# Patient Record
Sex: MALE | Race: WHITE | NOT HISPANIC OR LATINO | Employment: OTHER | URBAN - METROPOLITAN AREA
[De-identification: names, ages, dates, MRNs, and addresses within clinical notes are randomized per-mention and may not be internally consistent; named-entity substitution may affect disease eponyms.]

---

## 2017-01-07 ENCOUNTER — HOSPITAL ENCOUNTER (INPATIENT)
Facility: HOSPITAL | Age: 82
LOS: 5 days | Discharge: HOME HEALTH | End: 2017-01-13
Attending: EMERGENCY MEDICINE | Admitting: HOSPITALIST

## 2017-01-07 ENCOUNTER — APPOINTMENT (OUTPATIENT)
Dept: GENERAL RADIOLOGY | Facility: HOSPITAL | Age: 82
End: 2017-01-07

## 2017-01-07 DIAGNOSIS — E11.628 TYPE 2 DIABETES MELLITUS WITH OTHER SKIN COMPLICATION, WITHOUT LONG-TERM CURRENT USE OF INSULIN (HCC): ICD-10-CM

## 2017-01-07 DIAGNOSIS — L02.611 ABSCESS OF RIGHT FOOT: ICD-10-CM

## 2017-01-07 DIAGNOSIS — L03.115 CELLULITIS OF RIGHT FOOT: Primary | ICD-10-CM

## 2017-01-07 LAB
ALBUMIN SERPL-MCNC: 3.7 G/DL (ref 3.5–5.2)
ALBUMIN/GLOB SERPL: 1.5 G/DL
ALP SERPL-CCNC: 71 U/L (ref 40–129)
ALT SERPL W P-5'-P-CCNC: 11 U/L (ref 5–41)
ANION GAP SERPL CALCULATED.3IONS-SCNC: 14.3 MMOL/L
AST SERPL-CCNC: 12 U/L (ref 5–40)
BASOPHILS # BLD AUTO: 0.06 10*3/MM3 (ref 0–0.2)
BASOPHILS NFR BLD AUTO: 0.8 % (ref 0–2)
BILIRUB SERPL-MCNC: 0.4 MG/DL (ref 0.2–1.2)
BUN BLD-MCNC: 25 MG/DL (ref 8–23)
BUN/CREAT SERPL: 20.2 (ref 7–25)
CALCIUM SPEC-SCNC: 8.8 MG/DL (ref 8.8–10.5)
CHLORIDE SERPL-SCNC: 104 MMOL/L (ref 98–107)
CO2 SERPL-SCNC: 22.7 MMOL/L (ref 22–29)
CREAT BLD-MCNC: 1.24 MG/DL (ref 0.76–1.27)
DEPRECATED RDW RBC AUTO: 45.5 FL (ref 37–54)
EOSINOPHIL # BLD AUTO: 0.34 10*3/MM3 (ref 0.1–0.3)
EOSINOPHIL NFR BLD AUTO: 4.8 % (ref 0–4)
ERYTHROCYTE [DISTWIDTH] IN BLOOD BY AUTOMATED COUNT: 14 % (ref 11.5–14.5)
GFR SERPL CREATININE-BSD FRML MDRD: 56 ML/MIN/1.73
GLOBULIN UR ELPH-MCNC: 2.5 GM/DL
GLUCOSE BLD-MCNC: 163 MG/DL (ref 65–99)
HCT VFR BLD AUTO: 39 % (ref 42–52)
HGB BLD-MCNC: 12.6 G/DL (ref 14–18)
IMM GRANULOCYTES # BLD: 0.05 10*3/MM3 (ref 0–0.03)
IMM GRANULOCYTES NFR BLD: 0.7 % (ref 0–0.5)
LYMPHOCYTES # BLD AUTO: 0.99 10*3/MM3 (ref 0.6–4.8)
LYMPHOCYTES NFR BLD AUTO: 13.9 % (ref 20–45)
MCH RBC QN AUTO: 28.8 PG (ref 27–31)
MCHC RBC AUTO-ENTMCNC: 32.3 G/DL (ref 31–37)
MCV RBC AUTO: 89.2 FL (ref 80–94)
MONOCYTES # BLD AUTO: 0.84 10*3/MM3 (ref 0–1)
MONOCYTES NFR BLD AUTO: 11.8 % (ref 3–8)
NEUTROPHILS # BLD AUTO: 4.86 10*3/MM3 (ref 1.5–8.3)
NEUTROPHILS NFR BLD AUTO: 68 % (ref 45–70)
NRBC BLD MANUAL-RTO: 0 /100 WBC (ref 0–0)
PLATELET # BLD AUTO: 197 10*3/MM3 (ref 140–500)
PMV BLD AUTO: 10.8 FL (ref 7.4–10.4)
POTASSIUM BLD-SCNC: 4.3 MMOL/L (ref 3.5–5.2)
PROT SERPL-MCNC: 6.2 G/DL (ref 6–8.5)
RBC # BLD AUTO: 4.37 10*6/MM3 (ref 4.7–6.1)
SODIUM BLD-SCNC: 141 MMOL/L (ref 136–145)
WBC NRBC COR # BLD: 7.14 10*3/MM3 (ref 4.8–10.8)

## 2017-01-07 PROCEDURE — 85025 COMPLETE CBC W/AUTO DIFF WBC: CPT | Performed by: EMERGENCY MEDICINE

## 2017-01-07 PROCEDURE — 25010000002 VANCOMYCIN PER 500 MG: Performed by: EMERGENCY MEDICINE

## 2017-01-07 PROCEDURE — G0378 HOSPITAL OBSERVATION PER HR: HCPCS

## 2017-01-07 PROCEDURE — 80053 COMPREHEN METABOLIC PANEL: CPT | Performed by: EMERGENCY MEDICINE

## 2017-01-07 PROCEDURE — 99284 EMERGENCY DEPT VISIT MOD MDM: CPT

## 2017-01-07 PROCEDURE — 73630 X-RAY EXAM OF FOOT: CPT

## 2017-01-07 PROCEDURE — 99284 EMERGENCY DEPT VISIT MOD MDM: CPT | Performed by: EMERGENCY MEDICINE

## 2017-01-07 RX ORDER — CEPHALEXIN 500 MG/1
500 CAPSULE ORAL 4 TIMES DAILY
COMMUNITY
End: 2017-01-13 | Stop reason: HOSPADM

## 2017-01-07 RX ORDER — SODIUM CHLORIDE 9 MG/ML
INJECTION, SOLUTION INTRAVENOUS
Status: DISPENSED
Start: 2017-01-07 | End: 2017-01-08

## 2017-01-07 RX ORDER — SODIUM CHLORIDE 0.9 % (FLUSH) 0.9 %
10 SYRINGE (ML) INJECTION AS NEEDED
Status: DISCONTINUED | OUTPATIENT
Start: 2017-01-07 | End: 2017-01-13 | Stop reason: HOSPADM

## 2017-01-07 RX ORDER — ANTIOX #8/OM3/DHA/EPA/LUT/ZEAX 250-2.5 MG
1 CAPSULE ORAL 2 TIMES DAILY
COMMUNITY
End: 2017-11-21

## 2017-01-07 RX ADMIN — VANCOMYCIN HYDROCHLORIDE 2000 MG: 1 INJECTION, POWDER, LYOPHILIZED, FOR SOLUTION INTRAVENOUS at 22:27

## 2017-01-07 NOTE — Clinical Note
Level of Care: Med/Surg [1]   Admitting Physician: JULES SAENZ [[id number]]   Attending Physician: JULES SAENZ [[id number]]   Patient Class: Observation [104]

## 2017-01-07 NOTE — IP AVS SNAPSHOT
AFTER VISIT SUMMARY             Joey Schmitt           About your hospitalization     You were admitted on:  January 7, 2017 You last received care in the:  [facility] SURG       Procedures & Surgeries      Procedure(s) (LRB):  INCISION AND DRAINAGE LOWER EXTREMITY (Right)     1/7/2017 - 1/11/2017     Surgeon(s):  Mikael Dwyer DPM  -------------------      Medications    If you or your caregiver advised us that you are currently taking a medication and that medication is marked below as “Resume”, this simply indicates that we have reviewed those medications to make sure our new therapy recommendations do not interfere.  If you have concerns about medications other than those new ones which we are prescribing today, please consult the physician who prescribed them (or your primary physician).  Our review of your home medications is not meant to indicate that we are directing their use.             Your Medications      START taking these medications     amoxicillin-clavulanate 875-125 MG per tablet   Take 1 tablet by mouth 2 (Two) Times a Day.   Commonly known as:  AUGMENTIN   Next Dose Due:  01/13/2017 7:00 PM           HYDROcodone-acetaminophen 5-325 MG per tablet   Take 1 tablet by mouth Every 6 (Six) Hours As Needed for moderate pain (4-6).   Last time this was given:  1/12/2017  2:24 PM   Commonly known as:  NORCO           lactobacillus acidophilus capsule capsule   Take 1 capsule by mouth Daily.   Next Dose Due:  01/14/2017 8:00 AM           lisinopril 20 MG tablet   Take 1 tablet by mouth Daily.   Last time this was given:  1/13/2017  9:31 AM   Commonly known as:  PRINIVIL,ZESTRIL             CONTINUE taking these medications     glipiZIDE 10 MG tablet   Take 10 mg by mouth 2 (two) times a day before meals   Last time this was given:  1/13/2017  9:31 AM   Commonly known as:  GLUCOTROL   Next Dose Due:  01/13/2017 6:00 PM           levothyroxine 100 MCG tablet   Take 100 mcg by  mouth daily   Last time this was given:  1/13/2017  4:57 AM   Commonly known as:  SYNTHROID, LEVOTHROID   Next Dose Due:  01/14/2017 8:00 AM           meloxicam 7.5 MG tablet   Take 1 tablet by mouth Daily.   Last time this was given:  1/13/2017  9:31 AM   Commonly known as:  MOBIC   Next Dose Due:  01/14/2017 8:00 AM           metFORMIN 1000 MG tablet   Take 1,000 mg by mouth 2 (two) times a day with meals   Last time this was given:  1/13/2017  9:31 AM   Commonly known as:  GLUCOPHAGE   Next Dose Due:  01/13/2017 6:00 PM           montelukast 10 MG tablet   TAKE ONE TABLET DAILY IN THE EVENING   Last time this was given:  1/12/2017 10:23 PM   Commonly known as:  SINGULAIR           ONE TOUCH ULTRA 2 W/DEVICE kit   USE AS DIRECTED           PRESERVISION AREDS 2 capsule   Take 1 tablet by mouth 2 (Two) Times a Day.           VICTOZA 18 MG/3ML solution pen-injector   Inject under the skin   Generic drug:  Liraglutide             STOP taking these medications     ACTOS 30 MG tablet   Generic drug:  pioglitazone           cephalexin 500 MG capsule   Commonly known as:  KEFLEX           lisinopril-hydrochlorothiazide 20-25 MG per tablet   Commonly known as:  PRINZIDE,ZESTORETIC                Where to Get Your Medications      These medications were sent to [facility]/pharmacy #[id number] - [place], [place] - [address] - [phone]  - [phone] [address] [place] [address]     Phone:  [phone]     amoxicillin-clavulanate 875-125 MG per tablet    lactobacillus acidophilus capsule capsule    lisinopril 20 MG tablet         You can get these medications from any pharmacy     Bring a paper prescription for each of these medications     HYDROcodone-acetaminophen 5-325 MG per tablet          Pharmacy Instructions:      Patient fills scripts at [facility] pharmacy in [place].                         Your Medications      Your Medication List           Morning Noon Evening Bedtime As Needed     amoxicillin-clavulanate 875-125 MG per tablet   Take 1 tablet by mouth 2 (Two) Times a Day.   Commonly known as:  AUGMENTIN                       01/13/2017 7:00 PM               glipiZIDE 10 MG tablet   Take 10 mg by mouth 2 (two) times a day before meals   Commonly known as:  GLUCOTROL                       01/13/2017 6:00 PM               HYDROcodone-acetaminophen 5-325 MG per tablet   Take 1 tablet by mouth Every 6 (Six) Hours As Needed for moderate pain (4-6).   Commonly known as:  NORCO                                   lactobacillus acidophilus capsule capsule   Take 1 capsule by mouth Daily.            01/14/2017 8:00 AM                       levothyroxine 100 MCG tablet   Take 100 mcg by mouth daily   Commonly known as:  SYNTHROID, LEVOTHROID            01/14/2017 8:00 AM                       lisinopril 20 MG tablet   Take 1 tablet by mouth Daily.   Commonly known as:  PRINIVIL,ZESTRIL            01/14/2017 8:00 AM                       meloxicam 7.5 MG tablet   Take 1 tablet by mouth Daily.   Commonly known as:  MOBIC            01/14/2017 8:00 AM                       metFORMIN 1000 MG tablet   Take 1,000 mg by mouth 2 (two) times a day with meals   Commonly known as:  GLUCOPHAGE                       01/13/2017 6:00 PM               montelukast 10 MG tablet   TAKE ONE TABLET DAILY IN THE EVENING   Commonly known as:  SINGULAIR                    01/13/2017 6:00 PM               ONE TOUCH ULTRA 2 W/DEVICE kit   USE AS DIRECTED                                PRESERVISION AREDS 2 capsule   Take 1 tablet by mouth 2 (Two) Times a Day.                       01/13/2017 6:00 PM               VICTOZA 18 MG/3ML solution pen-injector   Inject under the skin   Generic drug:  Liraglutide                                         Pharmacy Instructions:      Patient fills scripts at [facility] pharmacy in [place].                 Instructions for After Discharge        Activity Instructions     Activity as Tolerated            Measure Blood Pressure                 Other Instructions     Check Blood Glucose - Fingerstick           Discharge Dressing / Wound Instructions       Dakin's wet to dry daily       Discharge Instructions       Review prior to discharge             Discharge References/Attachments     ABSCESS (ENGLISH)    CELLULITIS, EASY-TO-READ (ENGLISH)    DIABETES AND FOOT CARE (ENGLISH)    DIABETES AND SICK DAY MANAGEMENT (ENGLISH)    DIABETES AND EXERCISE-SPORTSMED (ENGLISH)    TYPE 2 DIABETES MELLITUS, ADULT, EASY-TO-READ (ENGLISH)       Follow-ups for After Discharge        Follow-up Information     Follow up with Romeo Chamberlain MD .    Specialty:  Family Medicine    Contact information:    [address]  [facility] [id number] [phone]          Follow up with [facility] .    Specialty:  Home Health Services    Contact information:    [address] [facility] [address]  [place] [id number] [phone]        Follow up with Mikael Dwyer DPM .    Specialty:  Podiatry    Contact information:    [address]  [address]  [place] [id number] [phone]        Referrals and Follow-ups to Schedule     Additional Follow-Up    As directed    Romeo Chamberlain MD   Follow Up Details:  1 week       Follow-Up    As directed    Dr. Dwyer   Follow Up Details:  within 1 week   Dr. Dwyer             Scheduled Appointments     Follow up with Dr. Romeo Chamberlain on  at 2:10    Follow up with Dr. Dwyer at the [place] office on  at 3:30 phone #[phone]           Pact Signup     [employer] allows you to send messages to your doctor, view your test results, renew your prescriptions, schedule appointments, and more. To sign up, go to [employer] and click on the Sign Up Now link in the New User? box. Enter your Pact Activation Code exactly as it appears below along with the last four digits of your Social Security Number and your Date of Birth () to complete  the sign-up process. If you do not sign up before the expiration date, you must request a new code.    MyChart Activation Code: [email or web address]  Expires: 1/27/2017 10:30 AM    If you have questions, you can email [email or web address] or call [phone] to talk to our MyChart staff. Remember, MyChart is NOT to be used for urgent needs. For medical emergencies, dial 911.           Summary of Your Hospitalization        Reason for Hospitalization     Your primary diagnosis was:  Not on File    Your diagnoses also included:  Cellulitis Of Foot, Right, Cellulitis Of Right Foot      Care Providers     Provider Service Role Specialty    Charlee Corona DO Internal Medicine Attending Provider Internal Medicine    Mikael Dwyer DPM -- Consulting Physician  Podiatry    Mikael Dwyer DPM -- Surgeon  Podiatry      Your Allergies  Date Reviewed: 1/11/2017    No active allergies      Pending Labs     Order Current Status    Anaerobic Culture In process    Wound Culture Preliminary result      Patient Belongings Returned     Document Return of Belongings Flowsheet     Were the patient bedside belongings sent home?   Yes   Belongings Retrieved from Security & Sent Home   N/A    Belongings Sent to Safe   --   Medications Retrieved from Pharmacy & Sent Home   Yes              More Information      Abscess  An abscess is an infected area that contains a collection of pus and debris. It can occur in almost any part of the body. An abscess is also known as a furuncle or boil.  CAUSES   An abscess occurs when tissue gets infected. This can occur from blockage of oil or sweat glands, infection of hair follicles, or a minor injury to the skin. As the body tries to fight the infection, pus collects in the area and creates pressure under the skin. This pressure causes pain. People with weakened immune systems have difficulty fighting infections and get certain abscesses more often.   SYMPTOMS  Usually an abscess develops  on the skin and becomes a painful mass that is red, warm, and tender. If the abscess forms under the skin, you may feel a moveable soft area under the skin. Some abscesses break open (rupture) on their own, but most will continue to get worse without care. The infection can spread deeper into the body and eventually into the bloodstream, causing you to feel ill.   DIAGNOSIS   Your caregiver will take your medical history and perform a physical exam. A sample of fluid may also be taken from the abscess to determine what is causing your infection.  TREATMENT   Your caregiver may prescribe antibiotic medicines to fight the infection. However, taking antibiotics alone usually does not cure an abscess. Your caregiver may need to make a small cut (incision) in the abscess to drain the pus. In some cases, gauze is packed into the abscess to reduce pain and to continue draining the area.  HOME CARE INSTRUCTIONS   · Only take over-the-counter or prescription medicines for pain, discomfort, or fever as directed by your caregiver.  · If you were prescribed antibiotics, take them as directed. Finish them even if you start to feel better.  · If gauze is used, follow your caregiver's directions for changing the gauze.  · To avoid spreading the infection:    Keep your draining abscess covered with a bandage.    Wash your hands well.    Do not share personal care items, towels, or whirlpools with others.    Avoid skin contact with others.  · Keep your skin and clothes clean around the abscess.  · Keep all follow-up appointments as directed by your caregiver.  SEEK MEDICAL CARE IF:   · You have increased pain, swelling, redness, fluid drainage, or bleeding.  · You have muscle aches, chills, or a general ill feeling.  · You have a fever.  MAKE SURE YOU:   · Understand these instructions.  · Will watch your condition.  · Will get help right away if you are not doing well or get worse.     This information is not intended to replace  advice given to you by your health care provider. Make sure you discuss any questions you have with your health care provider.     Document Released: 09/27/2006 Document Revised: 06/18/2013 Document Reviewed: 03/01/2013  [employer] Patient Education ©2016 [employer]          Cellulitis  Cellulitis is an infection of the skin and the tissue under the skin. The infected area is usually red and tender. This happens most often in the arms and lower legs.  HOME CARE   · Take your antibiotic medicine as told. Finish the medicine even if you start to feel better.  · Keep the infected arm or leg raised (elevated).  · Put a warm cloth on the area up to 4 times per day.  · Only take medicines as told by your doctor.  · Keep all doctor visits as told.  GET HELP IF:  · You see red streaks on the skin coming from the infected area.  · Your red area gets bigger or turns a dark color.  · Your bone or joint under the infected area is painful after the skin heals.  · Your infection comes back in the same area or different area.  · You have a puffy (swollen) bump in the infected area.  · You have new symptoms.  · You have a fever.  GET HELP RIGHT AWAY IF:   · You feel very sleepy.  · You throw up (vomit) or have watery poop (diarrhea).  · You feel sick and have muscle aches and pains.     This information is not intended to replace advice given to you by your health care provider. Make sure you discuss any questions you have with your health care provider.     Document Released: 06/05/2009 Document Revised: 09/07/2016 Document Reviewed: 03/04/2013  [employer] Patient Education ©2016 [employer] Inc.          Diabetes and Foot Care  Diabetes may cause you to have problems because of poor blood supply (circulation) to your feet and legs. This may cause the skin on your feet to become thinner, break easier, and heal more slowly. Your skin may become dry, and the skin may peel and crack. You may also have nerve damage  in your legs and feet causing decreased feeling in them. You may not notice minor injuries to your feet that could lead to infections or more serious problems. Taking care of your feet is one of the most important things you can do for yourself.   HOME CARE INSTRUCTIONS  · Wear shoes at all times, even in the house. Do not go barefoot. Bare feet are easily injured.  · Check your feet daily for blisters, cuts, and redness. If you cannot see the bottom of your feet, use a mirror or ask someone for help.  · Wash your feet with warm water (do not use hot water) and mild soap. Then pat your feet and the areas between your toes until they are completely dry. Do not soak your feet as this can dry your skin.  · Apply a moisturizing lotion or petroleum jelly (that does not contain alcohol and is unscented) to the skin on your feet and to dry, brittle toenails. Do not apply lotion between your toes.  · Trim your toenails straight across. Do not dig under them or around the cuticle. File the edges of your nails with an emery board or nail file.  · Do not cut corns or calluses or try to remove them with medicine.  · Wear clean socks or stockings every day. Make sure they are not too tight. Do not wear knee-high stockings since they may decrease blood flow to your legs.  · Wear shoes that fit properly and have enough cushioning. To break in new shoes, wear them for just a few hours a day. This prevents you from injuring your feet. Always look in your shoes before you put them on to be sure there are no objects inside.  · Do not cross your legs. This may decrease the blood flow to your feet.  · If you find a minor scrape, cut, or break in the skin on your feet, keep it and the skin around it clean and dry. These areas may be cleansed with mild soap and water. Do not cleanse the area with peroxide, alcohol, or iodine.  · When you remove an adhesive bandage, be sure not to damage the skin around it.  · If you have a wound, look at  it several times a day to make sure it is healing.  · Do not use heating pads or hot water bottles. They may burn your skin. If you have lost feeling in your feet or legs, you may not know it is happening until it is too late.  · Make sure your health care provider performs a complete foot exam at least annually or more often if you have foot problems. Report any cuts, sores, or bruises to your health care provider immediately.  SEEK MEDICAL CARE IF:   · You have an injury that is not healing.  · You have cuts or breaks in the skin.  · You have an ingrown nail.  · You notice redness on your legs or feet.  · You feel burning or tingling in your legs or feet.  · You have pain or cramps in your legs and feet.  · Your legs or feet are numb.  · Your feet always feel cold.  SEEK IMMEDIATE MEDICAL CARE IF:   · There is increasing redness, swelling, or pain in or around a wound.  · There is a red line that goes up your leg.  · Pus is coming from a wound.  · You develop a fever or as directed by your health care provider.  · You notice a bad smell coming from an ulcer or wound.     This information is not intended to replace advice given to you by your health care provider. Make sure you discuss any questions you have with your health care provider.     Document Released: 12/15/2001 Document Revised: 08/20/2014 Document Reviewed: 05/27/2014  [employer] Patient Education ©2016 [employer]          Diabetes and Sick Day Management  Blood sugar (glucose) can be more difficult to control when you are sick. Colds, fever, flu, nausea, vomiting, and diarrhea are all examples of common illnesses that can cause problems for people with diabetes. Loss of body fluids (dehydration) from fever, vomiting, diarrhea, infection, and the stress of a sickness can all cause blood glucose levels to increase. Because of this, it is very important to take your diabetes medicines and to eat some form of carbohydrate food when you are  sick. Liquid or soft foods are often tolerated, and they help to replace fluids.  HOME CARE INSTRUCTIONS  These main guidelines are intended for managing a short-term (24 hours or less) sickness:  · Take your usual dose of insulin or oral diabetes medicine. An exception would be if you take any form of metformin. If you cannot eat or drink, you can become dehydrated and should not take this medicine.  · Continue to take your insulin even if you are unable to eat solid foods or are vomiting. Your insulin dose may stay the same, or it may need to be increased when you are sick.  · You will need to test your blood glucose more often, generally every 2-4 hours. If you have type 1 diabetes, test your urine for ketones every 4 hours. If you have type 2 diabetes, test your urine for ketones as directed by your health care provider.  · Eat some form of food that contains carbohydrates. The carbohydrates can be in solid or liquid form. You should eat 45-50 g of carbohydrates every 3-4 hours.  · Replace fluids if you have a fever, vomit, or have diarrhea. Ask your health care provider for specific rehydration instructions.  · Watch carefully for the signs of ketoacidosis if you have type 1 diabetes. Call your health care provider if any of the following symptoms are present, especially in children:    Moderate to large ketones in the urine along with a high blood glucose level.    Severe nausea.    Vomiting.    Diarrhea.    Abdominal pain.    Rapid breathing.  · Drink extra liquids that do not contain sugar such as water.  · Be careful with over-the-counter medicines. Read the labels. They may contain sugar or types of sugars that can increase your blood glucose level.  Food Choices for Illness  All of the food choices below contain about 15 g of carbohydrates. Plan ahead and keep some of these foods around.   · ½ to ¾ cup carbonated beverage containing sugar. Carbonated beverages will usually be better tolerated if they are  opened and left at room temperature for a few minutes.  · ½ of a twin frozen ice pop.  · ½ cup regular gelatin.  · ½ cup juice.  · ½ cup ice cream or frozen yogurt.  · ½ cup cooked cereal.  · ¼ cup sherbet.  · 1 cup clear broth or soup.  · 1 cup cream soup.  · ½ cup regular custard.  · ½ cup regular pudding.  · 1 cup sports drink.  · 1 cup plain yogurt.  · 1 slice toast.  · 6 squares saltine crackers.  · 5 vanilla wafers.  SEEK MEDICAL CARE IF:   · You are unable to drink fluids, even small amounts.  · You have nausea and vomiting for more than 6 hours.  · You have diarrhea for more than 6 hours.  · Your blood glucose level is more than 240 mg/dL, even with additional insulin.  · There is a change in mental status.  · You develop an additional serious sickness.  · You have been sick for 2 days and are not getting better.  · You have a fever.  SEEK IMMEDIATE MEDICAL CARE IF:  · You have difficulty breathing.  · You have moderate to large ketone levels.  MAKE SURE YOU:  · Understand these instructions.  · Will watch your condition.  · Will get help right away if you are not doing well or get worse.     This information is not intended to replace advice given to you by your health care provider. Make sure you discuss any questions you have with your health care provider.     Document Released: 12/20/2004 Document Revised: 01/08/2016 Document Reviewed: 05/27/2014  [employer] Interactive Patient Education ©2016 [employer]          Diabetes and Exercise  Diabetes mellitus is a common, chronic disease in which a person's blood sugar (glucose) levels are often too high. Getting exercise on a regular basis is an important part of diabetes treatment.  WHY SHOULD I EXERCISE REGULARLY IF I HAVE DIABETES?  Exercising regularly provides many benefits for people who have diabetes. Some of these benefits include:  · Lowering your blood glucose level.  · Maintaining a healthy body weight and structure.  · Reducing your risk of  heart disease by:    Lowering your LDL cholesterol levels. LDL is sometimes called bad cholesterol.    Lowering your triglyceride levels.    Raising your HDL cholesterol levels. HDL is sometimes called good cholesterol.    Decreasing your blood pressure.  · Lowering your hemoglobin A1C levels. A1C is a blood test that determines your average blood glucose level over a 3-month period. Doing resistance exercises on a regular basis can help to lower these levels.  · Improving your body's ability to use insulin and glucose.  WHAT TYPES OF EXERCISE ARE RECOMMENDED FOR PEOPLE WHO HAVE DIABETES?  A combination of resistance exercises and aerobic exercise is recommended for people who have diabetes. Resistance exercises are those that you do with free weights or weight machines. Aerobic exercise includes any exercise that raises your heart rate and breathing rate for a sustained amount of time. This can include activities such as:  · Brisk walking.  · Water or dry-land aerobics.  · Bicycling or riding a stationary bike.  · Jogging.  · Dancing.  · Hiking.  · Moderate to vigorous gardening.  Ask your health care provider what types of exercise are safe for you.  HOW LONG AND HOW OFTEN SHOULD I EXERCISE?  The American Diabetes Association and the U.S. Department of Health recommend the following:  · Children who have diabetes or are at risk of developing diabetes (have prediabetes) should get 1 hour or more of exercise every day.  · Adults who have diabetes should exercise with moderate intensity for 150 minutes or more per week. Moderate-intensity exercise is any exercise that raises your heart rate to 50-70% of your maximum heart rate. Ask your health care provider how to calculate this.  · Adults who have diabetes should spread exercise over at least 3 days per week, but they should not go more than 2 days in a row without exercising.  · Adults who have diabetes should do resistance exercise at least 2 days per  week.  · Adults who have diabetes should avoid sitting for more than 90 minutes at a time.  WHAT SHOULD I DO BEFORE I START AN EXERCISE PROGRAM?  · Talk with your health care provider before you start a new exercise program. If you have, or are at risk for, certain medical conditions, you may need to have a physical exam and testing. Testing may include:    A chest X-ray.    An electrocardiogram (ECG). This test checks the electrical functioning of your heart.    Blood tests.  · If you have type 1 diabetes, talk with your health care provider about managing your blood glucose levels with insulin before, during, and after exercise.  · Understand that exercise affects blood glucose levels differently depending on how long you exercise and depending on other factors such as whether you are sick. Be ready to treat high blood glucose (hyperglycemia) and low blood glucose (hypoglycemia) right away if either occurs during or after exercise.  · Talk with your health care provider about diabetes-related problems that can occur during exercise. Your health care provider can help you to decide on an exercise plan that allows you to avoid these problems. This plan might include instructions about choosing the proper footwear for exercise and staying well hydrated during and after exercise.  HOW CAN I MANAGE MY BLOOD GLUCOSE LEVEL WHILE I EXERCISE?  · Eat 1-3 hours before you exercise.  · Check your blood glucose level immediately before and after exercise.  · Do not start exercising if:    Your blood glucose is more than 250 mg/dL and you do not feel well.    You have ketones in your urine.    Your blood glucose is less than 100 mg/dL.  · If you do not feel well while exercising, take a break and check your blood glucose.  · Stop exercising if you find that your blood glucose has dropped below 100 mg/dL. If this occurs, do the following:    Eat a 15-gram to 20-gram carbohydrate-rich snack.    Recheck your blood glucose level.     If your blood glucose level does not rise above 100 mg/dL, have another 15-gram to 20-gram carbohydrate snack.  · Stop exercising if you find that your blood glucose has risen above 250 mg/dL while exercising.  · Do not go on with your workout until:    Your blood glucose level rises above 100 mg/dL if it was below this level.    Your blood glucose level drops below 250 mg/dL if it was above this level.  · If you take insulin, you may need to alter your insulin schedule on the days that you exercise. This depends on how your blood glucose responds to exercise. Ask your health care provider how to do this.  · Drink fluids during and after exercise to avoid dehydration. For exercise that lasts a long time, use a sports drink to maintain your glucose level.  SEEK MEDICAL CARE IF:  · You have stopped exercising because of hypoglycemia and your blood glucose does not rise above 100 mg/dL after you have two 15-gram to 20-gram carbohydrate-rich snacks.  · You notice a loss of sensation in your feet during or after exercise.  · You have increased numbness, tingling, or pins-and-needles sensations after exercise.  · You have a fast, irregular heartbeat (palpitations) during or after exercise.  · Your exercise tolerance gets worse.  · You have dizzy spells or feel faint for brief periods during or after exercise.  SEEK IMMEDIATE MEDICAL CARE IF:  · You have chest pain during or after exercise.  · You pass out (lose consciousness) during or after exercise.  · You have the following in addition to hyperglycemia:    Fatigue.    Dry or flushed skin.    Difficulty breathing.    Confusion.    Nausea, vomiting, or pain in the abdomen.    Fruity-smelling breath.     This information is not intended to replace advice given to you by your health care provider. Make sure you discuss any questions you have with your health care provider.     Document Released: 12/18/2006 Document Revised: 09/07/2016 Document Reviewed:  02/16/2016  [employer] Interactive Patient Education ©2016 [employer]          Type 2 Diabetes Mellitus, Adult  Type 2 diabetes mellitus is a long-term (chronic) disease. In type 2 diabetes:  · The pancreas does not make enough of a hormone called insulin.  · The cells in the body do not respond as well to the insulin that is made.  · Both of the above can happen.  Normally, insulin moves sugars from food into tissue cells. This gives you energy. If you have type 2 diabetes, sugars cannot be moved into tissue cells. This causes high blood sugar (hyperglycemia).   Your doctors will set personal treatment goals for you based on your age, your medicines, how long you have had diabetes, and any other medical conditions you have. Generally, the goal of treatment is to maintain the following blood glucose levels:  · Before meals (preprandial):  mg/dL.  · After meals (postprandial): below 180 mg/dL.  · A1c: less than 6.5-7%.  HOME CARE  · Have your hemoglobin A1c level checked twice a year. The level shows if your diabetes is under control or out of control.  · Test your blood sugar level every day as told by your doctor.  · Check your ketone levels by testing your pee (urine) when you are sick and as told.  · Take your diabetes or insulin medicine as told by your doctor.    Never run out of insulin.    Adjust how much insulin you give yourself based on how many carbs (carbohydrates) you eat. Carbs are in many foods, such as fruits, vegetables, whole grains, and dairy products.  · Have a healthy snack between every healthy meal. Have 3 meals and 3 snacks a day.  · Lose weight if you are overweight.  · Carry a medical alert card or wear your medical alert jewelry.  · Carry a 15-gram carb snack with you at all times. Examples include:    Glucose pills, 3 or 4.    Glucose gel, 15-gram tube.    Raisins, 2 tablespoons (24 grams).    Jelly beans, 6.    Animal crackers, 8.    Regular (not diet) pop, 4 ounces (120  milliliters).    Gummy treats, 9.  · Notice low blood sugar (hypoglycemia) symptoms, such as:    Shaking (tremors).    Trouble thinking clearly.    Sweating.    Faster heart rate.    Headache.    Dry mouth.    Hunger.    Crabbiness (irritability).    Being worried or tense (anxious).    Restless sleep.    A change in speech or coordination.    Confusion.  · Treat low blood sugar right away. If you are alert and can swallow, follow the 15:15 rule:    Take 15-20 grams of a rapid-acting glucose or carb. This includes glucose gel, glucose pills, or 4 ounces (120 milliliters) of fruit juice, regular pop, or low-fat milk.    Check your blood sugar level 15 minutes after taking the glucose.    Take 15-20 grams more of glucose if the repeat blood sugar level is still 70 mg/dL (milligrams/deciliter) or below.    Eat a meal or snack within 1 hour of the blood sugar levels going back to normal.  · Notice early symptoms of high blood sugar, such as:    Being really thirsty or drinking a lot (polydipsia).    Peeing a lot (polyuria).  · Do at least 150 minutes of physical activity a week or as told.    Split the 150 minutes of activity up during the week. Do not do 150 minutes of activity in one day.    Perform exercises, such as weight lifting, at least 2 times a week or as told.    Spend no more than 90 minutes at one time inactive.  · Adjust your insulin or food intake as needed if you start a new exercise or sport.  · Follow your sick-day plan when you are not able to eat or drink as usual.  · Do not smoke, chew tobacco, or use electronic cigarettes.  · Women who are not pregnant should drink no more than 1 drink a day. Men should drink no more than 2 drinks a day.    Only drink alcohol with food.    Ask your doctor if alcohol is safe for you.    Tell your doctor if you drink alcohol several times during the week.  · See your doctor regularly.  · Schedule an eye exam soon after you are told you have diabetes. Schedule exams  once every year.  · Check your skin and feet every day. Check for cuts, bruises, redness, nail problems, bleeding, blisters, or sores. A doctor should do a foot exam once a year.  · Brush your teeth and gums twice a day. Floss once a day. Visit your dentist regularly.  · Share your diabetes plan with your workplace or school.  · Keep your shots that fight diseases (vaccines) up to date.    Get a flu (influenza) shot every year.    Get a pneumonia shot. If you are 65 years of age or older and you have never gotten a pneumonia shot, you might need to get two shots.    Ask your doctor which other shots you should get.  · Learn how to deal with stress.  · Get diabetes education and support as needed.  · Ask your doctor for special help if:    You need help to maintain or improve how you do things on your own.    You need help to maintain or improve the quality of your life.    You have foot or hand problems.    You have trouble cleaning yourself, dressing, eating, or doing physical activity.  GET HELP IF:  · You are unable to eat or drink for more than 6 hours.  · You feel sick to your stomach (nauseous) or throw up (vomit) for more than 6 hours.  · Your blood sugar level is over 240 mg/dL.  · There is a change in mental status.  · You get another serious illness.  · You have watery poop (diarrhea) for more than 6 hours.  · You have been sick or have had a fever for 2 or more days and are not getting better.  · You have pain when you are active.  GET HELP RIGHT AWAY IF:  · You have trouble breathing.  · Your ketone levels are higher than your doctor says they should be.  MAKE SURE YOU:  · Understand these instructions.  · Will watch your condition.  · Will get help right away if you are not doing well or get worse.     This information is not intended to replace advice given to you by your health care provider. Make sure you discuss any questions you have with your health care provider.     Document Released: 09/26/2009  Document Revised: 05/03/2016 Document Reviewed: 07/19/2013  [employer] Interactive Patient Education ©2016 [employer]         PREVENTING SURGICAL SITE INFECTIONS     Surgical Site Infections FAQs  What is a Surgical Site Infection (SSI)?  A surgical site infection is an infection that occurs after surgery in the part of the body where the surgery took place. Most patients who have surgery do not develop an infection. However, infections develop in about 1 to 3 out of every 100 patients who have surgery.  Some of the common symptoms of a surgical site infection are:  · Redness and pain around the area where you had surgery  · Drainage of cloudy fluid from your surgical wound  · Fever  Can SSIs be treated?  Yes. Most surgical site infections can be treated with antibiotics. The antibiotic given to you depends on the bacteria (germs) causing the infection. Sometimes patients with SSIs also need another surgery to treat the infection.  What are some of the things that hospitals are doing to prevent SSIs?  To prevent SSIs, doctors, nurses, and other healthcare providers:  · Clean their hands and arms up to their elbows with an antiseptic agent just before the surgery.  · Clean their hands with soap and water or an alcohol-based hand rub before and after caring for each patient.  · May remove some of your hair immediately before your surgery using electric clippers if the hair is in the same area where the procedure will occur. They should not shave you with a razor.  · Wear special hair covers, masks, gowns, and gloves during surgery to keep the surgery area clean.  · Give you antibiotics before your surgery starts. In most cases, you should get antibiotics within 60 minutes before the surgery starts and the antibiotics should be stopped within 24 hours after surgery.  · Clean the skin at the site of your surgery with a special soap that kills germs.  What can I do to help prevent SSIs?  Before your surgery:  · Tell  your doctor about other medical problems you may have. Health problems such as allergies, diabetes, and obesity could affect your surgery and your treatment.  · Quit smoking. Patients who smoke get more infections. Talk to your doctor about how you can quit before your surgery.  · Do not shave near where you will have surgery. Shaving with a razor can irritate your skin and make it easier to develop an infection.  At the time of your surgery:  · Speak up if someone tries to shave you with a razor before surgery. Ask why you need to be shaved and talk with your surgeon if you have any concerns.  · Ask if you will get antibiotics before surgery.  After your surgery:  · Make sure that your healthcare providers clean their hands before examining you, either with soap and water or an alcohol-based hand rub.    If you do not see your providers clean their hands, please ask them to do so.  · Family and friends who visit you should not touch the surgical wound or dressings.  · Family and friends should clean their hands with soap and water or an alcohol-based hand rub before and after visiting you. If you do not see them clean their hands, ask them to clean their hands.  What do I need to do when I go home from the hospital?  · Before you go home, your doctor or nurse should explain everything you need to know about taking care of your wound. Make sure you understand how to care for your wound before you leave the hospital.  · Always clean your hands before and after caring for your wound.  · Before you go home, make sure you know who to contact if you have questions or problems after you get home.  · If you have any symptoms of an infection, such as redness and pain at the surgery site, drainage, or fever, call your doctor immediately.  If you have additional questions, please ask your doctor or nurse.  Developed and co-sponsored by The Society for Healthcare Epidemiology of [place] (SHEA); Infectious Diseases Society of  [place] (IDSA); American Hospital Association; Association for Professionals in Infection Control and Epidemiology (APIC); Centers for Disease Control and Prevention (CDC); and The Joint Commission.     This information is not intended to replace advice given to you by your health care provider. Make sure you discuss any questions you have with your health care provider.     Document Released: 12/23/2014 Document Revised: 01/08/2016 Document Reviewed: 03/02/2016  [employer] Interactive Patient Education ©2016 [employer]             SYMPTOMS OF A STROKE    Call 911 or have someone take you to the Emergency Department if you have any of the following:    · Sudden numbness or weakness of your face, arm or leg especially on one side of the body  · Sudden confusion, diffiiculty speaking or trouble understanding   · Changes in your vision or loss of sight in one eye  · Sudden severe headache with no known cause  · sudden dizziness, trouble walking, loss of balance or coordination    It is important to seek emergency care right away if you have further stroke symptoms. If you get emergency help quickly, the powerful clot-dissolving medicines can reduce the disabilities caused by a stroke.     For more information:    American Stroke Association  0-786-2-STROKE  www.strokeassociation.org           IF YOU SMOKE OR USE TOBACCO PLEASE READ THE FOLLOWING:    Why is smoking bad for me?  Smoking increases the risk of heart disease, lung disease, vascular disease, stroke, and cancer.     If you smoke, STOP!    If you would like more information on quitting smoking, please visit the [employer] website: [email or web address]   This link will provide additional resources including the QUIT line and the [employer] support groups.     For more information:    American Cancer Society  ([phone] 973-7041    American Heart Association  1-975.852.4960               YOU ARE THE MOST IMPORTANT  FACTOR IN YOUR RECOVERY.     Follow all instructions carefully.     I have reviewed my discharge instructions with my nurse, including the following information, if applicable:     Information about my illness and diagnosis   Follow up appointments (including lab draws)   Wound Care   Equipment Needs   Medications (new and continuing) along with side effects   Preventative information such as vaccines and smoking cessations   Diet   Pain   I know when to contact my Doctor's office or seek emergency care      I want my nurse to describe the side effects of my medications: YES NO   If the answer is no, I understand the side effects of my medications: YES NO   My nurse described the side effects of my medications in a way that I could understand: YES NO   I have taken my personal belongings and my own medications with me at discharge: YES NO            I have received this information and my questions have been answered. I have discussed any concerns I see with this plan with the nurse or physician. I understand these instructions.    Signature of Patient or Responsible Person: _____________________________________    Date: _________________  Time: __________________    Signature of Healthcare Provider: _______________________________________  Date: _________________  Time: __________________

## 2017-01-08 PROBLEM — L03.115 CELLULITIS OF RIGHT FOOT: Status: ACTIVE | Noted: 2017-01-08

## 2017-01-08 LAB
ANION GAP SERPL CALCULATED.3IONS-SCNC: 14.3 MMOL/L
BUN BLD-MCNC: 22 MG/DL (ref 8–23)
BUN/CREAT SERPL: 18.8 (ref 7–25)
CALCIUM SPEC-SCNC: 8.8 MG/DL (ref 8.8–10.5)
CHLORIDE SERPL-SCNC: 104 MMOL/L (ref 98–107)
CO2 SERPL-SCNC: 24.7 MMOL/L (ref 22–29)
CREAT BLD-MCNC: 1.17 MG/DL (ref 0.76–1.27)
GFR SERPL CREATININE-BSD FRML MDRD: 60 ML/MIN/1.73
GLUCOSE BLD-MCNC: 117 MG/DL (ref 65–99)
GLUCOSE BLDC GLUCOMTR-MCNC: 124 MG/DL (ref 70–130)
GLUCOSE BLDC GLUCOMTR-MCNC: 66 MG/DL (ref 70–130)
GLUCOSE BLDC GLUCOMTR-MCNC: 71 MG/DL (ref 70–130)
GLUCOSE BLDC GLUCOMTR-MCNC: 87 MG/DL (ref 70–130)
GLUCOSE BLDC GLUCOMTR-MCNC: 88 MG/DL (ref 70–130)
HBA1C MFR BLD: 6.1 % (ref 4.8–5.6)
POTASSIUM BLD-SCNC: 4.1 MMOL/L (ref 3.5–5.2)
SODIUM BLD-SCNC: 143 MMOL/L (ref 136–145)

## 2017-01-08 PROCEDURE — 25010000002 VANCOMYCIN 1500 MG/250ML SOLUTION: Performed by: HOSPITALIST

## 2017-01-08 PROCEDURE — 80048 BASIC METABOLIC PNL TOTAL CA: CPT | Performed by: HOSPITALIST

## 2017-01-08 PROCEDURE — 25010000002 PIPERACILLIN-TAZOBACTAM: Performed by: HOSPITALIST

## 2017-01-08 PROCEDURE — 25010000002 PIPERACILLIN SOD-TAZOBACTAM PER 1 G: Performed by: HOSPITALIST

## 2017-01-08 PROCEDURE — 25010000002 ENOXAPARIN PER 10 MG: Performed by: HOSPITALIST

## 2017-01-08 PROCEDURE — 82962 GLUCOSE BLOOD TEST: CPT

## 2017-01-08 PROCEDURE — 99222 1ST HOSP IP/OBS MODERATE 55: CPT | Performed by: HOSPITALIST

## 2017-01-08 PROCEDURE — 83036 HEMOGLOBIN GLYCOSYLATED A1C: CPT | Performed by: HOSPITALIST

## 2017-01-08 RX ORDER — MELOXICAM 7.5 MG/1
7.5 TABLET ORAL DAILY
Status: DISCONTINUED | OUTPATIENT
Start: 2017-01-08 | End: 2017-01-13 | Stop reason: HOSPADM

## 2017-01-08 RX ORDER — MULTIPLE VITAMINS W/ MINERALS TAB 2148-113
1 TAB ORAL DAILY
Status: DISCONTINUED | OUTPATIENT
Start: 2017-01-08 | End: 2017-01-08 | Stop reason: CLARIF

## 2017-01-08 RX ORDER — MONTELUKAST SODIUM 10 MG/1
10 TABLET ORAL NIGHTLY
Status: DISCONTINUED | OUTPATIENT
Start: 2017-01-08 | End: 2017-01-13 | Stop reason: HOSPADM

## 2017-01-08 RX ORDER — LEVOTHYROXINE SODIUM 0.05 MG/1
TABLET ORAL
Status: COMPLETED
Start: 2017-01-08 | End: 2017-01-08

## 2017-01-08 RX ORDER — SODIUM CHLORIDE 0.9 % (FLUSH) 0.9 %
1-10 SYRINGE (ML) INJECTION AS NEEDED
Status: DISCONTINUED | OUTPATIENT
Start: 2017-01-08 | End: 2017-01-13 | Stop reason: HOSPADM

## 2017-01-08 RX ORDER — GLIPIZIDE 10 MG/1
10 TABLET ORAL
Status: DISCONTINUED | OUTPATIENT
Start: 2017-01-08 | End: 2017-01-13 | Stop reason: HOSPADM

## 2017-01-08 RX ORDER — VANCOMYCIN HCL-SODIUM CHLORIDE IV SOLN 1.5 GM/250ML-0.9% 1.5-0.9/25 GM/ML-%
1500 SOLUTION INTRAVENOUS EVERY 12 HOURS
Status: DISCONTINUED | OUTPATIENT
Start: 2017-01-08 | End: 2017-01-09

## 2017-01-08 RX ORDER — ACETAMINOPHEN 325 MG/1
650 TABLET ORAL EVERY 4 HOURS PRN
Status: DISCONTINUED | OUTPATIENT
Start: 2017-01-08 | End: 2017-01-13 | Stop reason: HOSPADM

## 2017-01-08 RX ORDER — LISINOPRIL 20 MG/1
20 TABLET ORAL
Status: DISCONTINUED | OUTPATIENT
Start: 2017-01-08 | End: 2017-01-13 | Stop reason: HOSPADM

## 2017-01-08 RX ORDER — NICOTINE POLACRILEX 4 MG
15 LOZENGE BUCCAL AS NEEDED
Status: DISCONTINUED | OUTPATIENT
Start: 2017-01-08 | End: 2017-01-13 | Stop reason: HOSPADM

## 2017-01-08 RX ORDER — MULTIPLE VITAMINS W/ MINERALS TAB 9MG-400MCG
1 TAB ORAL DAILY
Status: DISCONTINUED | OUTPATIENT
Start: 2017-01-08 | End: 2017-01-13 | Stop reason: HOSPADM

## 2017-01-08 RX ORDER — DEXTROSE MONOHYDRATE 25 G/50ML
25 INJECTION, SOLUTION INTRAVENOUS AS NEEDED
Status: DISCONTINUED | OUTPATIENT
Start: 2017-01-08 | End: 2017-01-13 | Stop reason: HOSPADM

## 2017-01-08 RX ORDER — LEVOTHYROXINE SODIUM 0.1 MG/1
100 TABLET ORAL
Status: DISCONTINUED | OUTPATIENT
Start: 2017-01-08 | End: 2017-01-13 | Stop reason: HOSPADM

## 2017-01-08 RX ADMIN — PIPERACILLIN AND TAZOBACTAM 3.38 G: 3; .375 INJECTION, POWDER, FOR SOLUTION INTRAVENOUS at 09:45

## 2017-01-08 RX ADMIN — MONTELUKAST SODIUM 10 MG: 10 TABLET, FILM COATED ORAL at 22:30

## 2017-01-08 RX ADMIN — PIPERACILLIN AND TAZOBACTAM 3.38 G: 3; .375 INJECTION, POWDER, FOR SOLUTION INTRAVENOUS at 22:31

## 2017-01-08 RX ADMIN — ENOXAPARIN SODIUM 40 MG: 40 INJECTION SUBCUTANEOUS at 06:57

## 2017-01-08 RX ADMIN — LEVOTHYROXINE SODIUM 100 MCG: 50 TABLET ORAL at 06:56

## 2017-01-08 RX ADMIN — MELOXICAM 7.5 MG: 7.5 TABLET ORAL at 08:18

## 2017-01-08 RX ADMIN — LISINOPRIL 20 MG: 20 TABLET ORAL at 08:18

## 2017-01-08 RX ADMIN — VANCOMYCIN HCL-SODIUM CHLORIDE IV SOLN 1.5 GM/250ML-0.9% 1500 MG: 1.5-0.9/25 SOLUTION at 12:08

## 2017-01-08 RX ADMIN — PIPERACILLIN AND TAZOBACTAM 3.38 G: 3; .375 INJECTION, POWDER, FOR SOLUTION INTRAVENOUS at 18:12

## 2017-01-08 RX ADMIN — LEVOTHYROXINE SODIUM 100 MCG: 100 TABLET ORAL at 06:56

## 2017-01-08 RX ADMIN — VANCOMYCIN HCL-SODIUM CHLORIDE IV SOLN 1.5 GM/250ML-0.9% 1500 MG: 1.5-0.9/25 SOLUTION at 23:33

## 2017-01-08 RX ADMIN — MULTIPLE VITAMINS W/ MINERALS TAB 1 TABLET: TAB at 08:18

## 2017-01-08 NOTE — PLAN OF CARE
Problem: Patient Care Overview (Adult)  Goal: Plan of Care Review  Outcome: Ongoing (interventions implemented as appropriate)    01/08/17 0239   Coping/Psychosocial Response Interventions   Plan Of Care Reviewed With patient   Patient Care Overview   Progress progress toward functional goals is gradual   Outcome Evaluation   Outcome Summary/Follow up Plan New admit, right foot cellulitis, IV Vancomycin, pharmacy to dose, Pediatry consult in the AM        Goal: Adult Individualization and Mutuality  Outcome: Ongoing (interventions implemented as appropriate)  Goal: Discharge Needs Assessment  Outcome: Ongoing (interventions implemented as appropriate)    Problem: Skin Integrity Impairment, Risk/Actual (Adult)  Goal: Identify Related Risk Factors and Signs and Symptoms  Outcome: Ongoing (interventions implemented as appropriate)  Goal: Skin Integrity/Wound Healing  Outcome: Ongoing (interventions implemented as appropriate)    Problem: Pain, Acute (Adult)  Goal: Identify Related Risk Factors and Signs and Symptoms  Outcome: Ongoing (interventions implemented as appropriate)  Goal: Acceptable Pain Control/Comfort Level  Outcome: Ongoing (interventions implemented as appropriate)

## 2017-01-08 NOTE — H&P
[facility] MEDICAL [facility] HOSPITALIST     Romeo Chamberlain MD    CHIEF COMPLAINT: Right foot pain, swelling and redness    HISTORY OF PRESENT ILLNESS:    80 y/o male with DM-2, hypothyroidism, hypertension, dyslipidemia and prostate cancer presented to the ER secondary to right foot swelling, redness and pain.  The patient notes on Tuesday of this past week he got up out of his recliner and stepped on a toothpick that was on the floor. He was only wearing socks at the time.  He pulled the toothpick out of his foot and thought her got it all. The next day that area started to become sore.  He then began to get some swelling and redness in that area.  He saw his PCP Friday and was started on Keflex.  After almost two days on the Keflex the area of redness was starting to spread back toward the patient's ankle and he was having increasing difficulty bearing weight secondary to increasing pain so the patient decided to come to the ER for evaluation.  Patient denies any F/C, N/V/D.  Denies CP, SOA or heart palpitations.  Denies recent illness.       Past Medical History   Diagnosis Date   • Diabetes mellitus    • Disease of thyroid gland    • Hyperlipidemia    • Hypertension    • Prostate cancer      Past Surgical History   Procedure Laterality Date   • Prostate surgery     • Shoulder surgery Left      Family History   Problem Relation Age of Onset   • Heart disease Father      Social History   Substance Use Topics   • Smoking status: Never Smoker   • Smokeless tobacco: Never Used   • Alcohol use No     Prescriptions Prior to Admission   Medication Sig Dispense Refill Last Dose   • cephalexin (KEFLEX) 500 MG capsule Take 500 mg by mouth 4 (Four) Times a Day.   1/7/2017 at 18:00   • glipiZIDE (GLUCOTROL) 10 MG tablet Take 10 mg by mouth 2 (two) times a day before meals   1/7/2017 at 18:00   • levothyroxine (SYNTHROID, LEVOTHROID) 100 MCG tablet Take 100 mcg by mouth daily   1/7/2017 at 09:00   • Liraglutide (VICTOZA)  "18 MG/3ML solution pen-injector Inject under the skin   1/7/2017 at 09:00   • lisinopril-hydrochlorothiazide (PRINZIDE,ZESTORETIC) 20-25 MG per tablet Take 1 tablet by mouth daily   1/7/2017 at 09:00   • meloxicam (MOBIC) 7.5 MG tablet Take 1 tablet by mouth Daily. 30 tablet 5 1/7/2017 at 09:00   • metFORMIN (GLUCOPHAGE) 1000 MG tablet Take 1,000 mg by mouth 2 (two) times a day with meals   1/7/2017 at 18:00   • montelukast (SINGULAIR) 10 MG tablet TAKE ONE TABLET DAILY IN THE EVENING  11 1/7/2017 at 18:00   • Multiple Vitamins-Minerals (PRESERVISION AREDS 2) capsule Take 1 tablet by mouth 2 (Two) Times a Day.   1/7/2017 at 18:00   • pioglitazone (ACTOS) 30 MG tablet Take 30 mg by mouth daily   1/7/2017 at 09:00   • Blood Glucose Monitoring Suppl (ONE TOUCH ULTRA 2) W/DEVICE kit USE AS DIRECTED  0 Taking     Allergies:  Review of patient's allergies indicates no known allergies.    REVIEW OF SYSTEMS:  Please see the above history of present illness for pertinent positives and negatives.  The remainder of the patient's systems have been reviewed and are negative.     Vital Signs  Temp:  [97.2 °F (36.2 °C)-97.6 °F (36.4 °C)] 97.6 °F (36.4 °C)  Heart Rate:  [76-86] 76  Resp:  [18-20] 18  BP: (131-165)/(72-84) 144/79   O2 Saturations: 97% on RA    Flowsheet Rows         First Filed Value    Admission Height  74\" (188 cm) Documented at 01/07/2017 2117    Admission Weight  240 lb (109 kg) Documented at 01/07/2017 2117           Physical Exam:  Physical Exam   Constitutional: Patient appears well-developed and well-nourished and in no acute distress   HEENT:   Head: Normocephalic and atraumatic.   Eyes:  Pupils are equal, round, and reactive to light. EOM are intact. Sclera are anicteric and non-injected.  Mouth and Throat: Patient has moist mucous membranes. Oropharynx is clear of any erythema or exudate.     Neck: Neck supple. No JVD present. No thyromegaly present. No lymphadenopathy present.  Cardiovascular: Regular " rate, regular rhythm, S1 normal and S2 normal.  Exam reveals no gallop and no friction rub.  No murmur heard.  Pulmonary/Chest: Lungs are clear to auscultation bilaterally. No respiratory distress. No wheezes. No rhonchi. No rales.   Abdominal: Soft. Bowel sounds are normal. No distension and no mass. There is no hepatosplenomegaly. There is no tenderness.   Musculoskeletal: Normal Muscle tone  Extremities: Pulses are palpable in all 4 extremities. Erythema of right foot with darker area along the medial aspect. Redness extends back to the heel and associated edema.  Neurological: Patient is alert and oriented to person, place, and time. Cranial nerves II-XII are grossly intact with no focal deficits.  Skin: Skin is warm. Nails show no clubbing.      Results Review:    I reviewed the patient's new clinical results.  Lab Results (most recent)     Procedure Component Value Units Date/Time    CBC & Differential [[id number]] Collected:  01/07/17 2209    Specimen:  Blood Updated:  01/07/17 2220    Narrative:       The following orders were created for panel order CBC & Differential.  Procedure                               Abnormality         Status                     ---------                               -----------         ------                     CBC Auto Differential[[id number]]         Abnormal            Final result                 Please view results for these tests on the individual orders.    CBC Auto Differential [[id number]]  (Abnormal) Collected:  01/07/17 2209    Specimen:  Blood Updated:  01/07/17 2220     WBC 7.14 10*3/mm3      RBC 4.37 (L) 10*6/mm3      Hemoglobin 12.6 (L) g/dL      Hematocrit 39.0 (L) %      MCV 89.2 fL      MCH 28.8 pg      MCHC 32.3 g/dL      RDW 14.0 %      RDW-SD 45.5 fl      MPV 10.8 (H) fL      Platelets 197 10*3/mm3      Neutrophil % 68.0 %      Lymphocyte % 13.9 (L) %      Monocyte % 11.8 (H) %      Eosinophil % 4.8 (H) %      Basophil % 0.8 %      Immature Grans % 0.7 (H) %       Neutrophils, Absolute 4.86 10*3/mm3      Lymphocytes, Absolute 0.99 10*3/mm3      Monocytes, Absolute 0.84 10*3/mm3      Eosinophils, Absolute 0.34 (H) 10*3/mm3      Basophils, Absolute 0.06 10*3/mm3      Immature Grans, Absolute 0.05 (H) 10*3/mm3      nRBC 0.0 /100 WBC     Comprehensive Metabolic Panel [[id number]]  (Abnormal) Collected:  01/07/17 2209    Specimen:  Blood Updated:  01/07/17 2239     Glucose 163 (H) mg/dL      BUN 25 (H) mg/dL      Creatinine 1.24 mg/dL      Sodium 141 mmol/L      Potassium 4.3 mmol/L      Chloride 104 mmol/L      CO2 22.7 mmol/L      Calcium 8.8 mg/dL      Total Protein 6.2 g/dL      Albumin 3.70 g/dL      ALT (SGPT) 11 U/L      AST (SGOT) 12 U/L      Alkaline Phosphatase 71 U/L      Total Bilirubin 0.4 mg/dL      eGFR Non African Amer 56 (L) mL/min/1.73      Globulin 2.5 gm/dL      A/G Ratio 1.5 g/dL      BUN/Creatinine Ratio 20.2      Anion Gap 14.3 mmol/L     Narrative:       The MDRD GFR formula is only valid for adults with stable renal function between ages 18 and 70.    Hemoglobin A1c [[id number]]  (Abnormal) Collected:  01/08/17 0134    Specimen:  Blood Updated:  01/08/17 0151     Hemoglobin A1C 6.10 (H) %     Narrative:       Hemoglobin A1C Ranges:    Increased Risk for Diabetes  5.7% to 6.4%  Diabetes                     >= 6.5%  Diabetic Goal                < 7.0%    POC Glucose Fingerstick [[id number]]  (Abnormal) Collected:  01/08/17 0756    Specimen:  Blood Updated:  01/08/17 0805     Glucose 66 (L) mg/dL     Narrative:       Meter: [id number] : [id number] Alfonso MADRIGAL    POC Glucose Fingerstick [[id number]]  (Normal) Collected:  01/08/17 0822    Specimen:  Blood Updated:  01/08/17 0831     Glucose 88 mg/dL     Narrative:       Meter: [id number] : [id number] Lester Shipley    Basic Metabolic Panel [[id number]] Collected:  01/08/17 0841    Specimen:  Blood Updated:  01/08/17 0844          Imaging Results (most recent)     Procedure Component Value Units Date/Time     XR Foot 3+ View Right [[id number]] Collected:  01/08/17 0811     Updated:  01/08/17 0814    Narrative:       INDICATION: Stepped on a toothpick last Wednesday with pain and swelling  since.     TECHNIQUE: 3 views the foot were obtained     FINDINGS: 3 views of the foot show mild degenerative changes along the  tarsometatarsal joint line and at the first MTP joint. Heel spurs are  noted. No radiodense foreign bodies are seen. No active cortical bone  destruction is noted.       Impression:       Degenerative changes as described.     This report was finalized on 1/8/2017 8:12 AM by Dr. Compa George MD.               ECG/EMG Results (most recent)     None            Assessment/Plan     1. Cellulitis right foot: Patient initially started on vancomycin, will add zosyn today as need coverage for gram negatives given this wound initiated from a foreign body on the floor.  Podiatry consulted.  WBC WNL and patient afebrile.  Monitor.    2. DM-2: Well controlled with HbA1c of 6.1.  Patient continued on home glipizide, but home actos and victoza held for now.  On SSI with Accu checks, monitor. CC diet.    3. 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  Monitor.    4. HTN: BP mostly WNL on home meds, monitor.    5. Hypothyroidism: Continue home replacement.    6. Dyslipidemia:  On no medications currently, defer to PCP at F/U.    7. Prostate Cancer: s/p prostatectomy many years ago.    I discussed the patients findings and my recommendations with patient.     Gwen Townsend MD  01/08/17  8:59 AM

## 2017-01-08 NOTE — CONSULTS
"Adult Nutrition  Assessment/PES    Patient Name:  Joey Schmitt  YOB: 1935  MRN: [id number]  Admit Date:  1/7/2017    Assessment Date:  1/8/2017        Reason for Assessment       01/08/17 1407    Reason for Assessment    Reason For Assessment/Visit education;physician consult    Diagnosis --   Cellulitis of foot               Nutrition/Diet History       01/08/17 1407    Nutrition/Diet History    Typical Food/Fluid Intake Pt with  at visit, will cont to follow.            Anthropometrics       01/08/17 1407    Anthropometrics    RD Documented Current Weight  109 kg (240 lb 4.8 oz)    Anthropometrics (Special Considerations)    RD Calculated BMI (kg/m2) 30.9    Body Mass Index (BMI)    BMI Grade 30 - 34.9- obesity - grade I            Labs/Tests/Procedures/Meds       01/08/17 1408    Labs/Tests/Procedures/Meds    Labs/Tests Review Reviewed;Glucose;Hgb A1C    Medication Review Reviewed, pertinent;Diabetic Oral Agent;Insulin;Multivitamin              Estimated/Assessed Needs       01/08/17 1408    Calculation Measurements    Weight Used For Calculations 109 kg (240 lb 4.8 oz)    Height Used for Calculations 1.88 m (6' 2.02\")    Estimated/Assessed Energy Needs    Energy Need Method [place]-St Jeor    Age 81    RMR ([place]-St. Jeor Equation) 1870    Activity Factors ([place] St Jeor)  Confined to bed  1.2    Total estimated needs ([place] St. Jeor) 2244 kcal    Estimated Kcal Range  1745 kcal ( mifflin 1.2 - 500 kcal for obesity) 195 gm cho/day (45% of needs)    Estimated/Assessed Protein Needs    Weight Used for Protein Calculation 109 kg (240 lb 4.8 oz)    Protein (gm/kg) 0.8    0.8 Gm Protein (gm) 87.2    Estimated/Assessed Fluid Needs    Fluid Need Method RDA method    RDA Method (mL)  1745            Nutrition Prescription Ordered       01/08/17 1409    Nutrition Prescription PO    Common Modifiers Cardiac;Consistent Carbohydrate            Evaluation of Received Nutrient/Fluid Intake "       01/08/17 1409    Evaluation of Received Nutrient/Fluid Intake    Number of Days Evaluated 1 day    Nutrition Delivered Fluid Evaluation    Fluid Intake Evaluation    IV Fluid (mL) 350    Total Fluid Intake (mL) 350    % Fluid Needs 20%    PO Evaluation    Number of Days PO Intake Evaluated Insufficient Data    % PO Intake no data available               Problem/Interventions:        Problem 1       01/08/17 1410    Nutrition Diagnoses Problem 1    Problem 1 Knowledge Deficit    Etiology (related to) Medical Diagnosis    Signs/Symptoms (evidenced by) Potential Information Deficit                    Intervention Goal       01/08/17 1410    Intervention Goal    General Provide information regarding MNT for treatment/condition    PO PO intake (%)    PO Intake % 50 %   greater 50% of meals            Nutrition Intervention       01/08/17 1411    Nutrition Intervention    RD/Tech Action Follow Tx progress            Nutrition Prescription       01/08/17 1411    Other Orders    Other continue current diet             Education/Evaluation       01/08/17 1411    Education    Education Other (comment)   Pt with other care provider will cont to follow    Monitor/Evaluation    Monitor Per protocol;I&O;PO intake;Pertinent labs;Weight;Skin status        Comments:    Agree with current diet. Will cont to follow and provide edu.    Electronically signed by:  Bianca Tang RD  01/08/17 2:11 PM

## 2017-01-08 NOTE — NURSING NOTE
Spoke with patient and with his permission wife and two daughters at bedside.  patient lives with wife in a multi level home.  patient is able to stay on one level.  He has been independent and still driving some prior to admission.  patient has no home oxygen, medical equipment or home health services in place at this time.  He fills scripts at [facility] pharmacy in [place] and has no issues getting his medicines.  patient says the plan and goal is home when medically ready.  Explained Medicare Important Message and gave signed copy to wife.   will follow and assist as needed.

## 2017-01-08 NOTE — ED PROVIDER NOTES
Subjective   History of Present Illness  History of Present Illness    Chief complaint: Foot pain, redness, swelling    Location: Right foot    Quality/Severity:  Moderate pain    Timing/Duration: Worsening over the past 3 days    Modifying Factors: Worse with weightbearing or ambulation    Narrative: Patient says that on Tuesday of this past week he got up out of his recliner at home and actually stepped on a toothpick on the floor.  He was not wearing shoes but may have been wearing socks at the time.  He remembers pulling the tooth back out of his foot and thought he had at all removed at that time.  He went about his day as normal from there.  The following day he began having some new tenderness and pain in this area which was subsequently followed by some area of redness and swelling.  He went to see his primary care doctor on Thursday and was prescribed Keflex for potential infection concerns.  He has taken about 7 pills of the Keflex so far but it does not seem to be helping his symptoms.  Now he had his family are concerned because the redness and swelling are increasing and spreading back towards his ankle.  He denies any fevers.  He says the pain is increasing and making it harder for him to bear any weight on that foot.  He does have a history of diabetes and has never had any irritations or foot wounds in the past.  He is not established with any podiatrist for routine foot care.    Associated Symptoms: As above    Review of Systems   Constitutional: Negative for activity change, appetite change, chills and fever.   HENT: Negative.    Eyes: Negative for pain and visual disturbance.   Respiratory: Negative for cough and shortness of breath.    Cardiovascular: Negative for chest pain.   Gastrointestinal: Negative for abdominal pain, nausea and vomiting.   Genitourinary: Negative for dysuria.   Musculoskeletal: Positive for arthralgias and myalgias.   Skin: Positive for rash and wound. Negative for color  change.   Neurological: Negative for syncope and headaches.   Psychiatric/Behavioral: Negative for confusion.   All other systems reviewed and are negative.      Past Medical History   Diagnosis Date   • Diabetes mellitus    • Disease of thyroid gland    • Hyperlipidemia    • Hypertension    • Prostate cancer        No Known Allergies    Past Surgical History   Procedure Laterality Date   • Prostate surgery     • Shoulder surgery Left        History reviewed. No pertinent family history.    Social History     Social History   • Marital status:      Spouse name: N/A   • Number of children: N/A   • Years of education: N/A     Social History Main Topics   • Smoking status: Never Smoker   • Smokeless tobacco: Never Used   • Alcohol use No   • Drug use: No   • Sexual activity: Defer     Other Topics Concern   • None     Social History Narrative   • None         ED Triage Vitals   Temp Heart Rate Resp BP SpO2   01/07/17 2117 01/07/17 2117 01/07/17 2117 01/07/17 2117 01/07/17 2117   97.4 °F (36.3 °C) 86 20 165/84 97 %      Temp src Heart Rate Source Patient Position BP Location FiO2 (%)   01/07/17 2117 01/07/17 2117 01/07/17 2117 01/07/17 2117 --   Oral Monitor Sitting Right arm        Objective   Physical Exam   Constitutional: He is oriented to person, place, and time. He appears well-developed and well-nourished. No distress.   HENT:   Head: Normocephalic and atraumatic.   Eyes: EOM are normal. Pupils are equal, round, and reactive to light. Right eye exhibits no discharge. Left eye exhibits no discharge.   Neck: Normal range of motion. Neck supple.   Cardiovascular: Normal rate, regular rhythm and intact distal pulses.    Pulmonary/Chest: Effort normal. No respiratory distress.   Musculoskeletal: Normal range of motion. He exhibits tenderness. He exhibits no edema or deformity.   The right foot is markedly tender at the distal first metatarsal and first MTP joint.  The plantar aspect in this region shows a small  puncture wound but it is difficult to discern whether there may be a retained foreign body within this wound.  There is significant surrounding erythema and induration present and spreading outward from the first MTP region.  The induration spreads superiorly to the dorsal aspect of the midfoot.  The proximal foot and ankle appears to be spared at this time.   Neurological: He is alert and oriented to person, place, and time. He exhibits normal muscle tone.   Skin: Skin is warm and dry. No rash noted. He is not diaphoretic. No erythema.   Psychiatric: He has a normal mood and affect. His behavior is normal. Judgment and thought content normal.   Nursing note and vitals reviewed.    Results for orders placed or performed during the hospital encounter of 01/07/17   Comprehensive Metabolic Panel   Result Value Ref Range    Glucose 163 (H) 65 - 99 mg/dL    BUN 25 (H) 8 - 23 mg/dL    Creatinine 1.24 0.76 - 1.27 mg/dL    Sodium 141 136 - 145 mmol/L    Potassium 4.3 3.5 - 5.2 mmol/L    Chloride 104 98 - 107 mmol/L    CO2 22.7 22.0 - 29.0 mmol/L    Calcium 8.8 8.8 - 10.5 mg/dL    Total Protein 6.2 6.0 - 8.5 g/dL    Albumin 3.70 3.50 - 5.20 g/dL    ALT (SGPT) 11 5 - 41 U/L    AST (SGOT) 12 5 - 40 U/L    Alkaline Phosphatase 71 40 - 129 U/L    Total Bilirubin 0.4 0.2 - 1.2 mg/dL    eGFR Non African Amer 56 (L) >60 mL/min/1.73    Globulin 2.5 gm/dL    A/G Ratio 1.5 g/dL    BUN/Creatinine Ratio 20.2 7.0 - 25.0    Anion Gap 14.3 mmol/L   CBC Auto Differential   Result Value Ref Range    WBC 7.14 4.80 - 10.80 10*3/mm3    RBC 4.37 (L) 4.70 - 6.10 10*6/mm3    Hemoglobin 12.6 (L) 14.0 - 18.0 g/dL    Hematocrit 39.0 (L) 42.0 - 52.0 %    MCV 89.2 80.0 - 94.0 fL    MCH 28.8 27.0 - 31.0 pg    MCHC 32.3 31.0 - 37.0 g/dL    RDW 14.0 11.5 - 14.5 %    RDW-SD 45.5 37.0 - 54.0 fl    MPV 10.8 (H) 7.4 - 10.4 fL    Platelets 197 140 - 500 10*3/mm3    Neutrophil % 68.0 45.0 - 70.0 %    Lymphocyte % 13.9 (L) 20.0 - 45.0 %    Monocyte % 11.8 (H)  3.0 - 8.0 %    Eosinophil % 4.8 (H) 0.0 - 4.0 %    Basophil % 0.8 0.0 - 2.0 %    Immature Grans % 0.7 (H) 0.0 - 0.5 %    Neutrophils, Absolute 4.86 1.50 - 8.30 10*3/mm3    Lymphocytes, Absolute 0.99 0.60 - 4.80 10*3/mm3    Monocytes, Absolute 0.84 0.00 - 1.00 10*3/mm3    Eosinophils, Absolute 0.34 (H) 0.10 - 0.30 10*3/mm3    Basophils, Absolute 0.06 0.00 - 0.20 10*3/mm3    Immature Grans, Absolute 0.05 (H) 0.00 - 0.03 10*3/mm3    nRBC 0.0 0.0 - 0.0 /100 WBC       RADIOLOGY        Study: Right foot x-ray    Findings: No fracture, dislocation or radiopaque foreign body    Interpreted Contemporaneously by myself, independently viewed by me      CONSULT        Provider:  Dr. porter    Discussion: Reviewed clinical presentation, labs, and x-ray.  Agrees that vancomycin is appropriate initial therapy.  No request for further orders at this time.  Agrees to accept for continued management tonight     Agreeable c treatment and planned disposition.          Procedures         ED Course  ED Course   Comment By Time   Patient appears to be failing outpatient management of this right foot cellulitis as this erythema is advancing despite being on Keflex therapy for her was 48 hours.  Given his history of diabetes, I started a more aggressive therapy with IV vancomycin tonight.  We'll plan to admit him for further management and possible podiatry consultation as needed Placido Chambers MD 01/07 6151                  MDM  Number of Diagnoses or Management Options     Amount and/or Complexity of Data Reviewed  Clinical lab tests: ordered and reviewed  Tests in the radiology section of CPT®: ordered and reviewed  Independent visualization of images, tracings, or specimens: yes    Risk of Complications, Morbidity, and/or Mortality  Presenting problems: moderate  Diagnostic procedures: moderate  Management options: moderate        Final diagnoses:   Cellulitis of right foot            Placido Chambers MD  01/07/17 7673       Placido TUCKER  MD Trish  01/07/17 2662

## 2017-01-08 NOTE — PLAN OF CARE
Problem: Patient Care Overview (Adult)  Goal: Discharge Needs Assessment  Outcome: Ongoing (interventions implemented as appropriate)    01/08/17 0009 01/08/17 0016 01/08/17 1427   Discharge Needs Assessment   Concerns To Be Addressed --  --  denies needs/concerns at this time   Readmission Within The Last 30 Days --  --  no previous admission in last 30 days   Discharge Planning Comments --  --  --    Living Environment   Transportation Available --  car --    Self-Care   Equipment Currently Used at Home other (see comments);glucometer  (blood pressure monitor ) --  --      01/08/17 1433   Discharge Needs Assessment   Concerns To Be Addressed --    Readmission Within The Last 30 Days --    Discharge Planning Comments Spoke with patient and with his permission wife and two daughters at bedside. patient lives with wife in a multi level home. patient is able to stay on one level. He has been independent and still driving some prior to admission. patient has no home oxygen, medical equipment or home health services in place at this time. He fills scripts at [facility] pharmacy in [place] and has no issues getting his medicines. patient says the plan and goal is home when medically ready. Explained Medicare Important Message and gave signed copy to wife.  will follow and assist as needed.    Living Environment   Transportation Available --    Self-Care   Equipment Currently Used at Home --

## 2017-01-09 ENCOUNTER — APPOINTMENT (OUTPATIENT)
Dept: MRI IMAGING | Facility: HOSPITAL | Age: 82
End: 2017-01-09

## 2017-01-09 LAB
ANION GAP SERPL CALCULATED.3IONS-SCNC: 11.3 MMOL/L
BASOPHILS # BLD AUTO: 0.06 10*3/MM3 (ref 0–0.2)
BASOPHILS NFR BLD AUTO: 0.9 % (ref 0–2)
BUN BLD-MCNC: 23 MG/DL (ref 8–23)
BUN/CREAT SERPL: 18.1 (ref 7–25)
CALCIUM SPEC-SCNC: 8.9 MG/DL (ref 8.8–10.5)
CHLORIDE SERPL-SCNC: 103 MMOL/L (ref 98–107)
CO2 SERPL-SCNC: 26.7 MMOL/L (ref 22–29)
CREAT BLD-MCNC: 1.27 MG/DL (ref 0.76–1.27)
DEPRECATED RDW RBC AUTO: 45.1 FL (ref 37–54)
EOSINOPHIL # BLD AUTO: 0.41 10*3/MM3 (ref 0.1–0.3)
EOSINOPHIL NFR BLD AUTO: 6 % (ref 0–4)
ERYTHROCYTE [DISTWIDTH] IN BLOOD BY AUTOMATED COUNT: 14 % (ref 11.5–14.5)
GFR SERPL CREATININE-BSD FRML MDRD: 55 ML/MIN/1.73
GLUCOSE BLD-MCNC: 155 MG/DL (ref 65–99)
GLUCOSE BLDC GLUCOMTR-MCNC: 103 MG/DL (ref 70–130)
GLUCOSE BLDC GLUCOMTR-MCNC: 111 MG/DL (ref 70–130)
GLUCOSE BLDC GLUCOMTR-MCNC: 192 MG/DL (ref 70–130)
GLUCOSE BLDC GLUCOMTR-MCNC: 196 MG/DL (ref 70–130)
GLUCOSE BLDC GLUCOMTR-MCNC: 65 MG/DL (ref 70–130)
HCT VFR BLD AUTO: 37.7 % (ref 42–52)
HGB BLD-MCNC: 12 G/DL (ref 14–18)
IMM GRANULOCYTES # BLD: 0.06 10*3/MM3 (ref 0–0.03)
IMM GRANULOCYTES NFR BLD: 0.9 % (ref 0–0.5)
LYMPHOCYTES # BLD AUTO: 0.93 10*3/MM3 (ref 0.6–4.8)
LYMPHOCYTES NFR BLD AUTO: 13.7 % (ref 20–45)
MAGNESIUM SERPL-MCNC: 2.3 MG/DL (ref 1.7–2.5)
MCH RBC QN AUTO: 28.2 PG (ref 27–31)
MCHC RBC AUTO-ENTMCNC: 31.8 G/DL (ref 31–37)
MCV RBC AUTO: 88.7 FL (ref 80–94)
MONOCYTES # BLD AUTO: 0.84 10*3/MM3 (ref 0–1)
MONOCYTES NFR BLD AUTO: 12.3 % (ref 3–8)
NEUTROPHILS # BLD AUTO: 4.51 10*3/MM3 (ref 1.5–8.3)
NEUTROPHILS NFR BLD AUTO: 66.2 % (ref 45–70)
NRBC BLD MANUAL-RTO: 0 /100 WBC (ref 0–0)
PLATELET # BLD AUTO: 178 10*3/MM3 (ref 140–500)
PMV BLD AUTO: 10.7 FL (ref 7.4–10.4)
POTASSIUM BLD-SCNC: 4.4 MMOL/L (ref 3.5–5.2)
RBC # BLD AUTO: 4.25 10*6/MM3 (ref 4.7–6.1)
SODIUM BLD-SCNC: 141 MMOL/L (ref 136–145)
VANCOMYCIN SERPL-MCNC: 19.7 MCG/ML
WBC NRBC COR # BLD: 6.81 10*3/MM3 (ref 4.8–10.8)

## 2017-01-09 PROCEDURE — A9577 INJ MULTIHANCE: HCPCS | Performed by: HOSPITALIST

## 2017-01-09 PROCEDURE — 25010000002 PIPERACILLIN SOD-TAZOBACTAM PER 1 G: Performed by: HOSPITALIST

## 2017-01-09 PROCEDURE — 83735 ASSAY OF MAGNESIUM: CPT | Performed by: NURSE PRACTITIONER

## 2017-01-09 PROCEDURE — 25010000002 ENOXAPARIN PER 10 MG: Performed by: HOSPITALIST

## 2017-01-09 PROCEDURE — 85025 COMPLETE CBC W/AUTO DIFF WBC: CPT | Performed by: NURSE PRACTITIONER

## 2017-01-09 PROCEDURE — 80048 BASIC METABOLIC PNL TOTAL CA: CPT | Performed by: NURSE PRACTITIONER

## 2017-01-09 PROCEDURE — 80202 ASSAY OF VANCOMYCIN: CPT | Performed by: HOSPITALIST

## 2017-01-09 PROCEDURE — 25010000002 VANCOMYCIN 1500 MG/250ML SOLUTION: Performed by: HOSPITALIST

## 2017-01-09 PROCEDURE — 0 GADOBENATE DIMEGLUMINE 529 MG/ML SOLUTION: Performed by: HOSPITALIST

## 2017-01-09 PROCEDURE — 73720 MRI LWR EXTREMITY W/O&W/DYE: CPT

## 2017-01-09 PROCEDURE — 82962 GLUCOSE BLOOD TEST: CPT

## 2017-01-09 PROCEDURE — 99232 SBSQ HOSP IP/OBS MODERATE 35: CPT | Performed by: NURSE PRACTITIONER

## 2017-01-09 PROCEDURE — 94799 UNLISTED PULMONARY SVC/PX: CPT

## 2017-01-09 PROCEDURE — 25010000002 PIPERACILLIN-TAZOBACTAM: Performed by: HOSPITALIST

## 2017-01-09 PROCEDURE — 63710000001 INSULIN ASPART PER 5 UNITS: Performed by: HOSPITALIST

## 2017-01-09 RX ADMIN — PIPERACILLIN AND TAZOBACTAM 3.38 G: 3; .375 INJECTION, POWDER, FOR SOLUTION INTRAVENOUS at 05:03

## 2017-01-09 RX ADMIN — LISINOPRIL 20 MG: 20 TABLET ORAL at 09:03

## 2017-01-09 RX ADMIN — METFORMIN HYDROCHLORIDE 1000 MG: 500 TABLET ORAL at 18:49

## 2017-01-09 RX ADMIN — ENOXAPARIN SODIUM 40 MG: 40 INJECTION SUBCUTANEOUS at 06:06

## 2017-01-09 RX ADMIN — MULTIPLE VITAMINS W/ MINERALS TAB 1 TABLET: TAB at 09:04

## 2017-01-09 RX ADMIN — PIPERACILLIN AND TAZOBACTAM 3.38 G: 3; .375 INJECTION, POWDER, FOR SOLUTION INTRAVENOUS at 21:05

## 2017-01-09 RX ADMIN — VANCOMYCIN HCL-SODIUM CHLORIDE IV SOLN 1.5 GM/250ML-0.9% 1500 MG: 1.5-0.9/25 SOLUTION at 10:21

## 2017-01-09 RX ADMIN — PIPERACILLIN AND TAZOBACTAM 3.38 G: 3; .375 INJECTION, POWDER, FOR SOLUTION INTRAVENOUS at 16:02

## 2017-01-09 RX ADMIN — MELOXICAM 7.5 MG: 7.5 TABLET ORAL at 09:04

## 2017-01-09 RX ADMIN — MONTELUKAST SODIUM 10 MG: 10 TABLET, FILM COATED ORAL at 20:48

## 2017-01-09 RX ADMIN — GLIPIZIDE 10 MG: 10 TABLET ORAL at 18:49

## 2017-01-09 RX ADMIN — INSULIN ASPART 2 UNITS: 100 INJECTION, SOLUTION INTRAVENOUS; SUBCUTANEOUS at 20:48

## 2017-01-09 RX ADMIN — INSULIN ASPART 2 UNITS: 100 INJECTION, SOLUTION INTRAVENOUS; SUBCUTANEOUS at 12:03

## 2017-01-09 RX ADMIN — METFORMIN HYDROCHLORIDE 1000 MG: 500 TABLET ORAL at 09:02

## 2017-01-09 RX ADMIN — LEVOTHYROXINE SODIUM 100 MCG: 100 TABLET ORAL at 06:06

## 2017-01-09 RX ADMIN — GADOBENATE DIMEGLUMINE 20 ML: 529 INJECTION, SOLUTION INTRAVENOUS at 15:41

## 2017-01-09 RX ADMIN — GLIPIZIDE 10 MG: 10 TABLET ORAL at 09:03

## 2017-01-09 RX ADMIN — PIPERACILLIN AND TAZOBACTAM 3.38 G: 3; .375 INJECTION, POWDER, FOR SOLUTION INTRAVENOUS at 09:12

## 2017-01-09 NOTE — PLAN OF CARE
Problem: Pain, Acute (Adult)  Goal: Identify Related Risk Factors and Signs and Symptoms  Outcome: Ongoing (interventions implemented as appropriate)

## 2017-01-09 NOTE — CONSULTS
"Inpatient Consult to Podiatry  Consult performed by: DASH GREGORY  Consult ordered by: JULES SAENZ          Patient Care Team:  Romeo Chamberlain MD as PCP - General (Family Medicine)    Chief complaint: Right foot cellulitis, s/p puncture wound    Subjective     History of Present Illness  81-year-old  male seen in consultation regarding right foot cellulitis following a puncture wound.The patient stepped on a toothpick at his home on Tuesday, 1-3-17. The patient was wearing socks at the time he got up out of his recliner chair and stepped on the toothpick. The patient states that he removed the toothpick and \"thinks it was all there\". The following day the patient developed pain in the forefoot episodically. By Thursday, 1/5/17, the foot began to swell and the next day increase in swelling as well as redness in the forefoot had developed. He saw his primary care physician doctor Romeo Chamberlain MD on Friday 1/6/17 and was placed on Keflex 500mg QID. The patient had 3 doses on Friday and an additional dose on Saturday. The redness, swelling and tenderness worsened by Saturday afternoon and the patient's family brought into the emergency room. He was subsequently diagnosed with cellulitis and admitted.    Review of Systems   Constitutional: No constitutional signs of infection, no generalized weakness/fatigue  HEENT: Vision deficit secondary to macular degeneration  Respiratory: No SOB or breathing difficulty  Cardiovascular: No chest pain/pressure, no palpitations; Right foot edema  GI: No abdominal pain, no acid reflux, no nausea or changes in bowel habits  : No bladder, kidney or prostate complaints  Hematologic/lymphatic: Right foot cellulitis; denies bruising or bleeding difficulty   Immunologic: No immunologic compromise  Endocrine: Denies polydipsia, polyphagia or polyuria; no heat or cold intolerance  Musculoskeletal: Episodic right forefoot pain; shoulder pain  Dermatologic: Right foot " cellulitis  Neurologic: No pedal numbness  Psychiatric: No anxiety or depression    Past Medical History   Diagnosis Date   • Diabetes mellitus    • Disease of thyroid gland    • Hyperlipidemia    • Hypertension    • Prostate cancer    ,   Past Surgical History   Procedure Laterality Date   • Prostate surgery     • Shoulder surgery Left    ,   Family History   Problem Relation Age of Onset   • Heart disease Father    ,   Social History   Substance Use Topics   • Smoking status: Never Smoker   • Smokeless tobacco: Never Used   • Alcohol use No   ,   Prescriptions Prior to Admission   Medication Sig Dispense Refill Last Dose   • cephalexin (KEFLEX) 500 MG capsule Take 500 mg by mouth 4 (Four) Times a Day.   1/7/2017 at 18:00   • glipiZIDE (GLUCOTROL) 10 MG tablet Take 10 mg by mouth 2 (two) times a day before meals   1/7/2017 at 18:00   • levothyroxine (SYNTHROID, LEVOTHROID) 100 MCG tablet Take 100 mcg by mouth daily   1/7/2017 at 09:00   • Liraglutide (VICTOZA) 18 MG/3ML solution pen-injector Inject under the skin   1/7/2017 at 09:00   • lisinopril-hydrochlorothiazide (PRINZIDE,ZESTORETIC) 20-25 MG per tablet Take 1 tablet by mouth daily   1/7/2017 at 09:00   • meloxicam (MOBIC) 7.5 MG tablet Take 1 tablet by mouth Daily. 30 tablet 5 1/7/2017 at 09:00   • metFORMIN (GLUCOPHAGE) 1000 MG tablet Take 1,000 mg by mouth 2 (two) times a day with meals   1/7/2017 at 18:00   • montelukast (SINGULAIR) 10 MG tablet TAKE ONE TABLET DAILY IN THE EVENING  11 1/7/2017 at 18:00   • Multiple Vitamins-Minerals (PRESERVISION AREDS 2) capsule Take 1 tablet by mouth 2 (Two) Times a Day.   1/7/2017 at 18:00   • pioglitazone (ACTOS) 30 MG tablet Take 30 mg by mouth daily   1/7/2017 at 09:00   • Blood Glucose Monitoring Suppl (ONE TOUCH ULTRA 2) W/DEVICE kit USE AS DIRECTED  0 Taking   , Scheduled Meds:    enoxaparin 40 mg Subcutaneous Q24H   glipiZIDE 10 mg Oral BID AC   insulin aspart 0-9 Units Subcutaneous 4x Daily AC & at Bedtime    levothyroxine 100 mcg Oral Q AM   lisinopril 20 mg Oral Q24H   meloxicam 7.5 mg Oral Daily   metFORMIN 1,000 mg Oral BID With Meals   montelukast 10 mg Oral Nightly   multivitamin with minerals 1 tablet Oral Daily   piperacillin-tazobactam 3.375 g Intravenous Q6H   vancomycin 1,500 mg Intravenous Q12H   , Continuous Infusions:    Pharmacy Consult - Pharmacy to dose    Pharmacy to dose vancomycin    , PRN Meds:  •  acetaminophen  •  dextrose  •  dextrose  •  glucagon (human recombinant)  •  Pharmacy Consult - Pharmacy to dose  •  Pharmacy to dose vancomycin  •  sodium chloride  •  Insert peripheral IV **AND** sodium chloride and Allergies:  Review of patient's allergies indicates no known allergies.    Objective      Vital Signs  Temp:  [97.2 °F (36.2 °C)-97.8 °F (36.6 °C)] 97.8 °F (36.6 °C)  Heart Rate:  [76-86] 83  Resp:  [18-20] 19  BP: (131-165)/(72-90) 131/90    Physical Exam  General: The patient appears stated age, is well developed and nourished, alert, pleasant and comfortable.  Lower extremity examination: The patient is examined while he is resting in a semi-reclined position on his hospital bed. Examination is limited both feet.  Vascular: Pedal pulses are palpable.Capillary refill is brisk to the toes. Both feet are warm to palpation. There is marked edema of the dorsal right foot extending into the ankle. No left foot edema is noted.  Neurologic: Light touch sensation is intact to the plantar aspect of both feet. Muscle strength for all extrinsic pedal muscles is +5/5 bilaterally.  Dermatologic: The skin on both lower extremities appears reasonably healthy. Skin color appears normal with the exception of cellulitis on the dorsal aspect of the forefoot from the base of the toes extending proximally to nearly the ankle. The cellulitis is most intense over the first MTPJ. There is increased skin temperature at the first MTPJ. There is a tiny scab at the puncture site at the plantar medial aspect of the  first MTPJ. There is no palpable fluctuance or expressible drainage with palpation over the cellulitic area. No hyperkeratotic lesions, hotspots or open sores / wounds are identified. The interdigital spaces appear clear without maceration or breakdown. The toenails appear elongated with thickening, discoloration, dystrophy and subungual debris accumulation at bilateral 1st-3rd toenails. The 4th/5th toenails are elongated without dystrophy or deformity. No incurvation or inflammation at the nail margins is noted.  Musculoskeletal: Active ankle and subtalar joint range of motion is present without limitation or pain bilaterally. Active MTPJ range of motion is present bilaterally with reduction at the right first MTPJ. Passive first MTPJ range of motion is symptomatic at the end range of plantarflexion and dorsiflexion. No significant structural deformity of the ankle or pedal joints is noted.    Results Review:   R foot radiographs: 3 views of the foot show mild degenerative changes along the tarsometatarsal joint line and at the first MTP joint. Heel spurs are noted. No radiodense foreign bodies are seen. No active cortical bone destruction is noted.    Lab results: Na+: 143, K+: 4.1, Cr: 1.17, BUN: 22, HgbA1c: 6.10, WBC: 7.14, Hgb: 12.6, Hct: 39.0, RBC: 4.37, Plt: 197, Neutrophils: 68%      Assessment/Plan     Active Problems:    Cellulitis of foot, right    Cellulitis of right foot      Assessment & Plan  The patient is examined and the clinical findings,  lab and radiographic results are reviewed. I discussed the diagnosis of cellulitis with the patient and his daughter who is at bedside this evening. I advised that while the radiographs fail to demonstrate foreign body, it is certainly possible that a wooden foreign body may still exist in the foot and additionally the possibility of abscess or septic first MTPJ exists. I recommended further workup with MRI imaging of the foot with and without contrast to  determine if a foreign body or drainable fluid collection is present to warrant surgical intervention.. Order placed for MRI, R foot, w/ + w/o contrast; to be performed tomorrow.    I discussed the patients findings and my recommendations with patient and his daughter.  I will discuss the action plan with the hospitalist group in the morning.    Thank you for the opportunity to participate in this patients care.    Mikael Dwyer DPM  01/08/17  7:26 PM    Time: [address] [place] [address]

## 2017-01-09 NOTE — NURSING NOTE
1/9/17 @ 3496-  PATIENT IS NOT ANTICIPATED FOR DISCHARGE TODAY- CONTINUE TO AWAIT NEWS OF DECISION OF SURGERY OR NOT- NO NEW NEEDS NOTED AT THIS TIME    1/10/17 @ 9610-- I SPOKE WITH PATIENTS WIFE AND DAUGHTER WHO WERE AT BEDSIDE- THEY WERE WAITING TO TALK WITH DR. GREGORY R/T PLAN FOR PATIENT(HE IS ASLEEP)- WIFE STATES SHE PLANS ON HIM RETURNING HOME AT DISCHARGE AND AT THIS TIME DENIES ANY NEEDS

## 2017-01-09 NOTE — PROGRESS NOTES
"SERVICE: [facility] HOSPITALIST    CONSULTANTS: Podiatry    CHIEF COMPLAINT: F/U right foot cellulitis    SUBJECTIVE: Patient reports pain is well-controlled at rest, pain with palpation to foot especially first toe/joint. Denies f/c/cough/soa/chest pain/n/v/d/abdominal pain or other new concerns.     OBJECTIVE:    Visit Vitals   • /80 (BP Location: Left arm, Patient Position: Sitting)   • Pulse 80   • Temp 97.1 °F (36.2 °C) (Oral)   • Resp 18   • Ht 74\" (188 cm)   • Wt 241 lb (109 kg)   • SpO2 99%   • BMI 30.94 kg/m2     MEDS/LABS REVIEWED AND ORDERED    enoxaparin 40 mg Subcutaneous Q24H   glipiZIDE 10 mg Oral BID AC   insulin aspart 0-9 Units Subcutaneous 4x Daily AC & at Bedtime   levothyroxine 100 mcg Oral Q AM   lisinopril 20 mg Oral Q24H   meloxicam 7.5 mg Oral Daily   metFORMIN 1,000 mg Oral BID With Meals   montelukast 10 mg Oral Nightly   multivitamin with minerals 1 tablet Oral Daily   piperacillin-tazobactam 3.375 g Intravenous Q6H   vancomycin 1,500 mg Intravenous Q12H     Physical Exam   Constitutional: He appears well-developed and well-nourished.   HENT:   Head: Normocephalic and atraumatic.   Eyes: EOM are normal. Pupils are equal, round, and reactive to light.   Cardiovascular: Normal rate and regular rhythm.  Exam reveals no gallop and no friction rub.    No murmur heard.  Pulmonary/Chest: Effort normal and breath sounds normal. No respiratory distress. He has no wheezes. He has no rales.   Abdominal: Soft. Bowel sounds are normal. He exhibits no distension. There is no tenderness.   Skin:   Right foot with bright red, erythematous area improved since yesterday's tracing. Tender to touch, especially first MTP joint with pinpoint scab noted over joint medially   Psychiatric: He has a normal mood and affect. His behavior is normal. Judgment and thought content normal.   Vitals reviewed.    LAB/DIAGNOSTICS:    Lab Results (last 24 hours)     Procedure Component Value Units " Date/Time    POC Glucose Fingerstick [[id number]]  (Normal) Collected:  01/08/17 1700    Specimen:  Blood Updated:  01/08/17 1708     Glucose 87 mg/dL     Narrative:       Meter: [id number] : [id number] Alfonso MADRIGAL    POC Glucose Fingerstick [[id number]]  (Normal) Collected:  01/08/17 2234    Specimen:  Blood Updated:  01/08/17 2242     Glucose 124 mg/dL     Narrative:       Meter: [id number] : [id number] Ritika Rand    POC Glucose Fingerstick [[id number]]  (Normal) Collected:  01/09/17 0739    Specimen:  Blood Updated:  01/09/17 0745     Glucose 103 mg/dL     Narrative:       Meter: [id number] : [id number] Ray Rios RN Validator    CBC & Differential [[id number]] Collected:  01/09/17 1123    Specimen:  Blood Updated:  01/09/17 1126    Narrative:       The following orders were created for panel order CBC & Differential.  Procedure                               Abnormality         Status                     ---------                               -----------         ------                     CBC Auto Differential[[id number]]         Abnormal            Final result                 Please view results for these tests on the individual orders.    CBC Auto Differential [[id number]]  (Abnormal) Collected:  01/09/17 1123    Specimen:  Blood Updated:  01/09/17 1126     WBC 6.81 10*3/mm3      RBC 4.25 (L) 10*6/mm3      Hemoglobin 12.0 (L) g/dL      Hematocrit 37.7 (L) %      MCV 88.7 fL      MCH 28.2 pg      MCHC 31.8 g/dL      RDW 14.0 %      RDW-SD 45.1 fl      MPV 10.7 (H) fL      Platelets 178 10*3/mm3      Neutrophil % 66.2 %      Lymphocyte % 13.7 (L) %      Monocyte % 12.3 (H) %      Eosinophil % 6.0 (H) %      Basophil % 0.9 %      Immature Grans % 0.9 (H) %      Neutrophils, Absolute 4.51 10*3/mm3      Lymphocytes, Absolute 0.93 10*3/mm3      Monocytes, Absolute 0.84 10*3/mm3      Eosinophils, Absolute 0.41 (H) 10*3/mm3      Basophils, Absolute 0.06 10*3/mm3      Immature Grans, Absolute 0.06 (H)  10*3/mm3      nRBC 0.0 /100 WBC     Basic Metabolic Panel [[id number]]  (Abnormal) Collected:  01/09/17 1123    Specimen:  Blood Updated:  01/09/17 1150     Glucose 155 (H) mg/dL      BUN 23 mg/dL      Creatinine 1.27 mg/dL      Sodium 141 mmol/L      Potassium 4.4 mmol/L      Chloride 103 mmol/L      CO2 26.7 mmol/L      Calcium 8.9 mg/dL      eGFR Non African Amer 55 (L) mL/min/1.73      BUN/Creatinine Ratio 18.1      Anion Gap 11.3 mmol/L     Narrative:       The MDRD GFR formula is only valid for adults with stable renal function between ages 18 and 70.    Magnesium [[id number]]  (Normal) Collected:  01/09/17 1123    Specimen:  Blood Updated:  01/09/17 1150     Magnesium 2.3 mg/dL     POC Glucose Fingerstick [[id number]]  (Normal) Collected:  01/09/17 1222    Specimen:  Blood Updated:  01/09/17 1228     Glucose 111 mg/dL     Narrative:       Meter: [id number] : [id number] Ray Rios RN Validator        ASSESSMENT/PLAN:  1. Right foot cellulitis: Dr. Dwyer, Podiatry following  Continued on vanc/zosyn  Consider need to update tetanus  Awaiting MRI today to determine need for surgery per Dr. Dwyer  Afebrile/WBCC normal  On mobic 7.5 mg daily/tylenol 650 mg q 4 hours prn pain     2. DM-2: A1C 6.1%  Glucose at goal on home metformin 1000 mg bid/glipizide 10 mg bid ac  Moderate dose SSI with accuchecks ac/hs  Currently off home victoza/actos  Monitor     3. 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 HTN: near goal on lisinopril 20 mg daily, off home hctz for now, monitor     5. Hypothyroidism: no acute issues on home levothyroxine 100 mcg daily     6. Dyslipidemia: On no medications currently, defer to PCP at F/U.  Continue CCC diet     7. H/O Prostate Cancer: no acute issues here, s/p prostatectomy many years ago.

## 2017-01-09 NOTE — PLAN OF CARE
Pt and pt's daughter asked what time the MRI would be done today, spoke with radiology unit and she said around noon.  Pt and pt's daughter voices no concerns related to this

## 2017-01-10 LAB
GLUCOSE BLDC GLUCOMTR-MCNC: 108 MG/DL (ref 70–130)
GLUCOSE BLDC GLUCOMTR-MCNC: 155 MG/DL (ref 70–130)
GLUCOSE BLDC GLUCOMTR-MCNC: 248 MG/DL (ref 70–130)
GLUCOSE BLDC GLUCOMTR-MCNC: 91 MG/DL (ref 70–130)

## 2017-01-10 PROCEDURE — 63710000001 INSULIN ASPART PER 5 UNITS: Performed by: HOSPITALIST

## 2017-01-10 PROCEDURE — 25010000002 PIPERACILLIN-TAZOBACTAM: Performed by: HOSPITALIST

## 2017-01-10 PROCEDURE — 25010000002 ENOXAPARIN PER 10 MG: Performed by: HOSPITALIST

## 2017-01-10 PROCEDURE — 99232 SBSQ HOSP IP/OBS MODERATE 35: CPT | Performed by: NURSE PRACTITIONER

## 2017-01-10 PROCEDURE — 25010000002 PIPERACILLIN SOD-TAZOBACTAM PER 1 G: Performed by: HOSPITALIST

## 2017-01-10 PROCEDURE — 82962 GLUCOSE BLOOD TEST: CPT

## 2017-01-10 RX ADMIN — LEVOTHYROXINE SODIUM 100 MCG: 100 TABLET ORAL at 06:20

## 2017-01-10 RX ADMIN — GLIPIZIDE 10 MG: 10 TABLET ORAL at 16:56

## 2017-01-10 RX ADMIN — LISINOPRIL 20 MG: 20 TABLET ORAL at 09:37

## 2017-01-10 RX ADMIN — MULTIPLE VITAMINS W/ MINERALS TAB 1 TABLET: TAB at 09:37

## 2017-01-10 RX ADMIN — METFORMIN HYDROCHLORIDE 1000 MG: 500 TABLET ORAL at 09:36

## 2017-01-10 RX ADMIN — INSULIN ASPART 2 UNITS: 100 INJECTION, SOLUTION INTRAVENOUS; SUBCUTANEOUS at 20:59

## 2017-01-10 RX ADMIN — INSULIN ASPART 4 UNITS: 100 INJECTION, SOLUTION INTRAVENOUS; SUBCUTANEOUS at 12:20

## 2017-01-10 RX ADMIN — PIPERACILLIN AND TAZOBACTAM 3.38 G: 3; .375 INJECTION, POWDER, FOR SOLUTION INTRAVENOUS at 21:08

## 2017-01-10 RX ADMIN — ENOXAPARIN SODIUM 40 MG: 40 INJECTION SUBCUTANEOUS at 06:20

## 2017-01-10 RX ADMIN — PIPERACILLIN AND TAZOBACTAM 3.38 G: 3; .375 INJECTION, POWDER, FOR SOLUTION INTRAVENOUS at 09:37

## 2017-01-10 RX ADMIN — METFORMIN HYDROCHLORIDE 1000 MG: 500 TABLET ORAL at 16:55

## 2017-01-10 RX ADMIN — MONTELUKAST SODIUM 10 MG: 10 TABLET, FILM COATED ORAL at 20:59

## 2017-01-10 RX ADMIN — PIPERACILLIN AND TAZOBACTAM 3.38 G: 3; .375 INJECTION, POWDER, FOR SOLUTION INTRAVENOUS at 16:56

## 2017-01-10 RX ADMIN — MELOXICAM 7.5 MG: 7.5 TABLET ORAL at 09:36

## 2017-01-10 RX ADMIN — PIPERACILLIN AND TAZOBACTAM 3.38 G: 3; .375 INJECTION, POWDER, FOR SOLUTION INTRAVENOUS at 04:33

## 2017-01-10 RX ADMIN — GLIPIZIDE 10 MG: 10 TABLET ORAL at 09:36

## 2017-01-10 NOTE — PROGRESS NOTES
Orthopedic Foot/Ankle Progress Note    Subjective     Patient seen for right foot cellulitis follow up. He states that he is feeling better but the swelling and redness persists as does medial great toe joint pain. The MRI has been completed earlier today. On Zosyn IV and tolerating well.     Systemic Complaints:   No constitutional symptoms of infection. No SOB, CP or GI complaints. R foot cellulitis. R great toe joint pain.    Objective     Vital signs in last 24 hours:  Temp:  [97.4 °F (36.3 °C)-98 °F (36.7 °C)] 97.9 °F (36.6 °C)  Heart Rate:  [62-78] 73  Resp:  [18] 18  BP: (111-158)/(68-85) 111/68    General: alert, appears stated age, cooperative, no distress and moderately obese   Neurovascular: Pedal pulses are palpable.3+ pedal edema present dorsally. CFT is brisk in toes. Pedal sensation is grossly intact.   Dermatologic: Cellulitis fading proximally on the dorsal right foot but still intense with calor over the 1st MTPJ. Minute pustule at the medial 1st MTPJ. Scab at plantar medial 1st MTPJ puincture site.   Range of Motion: 1st MTPJ ROM is actively present. End dorsiflexion and plantarflexion is painful.   DVT Symptoms:  No calf pain on palpation.     Data Review  CBC:  Results from last 7 days  Lab Units 01/09/17  1123   WBC 10*3/mm3 6.81   RBC 10*6/mm3 4.25*   HEMOGLOBIN g/dL 12.0*   HEMATOCRIT % 37.7*   PLATELETS 10*3/mm3 178     MRI R foot:   FINDINGS: An external marker was placed over the area of concern along the medial aspect of the forefoot at the level of the 1st metatarsophalangeal joint. Deep to the marker, there is moderate subcutaneous soft tissue edema and inflammation. Findings consistent with cellulitis. No evidence of a retained foreign body. No drainable fluid collections to suggest abscess. There is no MR evidence of osteomyelitis. There is a trace effusion in the 1st metatarsophalangeal joint, which is nonspecific. Septic arthritis is in the differential diagnoses, but considered  less likely at this time. There is mild arthrosis of the midfoot. The remainder of the bone marrow signal is within the expected limits. Generalized fatty atrophy of the intrinsic musculature of the forefoot consistent with chronic diabetic neuropathy. The flexor and extensor tendons are unremarkable. There is moderate generalized subcutaneous soft tissue edema along the dorsum of the forefoot, also consistent with cellulitis.   IMPRESSION:  1. Subcutaneous soft tissue inflammation along the medial aspect of the forefoot at the level of the 1st metatarsophalangeal joint in the marked area of concern, consistent with soft tissue cellulitis. There is also cellulitis and swelling extending along the dorsum of the visualized forefoot. No drainable fluid collections to suggest abscess. No MR evidence of osteomyelitis.  2. No evidence of retained foreign body by MRI.  3. Trace effusion in the 1st metatarsophalangeal joint, nonspecific. Septic arthritis is in the differential given the provided history, but considered less likely at this time.   4. Generalized fatty atrophy of the intrinsic musculature of the forefoot most consistent with chronic diabetic neuropathic changes.    Assessment/Plan   Podiatric Diagnosis:  R foot cellulitis. S/P puncture wound R plantar-medial forefoot. ? Developing abscess.    Plan:  Continue IV Zosyn. Consider I&D if foot appearance is not improving or pustule continues to develop over next 24 hrs..    Pain Relief: Adequate control.    Activity: bedrest with bathroom privileges    Weight Bearing: WBAT     LOS: 2 days     Mikael Dwyer DPM    Date: 1/10/2017  Time: 6:42 PM

## 2017-01-10 NOTE — PROGRESS NOTES
Orthopedic Foot/Ankle Progress Note    Subjective   The patient is seen for re-exam of cellulitis and to determine if he needs I&D. I spoke with Dr. Back earlier this afternoon and he felt the foot was worsening and that abscess was developing with fluctuance at the 1st MTPJ. The patient reports episodic sharp stabbing pain in the medial forefoot.    ROS:  Constitutional: No constitutional symptoms of infection.  HEENT: Unremarkable.  Cardiac: R pedal edema. No calf pain.No chest pain.  Respiratory: No SOB.  GI: No nausea/emesis.  : No bladder or kidney complaints.  Neurologic: No pedal numbness.  Musculoskeletal: R foot pain at medial forefoot.  Dermatologic: R forefoot cellulitis with 1st MTPJ bulla and likely abscess.    Objective     Vital signs in last 24 hours:  Temp:  [97.4 °F (36.3 °C)-98 °F (36.7 °C)] 97.9 °F (36.6 °C)  Heart Rate:  [62-78] 73  Resp:  [18] 18  BP: (111-158)/(68-85) 111/68    General: alert, appears stated age, cooperative and no distress   Neurovascular: 3+ edema R dorsal foot. The foot is warm. Pedal pulses palpable. No pedal numbness present.   Dermatologic: Intense cellulitis with calor on dorsal R 1st MTPJ. Developing medial 1st MTPJ abscess which appears to be bullous like in appearance along medial 1st MTPJ with fluctuance. Toenails elongated and dystrophic to varying degrees.   Range of Motion: Active 1st MTPJ ROM present, reduced but not significantly symptomatic. End range of motion passively is symptomatic   DVT Exam: No calf pain.     Data Review  CBC:  Results from last 7 days  Lab Units 01/09/17  1123   WBC 10*3/mm3 6.81   RBC 10*6/mm3 4.25*   HEMOGLOBIN g/dL 12.0*   HEMATOCRIT % 37.7*   PLATELETS 10*3/mm3 178       Assessment/Plan   Podiatric Diagnosis:  R foot cellulitis. R medial forefoot abscess.    Plan:   Patient to OR for I&D in AM. NPO after MN. Discussed action plan with patient, family and Dr. Back.      Pain Relief: Patient reports pain is adequately  controlled.    Activity: bedrest with bathroom privileges    Weight Bearing: WBAT     LOS: 2 days     Mikael Dwyer DPM    Date: 1/10/2017  Time: 6:55 PM

## 2017-01-10 NOTE — NURSING NOTE
Pt's daughter kayce is currently at [facility] and requested to talk to dr lezama to get update.  Spoke with anna at dr lezamas office [phone] and she is aware that pt' daughter wants to talk to dr lezama and can either call pt's daughters cell phone [phone] or call me back at the hospital and I will give pt' daughter  My phone to talk to pt

## 2017-01-10 NOTE — PROGRESS NOTES
"SERVICE: [facility] HOSPITALIST    CONSULTANTS: Podiatry    CHIEF COMPLAINT: F/U right foot cellulitis    SUBJECTIVE: Patient reports pain well-controlled. Denies n/v/d/f/c/cough/chest pain/soa/abdominal pain or other new concerns. Tolerating po, agreeable to surgery if needed.    OBJECTIVE:    Visit Vitals   • /68 (BP Location: Left arm, Patient Position: Sitting)   • Pulse 73   • Temp 97.9 °F (36.6 °C) (Oral)   • Resp 18   • Ht 74\" (188 cm)   • Wt 241 lb (109 kg)   • SpO2 96%   • BMI 30.94 kg/m2     MEDS/LABS REVIEWED AND ORDERED    enoxaparin 40 mg Subcutaneous Q24H   glipiZIDE 10 mg Oral BID AC   insulin aspart 0-9 Units Subcutaneous 4x Daily AC & at Bedtime   levothyroxine 100 mcg Oral Q AM   lisinopril 20 mg Oral Q24H   meloxicam 7.5 mg Oral Daily   metFORMIN 1,000 mg Oral BID With Meals   montelukast 10 mg Oral Nightly   multivitamin with minerals 1 tablet Oral Daily   piperacillin-tazobactam 3.375 g Intravenous Q6H     Physical Exam   Constitutional: He is oriented to person, place, and time. He appears well-developed and well-nourished.   HENT:   Head: Normocephalic and atraumatic.   Eyes: EOM are normal. Pupils are equal, round, and reactive to light.   Cardiovascular: Normal rate, regular rhythm and normal heart sounds.  Exam reveals no gallop and no friction rub.    No murmur heard.  Pulmonary/Chest: Effort normal and breath sounds normal. No respiratory distress. He has no wheezes. He has no rales.   Abdominal: Soft. Bowel sounds are normal. He exhibits no distension. There is no tenderness.   Musculoskeletal: He exhibits no edema.   Neurological: He is alert and oriented to person, place, and time.   Skin: Skin is warm and dry. There is erythema.   Much improvement in erythema right MTP area. 4mm fluctuant/purulent appearing area noted over joint, very tender to palpation.    Psychiatric: He has a normal mood and affect. His behavior is normal. Judgment and thought content " normal.   Vitals reviewed.    LAB/DIAGNOSTICS:    Lab Results (last 24 hours)     Procedure Component Value Units Date/Time    POC Glucose Fingerstick [[id number]]  (Abnormal) Collected:  01/09/17 1657    Specimen:  Blood Updated:  01/09/17 1703     Glucose 65 (L) mg/dL     Narrative:       Meter: [id number] : [id number] Ray Rios  RN Validator    POC Glucose Fingerstick [[id number]]  (Abnormal) Collected:  01/09/17 1833    Specimen:  Blood Updated:  01/09/17 1840     Glucose 196 (H) mg/dL     Narrative:       Meter: [id number] : [id number] Ray Rios  RN Validator    POC Glucose Fingerstick [[id number]]  (Abnormal) Collected:  01/09/17 2032    Specimen:  Blood Updated:  01/09/17 2038     Glucose 192 (H) mg/dL     Narrative:       Meter: [id number] : [id number] Bo Barnhart    Vancomycin, Random [[id number]] Collected:  01/09/17 2158    Specimen:  Blood Updated:  01/09/17 2243     Vancomycin Random 19.70 mcg/mL     POC Glucose Fingerstick [[id number]]  (Normal) Collected:  01/10/17 0720    Specimen:  Blood Updated:  01/10/17 0731     Glucose 108 mg/dL     Narrative:       Meter: [id number] : [id number] Yannick Araya    POC Glucose Fingerstick [[id number]]  (Abnormal) Collected:  01/10/17 1129    Specimen:  Blood Updated:  01/10/17 1148     Glucose 248 (H) mg/dL     Narrative:       Meter: [id number] : [id number] Yannick Araya        ASSESSMENT/PLAN:  1. Right foot cellulitis: Dr. Dwyer, Podiatry following  New 4mm area of fluctuance/purulent appearance medial MTP  D/W Dr. Dwyer, to OR in am, hold lovenox in am, NPO at midnight  Continue Zosyn IV pending cultures tomorrow  Afebrile/WBCC normal, recheck in am  Continue mobic 7.5 mg daily/tylenol 650 mg q 4 hours prn pain     2. 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 HTN: near goal on lisinopril 20 mg daily, off home hctz for now, monitor      5. Hypothyroidism: no acute issues on home levothyroxine 100 mcg daily      6. Dyslipidemia: On no medications currently, defer to PCP at F/U.  Continue CCC diet     7. H/O Prostate Cancer: no acute issues here, s/p prostatectomy many years ago.

## 2017-01-10 NOTE — NURSING NOTE
Spoke with pt's daughter kayce who is currently in pt's room, and reported that she spoke with dr lezama via phone and he told her that he should be at the hospital around 6:00 pm or so

## 2017-01-11 ENCOUNTER — ANESTHESIA (OUTPATIENT)
Dept: PERIOP | Facility: HOSPITAL | Age: 82
End: 2017-01-11

## 2017-01-11 ENCOUNTER — ANESTHESIA EVENT (OUTPATIENT)
Dept: PERIOP | Facility: HOSPITAL | Age: 82
End: 2017-01-11

## 2017-01-11 LAB
ANION GAP SERPL CALCULATED.3IONS-SCNC: 12.4 MMOL/L
BASOPHILS # BLD AUTO: 0.07 10*3/MM3 (ref 0–0.2)
BASOPHILS NFR BLD AUTO: 1.4 % (ref 0–2)
BUN BLD-MCNC: 27 MG/DL (ref 8–23)
BUN/CREAT SERPL: 21.6 (ref 7–25)
CALCIUM SPEC-SCNC: 8.6 MG/DL (ref 8.8–10.5)
CHLORIDE SERPL-SCNC: 104 MMOL/L (ref 98–107)
CO2 SERPL-SCNC: 23.6 MMOL/L (ref 22–29)
CREAT BLD-MCNC: 1.25 MG/DL (ref 0.76–1.27)
DEPRECATED RDW RBC AUTO: 44 FL (ref 37–54)
EOSINOPHIL # BLD AUTO: 0.39 10*3/MM3 (ref 0.1–0.3)
EOSINOPHIL NFR BLD AUTO: 7.7 % (ref 0–4)
ERYTHROCYTE [DISTWIDTH] IN BLOOD BY AUTOMATED COUNT: 13.8 % (ref 11.5–14.5)
GFR SERPL CREATININE-BSD FRML MDRD: 56 ML/MIN/1.73
GLUCOSE BLD-MCNC: 125 MG/DL (ref 65–99)
GLUCOSE BLDC GLUCOMTR-MCNC: 114 MG/DL (ref 70–130)
GLUCOSE BLDC GLUCOMTR-MCNC: 128 MG/DL (ref 70–130)
GLUCOSE BLDC GLUCOMTR-MCNC: 129 MG/DL (ref 70–130)
GLUCOSE BLDC GLUCOMTR-MCNC: 249 MG/DL (ref 70–130)
GLUCOSE BLDC GLUCOMTR-MCNC: 263 MG/DL (ref 70–130)
HCT VFR BLD AUTO: 35.3 % (ref 42–52)
HGB BLD-MCNC: 11.6 G/DL (ref 14–18)
IMM GRANULOCYTES # BLD: 0.05 10*3/MM3 (ref 0–0.03)
IMM GRANULOCYTES NFR BLD: 1 % (ref 0–0.5)
LYMPHOCYTES # BLD AUTO: 0.99 10*3/MM3 (ref 0.6–4.8)
LYMPHOCYTES NFR BLD AUTO: 19.6 % (ref 20–45)
MCH RBC QN AUTO: 28.6 PG (ref 27–31)
MCHC RBC AUTO-ENTMCNC: 32.9 G/DL (ref 31–37)
MCV RBC AUTO: 87.2 FL (ref 80–94)
MONOCYTES # BLD AUTO: 0.53 10*3/MM3 (ref 0–1)
MONOCYTES NFR BLD AUTO: 10.5 % (ref 3–8)
NEUTROPHILS # BLD AUTO: 3.03 10*3/MM3 (ref 1.5–8.3)
NEUTROPHILS NFR BLD AUTO: 59.8 % (ref 45–70)
NRBC BLD MANUAL-RTO: 0 /100 WBC (ref 0–0)
PLATELET # BLD AUTO: 176 10*3/MM3 (ref 140–500)
PMV BLD AUTO: 10.5 FL (ref 7.4–10.4)
POTASSIUM BLD-SCNC: 4 MMOL/L (ref 3.5–5.2)
RBC # BLD AUTO: 4.05 10*6/MM3 (ref 4.7–6.1)
SODIUM BLD-SCNC: 140 MMOL/L (ref 136–145)
WBC NRBC COR # BLD: 5.06 10*3/MM3 (ref 4.8–10.8)

## 2017-01-11 PROCEDURE — 25010000002 MIDAZOLAM PER 1 MG: Performed by: NURSE ANESTHETIST, CERTIFIED REGISTERED

## 2017-01-11 PROCEDURE — 87205 SMEAR GRAM STAIN: CPT | Performed by: PODIATRIST

## 2017-01-11 PROCEDURE — 25010000002 PIPERACILLIN SOD-TAZOBACTAM PER 1 G: Performed by: HOSPITALIST

## 2017-01-11 PROCEDURE — 82962 GLUCOSE BLOOD TEST: CPT

## 2017-01-11 PROCEDURE — 25010000002 FENTANYL CITRATE (PF) 100 MCG/2ML SOLUTION: Performed by: NURSE ANESTHETIST, CERTIFIED REGISTERED

## 2017-01-11 PROCEDURE — 87075 CULTR BACTERIA EXCEPT BLOOD: CPT | Performed by: PODIATRIST

## 2017-01-11 PROCEDURE — 25010000002 ONDANSETRON PER 1 MG: Performed by: NURSE ANESTHETIST, CERTIFIED REGISTERED

## 2017-01-11 PROCEDURE — 0JBQ0ZZ EXCISION OF RIGHT FOOT SUBCUTANEOUS TISSUE AND FASCIA, OPEN APPROACH: ICD-10-PCS | Performed by: PODIATRIST

## 2017-01-11 PROCEDURE — 85025 COMPLETE CBC W/AUTO DIFF WBC: CPT | Performed by: NURSE PRACTITIONER

## 2017-01-11 PROCEDURE — 0J9Q0ZZ DRAINAGE OF RIGHT FOOT SUBCUTANEOUS TISSUE AND FASCIA, OPEN APPROACH: ICD-10-PCS | Performed by: PODIATRIST

## 2017-01-11 PROCEDURE — 99232 SBSQ HOSP IP/OBS MODERATE 35: CPT | Performed by: NURSE PRACTITIONER

## 2017-01-11 PROCEDURE — 25010000002 PROPOFOL 10 MG/ML EMULSION: Performed by: NURSE ANESTHETIST, CERTIFIED REGISTERED

## 2017-01-11 PROCEDURE — 87070 CULTURE OTHR SPECIMN AEROBIC: CPT | Performed by: PODIATRIST

## 2017-01-11 PROCEDURE — 0S9M3ZZ DRAINAGE OF RIGHT METATARSAL-PHALANGEAL JOINT, PERCUTANEOUS APPROACH: ICD-10-PCS | Performed by: PODIATRIST

## 2017-01-11 PROCEDURE — 93005 ELECTROCARDIOGRAM TRACING: CPT | Performed by: NURSE ANESTHETIST, CERTIFIED REGISTERED

## 2017-01-11 PROCEDURE — 63710000001 INSULIN ASPART PER 5 UNITS: Performed by: HOSPITALIST

## 2017-01-11 PROCEDURE — 25010000002 HYDROMORPHONE PER 4 MG: Performed by: PODIATRIST

## 2017-01-11 PROCEDURE — 80048 BASIC METABOLIC PNL TOTAL CA: CPT | Performed by: NURSE PRACTITIONER

## 2017-01-11 RX ORDER — MIDAZOLAM HYDROCHLORIDE 1 MG/ML
2 INJECTION INTRAMUSCULAR; INTRAVENOUS
Status: DISCONTINUED | OUTPATIENT
Start: 2017-01-11 | End: 2017-01-11 | Stop reason: HOSPADM

## 2017-01-11 RX ORDER — HYDROCODONE BITARTRATE AND ACETAMINOPHEN 5; 325 MG/1; MG/1
1 TABLET ORAL EVERY 4 HOURS PRN
Status: DISCONTINUED | OUTPATIENT
Start: 2017-01-11 | End: 2017-01-13 | Stop reason: HOSPADM

## 2017-01-11 RX ORDER — FENTANYL CITRATE 50 UG/ML
INJECTION, SOLUTION INTRAMUSCULAR; INTRAVENOUS AS NEEDED
Status: DISCONTINUED | OUTPATIENT
Start: 2017-01-11 | End: 2017-01-11 | Stop reason: SURG

## 2017-01-11 RX ORDER — SODIUM HYPOCHLORITE 1.25 MG/ML
SOLUTION TOPICAL 2 TIMES DAILY
Status: DISCONTINUED | OUTPATIENT
Start: 2017-01-11 | End: 2017-01-13 | Stop reason: HOSPADM

## 2017-01-11 RX ORDER — FAMOTIDINE 10 MG/ML
20 INJECTION, SOLUTION INTRAVENOUS ONCE
Status: COMPLETED | OUTPATIENT
Start: 2017-01-11 | End: 2017-01-11

## 2017-01-11 RX ORDER — SODIUM CHLORIDE 0.9 % (FLUSH) 0.9 %
1-10 SYRINGE (ML) INJECTION AS NEEDED
Status: DISCONTINUED | OUTPATIENT
Start: 2017-01-11 | End: 2017-01-11 | Stop reason: HOSPADM

## 2017-01-11 RX ORDER — SODIUM CHLORIDE, SODIUM LACTATE, POTASSIUM CHLORIDE, CALCIUM CHLORIDE 600; 310; 30; 20 MG/100ML; MG/100ML; MG/100ML; MG/100ML
9 INJECTION, SOLUTION INTRAVENOUS CONTINUOUS PRN
Status: DISCONTINUED | OUTPATIENT
Start: 2017-01-11 | End: 2017-01-11 | Stop reason: HOSPADM

## 2017-01-11 RX ORDER — ONDANSETRON 2 MG/ML
4 INJECTION INTRAMUSCULAR; INTRAVENOUS ONCE AS NEEDED
Status: DISCONTINUED | OUTPATIENT
Start: 2017-01-11 | End: 2017-01-13 | Stop reason: HOSPADM

## 2017-01-11 RX ORDER — PROPOFOL 10 MG/ML
VIAL (ML) INTRAVENOUS AS NEEDED
Status: DISCONTINUED | OUTPATIENT
Start: 2017-01-11 | End: 2017-01-11 | Stop reason: SURG

## 2017-01-11 RX ORDER — BUPIVACAINE HYDROCHLORIDE 2.5 MG/ML
INJECTION, SOLUTION INFILTRATION; PERINEURAL AS NEEDED
Status: DISCONTINUED | OUTPATIENT
Start: 2017-01-11 | End: 2017-01-11 | Stop reason: HOSPADM

## 2017-01-11 RX ORDER — MAGNESIUM HYDROXIDE 1200 MG/15ML
LIQUID ORAL AS NEEDED
Status: DISCONTINUED | OUTPATIENT
Start: 2017-01-11 | End: 2017-01-11 | Stop reason: HOSPADM

## 2017-01-11 RX ORDER — MIDAZOLAM HYDROCHLORIDE 1 MG/ML
1 INJECTION INTRAMUSCULAR; INTRAVENOUS
Status: DISCONTINUED | OUTPATIENT
Start: 2017-01-11 | End: 2017-01-11 | Stop reason: HOSPADM

## 2017-01-11 RX ORDER — LIDOCAINE HYDROCHLORIDE 20 MG/ML
INJECTION, SOLUTION INFILTRATION; PERINEURAL AS NEEDED
Status: DISCONTINUED | OUTPATIENT
Start: 2017-01-11 | End: 2017-01-11 | Stop reason: SURG

## 2017-01-11 RX ORDER — MEPERIDINE HYDROCHLORIDE 25 MG/ML
12.5 INJECTION INTRAMUSCULAR; INTRAVENOUS; SUBCUTANEOUS
Status: ACTIVE | OUTPATIENT
Start: 2017-01-11 | End: 2017-01-12

## 2017-01-11 RX ORDER — ONDANSETRON 2 MG/ML
4 INJECTION INTRAMUSCULAR; INTRAVENOUS ONCE AS NEEDED
Status: COMPLETED | OUTPATIENT
Start: 2017-01-11 | End: 2017-01-11

## 2017-01-11 RX ADMIN — PIPERACILLIN AND TAZOBACTAM 3.38 G: 3; .375 INJECTION, POWDER, FOR SOLUTION INTRAVENOUS at 04:30

## 2017-01-11 RX ADMIN — GLIPIZIDE 10 MG: 10 TABLET ORAL at 17:55

## 2017-01-11 RX ADMIN — LISINOPRIL 20 MG: 20 TABLET ORAL at 08:42

## 2017-01-11 RX ADMIN — HYDROCODONE BITARTATE AND ACETAMINOPHEN 1 TABLET: 5; 325 TABLET ORAL at 18:52

## 2017-01-11 RX ADMIN — PROPOFOL 40 MG: 10 INJECTION, EMULSION INTRAVENOUS at 12:42

## 2017-01-11 RX ADMIN — HYDROMORPHONE HYDROCHLORIDE 0.5 MG: 1 INJECTION, SOLUTION INTRAMUSCULAR; INTRAVENOUS; SUBCUTANEOUS at 21:35

## 2017-01-11 RX ADMIN — PIPERACILLIN AND TAZOBACTAM 3.38 G: 3; .375 INJECTION, POWDER, FOR SOLUTION INTRAVENOUS at 15:50

## 2017-01-11 RX ADMIN — FENTANYL CITRATE 25 MCG: 50 INJECTION, SOLUTION INTRAMUSCULAR; INTRAVENOUS at 13:08

## 2017-01-11 RX ADMIN — INSULIN ASPART 4 UNITS: 100 INJECTION, SOLUTION INTRAVENOUS; SUBCUTANEOUS at 17:53

## 2017-01-11 RX ADMIN — PIPERACILLIN AND TAZOBACTAM 3.38 G: 3; .375 INJECTION, POWDER, FOR SOLUTION INTRAVENOUS at 21:35

## 2017-01-11 RX ADMIN — FENTANYL CITRATE 25 MCG: 50 INJECTION, SOLUTION INTRAMUSCULAR; INTRAVENOUS at 12:43

## 2017-01-11 RX ADMIN — LIDOCAINE HYDROCHLORIDE 100 MG: 20 INJECTION, SOLUTION INFILTRATION; PERINEURAL at 12:41

## 2017-01-11 RX ADMIN — MIDAZOLAM HYDROCHLORIDE 0.5 MG: 1 INJECTION, SOLUTION INTRAMUSCULAR; INTRAVENOUS at 12:33

## 2017-01-11 RX ADMIN — MELOXICAM 7.5 MG: 7.5 TABLET ORAL at 15:46

## 2017-01-11 RX ADMIN — METFORMIN HYDROCHLORIDE 1000 MG: 500 TABLET ORAL at 17:54

## 2017-01-11 RX ADMIN — FAMOTIDINE 20 MG: 10 INJECTION, SOLUTION INTRAVENOUS at 11:28

## 2017-01-11 RX ADMIN — ONDANSETRON 4 MG: 2 INJECTION, SOLUTION INTRAMUSCULAR; INTRAVENOUS at 11:28

## 2017-01-11 RX ADMIN — PIPERACILLIN AND TAZOBACTAM 3.38 G: 3; .375 INJECTION, POWDER, FOR SOLUTION INTRAVENOUS at 11:04

## 2017-01-11 RX ADMIN — SODIUM CHLORIDE, POTASSIUM CHLORIDE, SODIUM LACTATE AND CALCIUM CHLORIDE 9 ML/HR: 600; 310; 30; 20 INJECTION, SOLUTION INTRAVENOUS at 11:00

## 2017-01-11 RX ADMIN — LEVOTHYROXINE SODIUM 100 MCG: 100 TABLET ORAL at 06:52

## 2017-01-11 RX ADMIN — MONTELUKAST SODIUM 10 MG: 10 TABLET, FILM COATED ORAL at 21:35

## 2017-01-11 RX ADMIN — PROPOFOL 20 MG: 10 INJECTION, EMULSION INTRAVENOUS at 13:08

## 2017-01-11 NOTE — PLAN OF CARE
Problem: Patient Care Overview (Adult)  Goal: Plan of Care Review  Outcome: Ongoing (interventions implemented as appropriate)    01/11/17 1108   Coping/Psychosocial Response Interventions   Plan Of Care Reviewed With patient   Patient Care Overview   Progress no change   Outcome Evaluation   Outcome Summary/Follow up Plan vss       Goal: Adult Individualization and Mutuality  Outcome: Ongoing (interventions implemented as appropriate)    Problem: Perioperative Period (Adult)  Goal: Signs and Symptoms of Listed Potential Problems Will be Absent or Manageable (Perioperative Period)  Outcome: Ongoing (interventions implemented as appropriate)

## 2017-01-11 NOTE — PLAN OF CARE
Problem: Patient Care Overview (Adult)  Goal: Plan of Care Review  Outcome: Ongoing (interventions implemented as appropriate)    01/11/17 1400   Coping/Psychosocial Response Interventions   Plan Of Care Reviewed With patient   Patient Care Overview   Progress improving   Outcome Evaluation   Outcome Summary/Follow up Plan VSS, NO C/O PAIN, DRESSING CLEAN DRY AND INTACT, TAKING PO, READY FOR TRANSFER BACK TO FLOOR ROOM       Goal: Adult Individualization and Mutuality  Outcome: Ongoing (interventions implemented as appropriate)    Problem: Perioperative Period (Adult)  Goal: Signs and Symptoms of Listed Potential Problems Will be Absent or Manageable (Perioperative Period)  Outcome: Ongoing (interventions implemented as appropriate)

## 2017-01-11 NOTE — BRIEF OP NOTE
INCISION AND DRAINAGE LOWER EXTREMITY  Procedure Note    Joey Schmitt  1/7/2017 - 1/11/2017    Pre-op Diagnosis:   Abscess of right foot [L02.611]    Post-op Diagnosis:     Post-Op Diagnosis Codes:     * Abscess of right foot [L02.611]    Procedure/CPT® Codes:  AL EXPLOR METATARSO-PHALANG JT [03293]    Procedure(s):  INCISION AND DRAINAGE RIGHT FOREFOOT WITH 1ST MTPJ ARTHROTOMY    Surgeon(s):  Mikael Dwyer DPM    Anesthesia: MAC + ankle block using 18cc 1:1 mix of 0.25% Marcaine and 1% Lidocaine plain. An additional 12cc of 0.25% Marcaine was necessary intra-op.    Staff:   Circulator: Mani Hall RN  Scrub Person: Chelsea Blakely  Orientee: Jocelyn Duran RN    Estimated Blood Loss: 15 mL    Specimens:                  ID Type Source Tests Collected by Time Destination   1 :  Wound Foot, Right ANAEROBIC CULTURE, WOUND CULTURE Mikael Dwyer DPM 1/11/2017 1306          Drains:    None       Findings: 3.2x1.7cm bullous lesion at medial R 1st MTPJ with underlying fluctuance and superficial abscess with a small quantity of pus which was collected for C&S. 1st MTPJ aspiration is negative. Arthrotomy of the 1st MTPJ is negative for purulence or foreign material.    Complications: None    Mikael Dwyer DPM     Date: 1/11/2017  Time: 2:16 PM

## 2017-01-11 NOTE — ANESTHESIA PREPROCEDURE EVALUATION
Anesthesia Evaluation     Patient summary reviewed and Nursing notes reviewed    No history of anesthetic complications   Airway   Mallampati: II  TM distance: >3 FB  Neck ROM: limited  possible difficult intubation  Dental - normal exam     Pulmonary - normal exam    breath sounds clear to auscultation  (+) sleep apnea (NO CPAP),   Recent URI: clear sinus drainage.  Cardiovascular - normal exam  (+) hypertension well controlled,     Rhythm: regular  Rate: normal    Neuro/Psych  (+) numbness (diabetic neuropathy bilat. feet), dementia, poor historian.,    GI/Hepatic/Renal/Endo    (+) obesity,  diabetes mellitus type 2 well controlled,     Musculoskeletal     Abdominal   (+) obese,    Substance History - negative use     OB/GYN negative ob/gyn ROS         Other   (+) arthritis (shoulders)                        Anesthesia Plan    ASA 2     MAC     intravenous induction   Anesthetic plan and risks discussed with patient.  Use of blood products discussed with patient  Consented to blood products.

## 2017-01-11 NOTE — PROGRESS NOTES
"SERVICE: [facility] HOSPITALIST    CONSULTANTS: Podiatry    CHIEF COMPLAINT: F/U right foot cellulitis/abscess    SUBJECTIVE: Patient denies concerns, reports foot is still numb. Tolerating po, no f/c/n/v/d/cough/chest pain/abdominal pain or other concerns.     OBJECTIVE:    Visit Vitals   • /75 (BP Location: Left arm, Patient Position: Sitting)   • Pulse 75   • Temp 98 °F (36.7 °C) (Oral)   • Resp 20   • Ht 74\" (188 cm)   • Wt 241 lb (109 kg)   • SpO2 94%   • BMI 30.94 kg/m2     MEDS/LABS REVIEWED AND ORDERED    glipiZIDE 10 mg Oral BID AC   insulin aspart 0-9 Units Subcutaneous 4x Daily AC & at Bedtime   levothyroxine 100 mcg Oral Q AM   lisinopril 20 mg Oral Q24H   meloxicam 7.5 mg Oral Daily   metFORMIN 1,000 mg Oral BID With Meals   montelukast 10 mg Oral Nightly   multivitamin with minerals 1 tablet Oral Daily   piperacillin-tazobactam 3.375 g Intravenous Q6H   sodium hypochlorite  Topical BID     Physical Exam   Constitutional: He is oriented to person, place, and time. He appears well-developed and well-nourished.   HENT:   Head: Normocephalic and atraumatic.   Eyes: EOM are normal. Pupils are equal, round, and reactive to light.   Cardiovascular: Normal rate and regular rhythm.  Exam reveals no friction rub.    No murmur heard.  Pulmonary/Chest: Effort normal and breath sounds normal. No respiratory distress. He has no wheezes. He has no rales.   Abdominal: Soft. Bowel sounds are normal. He exhibits no distension. There is no tenderness.   Musculoskeletal: He exhibits no edema.   Neurological: He is alert and oriented to person, place, and time.   Skin: Skin is warm and dry. No erythema.   Right foot with ace. CMS check good to right foot.    Psychiatric: He has a normal mood and affect. His behavior is normal. Judgment and thought content normal.     LAB/DIAGNOSTICS:    Lab Results (last 24 hours)     Procedure Component Value Units Date/Time    POC Glucose Fingerstick [[id number]]  " (Normal) Collected:  01/10/17 0720    Specimen:  Blood Updated:  01/10/17 0731     Glucose 108 mg/dL     Narrative:       Meter: [id number] : [id number] Yannick Quiana    POC Glucose Fingerstick [[id number]]  (Abnormal) Collected:  01/10/17 1129    Specimen:  Blood Updated:  01/10/17 1148     Glucose 248 (H) mg/dL     Narrative:       Meter: [id number] : [id number] Yannick Quiana    POC Glucose Fingerstick [[id number]]  (Normal) Collected:  01/10/17 1624    Specimen:  Blood Updated:  01/10/17 1631     Glucose 91 mg/dL     Narrative:       Meter: [id number] : [id number] Yannick Quiana    POC Glucose Fingerstick [[id number]]  (Abnormal) Collected:  01/10/17 2015    Specimen:  Blood Updated:  01/10/17 2022     Glucose 155 (H) mg/dL     Narrative:       Meter: [id number] : [id number] Tony Catrina    CBC & Differential [[id number]] Collected:  01/11/17 0448    Specimen:  Blood Updated:  01/11/17 0459    Narrative:       The following orders were created for panel order CBC & Differential.  Procedure                               Abnormality         Status                     ---------                               -----------         ------                     CBC Auto Differential[[id number]]         Abnormal            Final result                 Please view results for these tests on the individual orders.    CBC Auto Differential [[id number]]  (Abnormal) Collected:  01/11/17 0448    Specimen:  Blood Updated:  01/11/17 0459     WBC 5.06 10*3/mm3      RBC 4.05 (L) 10*6/mm3      Hemoglobin 11.6 (L) g/dL      Hematocrit 35.3 (L) %      MCV 87.2 fL      MCH 28.6 pg      MCHC 32.9 g/dL      RDW 13.8 %      RDW-SD 44.0 fl      MPV 10.5 (H) fL      Platelets 176 10*3/mm3      Neutrophil % 59.8 %      Lymphocyte % 19.6 (L) %      Monocyte % 10.5 (H) %      Eosinophil % 7.7 (H) %      Basophil % 1.4 %      Immature Grans % 1.0 (H) %      Neutrophils, Absolute 3.03 10*3/mm3      Lymphocytes, Absolute 0.99 10*3/mm3       Monocytes, Absolute 0.53 10*3/mm3      Eosinophils, Absolute 0.39 (H) 10*3/mm3      Basophils, Absolute 0.07 10*3/mm3      Immature Grans, Absolute 0.05 (H) 10*3/mm3      nRBC 0.0 /100 WBC     Basic Metabolic Panel [[id number]]  (Abnormal) Collected:  01/11/17 0448    Specimen:  Blood Updated:  01/11/17 0522     Glucose 125 (H) mg/dL      BUN 27 (H) mg/dL      Creatinine 1.25 mg/dL      Sodium 140 mmol/L      Potassium 4.0 mmol/L      Chloride 104 mmol/L      CO2 23.6 mmol/L      Calcium 8.6 (L) mg/dL      eGFR Non African Amer 56 (L) mL/min/1.73      BUN/Creatinine Ratio 21.6      Anion Gap 12.4 mmol/L     Narrative:       The MDRD GFR formula is only valid for adults with stable renal function between ages 18 and 70.        ASSESSMENT/PLAN:  1. Right foot cellulitis: Dr. Dwyer, Podiatry following  S/P I&D right medial MTP abscess  Continue Zosyn IV, pending results of wound cultures sent by Dr. Dwyer  Continues afebrile/WBCC continues normal  Dilaudid IV per Dr. Dwyer for severe pain, Norco 5/325 mg for moderate pain  Continue mobic 7.5 mg daily/tylenol 650 mg q 4 hours prn mild pain  Monitor and adjust medication based on culture results      2. 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 HTN: overall at goal on lisinopril 20 mg daily, off home hctz for now, monitor      5. Hypothyroidism: no acute issues on home levothyroxine 100 mcg daily      6. Dyslipidemia: no medications noted on med list  Will defer to Romeo Chamberlain MD  Continue CCC diet      7. H/O Prostate Cancer: no acute issues here, s/p prostatectomy many years ago.

## 2017-01-11 NOTE — PROGRESS NOTES
"Adult Nutrition  Assessment/PES    Patient Name:  Joey Schmitt  YOB: 1935  MRN: [id number]  Admit Date:  1/7/2017    Assessment Date:  1/11/2017        Reason for Assessment       01/11/17 1155    Reason for Assessment    Reason For Assessment/Visit follow up protocol    Diagnosis --   Pt in OR for I & D of foot s/p stepping on tooth pick              Nutrition/Diet History       01/11/17 1156    Nutrition/Diet History    Typical Food/Fluid Intake Pt in OR today. Spoke with pt and daughter yesterday report good appetite no needs voiced.             Anthropometrics       01/11/17 1156    Anthropometrics    RD Documented Current Weight  --   no new wt    Anthropometrics (Special Considerations)    RD Calculated BMI (kg/m2) 30.9    Body Mass Index (BMI)    BMI Grade 30 - 34.9- obesity - grade I            Labs/Tests/Procedures/Meds       01/11/17 1157    Labs/Tests/Procedures/Meds    Labs/Tests Review Reviewed;Glucose;BUN    Medication Review Reviewed, pertinent;Diabetic Oral Agent;Insulin;Antibiotic;Multivitamin              Estimated/Assessed Needs       01/11/17 1157    Calculation Measurements    Weight Used For Calculations 109 kg (240 lb 4.8 oz)    Height Used for Calculations 1.88 m (6' 2.02\")    Estimated/Assessed Energy Needs    [facility]    Age 81    RMR ([facility] Equation) 1870    Activity Factors ([facility] [facility])  Confined to bed  1.2    Estimated Kcal Range  1744 kcal (mifflin1.2 - 500 kcal )    Estimated/Assessed Protein Needs    Weight Used for Protein Calculation 109 kg (240 lb 4.8 oz)    Protein (gm/kg) 1.0    1.0 Gm Protein (gm) 109    Estimated/Assessed Fluid Needs    Fluid Need Method RDA method    RDA Method (mL)  1744            Nutrition Prescription Ordered       01/11/17 1159    Nutrition Prescription PO    Current PO Diet NPO            Evaluation of Received Nutrient/Fluid Intake       01/11/17 1159    Evaluation of Received Nutrient/Fluid " Intake    Number of Days Evaluated 3 days    Nutrition Delivered Fluid Evaluation    Fluid Intake Evaluation    Oral Fluid (mL) 713    Total Fluid Intake (mL) 713    % Fluid Needs 41%    PO Evaluation    Number of Meals 7    % PO Intake 93              Problem/Interventions:        Problem 1       01/11/17 1201    Nutrition Diagnoses Problem 1    Problem 1 Overweight/Obesity    Etiology (related to) Medical Diagnosis    Signs/Symptoms (evidenced by) BMI    BMI 30 - 34.9                    Intervention Goal       01/11/17 1202    Intervention Goal    PO Maintain intake    PO Intake % 75 %   % of meals            Nutrition Intervention       01/11/17 1202    Nutrition Intervention    RD/Tech Action Follow Tx progress            Nutrition Prescription       01/11/17 1202    Other Orders    Other Advance diet as indicated post op            Education/Evaluation       01/11/17 1202    Education    Education Other (comment)   Pt in OR, previously declined edu this admit.    Monitor/Evaluation    Monitor Per protocol;I&O;PO intake;Pertinent labs;Weight;Skin status        Comments:    Agree with MVT to aid with healing wound.    Will cont to follow and monitor.     Electronically signed by:  Bianca Tang RD  01/11/17 12:03 PM

## 2017-01-11 NOTE — PLAN OF CARE
Problem: Patient Care Overview (Adult)  Goal: Plan of Care Review  Outcome: Ongoing (interventions implemented as appropriate)    01/11/17 182   Coping/Psychosocial Response Interventions   Plan Of Care Reviewed With patient;spouse;daughter   Patient Care Overview   Progress improving       Goal: Adult Individualization and Mutuality  Outcome: Ongoing (interventions implemented as appropriate)  Goal: Discharge Needs Assessment  Outcome: Ongoing (interventions implemented as appropriate)    Problem: Skin Integrity Impairment, Risk/Actual (Adult)  Goal: Identify Related Risk Factors and Signs and Symptoms  Outcome: Ongoing (interventions implemented as appropriate)  Goal: Skin Integrity/Wound Healing  Outcome: Ongoing (interventions implemented as appropriate)    Problem: Pain, Acute (Adult)  Goal: Identify Related Risk Factors and Signs and Symptoms  Outcome: Ongoing (interventions implemented as appropriate)  Goal: Acceptable Pain Control/Comfort Level  Outcome: Ongoing (interventions implemented as appropriate)

## 2017-01-11 NOTE — ANESTHESIA POSTPROCEDURE EVALUATION
Patient: Joey Schmitt    Procedure Summary     Date Anesthesia Start Anesthesia Stop Room / Location    01/11/17 9738 [id number] [facility] / [facility]       Procedure Diagnosis Surgeon Provider    INCISION AND DRAINAGE LOWER EXTREMITY (Right Foot) No diagnosis on file. YULIANA Hernández CRNA          Anesthesia Type: general  Last vitals  BP      Temp      Pulse     Resp      SpO2        Post Anesthesia Care and Evaluation    Patient location during evaluation: bedside  Patient participation: complete - patient participated  Level of consciousness: awake and alert  Pain management: adequate  Airway patency: patent  Anesthetic complications: No anesthetic complications  Cardiovascular status: acceptable  Respiratory status: acceptable  Hydration status: acceptable

## 2017-01-12 LAB
GLUCOSE BLDC GLUCOMTR-MCNC: 131 MG/DL (ref 70–130)
GLUCOSE BLDC GLUCOMTR-MCNC: 187 MG/DL (ref 70–130)
GLUCOSE BLDC GLUCOMTR-MCNC: 258 MG/DL (ref 70–130)
GLUCOSE BLDC GLUCOMTR-MCNC: 81 MG/DL (ref 70–130)

## 2017-01-12 PROCEDURE — 82962 GLUCOSE BLOOD TEST: CPT

## 2017-01-12 PROCEDURE — 25010000002 PIPERACILLIN-TAZOBACTAM: Performed by: HOSPITALIST

## 2017-01-12 PROCEDURE — 63710000001 INSULIN ASPART PER 5 UNITS: Performed by: HOSPITALIST

## 2017-01-12 PROCEDURE — 99231 SBSQ HOSP IP/OBS SF/LOW 25: CPT | Performed by: NURSE PRACTITIONER

## 2017-01-12 PROCEDURE — 25010000002 PIPERACILLIN SOD-TAZOBACTAM PER 1 G: Performed by: HOSPITALIST

## 2017-01-12 RX ADMIN — MULTIPLE VITAMINS W/ MINERALS TAB 1 TABLET: TAB at 11:09

## 2017-01-12 RX ADMIN — MONTELUKAST SODIUM 10 MG: 10 TABLET, FILM COATED ORAL at 22:23

## 2017-01-12 RX ADMIN — PIPERACILLIN AND TAZOBACTAM 3.38 G: 3; .375 INJECTION, POWDER, FOR SOLUTION INTRAVENOUS at 22:24

## 2017-01-12 RX ADMIN — PIPERACILLIN AND TAZOBACTAM 3.38 G: 3; .375 INJECTION, POWDER, FOR SOLUTION INTRAVENOUS at 16:09

## 2017-01-12 RX ADMIN — GLIPIZIDE 10 MG: 10 TABLET ORAL at 17:45

## 2017-01-12 RX ADMIN — LISINOPRIL 20 MG: 20 TABLET ORAL at 11:09

## 2017-01-12 RX ADMIN — HYDROCODONE BITARTATE AND ACETAMINOPHEN 1 TABLET: 5; 325 TABLET ORAL at 14:24

## 2017-01-12 RX ADMIN — INSULIN ASPART 2 UNITS: 100 INJECTION, SOLUTION INTRAVENOUS; SUBCUTANEOUS at 22:23

## 2017-01-12 RX ADMIN — DAKIN'S SOLUTION 0.125% (QUARTER STRENGTH): 0.12 SOLUTION at 11:10

## 2017-01-12 RX ADMIN — INSULIN ASPART 6 UNITS: 100 INJECTION, SOLUTION INTRAVENOUS; SUBCUTANEOUS at 12:50

## 2017-01-12 RX ADMIN — METFORMIN HYDROCHLORIDE 1000 MG: 500 TABLET ORAL at 17:44

## 2017-01-12 RX ADMIN — MELOXICAM 7.5 MG: 7.5 TABLET ORAL at 11:09

## 2017-01-12 RX ADMIN — GLIPIZIDE 10 MG: 10 TABLET ORAL at 11:09

## 2017-01-12 RX ADMIN — PIPERACILLIN AND TAZOBACTAM 3.38 G: 3; .375 INJECTION, POWDER, FOR SOLUTION INTRAVENOUS at 11:09

## 2017-01-12 RX ADMIN — HYDROCODONE BITARTATE AND ACETAMINOPHEN 1 TABLET: 5; 325 TABLET ORAL at 00:41

## 2017-01-12 RX ADMIN — LEVOTHYROXINE SODIUM 100 MCG: 100 TABLET ORAL at 05:12

## 2017-01-12 RX ADMIN — PIPERACILLIN AND TAZOBACTAM 3.38 G: 3; .375 INJECTION, POWDER, FOR SOLUTION INTRAVENOUS at 04:17

## 2017-01-12 RX ADMIN — METFORMIN HYDROCHLORIDE 1000 MG: 500 TABLET ORAL at 11:09

## 2017-01-12 RX ADMIN — DAKIN'S SOLUTION 0.125% (QUARTER STRENGTH): 0.12 SOLUTION at 17:45

## 2017-01-12 RX ADMIN — HYDROCODONE BITARTATE AND ACETAMINOPHEN 1 TABLET: 5; 325 TABLET ORAL at 05:12

## 2017-01-12 NOTE — PLAN OF CARE
Problem: Patient Care Overview (Adult)  Goal: Plan of Care Review  Outcome: Ongoing (interventions implemented as appropriate)    01/12/17 0524   Coping/Psychosocial Response Interventions   Plan Of Care Reviewed With patient   Patient Care Overview   Progress no change   Outcome Evaluation   Outcome Summary/Follow up Plan vss, c/o pain, drsg clean dry intact         Problem: Skin Integrity Impairment, Risk/Actual (Adult)  Goal: Identify Related Risk Factors and Signs and Symptoms  Outcome: Ongoing (interventions implemented as appropriate)    01/12/17 0524   Skin Integrity Impairment, Risk/Actual   Skin Integrity Impairment, Risk/Actual: Related Risk Factors surgery/procedure   Signs and Symptoms (Skin Integrity Impairment) inflammation       Goal: Skin Integrity/Wound Healing  Outcome: Ongoing (interventions implemented as appropriate)    01/12/17 0524   Skin Integrity Impairment, Risk/Actual (Adult)   Skin Integrity/Wound Healing making progress toward outcome         Problem: Pain, Acute (Adult)  Goal: Identify Related Risk Factors and Signs and Symptoms  Outcome: Ongoing (interventions implemented as appropriate)    01/12/17 0524   Pain, Acute   Related Risk Factors (Acute Pain) surgery;procedure/treatment   Signs and Symptoms (Acute Pain) verbalization of pain descriptors       Goal: Acceptable Pain Control/Comfort Level  Outcome: Ongoing (interventions implemented as appropriate)    01/12/17 0524   Pain, Acute (Adult)   Acceptable Pain Control/Comfort Level making progress toward outcome

## 2017-01-12 NOTE — PROGRESS NOTES
Orthopedic Foot/Ankle Progress Note    Subjective     Post-Operative Day: 1 post-right foot/ankle procedure; I&D of R forefoot abscess w/ 1st MTPJ arthrotomy.    Systemic or Specific Complaints: The patient denies constitutional symptoms of infection. The dressing has blood strike thru at the 1st MTPJ medially. He states he is feeling better and getting around better and in less pain than prior to OR. He is resting in a reclined position in a bedside chair with surgical shoe and intact surgical dressing on foot. The patients wife and daughter are at bedside.    Objective     Vital signs in last 24 hours:  Temp:  [96.6 °F (35.9 °C)-97.3 °F (36.3 °C)] 96.7 °F (35.9 °C)  Heart Rate:  [62-77] 63  Resp:  [16-18] 18  BP: (121-142)/(73-78) 142/78    General: alert, appears stated age, cooperative and no distress   Neurovascular: Neurovascular status is intact. 2-3+ pedal edema is present.   Wound: Open 4cm longitudinal incision is present at the medial R 1st MTPJ. Serosanguinous drainage is present in small quantity. No foul odor is noted. No active bleeding. The dorsal cellulitis continues to improve and no calor is present over the dorsal erythematous zone.   Range of Motion: Reduced active/passive 1st MTPJ ROM due to recent surgery.   DVT Exam: No calf pain to palpation.     Data Review  CBC:  Results from last 7 days  Lab Units 01/11/17  0448   WBC 10*3/mm3 5.06   RBC 10*6/mm3 4.05*   HEMOGLOBIN g/dL 11.6*   HEMATOCRIT % 35.3*   PLATELETS 10*3/mm3 176       Assessment/Plan     Status post-right foot/ankle procedure: Doing well postoperatively.     Pain Relief: some relief w/ Percocet.    Activity: BRP w/ assist, up in chair as desired w/ foot elevated.    Weight Bearing: PWB w/ walker assist    Treatment: Dressing change done. Wound is re-packed w/ Dakins wet-dry gauze after Dakins irrigation.     Plan: Wound culture is pending. Patient will require return to OR for wound closure. This can be done next week as an  out-patient. Once daily wound packing can be performed as out patient until wound closure occurs. Will discuss with primary team.     LOS: 4 days     Mikael Dwyer DPM    Date: 1/12/2017  Time: 6:43 PM

## 2017-01-12 NOTE — PLAN OF CARE
Problem: Patient Care Overview (Adult)  Goal: Plan of Care Review  Outcome: Ongoing (interventions implemented as appropriate)    01/12/17 8712   Coping/Psychosocial Response Interventions   Plan Of Care Reviewed With patient;daughter   Patient Care Overview   Progress improving   Outcome Evaluation   Outcome Summary/Follow up Plan patient remains confused. Family staying at bedside, incontince issues.          Problem: Skin Integrity Impairment, Risk/Actual (Adult)  Goal: Skin Integrity/Wound Healing  Outcome: Ongoing (interventions implemented as appropriate)

## 2017-01-12 NOTE — PROGRESS NOTES
Patient: Joey Schmtit  Procedure(s):  INCISION AND DRAINAGE LOWER EXTREMITY  Anesthesia type: [unfilled]    Patient location: [facility] Surgical Floor  Vitals:    01/11/17 1950 01/11/17 2331 01/12/17 0404 01/12/17 0630   BP: 121/74 133/73 128/77 136/73   BP Location: Right arm Right arm Right arm Right arm   Patient Position: Lying Lying Lying Lying   Pulse: 71 77 72 74   Resp: 18 16 18 16   Temp: 96.9 °F (36.1 °C) 97.3 °F (36.3 °C) 96.6 °F (35.9 °C) 97 °F (36.1 °C)   TempSrc: Oral Oral Oral Oral   SpO2: 95% 96% 95% 94%   Weight:       Height:         Level of consciousness: awake, alert and oriented    Post-anesthesia pain: adequate analgesia  Airway patency: patent  Respiratory: unassisted  Cardiovascular: stable and blood pressure at baseline  Hydration: euvolemic    Anesthetic complications: no

## 2017-01-12 NOTE — PROGRESS NOTES
"SERVICE: [facility] HOSPITALIST    CONSULTANTS: Podiatry    CHIEF COMPLAINT: f/u Right foot cellulitis/abscess, s/p I&D    SUBJECTIVE: The patient reports that his pain is well controlled this morning however his wife reports that he experienced severe pain overnight she does not recall. He reports that he has had a dry cough for months, unchanged after URI in nov/dec. He denies fever, chills, chest pain, shortness of breath, abdominal pain or other new concerns.    OBJECTIVE:    Visit Vitals   • /73 (BP Location: Right arm, Patient Position: Lying)   • Pulse 74   • Temp 97 °F (36.1 °C) (Oral)   • Resp 16   • Ht 74\" (188 cm)   • Wt 241 lb (109 kg)   • SpO2 94%   • BMI 30.94 kg/m2     MEDS/LABS REVIEWED AND ORDERED    glipiZIDE 10 mg Oral BID AC   insulin aspart 0-9 Units Subcutaneous 4x Daily AC & at Bedtime   levothyroxine 100 mcg Oral Q AM   lisinopril 20 mg Oral Q24H   meloxicam 7.5 mg Oral Daily   metFORMIN 1,000 mg Oral BID With Meals   montelukast 10 mg Oral Nightly   multivitamin with minerals 1 tablet Oral Daily   piperacillin-tazobactam 3.375 g Intravenous Q6H   sodium hypochlorite  Topical BID     Physical Exam   Constitutional: He is oriented to person, place, and time. He appears well-developed and well-nourished.   HENT:   Head: Normocephalic and atraumatic.   Eyes: EOM are normal. Pupils are equal, round, and reactive to light.   Cardiovascular: Normal rate and regular rhythm.  Exam reveals no gallop and no friction rub.    No murmur heard.  Pulmonary/Chest: Effort normal and breath sounds normal. No respiratory distress. He has no wheezes. He has no rales.   Abdominal: Soft. Bowel sounds are normal. He exhibits no distension. There is no tenderness.   Musculoskeletal: He exhibits no edema.   Neurological: He is alert and oriented to person, place, and time.   Skin: Skin is warm and dry. No erythema.   Right foot with ace wrap, incision not visualized. CMS check to right foot " are WNL   Psychiatric: He has a normal mood and affect. His behavior is normal. Judgment and thought content normal.       LAB/DIAGNOSTICS:    Lab Results (last 24 hours)     Procedure Component Value Units Date/Time    POC Glucose Fingerstick [[id number]]  (Normal) Collected:  01/11/17 1105    Specimen:  Blood Updated:  01/11/17 1120     Glucose 129 mg/dL     Narrative:       Meter: [id number] : [id number] Roney Mary    Anaerobic Culture [[id number]] Collected:  01/11/17 1306    Specimen:  Wound from Foot, Right Updated:  01/11/17 1349    POC Glucose Fingerstick [[id number]]  (Abnormal) Collected:  01/11/17 1735    Specimen:  Blood Updated:  01/11/17 1742     Glucose 263 (H) mg/dL     Narrative:       Meter: [id number] : [id number] Hilaria Sherly PCA    POC Glucose Fingerstick [[id number]]  (Abnormal) Collected:  01/11/17 1737    Specimen:  Blood Updated:  01/11/17 1743     Glucose 249 (H) mg/dL     Narrative:       Meter: [id number] : [id number] Hilaria Sherly PCA    POC Glucose Fingerstick [[id number]]  (Normal) Collected:  01/11/17 2035    Specimen:  Blood Updated:  01/11/17 2041     Glucose 128 mg/dL     Narrative:       Meter: [id number] : [id number] Tony Fritz    POC Glucose Fingerstick [[id number]]  (Abnormal) Collected:  01/12/17 0832    Specimen:  Blood Updated:  01/12/17 0846     Glucose 131 (H) mg/dL     Narrative:       Meter: [id number] : [id number] Yannick Araya    Wound Culture [[id number]]  (Normal) Collected:  01/11/17 1306    Specimen:  Wound from Foot, Right Updated:  01/12/17 0939     Wound Culture No growth at 24 hours         ASSESSMENT/PLAN:  1. Right foot cellulitis/abscess/S/P I&D POD 1: Dr. Dwyer, Podiatry following  S/P I&D yesterday right medial MTP area abscess  Continue Zosyn IV although cultures prelim negative for bacterial growth, await finals  Dilaudid IV per Dr. Dwyer for severe pain, Norco 5/325 mg for moderate pain  Continue mobic 7.5 mg daily/tylenol 650 mg q  4 hours prn mild pain  Awaiting final culture results to change medication and make plan for discharge soon      2. DM-2: A1C 6.1%  Glucose at goal on home dose of metformin 1000 mg bid/glipizide 10 mg bid ac  Continue moderate dose SSI with accuchecks ac/hs-has required minimal mealtime insulin  Continue off home victoza/actos      3. 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 HTN: overall at goal on lisinopril 20 mg daily, off home hctz for now, monitor      5. Hypothyroidism: no acute issues on home levothyroxine 100 mcg daily      6. Dyslipidemia: no medications noted on med list  Will defer to Romeo Chamberlain MD  Continue CCC diet      7. H/O Prostate Cancer: no acute issues here, s/p prostatectomy many years ago.

## 2017-01-13 VITALS
RESPIRATION RATE: 18 BRPM | DIASTOLIC BLOOD PRESSURE: 79 MMHG | BODY MASS INDEX: 30.93 KG/M2 | HEART RATE: 67 BPM | TEMPERATURE: 97.4 F | WEIGHT: 241 LBS | OXYGEN SATURATION: 98 % | SYSTOLIC BLOOD PRESSURE: 144 MMHG | HEIGHT: 74 IN

## 2017-01-13 LAB
GLUCOSE BLDC GLUCOMTR-MCNC: 153 MG/DL (ref 70–130)
GLUCOSE BLDC GLUCOMTR-MCNC: 181 MG/DL (ref 70–130)

## 2017-01-13 PROCEDURE — 82962 GLUCOSE BLOOD TEST: CPT

## 2017-01-13 PROCEDURE — 63710000001 INSULIN ASPART PER 5 UNITS: Performed by: HOSPITALIST

## 2017-01-13 PROCEDURE — 25010000002 PIPERACILLIN SOD-TAZOBACTAM PER 1 G: Performed by: HOSPITALIST

## 2017-01-13 PROCEDURE — 99238 HOSP IP/OBS DSCHRG MGMT 30/<: CPT | Performed by: NURSE PRACTITIONER

## 2017-01-13 RX ORDER — L.ACID,PARA/B.BIFIDUM/S.THERM 8B CELL
1 CAPSULE ORAL DAILY
Qty: 30 CAPSULE | Refills: 0 | OUTPATIENT
Start: 2017-01-13 | End: 2017-11-21

## 2017-01-13 RX ORDER — HYDROCODONE BITARTRATE AND ACETAMINOPHEN 5; 325 MG/1; MG/1
1 TABLET ORAL EVERY 6 HOURS PRN
Qty: 28 TABLET | Refills: 0 | Status: SHIPPED | OUTPATIENT
Start: 2017-01-13 | End: 2017-11-20

## 2017-01-13 RX ORDER — LISINOPRIL 20 MG/1
20 TABLET ORAL
Qty: 30 TABLET | Refills: 0 | Status: SHIPPED | OUTPATIENT
Start: 2017-01-13 | End: 2017-11-28 | Stop reason: ALTCHOICE

## 2017-01-13 RX ORDER — AMOXICILLIN AND CLAVULANATE POTASSIUM 875; 125 MG/1; MG/1
1 TABLET, FILM COATED ORAL 2 TIMES DAILY
Qty: 14 TABLET | Refills: 0 | Status: SHIPPED | OUTPATIENT
Start: 2017-01-13 | End: 2017-11-08

## 2017-01-13 RX ADMIN — DAKIN'S SOLUTION 0.125% (QUARTER STRENGTH): 0.12 SOLUTION at 11:41

## 2017-01-13 RX ADMIN — LISINOPRIL 20 MG: 20 TABLET ORAL at 09:31

## 2017-01-13 RX ADMIN — PIPERACILLIN AND TAZOBACTAM 3.38 G: 3; .375 INJECTION, POWDER, FOR SOLUTION INTRAVENOUS at 11:41

## 2017-01-13 RX ADMIN — INSULIN ASPART 2 UNITS: 100 INJECTION, SOLUTION INTRAVENOUS; SUBCUTANEOUS at 09:32

## 2017-01-13 RX ADMIN — LEVOTHYROXINE SODIUM 100 MCG: 100 TABLET ORAL at 04:57

## 2017-01-13 RX ADMIN — METFORMIN HYDROCHLORIDE 1000 MG: 500 TABLET ORAL at 09:31

## 2017-01-13 RX ADMIN — MULTIPLE VITAMINS W/ MINERALS TAB 1 TABLET: TAB at 09:32

## 2017-01-13 RX ADMIN — MELOXICAM 7.5 MG: 7.5 TABLET ORAL at 09:31

## 2017-01-13 RX ADMIN — GLIPIZIDE 10 MG: 10 TABLET ORAL at 09:31

## 2017-01-13 RX ADMIN — PIPERACILLIN AND TAZOBACTAM 3.38 G: 3; .375 INJECTION, POWDER, FOR SOLUTION INTRAVENOUS at 04:54

## 2017-01-13 NOTE — PLAN OF CARE
Problem: Patient Care Overview (Adult)  Goal: Plan of Care Review  Outcome: Ongoing (interventions implemented as appropriate)    01/13/17 0247   Coping/Psychosocial Response Interventions   Plan Of Care Reviewed With patient   Patient Care Overview   Progress no change   Outcome Evaluation   Outcome Summary/Follow up Plan pt confused, bed alarm on         Problem: Skin Integrity Impairment, Risk/Actual (Adult)  Goal: Identify Related Risk Factors and Signs and Symptoms  Outcome: Ongoing (interventions implemented as appropriate)    01/13/17 0247   Skin Integrity Impairment, Risk/Actual   Skin Integrity Impairment, Risk/Actual: Related Risk Factors surgery/procedure       Goal: Skin Integrity/Wound Healing  Outcome: Ongoing (interventions implemented as appropriate)    01/13/17 0247   Skin Integrity Impairment, Risk/Actual (Adult)   Skin Integrity/Wound Healing making progress toward outcome         Problem: Pain, Acute (Adult)  Goal: Identify Related Risk Factors and Signs and Symptoms  Outcome: Ongoing (interventions implemented as appropriate)    01/13/17 0247   Pain, Acute   Related Risk Factors (Acute Pain) surgery;procedure/treatment   Signs and Symptoms (Acute Pain) facial mask of pain/grimace       Goal: Acceptable Pain Control/Comfort Level  Outcome: Ongoing (interventions implemented as appropriate)    01/13/17 0247   Pain, Acute (Adult)   Acceptable Pain Control/Comfort Level making progress toward outcome

## 2017-01-13 NOTE — DISCHARGE SUMMARY
Joey Schmitt  1935  [id number]    Hospitalists Discharge Summary    Date of Admission: 1/7/2017  Date of Discharge:  1/13/2017    Primary Discharge Diagnoses:   1. Right foot cellulitis/abscess/S/P I&D POD 2  Secondary Discharge Diagnoses:     2. DM-2    3. 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 HTN    5. Hypothyroidism    6. Dyslipidemia    7. H/O Prostate Cancer  PCP  Patient Care Team:  Romeo Chamberlain MD as PCP - General (Family Medicine)    Consults:   Consults     Date and Time Order Name Status Description    1/8/2017 0111 Inpatient Consult to Podiatry Completed         Operations and Procedures Performed:  Procedure(s):  INCISION AND DRAINAGE LOWER EXTREMITY     Xr Foot 3+ View Right    Result Date: 1/8/2017  Narrative: INDICATION: Stepped on a toothpick last Wednesday with pain and swelling since.  TECHNIQUE: 3 views the foot were obtained  FINDINGS: 3 views of the foot show mild degenerative changes along the tarsometatarsal joint line and at the first MTP joint. Heel spurs are noted. No radiodense foreign bodies are seen. No active cortical bone destruction is noted.      Impression: Degenerative changes as described.  This report was finalized on 1/8/2017 8:12 AM by Dr. Compa George MD.      Mri Foot Right With & Without Contrast    Result Date: 1/10/2017  Narrative: RIGHT FOOT MRI WITHOUT AND WITH CONTRAST 01/09/2017 HISTORY: An 81-year-old male with right foot pain and swelling after stepping on a toothpick 01/03/2017. History of diabetes. Order states cellulitis. Evaluate for foreign body and septic arthritis of the 1st MTP joint.  COMPARISON: Right foot x-rays 01/07/2017. TECHNIQUE: Multiplanar multisequence high-field MR imaging of the right forefoot was performed both pre and post administration of IV gadolinium (20 mL Multihance). FINDINGS: An external marker was placed over the area of concern along the medial aspect of the forefoot at the level of the 1st metatarsophalangeal joint. Deep to  the marker, there is moderate subcutaneous soft tissue edema and inflammation. Findings consistent with cellulitis. No evidence of a retained foreign body. No drainable fluid collections to suggest abscess. There is no MR evidence of osteomyelitis. There is a trace effusion in the 1st metatarsophalangeal joint, which is nonspecific. Septic arthritis is in the differential diagnoses, but considered less likely at this time. There is mild arthrosis of the midfoot. The remainder of the bone marrow signal is within the expected limits. Generalized fatty atrophy of the intrinsic musculature of the forefoot consistent with chronic diabetic neuropathy. The flexor and extensor tendons are unremarkable. There is moderate generalized subcutaneous soft tissue edema along the dorsum of the forefoot, also consistent with cellulitis.     Impression: 1. Subcutaneous soft tissue inflammation along the medial aspect of the forefoot at the level of the 1st metatarsophalangeal joint in the marked area of concern, consistent with soft tissue cellulitis. There is also cellulitis and swelling extending along the dorsum of the visualized forefoot. No drainable fluid collections to suggest abscess. No MR evidence of osteomyelitis. 2. No evidence of retained foreign body by MRI. 3. Trace effusion in the 1st metatarsophalangeal joint, nonspecific. Septic arthritis is in the differential given the provided history, but considered less likely at this time. 4. Generalized fatty atrophy of the intrinsic musculature of the forefoot most consistent with chronic diabetic neuropathic changes.    Allergies:  has No Known Allergies.    Andrew  No medications noted on report 1/8/17    Discharge Medications:   Joey Schmitt   [place] Medication Instructions DARLINE:[id number]    Printed on:01/13/17 9713   Medication Information                      amoxicillin-clavulanate (AUGMENTIN) 875-125 MG per tablet  Take 1 tablet by mouth 2 (Two) Times a Day.       "       Blood Glucose Monitoring Suppl (ONE TOUCH ULTRA 2) W/DEVICE kit  USE AS DIRECTED             glipiZIDE (GLUCOTROL) 10 MG tablet  Take 10 mg by mouth 2 (two) times a day before meals             HYDROcodone-acetaminophen (NORCO) 5-325 MG per tablet  Take 1 tablet by mouth Every 6 (Six) Hours As Needed for moderate pain (4-6).             lactobacillus acidophilus (RISAQUAD) capsule capsule  Take 1 capsule by mouth Daily.             levothyroxine (SYNTHROID, LEVOTHROID) 100 MCG tablet  Take 100 mcg by mouth daily             Liraglutide (VICTOZA) 18 MG/3ML solution pen-injector  Inject under the skin             lisinopril (PRINIVIL,ZESTRIL) 20 MG tablet  Take 1 tablet by mouth Daily.             meloxicam (MOBIC) 7.5 MG tablet  Take 1 tablet by mouth Daily.             metFORMIN (GLUCOPHAGE) 1000 MG tablet  Take 1,000 mg by mouth 2 (two) times a day with meals             montelukast (SINGULAIR) 10 MG tablet  TAKE ONE TABLET DAILY IN THE EVENING             Multiple Vitamins-Minerals (PRESERVISION AREDS 2) capsule  Take 1 tablet by mouth 2 (Two) Times a Day.               History of Present Illness: Taken from original HPI on admit per Dr. Townsend:  \"80 y/o male with DM-2, hypothyroidism, hypertension, dyslipidemia and prostate cancer presented to the ER secondary to right foot swelling, redness and pain. The patient notes on Tuesday of this past week he got up out of his recliner and stepped on a toothpick that was on the floor. He was only wearing socks at the time. He pulled the toothpick out of his foot and thought her got it all. The next day that area started to become sore. He then began to get some swelling and redness in that area. He saw his PCP Friday and was started on Keflex. After almost two days on the Keflex the area of redness was starting to spread back toward the patient's ankle and he was having increasing difficulty bearing weight secondary to increasing pain so the patient decided to " "come to the ER for evaluation. Patient denies any F/C, N/V/D. Denies CP, SOA or heart palpitations. Denies recent illness.\"     Hospital Course  1. Right foot cellulitis/abscess/S/P I&D POD 2: Dr. Dwyer, Podiatry followed  Received Zosyn IV although cultures prelim remain negative for bacterial growth, anaerobic still outstanding  Continue mobic 7.5 mg daily/tylenol 650 mg q 4 hours prn mild pain  Dakins wet-to-dry daily dressing per    Augmentin 875/125mg x 7 days  Probiotic added  F/U Romeo Chamberlain MD 1 week  f/u Dr. Dwyer early next week      2. DM-2: A1C 6.1%  Glucose remained near goal on home dose of metformin 1000 mg bid/glipizide 10 mg bid ac  Required minimal sliding scale insulin  OK to resume home victoza, hold actos for now pending re-eval per Romeo Chamberlain MD   Log glucose and bring to appt with Romeo Chamberlain MD      3. 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 HTN: overall at goal on lisinopril 20 mg daily, off home hctz while here and on IVFs  F/U Romeo Chamberlain MD next week to resume or not,  to monitor      5. Hypothyroidism: no acute issues on home levothyroxine 100 mcg daily      6. Dyslipidemia: no medications noted on med list  Will defer to Romeo Chamberlain MD  Continue CCC diet      7. H/O Prostate Cancer: no acute issues here, s/p prostatectomy many years ago    Last Lab Results:   Lab Results (most recent)     Procedure Component Value Units Date/Time    CBC & Differential [[id number]] Collected:  01/07/17 2209    Specimen:  Blood Updated:  01/07/17 2220    Narrative:       The following orders were created for panel order CBC & Differential.  Procedure                               Abnormality         Status                     ---------                               -----------         ------                     CBC Auto Differential[[id number]]         Abnormal            Final result      "            Please view results for these tests on the individual orders.    CBC Auto Differential [[id number]]  (Abnormal) Collected:  01/07/17 2209    Specimen:  Blood Updated:  01/07/17 2220     WBC 7.14 10*3/mm3      RBC 4.37 (L) 10*6/mm3      Hemoglobin 12.6 (L) g/dL      Hematocrit 39.0 (L) %      MCV 89.2 fL      MCH 28.8 pg      MCHC 32.3 g/dL      RDW 14.0 %      RDW-SD 45.5 fl      MPV 10.8 (H) fL      Platelets 197 10*3/mm3      Neutrophil % 68.0 %      Lymphocyte % 13.9 (L) %      Monocyte % 11.8 (H) %      Eosinophil % 4.8 (H) %      Basophil % 0.8 %      Immature Grans % 0.7 (H) %      Neutrophils, Absolute 4.86 10*3/mm3      Lymphocytes, Absolute 0.99 10*3/mm3      Monocytes, Absolute 0.84 10*3/mm3      Eosinophils, Absolute 0.34 (H) 10*3/mm3      Basophils, Absolute 0.06 10*3/mm3      Immature Grans, Absolute 0.05 (H) 10*3/mm3      nRBC 0.0 /100 WBC     Comprehensive Metabolic Panel [[id number]]  (Abnormal) Collected:  01/07/17 2209    Specimen:  Blood Updated:  01/07/17 2239     Glucose 163 (H) mg/dL      BUN 25 (H) mg/dL      Creatinine 1.24 mg/dL      Sodium 141 mmol/L      Potassium 4.3 mmol/L      Chloride 104 mmol/L      CO2 22.7 mmol/L      Calcium 8.8 mg/dL      Total Protein 6.2 g/dL      Albumin 3.70 g/dL      ALT (SGPT) 11 U/L      AST (SGOT) 12 U/L      Alkaline Phosphatase 71 U/L      Total Bilirubin 0.4 mg/dL      eGFR Non African Amer 56 (L) mL/min/1.73      Globulin 2.5 gm/dL      A/G Ratio 1.5 g/dL      BUN/Creatinine Ratio 20.2      Anion Gap 14.3 mmol/L     Narrative:       The MDRD GFR formula is only valid for adults with stable renal function between ages 18 and 70.    Hemoglobin A1c [[id number]]  (Abnormal) Collected:  01/08/17 0134    Specimen:  Blood Updated:  01/08/17 0151     Hemoglobin A1C 6.10 (H) %     Narrative:       Hemoglobin A1C Ranges:    Increased Risk for Diabetes  5.7% to 6.4%  Diabetes                     >= 6.5%  Diabetic Goal                < 7.0%    POC Glucose  Fingerstick [[id number]]  (Abnormal) Collected:  01/08/17 0756    Specimen:  Blood Updated:  01/08/17 0805     Glucose 66 (L) mg/dL     Narrative:       Meter: [id number] : [id number] Faulker Lorenza PCA    POC Glucose Fingerstick [[id number]]  (Normal) Collected:  01/08/17 0822    Specimen:  Blood Updated:  01/08/17 0831     Glucose 88 mg/dL     Narrative:       Meter: [id number] : [id number] [facility]    Basic Metabolic Panel [[id number]]  (Abnormal) Collected:  01/08/17 0841    Specimen:  Blood Updated:  01/08/17 0912     Glucose 117 (H) mg/dL      BUN 22 mg/dL      Creatinine 1.17 mg/dL      Sodium 143 mmol/L      Potassium 4.1 mmol/L      Chloride 104 mmol/L      CO2 24.7 mmol/L      Calcium 8.8 mg/dL      eGFR Non African Amer 60 (L) mL/min/1.73      BUN/Creatinine Ratio 18.8      Anion Gap 14.3 mmol/L     Narrative:       The MDRD GFR formula is only valid for adults with stable renal function between ages 18 and 70.    POC Glucose Fingerstick [[id number]]  (Normal) Collected:  01/08/17 1158    Specimen:  Blood Updated:  01/08/17 1205     Glucose 71 mg/dL     Narrative:       Meter: [id number] : [id number] Faulker Lorenza PCA    POC Glucose Fingerstick [[id number]]  (Normal) Collected:  01/08/17 1700    Specimen:  Blood Updated:  01/08/17 1708     Glucose 87 mg/dL     Narrative:       Meter: [id number] : [id number] Faulker Lorenza PCA    POC Glucose Fingerstick [[id number]]  (Normal) Collected:  01/08/17 2234    Specimen:  Blood Updated:  01/08/17 2242     Glucose 124 mg/dL     Narrative:       Meter: [id number] : [id number] Ritika Rand    POC Glucose Fingerstick [[id number]]  (Normal) Collected:  01/09/17 0739    Specimen:  Blood Updated:  01/09/17 0745     Glucose 103 mg/dL     Narrative:       Meter: [id number] : [id number] Ray Rios RN Validator    CBC & Differential [[id number]] Collected:  01/09/17 1123    Specimen:  Blood Updated:  01/09/17 1126    Narrative:       The following orders  were created for panel order CBC & Differential.  Procedure                               Abnormality         Status                     ---------                               -----------         ------                     CBC Auto Differential[[id number]]         Abnormal            Final result                 Please view results for these tests on the individual orders.    CBC Auto Differential [[id number]]  (Abnormal) Collected:  01/09/17 1123    Specimen:  Blood Updated:  01/09/17 1126     WBC 6.81 10*3/mm3      RBC 4.25 (L) 10*6/mm3      Hemoglobin 12.0 (L) g/dL      Hematocrit 37.7 (L) %      MCV 88.7 fL      MCH 28.2 pg      MCHC 31.8 g/dL      RDW 14.0 %      RDW-SD 45.1 fl      MPV 10.7 (H) fL      Platelets 178 10*3/mm3      Neutrophil % 66.2 %      Lymphocyte % 13.7 (L) %      Monocyte % 12.3 (H) %      Eosinophil % 6.0 (H) %      Basophil % 0.9 %      Immature Grans % 0.9 (H) %      Neutrophils, Absolute 4.51 10*3/mm3      Lymphocytes, Absolute 0.93 10*3/mm3      Monocytes, Absolute 0.84 10*3/mm3      Eosinophils, Absolute 0.41 (H) 10*3/mm3      Basophils, Absolute 0.06 10*3/mm3      Immature Grans, Absolute 0.06 (H) 10*3/mm3      nRBC 0.0 /100 WBC     Basic Metabolic Panel [[id number]]  (Abnormal) Collected:  01/09/17 1123    Specimen:  Blood Updated:  01/09/17 1150     Glucose 155 (H) mg/dL      BUN 23 mg/dL      Creatinine 1.27 mg/dL      Sodium 141 mmol/L      Potassium 4.4 mmol/L      Chloride 103 mmol/L      CO2 26.7 mmol/L      Calcium 8.9 mg/dL      eGFR Non African Amer 55 (L) mL/min/1.73      BUN/Creatinine Ratio 18.1      Anion Gap 11.3 mmol/L     Narrative:       The MDRD GFR formula is only valid for adults with stable renal function between ages 18 and 70.    Magnesium [[id number]]  (Normal) Collected:  01/09/17 1123    Specimen:  Blood Updated:  01/09/17 1150     Magnesium 2.3 mg/dL     POC Glucose Fingerstick [[id number]]  (Normal) Collected:  01/09/17 1222    Specimen:  Blood Updated:  01/09/17  1228     Glucose 111 mg/dL     Narrative:       Meter: [id number] : [id number] Ray Rios RN Validator    POC Glucose Fingerstick [[id number]]  (Abnormal) Collected:  01/09/17 1657    Specimen:  Blood Updated:  01/09/17 1703     Glucose 65 (L) mg/dL     Narrative:       Meter: [id number] : [id number] Ray Rios RN Validator    POC Glucose Fingerstick [[id number]]  (Abnormal) Collected:  01/09/17 1833    Specimen:  Blood Updated:  01/09/17 1840     Glucose 196 (H) mg/dL     Narrative:       Meter: [id number] : [id number] Ray Rios RN Validator    POC Glucose Fingerstick [[id number]]  (Abnormal) Collected:  01/09/17 2032    Specimen:  Blood Updated:  01/09/17 2038     Glucose 192 (H) mg/dL     Narrative:       Meter: [id number] : [id number] Bo Barnhart    Vancomycin, Random [[id number]] Collected:  01/09/17 2158    Specimen:  Blood Updated:  01/09/17 2243     Vancomycin Random 19.70 mcg/mL     POC Glucose Fingerstick [[id number]]  (Normal) Collected:  01/10/17 0720    Specimen:  Blood Updated:  01/10/17 0731     Glucose 108 mg/dL     Narrative:       Meter: [id number] : [id number] Yannick Quiana    POC Glucose Fingerstick [[id number]]  (Abnormal) Collected:  01/10/17 1129    Specimen:  Blood Updated:  01/10/17 1148     Glucose 248 (H) mg/dL     Narrative:       Meter: [id number] : [id number] Yannick Quiana    POC Glucose Fingerstick [[id number]]  (Normal) Collected:  01/10/17 1624    Specimen:  Blood Updated:  01/10/17 1631     Glucose 91 mg/dL     Narrative:       Meter: [id number] : [id number] Yannick Quiana    POC Glucose Fingerstick [[id number]]  (Abnormal) Collected:  01/10/17 2015    Specimen:  Blood Updated:  01/10/17 2022     Glucose 155 (H) mg/dL     Narrative:       Meter: [id number] : [id number] Tony Fritz    CBC & Differential [[id number]] Collected:  01/11/17 0448    Specimen:  Blood Updated:  01/11/17 0459    Narrative:       The following orders were created for panel order  CBC & Differential.  Procedure                               Abnormality         Status                     ---------                               -----------         ------                     CBC Auto Differential[[id number]]         Abnormal            Final result                 Please view results for these tests on the individual orders.    CBC Auto Differential [[id number]]  (Abnormal) Collected:  01/11/17 0448    Specimen:  Blood Updated:  01/11/17 0459     WBC 5.06 10*3/mm3      RBC 4.05 (L) 10*6/mm3      Hemoglobin 11.6 (L) g/dL      Hematocrit 35.3 (L) %      MCV 87.2 fL      MCH 28.6 pg      MCHC 32.9 g/dL      RDW 13.8 %      RDW-SD 44.0 fl      MPV 10.5 (H) fL      Platelets 176 10*3/mm3      Neutrophil % 59.8 %      Lymphocyte % 19.6 (L) %      Monocyte % 10.5 (H) %      Eosinophil % 7.7 (H) %      Basophil % 1.4 %      Immature Grans % 1.0 (H) %      Neutrophils, Absolute 3.03 10*3/mm3      Lymphocytes, Absolute 0.99 10*3/mm3      Monocytes, Absolute 0.53 10*3/mm3      Eosinophils, Absolute 0.39 (H) 10*3/mm3      Basophils, Absolute 0.07 10*3/mm3      Immature Grans, Absolute 0.05 (H) 10*3/mm3      nRBC 0.0 /100 WBC     Basic Metabolic Panel [[id number]]  (Abnormal) Collected:  01/11/17 0448    Specimen:  Blood Updated:  01/11/17 0522     Glucose 125 (H) mg/dL      BUN 27 (H) mg/dL      Creatinine 1.25 mg/dL      Sodium 140 mmol/L      Potassium 4.0 mmol/L      Chloride 104 mmol/L      CO2 23.6 mmol/L      Calcium 8.6 (L) mg/dL      eGFR Non African Amer 56 (L) mL/min/1.73      BUN/Creatinine Ratio 21.6      Anion Gap 12.4 mmol/L     Narrative:       The MDRD GFR formula is only valid for adults with stable renal function between ages 18 and 70.    POC Glucose Fingerstick [[id number]]  (Normal) Collected:  01/11/17 0708    Specimen:  Blood Updated:  01/11/17 0733     Glucose 114 mg/dL     Narrative:       Meter: [id number] : [id number] Yannick Araya    POC Glucose Fingerstick [[id number]]  (Normal)  Collected:  01/11/17 1105    Specimen:  Blood Updated:  01/11/17 1120     Glucose 129 mg/dL     Narrative:       Meter: [id number] : [id number] Kade Hernandez    Anaerobic Culture [[id number]] Collected:  01/11/17 1306    Specimen:  Wound from Foot, Right Updated:  01/11/17 1349    POC Glucose Fingerstick [[id number]]  (Abnormal) Collected:  01/11/17 1735    Specimen:  Blood Updated:  01/11/17 1742     Glucose 263 (H) mg/dL     Narrative:       Meter: [id number] : [id number] Hilaria Sherly PCA    POC Glucose Fingerstick [[id number]]  (Abnormal) Collected:  01/11/17 1737    Specimen:  Blood Updated:  01/11/17 1743     Glucose 249 (H) mg/dL     Narrative:       Meter: [id number] : [id number] Hilaria Sherly PCA    POC Glucose Fingerstick [[id number]]  (Normal) Collected:  01/11/17 2035    Specimen:  Blood Updated:  01/11/17 2041     Glucose 128 mg/dL     Narrative:       Meter: [id number] : [id number] Tony Catrina    POC Glucose Fingerstick [[id number]]  (Abnormal) Collected:  01/12/17 0832    Specimen:  Blood Updated:  01/12/17 0846     Glucose 131 (H) mg/dL     Narrative:       Meter: [id number] : [id number] Yannick Araya    Wound Culture [[id number]]  (Normal) Collected:  01/11/17 1306    Specimen:  Wound from Foot, Right Updated:  01/12/17 1022     Wound Culture No growth at 24 hours      Gram Stain Result No WBCs or organisms seen     POC Glucose Fingerstick [[id number]]  (Abnormal) Collected:  01/12/17 1125    Specimen:  Blood Updated:  01/12/17 1141     Glucose 258 (H) mg/dL     Narrative:       Meter: [id number] : [id number] Yannick Araya    POC Glucose Fingerstick [[id number]]  (Normal) Collected:  01/12/17 1633    Specimen:  Blood Updated:  01/12/17 1647     Glucose 81 mg/dL     Narrative:       Meter: [id number] : [id number] Manuel Aydee PCA    POC Glucose Fingerstick [[id number]]  (Abnormal) Collected:  01/12/17 2140    Specimen:  Blood Updated:  01/12/17 2148     Glucose 187 (H) mg/dL      Narrative:       Meter: [id number] : [id number] Tony Fritz    POC Glucose Fingerstick [[id number]]  (Abnormal) Collected:  01/13/17 0728    Specimen:  Blood Updated:  01/13/17 0743     Glucose 153 (H) mg/dL     Narrative:       Meter: [id number] : [id number] Manuel Bajwa PCA        Imaging Results (most recent)     Procedure Component Value Units Date/Time    XR Foot 3+ View Right [[id number]] Collected:  01/08/17 0811     Updated:  01/08/17 0814    Narrative:       INDICATION: Stepped on a toothpick last Wednesday with pain and swelling  since.     TECHNIQUE: 3 views the foot were obtained     FINDINGS: 3 views of the foot show mild degenerative changes along the  tarsometatarsal joint line and at the first MTP joint. Heel spurs are  noted. No radiodense foreign bodies are seen. No active cortical bone  destruction is noted.       Impression:       Degenerative changes as described.     This report was finalized on 1/8/2017 8:12 AM by Dr. Compa George MD.       SCANNED - IMAGING [[id number]] Resulted:  01/09/17      Updated:  01/10/17 1416    MRI Foot Right With & Without Contrast [[id number]] Resulted:  01/10/17 1537     Updated:  01/10/17 1537    Narrative:       RIGHT FOOT MRI WITHOUT AND WITH CONTRAST 01/09/2017    HISTORY: An 81-year-old male with right foot pain and swelling after   stepping on a toothpick 01/03/2017. History of diabetes. Order states   cellulitis. Evaluate for foreign body and septic arthritis of the 1st MTP   joint.      COMPARISON: Right foot x-rays 01/07/2017.    TECHNIQUE: Multiplanar multisequence high-field MR imaging of the right   forefoot was performed both pre and post administration of IV gadolinium   (20 mL Multihance).    FINDINGS: An external marker was placed over the area of concern along the   medial aspect of the forefoot at the level of the 1st metatarsophalangeal   joint. Deep to the marker, there is moderate subcutaneous soft tissue   edema and inflammation. Findings  consistent with cellulitis. No evidence   of a retained foreign body. No drainable fluid collections to suggest   abscess. There is no MR evidence of osteomyelitis. There is a trace   effusion in the 1st metatarsophalangeal joint, which is nonspecific.   Septic arthritis is in the differential diagnoses, but considered less   likely at this time. There is mild arthrosis of the midfoot. The remainder   of the bone marrow signal is within the expected limits. Generalized fatty   atrophy of the intrinsic musculature of the forefoot consistent with   chronic diabetic neuropathy. The flexor and extensor tendons are   unremarkable. There is moderate generalized subcutaneous soft tissue edema   along the dorsum of the forefoot, also consistent with cellulitis.       Impression:       1. Subcutaneous soft tissue inflammation along the medial aspect of the   forefoot at the level of the 1st metatarsophalangeal joint in the marked   area of concern, consistent with soft tissue cellulitis. There is also   cellulitis and swelling extending along the dorsum of the visualized   forefoot. No drainable fluid collections to suggest abscess. No MR   evidence of osteomyelitis.  2. No evidence of retained foreign body by MRI.  3. Trace effusion in the 1st metatarsophalangeal joint, nonspecific.   Septic arthritis is in the differential given the provided history, but   considered less likely at this time.   4. Generalized fatty atrophy of the intrinsic musculature of the forefoot   most consistent with chronic diabetic neuropathic changes.        PROCEDURES  Procedure(s):  INCISION AND DRAINAGE LOWER EXTREMITY    Condition on Discharge:  Stable    Physical Exam at Discharge  Vital Signs  Temp:  [96.7 °F (35.9 °C)-97.4 °F (36.3 °C)] 97.4 °F (36.3 °C)  Heart Rate:  [63-69] 67  Resp:  [18] 18  BP: (136-144)/(78-84) 144/79    Physical Exam:  Physical Exam   Constitutional: Patient appears well-developed and well-nourished and in no acute  distress , [place]  HEENT:   Head: Normocephalic and atraumatic.   Eyes:  Pupils are equal, round, and reactive to light. EOM are intact. Sclera are anicteric and non-injected.  Mouth and Throat: Patient has moist mucous membranes. Oropharynx is clear of any erythema or exudate.     Neck: Neck supple. No JVD present. No thyromegaly present. No lymphadenopathy present.  Cardiovascular: Regular rate, regular rhythm, S1 normal and S2 normal.  Exam reveals no gallop and no friction rub.  No murmur heard.  Pulmonary/Chest: Lungs are clear to auscultation bilaterally. No respiratory distress. No wheezes. No rhonchi. No rales.   Abdominal: Soft. Bowel sounds are normal. No distension and no mass. There is no hepatosplenomegaly. There is no tenderness.   Musculoskeletal: Normal Muscle tone  Extremities: No edema. Pulses are palpable in all 4 extremities.  Neurological: Patient is alert and oriented to person, place, and time. Cranial nerves II-XII are grossly intact with no focal deficits.  Skin: Skin is warm. No rash noted. Nails show no clubbing.  No cyanosis or erythema. Right foot dressing clean, dry and intact with CMS checks good to right foot.  Discharge Disposition  Home    Visiting Nurse:    Yes     Home PT/OT:  Yes     Home Safety Evaluation:  Yes     DME  Already has    Discharge Diet:         Dietary Orders            Start     Ordered    01/11/17 1421  Diet Regular; Consistent Carbohydrate  Diet Effective Now     Question Answer Comment   Diet Texture / Consistency Regular    Common Modifiers Consistent Carbohydrate        01/11/17 1420        Activity at Discharge:  As tolerated    Pre-discharge education  Diabetic, Wound Care, Injectables, medications, follow up    Follow-up Appointments  No future appointments.  Referrals and Follow-ups to Schedule     Additional Follow-Up    As directed    Romeo Chamberlain MD   Follow Up Details:  1 week       Follow-Up    As directed    Dr. Dwyer   Follow Up Details:  within  1 week   Dr. Dwyer               Test Results Pending at Discharge   Order Current Status    Anaerobic Culture In process    Wound Culture Preliminary result         Deanna Goldberg, APRN  01/13/17  11:16 AM    Time: Discharge 30 min (if over 30 minutes give explanation as to why it took greater than 30 minutes)

## 2017-01-13 NOTE — NURSING NOTE
spoke with daughter Xiomara in med-surg rejuvenation room regarding discharge planning. list of HH agencies given. daughter to call  later with decision. updated important message from medicare. will continue to follow.      received call from daughter and home health choice is [facility]. spoke with Kaushik Orellana r/t referral. will continue to follow.

## 2017-01-13 NOTE — DISCHARGE INSTR - APPOINTMENTS
Follow up with Dr. Romeo Chamberlain on January 23rd at 2:10    Follow up with Dr. Dwyer at the [place] office on January 6th at 3:30 phone #[phone]

## 2017-01-14 LAB
BACTERIA SPEC AEROBE CULT: NORMAL
GRAM STN SPEC: NORMAL

## 2017-01-16 LAB — BACTERIA SPEC ANAEROBE CULT: NORMAL

## 2017-02-02 ENCOUNTER — OFFICE VISIT (OUTPATIENT)
Dept: ORTHOPEDIC SURGERY | Facility: CLINIC | Age: 82
End: 2017-02-02

## 2017-02-02 DIAGNOSIS — M19.011 PRIMARY OSTEOARTHRITIS OF BOTH SHOULDERS: Primary | ICD-10-CM

## 2017-02-02 DIAGNOSIS — M19.012 PRIMARY OSTEOARTHRITIS OF BOTH SHOULDERS: Primary | ICD-10-CM

## 2017-02-02 PROCEDURE — 20610 DRAIN/INJ JOINT/BURSA W/O US: CPT | Performed by: ORTHOPAEDIC SURGERY

## 2017-02-02 RX ORDER — LIDOCAINE HYDROCHLORIDE 10 MG/ML
4 INJECTION, SOLUTION INFILTRATION; PERINEURAL
Status: COMPLETED | OUTPATIENT
Start: 2017-02-02 | End: 2017-02-02

## 2017-02-02 RX ORDER — PIOGLITAZONEHYDROCHLORIDE 30 MG/1
30 TABLET ORAL EVERY MORNING
COMMUNITY
Start: 2017-02-01 | End: 2017-11-28 | Stop reason: ALTCHOICE

## 2017-02-02 RX ORDER — BETAMETHASONE SODIUM PHOSPHATE AND BETAMETHASONE ACETATE 3; 3 MG/ML; MG/ML
6 INJECTION, SUSPENSION INTRA-ARTICULAR; INTRALESIONAL; INTRAMUSCULAR; SOFT TISSUE
Status: COMPLETED | OUTPATIENT
Start: 2017-02-02 | End: 2017-02-02

## 2017-02-02 RX ADMIN — LIDOCAINE HYDROCHLORIDE 4 ML: 10 INJECTION, SOLUTION INFILTRATION; PERINEURAL at 10:08

## 2017-02-02 RX ADMIN — BETAMETHASONE SODIUM PHOSPHATE AND BETAMETHASONE ACETATE 6 MG: 3; 3 INJECTION, SUSPENSION INTRA-ARTICULAR; INTRALESIONAL; INTRAMUSCULAR; SOFT TISSUE at 10:08

## 2017-02-02 NOTE — PROGRESS NOTES
Subjective: Right shoulder pain     Patient ID: Joey Schmitt is a 81 y.o. male.    Chief Complaint:    History of Present Illness 81-year-old female with osteoarthritis of both shoulder returns with the right shoulder still symptomatic.  Underwent a cortisone injection in his left shoulder last month which has helped although still has some pain but the right was given in the most discomfort.  He inquired about other forms of treatment and I explained to him with the amount of arthritis that he has in the only other form of treatment would be shoulder replacement operation but at the cortisone injection to given him relief and he last for 5-6 months that be my preference.  Reviewed with him the risk of shoulder replacement which include but not limited to particularly diabetic infection and the need for implant removal to eradicate the infection, nerve injury and paralysis, vascular injury, periprosthetic fracture, dislocation and in some persistent pain and discomfort despite a well implanted implant.  After discussion he agrees to proceed with an injection of the right shoulder and see how they respond to these injections     Social History     Occupational History   • Not on file.     Social History Main Topics   • Smoking status: Never Smoker   • Smokeless tobacco: Never Used   • Alcohol use No   • Drug use: No   • Sexual activity: Defer      Review of Systems   Constitutional: Negative for chills, diaphoresis, fever and unexpected weight change.   HENT: Negative for hearing loss, nosebleeds, sore throat and tinnitus.    Eyes: Negative for pain and visual disturbance.   Respiratory: Negative for cough, shortness of breath and wheezing.    Cardiovascular: Negative for chest pain and palpitations.   Gastrointestinal: Negative for abdominal pain, diarrhea, nausea and vomiting.   Endocrine: Negative for cold intolerance, heat intolerance and polydipsia.   Genitourinary: Negative for difficulty urinating, dysuria  and hematuria.   Musculoskeletal: Negative for arthralgias, joint swelling and myalgias.   Skin: Negative for rash and wound.   Allergic/Immunologic: Negative for environmental allergies.   Neurological: Negative for dizziness, syncope and numbness.   Hematological: Does not bruise/bleed easily.   Psychiatric/Behavioral: Negative for dysphoric mood and sleep disturbance. The patient is not nervous/anxious.    All other systems reviewed and are negative.        Past Medical History   Diagnosis Date   • Diabetes mellitus    • Disease of thyroid gland    • Hyperlipidemia    • Hypertension    • Prostate cancer      Past Surgical History   Procedure Laterality Date   • Prostate surgery     • Shoulder surgery Left    • Incision and drainage leg Right 1/11/2017     Procedure: INCISION AND DRAINAGE LOWER EXTREMITY;  Surgeon: Mikael Dwyer DPM;  [facility]: [facility];  Service:      Family History   Problem Relation Age of Onset   • Heart disease Father          Objective:  There were no vitals filed for this visit.  There were no vitals filed for this visit.  There is no height or weight on file to calculate BMI.       Ortho Exam   marked pain and crepitus with range of motion to the right shoulder particularly with anything above 90° which is very limited.  No motor deficit    Assessment:     No diagnosis found.      Plan:       the right shoulder was injected 4 cc lidocaine 1 cc Celestone through the posterior portal after sterile prep without, complications tolerated it well.  Postinjection instructions given to the patient.  Return to see me as needed      Work Status:    MYRA query complete.    Orders:  No orders of the defined types were placed in this encounter.      Medications:  New Medications Ordered This Visit   Medications   • pioglitazone (ACTOS) 30 MG tablet       Followup:  No Follow-up on file.        Large Joint Arthrocentesis  Date/Time: 2/2/2017 10:08 AM  Consent given by: patient  Site marked: site  marked  Timeout: Immediately prior to procedure a time out was called to verify the correct patient, procedure, equipment, support staff and site/side marked as required   Supporting Documentation  Indications: pain   Procedure Details  Location: shoulder - R glenohumeral  Preparation: Patient was prepped and draped in the usual sterile fashion  Needle size: 22 G  Medications administered: 4 mL lidocaine 1 %; 6 mg betamethasone acetate-betamethasone sodium phosphate 6 (3-3) MG/ML  Patient tolerance: patient tolerated the procedure well with no immediate complications        Dragon transcription disclaimer     Much of this encounter note is an electronic transcription/translation of spoken language to printed text. The electronic translation of spoken language may permit erroneous, or at times, nonsensical words or phrases to be inadvertently transcribed. Although I have reviewed the note for such errors, some may still exist.

## 2017-05-30 RX ORDER — MELOXICAM 7.5 MG/1
TABLET ORAL
Qty: 30 TABLET | Refills: 4 | Status: SHIPPED | OUTPATIENT
Start: 2017-05-30 | End: 2017-11-08

## 2017-05-31 RX ORDER — MELOXICAM 7.5 MG/1
TABLET ORAL
Qty: 30 TABLET | Refills: 4 | OUTPATIENT
Start: 2017-05-31 | End: 2017-11-21

## 2017-07-08 LAB — GLUCOSE BLDC GLUCOMTR-MCNC: 300 MG/DL (ref 70–130)

## 2017-07-08 PROCEDURE — 99283 EMERGENCY DEPT VISIT LOW MDM: CPT

## 2017-07-08 PROCEDURE — 82962 GLUCOSE BLOOD TEST: CPT

## 2017-07-09 ENCOUNTER — HOSPITAL ENCOUNTER (EMERGENCY)
Facility: HOSPITAL | Age: 82
Discharge: HOME | End: 2017-07-09
Attending: EMERGENCY MEDICINE | Admitting: EMERGENCY MEDICINE

## 2017-07-09 VITALS
BODY MASS INDEX: 29.77 KG/M2 | TEMPERATURE: 97.8 F | HEIGHT: 74 IN | HEART RATE: 74 BPM | DIASTOLIC BLOOD PRESSURE: 81 MMHG | OXYGEN SATURATION: 98 % | RESPIRATION RATE: 20 BRPM | SYSTOLIC BLOOD PRESSURE: 154 MMHG | WEIGHT: 232 LBS

## 2017-07-09 DIAGNOSIS — E11.65 TYPE 2 DIABETES MELLITUS WITH HYPERGLYCEMIA, WITHOUT LONG-TERM CURRENT USE OF INSULIN (HCC): Primary | ICD-10-CM

## 2017-07-09 LAB
ALBUMIN SERPL-MCNC: 4.1 G/DL (ref 3.5–5.2)
ALBUMIN/GLOB SERPL: 1.6 G/DL
ALP SERPL-CCNC: 73 U/L (ref 40–129)
ALT SERPL W P-5'-P-CCNC: 18 U/L (ref 5–41)
ANION GAP SERPL CALCULATED.3IONS-SCNC: 18.5 MMOL/L
AST SERPL-CCNC: 27 U/L (ref 5–40)
BACTERIA UR QL AUTO: ABNORMAL /HPF
BASOPHILS # BLD AUTO: 0.01 10*3/MM3 (ref 0–0.2)
BASOPHILS NFR BLD AUTO: 0.1 % (ref 0–2)
BILIRUB SERPL-MCNC: 0.3 MG/DL (ref 0.2–1.2)
BILIRUB UR QL STRIP: NEGATIVE
BUN BLD-MCNC: 52 MG/DL (ref 8–23)
BUN/CREAT SERPL: 32.3 (ref 7–25)
CALCIUM SPEC-SCNC: 9.5 MG/DL (ref 8.8–10.5)
CHLORIDE SERPL-SCNC: 97 MMOL/L (ref 98–107)
CLARITY UR: CLEAR
CO2 SERPL-SCNC: 20.5 MMOL/L (ref 22–29)
COLOR UR: YELLOW
CREAT BLD-MCNC: 1.61 MG/DL (ref 0.76–1.27)
DEPRECATED RDW RBC AUTO: 42 FL (ref 37–54)
EOSINOPHIL # BLD AUTO: 0 10*3/MM3 (ref 0.1–0.3)
EOSINOPHIL NFR BLD AUTO: 0 % (ref 0–4)
ERYTHROCYTE [DISTWIDTH] IN BLOOD BY AUTOMATED COUNT: 13.7 % (ref 11.5–14.5)
GFR SERPL CREATININE-BSD FRML MDRD: 41 ML/MIN/1.73
GLOBULIN UR ELPH-MCNC: 2.6 GM/DL
GLUCOSE BLD-MCNC: 306 MG/DL (ref 65–99)
GLUCOSE BLDC GLUCOMTR-MCNC: 265 MG/DL (ref 70–130)
GLUCOSE UR STRIP-MCNC: NEGATIVE MG/DL
HCT VFR BLD AUTO: 41.1 % (ref 42–52)
HGB BLD-MCNC: 13.9 G/DL (ref 14–18)
HGB UR QL STRIP.AUTO: ABNORMAL
HYALINE CASTS UR QL AUTO: ABNORMAL /LPF
IMM GRANULOCYTES # BLD: 0.1 10*3/MM3 (ref 0–0.03)
IMM GRANULOCYTES NFR BLD: 0.9 % (ref 0–0.5)
KETONES UR QL STRIP: ABNORMAL
LEUKOCYTE ESTERASE UR QL STRIP.AUTO: NEGATIVE
LYMPHOCYTES # BLD AUTO: 0.59 10*3/MM3 (ref 0.6–4.8)
LYMPHOCYTES NFR BLD AUTO: 5.1 % (ref 20–45)
MCH RBC QN AUTO: 28.9 PG (ref 27–31)
MCHC RBC AUTO-ENTMCNC: 33.8 G/DL (ref 31–37)
MCV RBC AUTO: 85.4 FL (ref 80–94)
MONOCYTES # BLD AUTO: 0.83 10*3/MM3 (ref 0–1)
MONOCYTES NFR BLD AUTO: 7.1 % (ref 3–8)
NEUTROPHILS # BLD AUTO: 10.08 10*3/MM3 (ref 1.5–8.3)
NEUTROPHILS NFR BLD AUTO: 86.8 % (ref 45–70)
NITRITE UR QL STRIP: NEGATIVE
NRBC BLD MANUAL-RTO: 0 /100 WBC (ref 0–0)
PH UR STRIP.AUTO: <=5 [PH] (ref 4.5–8)
PLATELET # BLD AUTO: 246 10*3/MM3 (ref 140–500)
PMV BLD AUTO: 10.7 FL (ref 7.4–10.4)
POTASSIUM BLD-SCNC: 5 MMOL/L (ref 3.5–5.2)
PROT SERPL-MCNC: 6.7 G/DL (ref 6–8.5)
PROT UR QL STRIP: NEGATIVE
RBC # BLD AUTO: 4.81 10*6/MM3 (ref 4.7–6.1)
RBC # UR: ABNORMAL /HPF
REF LAB TEST METHOD: ABNORMAL
SODIUM BLD-SCNC: 136 MMOL/L (ref 136–145)
SP GR UR STRIP: 1.02 (ref 1–1.03)
SQUAMOUS #/AREA URNS HPF: ABNORMAL /HPF
UROBILINOGEN UR QL STRIP: ABNORMAL
WBC NRBC COR # BLD: 11.61 10*3/MM3 (ref 4.8–10.8)
WBC UR QL AUTO: ABNORMAL /HPF

## 2017-07-09 PROCEDURE — 85025 COMPLETE CBC W/AUTO DIFF WBC: CPT | Performed by: EMERGENCY MEDICINE

## 2017-07-09 PROCEDURE — 96360 HYDRATION IV INFUSION INIT: CPT

## 2017-07-09 PROCEDURE — 81001 URINALYSIS AUTO W/SCOPE: CPT | Performed by: EMERGENCY MEDICINE

## 2017-07-09 PROCEDURE — 82962 GLUCOSE BLOOD TEST: CPT

## 2017-07-09 PROCEDURE — 80053 COMPREHEN METABOLIC PANEL: CPT | Performed by: EMERGENCY MEDICINE

## 2017-07-09 PROCEDURE — 99284 EMERGENCY DEPT VISIT MOD MDM: CPT | Performed by: EMERGENCY MEDICINE

## 2017-07-09 RX ORDER — SODIUM CHLORIDE 0.9 % (FLUSH) 0.9 %
10 SYRINGE (ML) INJECTION AS NEEDED
Status: DISCONTINUED | OUTPATIENT
Start: 2017-07-09 | End: 2017-07-09 | Stop reason: HOSPADM

## 2017-07-09 RX ADMIN — SODIUM CHLORIDE 1000 ML: 9 INJECTION, SOLUTION INTRAVENOUS at 00:53

## 2017-07-09 NOTE — DISCHARGE INSTRUCTIONS
Continue taking your regular medications as directed.  Drink plenty of water as tolerated.  Please return to the emergency room for any worsening pain, fevers, weakness, nausea, vomiting, shortness of breath, vision changes or any other concerns.

## 2017-07-10 NOTE — ED PROVIDER NOTES
Subjective   History of Present Illness  History of Present Illness    Chief complaint: High blood sugar    Location: None    Quality/Severity:  None    Timing/Duration: Worsening over the past few days    Modifying Factors: None    Narrative: This patient's family brings him in for evaluation tonight because his blood sugars have been running high for the past few days.  He has a known history of diabetes and he takes oral medications only for that condition.  He denies any recent fevers or illnesses.  He specifically denies any recent cough or cold symptoms.  He denies any vomiting or diarrhea symptoms.  He denies any dysuria or hematuria symptoms.  He denies any rashes or wounds to his skin anywhere.  He denies any pain symptoms or discomfort issues that seem to be new to him.  His family tell me that his sugars have consistently been running in the 400s to 500 range since yesterday.  On a couple of occasions, they were so high that they were not measurable on their home glucometer.  Tonight, his sugars were again in the 400 range and he took his usual medications.  Shortly after that they checked it again and it seemed to be improving with a reading in the 300s.  Then, later in the evening, they checked his blood sugars once more and more discouraged to find it elevated up into the 400s again.  At that point they insisted that he should be seen for evaluation.  Of importance, the patient did have an intra-articular injection to his right shoulder with a steroid medication about one or 2 weeks ago in his private care doctor's office.  He is not currently taking any oral glucocorticoids, however.  When I asked the patient directly if anything is bothering him at this time, he tells me that he feels perfectly fine and he wants to go home as soon as possible.    Associated Symptoms: None    Review of Systems   Constitutional: Negative for activity change, appetite change and fever.   HENT: Negative.    Respiratory:  Negative for cough and shortness of breath.    Cardiovascular: Negative for chest pain.   Gastrointestinal: Negative for abdominal pain and vomiting.   Genitourinary: Negative for decreased urine volume (urine has been increased actually), dysuria, flank pain and urgency.   Musculoskeletal: Positive for arthralgias.   Skin: Negative for color change, rash and wound.   Neurological: Negative for syncope and headaches.   Psychiatric/Behavioral: Negative for agitation and confusion.   All other systems reviewed and are negative.      Past Medical History:   Diagnosis Date   • Diabetes mellitus    • Disease of thyroid gland    • Hyperlipidemia    • Hypertension    • Prostate cancer        No Known Allergies    Past Surgical History:   Procedure Laterality Date   • INCISION AND DRAINAGE LEG Right 1/11/2017    Procedure: INCISION AND DRAINAGE LOWER EXTREMITY;  Surgeon: Mikael Dwyer DPM;  Location: [facility] OR;  Service:    • PROSTATE SURGERY     • SHOULDER SURGERY Left        Family History   Problem Relation Age of Onset   • Heart disease Father        Social History     Social History   • Marital status:      Spouse name: N/A   • Number of children: N/A   • Years of education: N/A     Social History Main Topics   • Smoking status: Never Smoker   • Smokeless tobacco: Never Used   • Alcohol use No   • Drug use: No   • Sexual activity: Defer     Other Topics Concern   • None     Social History Narrative       ED Triage Vitals   Temp Heart Rate Resp BP SpO2   07/08/17 2327 07/08/17 2327 07/08/17 2327 07/08/17 2327 07/08/17 2327   97.8 °F (36.6 °C) 85 20 133/84 95 %      Temp src Heart Rate Source Patient Position BP Location FiO2 (%)   07/08/17 2327 -- 07/08/17 2327 07/08/17 2327 --   Oral  Sitting Right arm          Objective   Physical Exam   Constitutional: He is oriented to person, place, and time. He appears well-developed and well-nourished. No distress.   HENT:   Head: Normocephalic and atraumatic.   Eyes:  EOM are normal. Pupils are equal, round, and reactive to light. Right eye exhibits no discharge. Left eye exhibits no discharge.   Neck: Normal range of motion. Neck supple.   Cardiovascular: Normal rate, regular rhythm, normal heart sounds and intact distal pulses.  Exam reveals no gallop and no friction rub.    No murmur heard.  Pulmonary/Chest: Effort normal. No respiratory distress. He has no wheezes. He has no rales. He exhibits no tenderness.   Abdominal: Soft. He exhibits no mass. There is no tenderness. There is no rebound and no guarding. No hernia.   Musculoskeletal: Normal range of motion. He exhibits no edema or deformity.   Neurological: He is alert and oriented to person, place, and time. He exhibits normal muscle tone.   Skin: Skin is warm and dry. No rash noted. He is not diaphoretic. No erythema. No pallor.   Psychiatric: He has a normal mood and affect. His behavior is normal. Judgment and thought content normal.   Nursing note and vitals reviewed.    Results for orders placed or performed during the hospital encounter of 07/09/17   Comprehensive Metabolic Panel   Result Value Ref Range    Glucose 306 (H) 65 - 99 mg/dL    BUN 52 (H) 8 - 23 mg/dL    Creatinine 1.61 (H) 0.76 - 1.27 mg/dL    Sodium 136 136 - 145 mmol/L    Potassium 5.0 3.5 - 5.2 mmol/L    Chloride 97 (L) 98 - 107 mmol/L    CO2 20.5 (L) 22.0 - 29.0 mmol/L    Calcium 9.5 8.8 - 10.5 mg/dL    Total Protein 6.7 6.0 - 8.5 g/dL    Albumin 4.10 3.50 - 5.20 g/dL    ALT (SGPT) 18 5 - 41 U/L    AST (SGOT) 27 5 - 40 U/L    Alkaline Phosphatase 73 40 - 129 U/L    Total Bilirubin 0.3 0.2 - 1.2 mg/dL    eGFR Non African Amer 41 (L) >60 mL/min/1.73    Globulin 2.6 gm/dL    A/G Ratio 1.6 g/dL    BUN/Creatinine Ratio 32.3 (H) 7.0 - 25.0    Anion Gap 18.5 mmol/L   Urinalysis With / Culture If Indicated   Result Value Ref Range    Color, UA Yellow Yellow, Straw    Appearance, UA Clear Clear    pH, UA <=5.0 4.5 - 8.0    Specific Gravity, UA 1.025 1.003 -  1.030    Glucose, UA Negative Negative    Ketones, UA Trace (A) Negative, 80 mg/dL (3+), >=160 mg/dL (4+)    Bilirubin, UA Negative Negative    Blood, UA Trace (A) Negative    Protein, UA Negative Negative    Leuk Esterase, UA Negative Negative    Nitrite, UA Negative Negative    Urobilinogen, UA 0.2 E.U./dL 0.2 - 1.0 E.U./dL   CBC Auto Differential   Result Value Ref Range    WBC 11.61 (H) 4.80 - 10.80 10*3/mm3    RBC 4.81 4.70 - 6.10 10*6/mm3    Hemoglobin 13.9 (L) 14.0 - 18.0 g/dL    Hematocrit 41.1 (L) 42.0 - 52.0 %    MCV 85.4 80.0 - 94.0 fL    MCH 28.9 27.0 - 31.0 pg    MCHC 33.8 31.0 - 37.0 g/dL    RDW 13.7 11.5 - 14.5 %    RDW-SD 42.0 37.0 - 54.0 fl    MPV 10.7 (H) 7.4 - 10.4 fL    Platelets 246 140 - 500 10*3/mm3    Neutrophil % 86.8 (H) 45.0 - 70.0 %    Lymphocyte % 5.1 (L) 20.0 - 45.0 %    Monocyte % 7.1 3.0 - 8.0 %    Eosinophil % 0.0 0.0 - 4.0 %    Basophil % 0.1 0.0 - 2.0 %    Immature Grans % 0.9 (H) 0.0 - 0.5 %    Neutrophils, Absolute 10.08 (H) 1.50 - 8.30 10*3/mm3    Lymphocytes, Absolute 0.59 (L) 0.60 - 4.80 10*3/mm3    Monocytes, Absolute 0.83 0.00 - 1.00 10*3/mm3    Eosinophils, Absolute 0.00 (L) 0.10 - 0.30 10*3/mm3    Basophils, Absolute 0.01 0.00 - 0.20 10*3/mm3    Immature Grans, Absolute 0.10 (H) 0.00 - 0.03 10*3/mm3    nRBC 0.0 0.0 - 0.0 /100 WBC   Urinalysis, Microscopic Only   Result Value Ref Range    RBC, UA 3-5 (A) None Seen /HPF    WBC, UA 0-2 (A) None Seen /HPF    Bacteria, UA None Seen None Seen /HPF    Squamous Epithelial Cells, UA None Seen None Seen, 0-2 /HPF    Hyaline Casts, UA None Seen None Seen /LPF    Methodology Manual Light Microscopy    POC Glucose Fingerstick   Result Value Ref Range    Glucose 300 (H) 70 - 130 mg/dL   POC Glucose Fingerstick   Result Value Ref Range    Glucose 265 (H) 70 - 130 mg/dL       Procedures         ED Course  ED Course   Comment By Time   I have reviewed all the labs.  The patient's physical exam is entirely non-worrisome and he has no  somatic complaints at all.  I spoke with the patient's primary care doctor on the phone to review his condition and current labs.  We are encouraged by the fact that his blood sugars dropped down to 226 after only 1 L of fluids.  I have not given him any other diabetes medications throughout the visit.  Dr. Chamberlain and I both believe that some of his hyperglycemia problem is related to his recent corticosteroid injection which may be influencing his sugars at least temporarily.  I asked Dr. Chamberlain if he wanted me to change the medicines or had anything for tonight.  He indicated that we don't need to do anything more at this time and the patient can be discharged home safely to follow up in the office early this week for a repeat evaluation.  I spoke with the patient and his family at length about the need to eat appropriately according to his diabetic diet strategies and continue to drink plenty of water.  I advised them to return to the emergency room if he seems to be developing any worsening symptoms or new concerns.  They agree to do so as outlined. Placido Chambers MD 07/09 6615                  MDM  Number of Diagnoses or Management Options  Type 2 diabetes mellitus with hyperglycemia, without long-term current use of insulin:      Amount and/or Complexity of Data Reviewed  Clinical lab tests: reviewed and ordered  Tests in the radiology section of CPT®: ordered and reviewed  Obtain history from someone other than the patient: yes (The patient's 3 daughters provided most of the history)  Discuss the patient with other providers: yes (I spoke with the patient's primary care doctor, Dr. Chamberlain)    Risk of Complications, Morbidity, and/or Mortality  Presenting problems: moderate  Diagnostic procedures: low  Management options: moderate        Final diagnoses:   Type 2 diabetes mellitus with hyperglycemia, without long-term current use of insulin            Placido Chambers MD  07/09/17 5650

## 2017-11-08 ENCOUNTER — OFFICE VISIT (OUTPATIENT)
Dept: ORTHOPEDIC SURGERY | Facility: CLINIC | Age: 82
End: 2017-11-08

## 2017-11-08 VITALS — BODY MASS INDEX: 30.29 KG/M2 | HEIGHT: 74 IN | WEIGHT: 236 LBS

## 2017-11-08 DIAGNOSIS — M19.011 PRIMARY OSTEOARTHRITIS OF BOTH SHOULDERS: Primary | ICD-10-CM

## 2017-11-08 DIAGNOSIS — M19.012 PRIMARY OSTEOARTHRITIS OF BOTH SHOULDERS: Primary | ICD-10-CM

## 2017-11-08 PROCEDURE — 99213 OFFICE O/P EST LOW 20 MIN: CPT | Performed by: ORTHOPAEDIC SURGERY

## 2017-11-08 NOTE — PROGRESS NOTES
Subjective: Osteoarthritis right shoulder     Patient ID: Joey Schmitt is a 82 y.o. male.    Chief Complaint:    History of Present Illness 82-year-old male with osteoarthritis of right shoulder torn rotator cuff return to discuss shoulder surgery.  Patient is failed to respond to anti-inflammatories cortisone injections modification of activity and exercise program.       Social History     Occupational History   • Not on file.     Social History Main Topics   • Smoking status: Never Smoker   • Smokeless tobacco: Never Used   • Alcohol use No   • Drug use: No   • Sexual activity: Defer      Review of Systems   Constitutional: Negative for chills, diaphoresis, fever and unexpected weight change.   HENT: Negative for hearing loss, nosebleeds, sore throat and tinnitus.    Eyes: Negative for pain and visual disturbance.   Respiratory: Negative for cough, shortness of breath and wheezing.    Cardiovascular: Negative for chest pain and palpitations.   Gastrointestinal: Negative for abdominal pain, diarrhea, nausea and vomiting.   Endocrine: Negative for cold intolerance, heat intolerance and polydipsia.   Genitourinary: Negative for difficulty urinating, dysuria and hematuria.   Musculoskeletal: Positive for arthralgias and myalgias. Negative for joint swelling.   Skin: Negative for rash and wound.   Allergic/Immunologic: Negative for environmental allergies.   Neurological: Negative for dizziness, syncope and numbness.   Hematological: Does not bruise/bleed easily.   Psychiatric/Behavioral: Negative for dysphoric mood and sleep disturbance. The patient is not nervous/anxious.          Past Medical History:   Diagnosis Date   • Diabetes mellitus    • Disease of thyroid gland    • Hyperlipidemia    • Hypertension    • Prostate cancer      Past Surgical History:   Procedure Laterality Date   • INCISION AND DRAINAGE LEG Right 1/11/2017    Procedure: INCISION AND DRAINAGE LOWER EXTREMITY;  Surgeon: Mikael Dwyer DPM;   Location: [facility] OR;  Service:    • PROSTATE SURGERY     • SHOULDER SURGERY Left      Family History   Problem Relation Age of Onset   • Heart disease Father          Objective:  There were no vitals filed for this visit.  Last 3 weights    11/08/17  1330   Weight: 236 lb (107 kg)     Body mass index is 30.3 kg/(m^2).       Ortho Exam  review of previous x-rays done here in the office AP lateral outlet view shows end-stage degenerative arthritis of the glenohumeral joint with evidence of chronic rotator cuff tendinopathy tear with a high riding humeral head.  He is alert and oriented ×3.  He can actively extend or abduct the shoulder only about 30°.  His no motor deficit good distal pulses no sensory loss.  Deltoid function is +5 secondary to pain.  Biceps function is +5 secondary to pain.    Assessment:       1. Primary osteoarthritis of both shoulders          Plan: Lengthy discussion with the patient and his wife regarding the surgery and had a model maroon in reviewing the surgery itself what is done exactly.  Discussed the rehabilitation discussed the complications which include but not limited to flexion and the need for multiple procedures including implant removal to eradicate infection particularly diabetic as he is, nerve injury, vascular injury, periprosthetic fracture, dislocation, the need for revision surgery and persistent pain and discomfort and he understands.  Emphasize that he must get his A1c around 7 of the surgery cannot be done.  He understands and wishes to proceed with workup process to get the surgery set up for 1 December            Work Status:    MYRA query complete.    Orders:  No orders of the defined types were placed in this encounter.      Medications:  No orders of the defined types were placed in this encounter.      Followup:  Return in about 4 weeks (around 12/6/2017).          Dragon transcription disclaimer     Much of this encounter note is an electronic  transcription/translation of spoken language to printed text. The electronic translation of spoken language may permit erroneous, or at times, nonsensical words or phrases to be inadvertently transcribed. Although I have reviewed the note for such errors, some may still exist.

## 2017-11-09 ENCOUNTER — PREP FOR SURGERY (OUTPATIENT)
Dept: OTHER | Facility: HOSPITAL | Age: 82
End: 2017-11-09

## 2017-11-09 DIAGNOSIS — M67.911 TENDINOPATHY OF RIGHT ROTATOR CUFF: ICD-10-CM

## 2017-11-09 DIAGNOSIS — M19.011 OSTEOARTHRITIS OF RIGHT GLENOHUMERAL JOINT: Primary | ICD-10-CM

## 2017-11-09 RX ORDER — PREGABALIN 75 MG/1
150 CAPSULE ORAL ONCE
Status: CANCELLED | OUTPATIENT
Start: 2017-12-08

## 2017-11-09 RX ORDER — VANCOMYCIN HCL-SODIUM CHLORIDE IV SOLN 1.5 GM/250ML-0.9% 1.5-0.9/25 GM/ML-%
15 SOLUTION INTRAVENOUS ONCE
Status: CANCELLED | OUTPATIENT
Start: 2017-12-08

## 2017-11-09 RX ORDER — PANTOPRAZOLE SODIUM 20 MG/1
20 TABLET, DELAYED RELEASE ORAL ONCE
Status: CANCELLED | OUTPATIENT
Start: 2017-12-08

## 2017-11-09 RX ORDER — OXYCODONE HCL 10 MG/1
10 TABLET, FILM COATED, EXTENDED RELEASE ORAL ONCE
Status: CANCELLED | OUTPATIENT
Start: 2017-12-08

## 2017-11-09 RX ORDER — MELOXICAM 7.5 MG/1
15 TABLET ORAL ONCE
Status: CANCELLED | OUTPATIENT
Start: 2017-12-08

## 2017-11-15 ENCOUNTER — PREP FOR SURGERY (OUTPATIENT)
Dept: OTHER | Facility: HOSPITAL | Age: 82
End: 2017-11-15

## 2017-11-15 DIAGNOSIS — Z85.9 HISTORY OF CANCER: Primary | ICD-10-CM

## 2017-11-20 ENCOUNTER — APPOINTMENT (OUTPATIENT)
Dept: PREADMISSION TESTING | Facility: HOSPITAL | Age: 82
End: 2017-11-20

## 2017-11-20 ENCOUNTER — HOSPITAL ENCOUNTER (OUTPATIENT)
Dept: GENERAL RADIOLOGY | Facility: HOSPITAL | Age: 82
Discharge: HOME | End: 2017-11-20
Admitting: ORTHOPAEDIC SURGERY

## 2017-11-20 VITALS — HEIGHT: 74 IN | WEIGHT: 229.3 LBS | BODY MASS INDEX: 29.43 KG/M2

## 2017-11-20 DIAGNOSIS — Z85.9 HISTORY OF CANCER: ICD-10-CM

## 2017-11-20 LAB
ANION GAP SERPL CALCULATED.3IONS-SCNC: 13.6 MMOL/L
BASOPHILS # BLD AUTO: 0.11 10*3/MM3 (ref 0–0.2)
BASOPHILS NFR BLD AUTO: 1.6 % (ref 0–2)
BILIRUB UR QL STRIP: NEGATIVE
BUN BLD-MCNC: 33 MG/DL (ref 8–23)
BUN/CREAT SERPL: 22.1 (ref 7–25)
CALCIUM SPEC-SCNC: 9.4 MG/DL (ref 8.8–10.5)
CHLORIDE SERPL-SCNC: 102 MMOL/L (ref 98–107)
CLARITY UR: CLEAR
CO2 SERPL-SCNC: 23.4 MMOL/L (ref 22–29)
COLOR UR: YELLOW
CREAT BLD-MCNC: 1.49 MG/DL (ref 0.76–1.27)
DEPRECATED RDW RBC AUTO: 43.3 FL (ref 37–54)
EOSINOPHIL # BLD AUTO: 0.43 10*3/MM3 (ref 0.1–0.3)
EOSINOPHIL NFR BLD AUTO: 6.2 % (ref 0–4)
ERYTHROCYTE [DISTWIDTH] IN BLOOD BY AUTOMATED COUNT: 13.4 % (ref 11.5–14.5)
GFR SERPL CREATININE-BSD FRML MDRD: 45 ML/MIN/1.73
GLUCOSE BLD-MCNC: 150 MG/DL (ref 65–99)
GLUCOSE UR STRIP-MCNC: NEGATIVE MG/DL
HCT VFR BLD AUTO: 44.6 % (ref 42–52)
HGB BLD-MCNC: 14.9 G/DL (ref 14–18)
HGB UR QL STRIP.AUTO: NEGATIVE
IMM GRANULOCYTES # BLD: 0.05 10*3/MM3 (ref 0–0.03)
IMM GRANULOCYTES NFR BLD: 0.7 % (ref 0–0.5)
INR PPP: 1.05 (ref 0.9–1.1)
KETONES UR QL STRIP: ABNORMAL
LEUKOCYTE ESTERASE UR QL STRIP.AUTO: NEGATIVE
LYMPHOCYTES # BLD AUTO: 1.17 10*3/MM3 (ref 0.6–4.8)
LYMPHOCYTES NFR BLD AUTO: 16.8 % (ref 20–45)
MCH RBC QN AUTO: 29.7 PG (ref 27–31)
MCHC RBC AUTO-ENTMCNC: 33.4 G/DL (ref 31–37)
MCV RBC AUTO: 88.8 FL (ref 80–94)
MONOCYTES # BLD AUTO: 0.61 10*3/MM3 (ref 0–1)
MONOCYTES NFR BLD AUTO: 8.8 % (ref 3–8)
NEUTROPHILS # BLD AUTO: 4.6 10*3/MM3 (ref 1.5–8.3)
NEUTROPHILS NFR BLD AUTO: 65.9 % (ref 45–70)
NITRITE UR QL STRIP: NEGATIVE
NRBC BLD MANUAL-RTO: 0 /100 WBC (ref 0–0)
PH UR STRIP.AUTO: <=5 [PH] (ref 4.5–8)
PLATELET # BLD AUTO: 215 10*3/MM3 (ref 140–500)
PMV BLD AUTO: 10.7 FL (ref 7.4–10.4)
POTASSIUM BLD-SCNC: 4.5 MMOL/L (ref 3.5–5.2)
PROT UR QL STRIP: NEGATIVE
PROTHROMBIN TIME: 13.7 SECONDS (ref 12.1–15)
RBC # BLD AUTO: 5.02 10*6/MM3 (ref 4.7–6.1)
SODIUM BLD-SCNC: 139 MMOL/L (ref 136–145)
SP GR UR STRIP: 1.02 (ref 1–1.03)
UROBILINOGEN UR QL STRIP: ABNORMAL
WBC NRBC COR # BLD: 6.97 10*3/MM3 (ref 4.8–10.8)

## 2017-11-20 PROCEDURE — 80048 BASIC METABOLIC PNL TOTAL CA: CPT | Performed by: ORTHOPAEDIC SURGERY

## 2017-11-20 PROCEDURE — 36415 COLL VENOUS BLD VENIPUNCTURE: CPT

## 2017-11-20 PROCEDURE — 81003 URINALYSIS AUTO W/O SCOPE: CPT | Performed by: ORTHOPAEDIC SURGERY

## 2017-11-20 PROCEDURE — 71020 HC CHEST PA AND LATERAL: CPT

## 2017-11-20 PROCEDURE — 85610 PROTHROMBIN TIME: CPT | Performed by: ORTHOPAEDIC SURGERY

## 2017-11-20 PROCEDURE — 85025 COMPLETE CBC W/AUTO DIFF WBC: CPT | Performed by: ORTHOPAEDIC SURGERY

## 2017-11-20 PROCEDURE — 87081 CULTURE SCREEN ONLY: CPT | Performed by: ORTHOPAEDIC SURGERY

## 2017-11-20 NOTE — DISCHARGE INSTRUCTIONS
TO STOP CLEARS/GATORADE 2 HOURS PRIOR TO ARRIVAL TIME    PRE-ADMISSION TESTING INSTRUCTIONS FOR ADULTS      General Instructions:    • Do not eat solid food after midnight the night before surgery.  No gum, mints, or hard candy after midnight the night before surgery.  • You may drink clear liquids the day of surgery up until 2 hours before your arrival time.  • Clear liquids are liquids you can see through. Nothing RED in color.    Plain water    Sports drinks  Sodas     Gelatin (Jell-O)  Fruit juices without pulp such as white grape juice and apple juice  Popsicles that contain no fruit or yogurt  Tea or coffee (no cream or milk added)    • It is beneficial for you to have a clear drink that contains carbohydrates just before you leave your house and before your fasting time begins.  We suggest a 20 ounce bottle of Gatorade or Powerade for non-diabetic patients or a 20 ounce bottle of G2 or Powerade Zero for diabetic patients.     • Patients who avoid smoking, chewing tobacco and alcohol for 4 weeks prior to surgery have a reduced risk of post-operative complications.  • Do not smoke, use chewing tobacco or drink alcohol the day of surgery    • Bring your C-PAP/ BI-PAP machine if you use one.  • Wear clean comfortable clothes and socks.  • Do not wear contact lenses, lotion, deodorant, or make-up.  Bring a case for your glasses if applicable.   • Bring crutches or walker if applicable.  • Leave all other valuables and jewelry at home.      Preventing a Surgical Site Infection:    • Shower the night before and on the morning of surgery using the chlorhexidine soap you were given.  Use a clean washcloth with the soap.  Place clean sheets on your bed after showering the night before surgery. Do not use the CHG soap on your hair, face, or private areas. Wash your body gently for five (5) minutes. Do not scrub your skin too hard.  Dry with a clean towel and dress in clean clothing.    • Do not shave the surgical area  for 10 days-2 weeks prior to surgery  because the razor can irritate skin and make it easier to develop an infection.    • Make sure you, your family, and all healthcare providers clean their hands with soap and water or an alcohol based hand  before caring for you or your wound.    • If at all possible, quit smoking as many days before surgery as you can.    Day of surgery:    Your surgeon’s office will advise you of your arrival time for the day of surgery.    Upon arrival, a Pre-op nurse and Anesthesia provider will review your health history, obtain vital signs, and answer questions you may have.  The only belongings needed at this time will be your home medications and if applicable your C-PAP/BI-PAP machine.  If you are staying overnight your family can leave the rest of your belongings in the car and bring them to your room later.  A Pre-op nurse will start an IV and you may receive medication in preparation for surgery, including something to help you relax.  Your family will be able to see you in the Pre-op area.  While you are in surgery your family should notify the waiting room  if they leave the waiting room area and provide a contact phone number.    IF you have any questions, you can call the Pre-Admission Department at [phone] or your surgeon's office.    Please be aware that surgery does come with discomfort.  We want to make every effort to control your discomfort so please discuss any uncontrolled symptoms with your nurse.   Your doctor will most likely have prescribed pain medications.      If you are going home after surgery, you will receive individualized written care instructions before being discharged.  A responsible adult (over the age of 18) must drive you to and from the hospital on the day of your surgery and stay with you for 24 hours after anesthesia.    If you are staying overnight following surgery, you will be transported to your hospital room following  the recovery period.  [facility] has all private rooms.    Deductibles and co-payments are collected on the day of service. Please be prepared to pay the required co-pay, deductible or deposit on the day of service as defined by your plan.

## 2017-11-20 NOTE — PAT
PT. HERE FOR PAT VISIT., LABS, CXR COMPLETE. PT. HAS APPOINTMENTS FOR MEDICAL & CARDIAC CLEARANCE. PALLAVI DODGE RN

## 2017-11-22 LAB — MRSA SPEC QL CULT: NORMAL

## 2017-11-28 ENCOUNTER — OFFICE VISIT (OUTPATIENT)
Dept: CARDIOLOGY | Facility: CLINIC | Age: 82
End: 2017-11-28

## 2017-11-28 ENCOUNTER — LAB (OUTPATIENT)
Dept: LAB | Facility: HOSPITAL | Age: 82
End: 2017-11-28
Attending: ORTHOPAEDIC SURGERY

## 2017-11-28 VITALS
SYSTOLIC BLOOD PRESSURE: 140 MMHG | DIASTOLIC BLOOD PRESSURE: 66 MMHG | BODY MASS INDEX: 29.05 KG/M2 | HEART RATE: 79 BPM | WEIGHT: 226.4 LBS | HEIGHT: 74 IN

## 2017-11-28 DIAGNOSIS — R01.1 MURMUR: ICD-10-CM

## 2017-11-28 DIAGNOSIS — I49.1 PAC (PREMATURE ATRIAL CONTRACTION): ICD-10-CM

## 2017-11-28 DIAGNOSIS — I49.3 PVCS (PREMATURE VENTRICULAR CONTRACTIONS): ICD-10-CM

## 2017-11-28 DIAGNOSIS — I10 ESSENTIAL HYPERTENSION: ICD-10-CM

## 2017-11-28 DIAGNOSIS — Z85.9 HISTORY OF CANCER: ICD-10-CM

## 2017-11-28 DIAGNOSIS — I44.7 LEFT BUNDLE BRANCH BLOCK: Primary | ICD-10-CM

## 2017-11-28 DIAGNOSIS — Z01.810 PREOP CARDIOVASCULAR EXAM: ICD-10-CM

## 2017-11-28 PROBLEM — L03.115 CELLULITIS OF RIGHT FOOT: Status: RESOLVED | Noted: 2017-01-08 | Resolved: 2017-11-28

## 2017-11-28 PROBLEM — L03.115 CELLULITIS OF FOOT, RIGHT: Status: RESOLVED | Noted: 2017-01-07 | Resolved: 2017-11-28

## 2017-11-28 LAB
ABO GROUP BLD: NORMAL
BLD GP AB SCN SERPL QL: NEGATIVE
RH BLD: POSITIVE

## 2017-11-28 PROCEDURE — 36415 COLL VENOUS BLD VENIPUNCTURE: CPT

## 2017-11-28 PROCEDURE — 99204 OFFICE O/P NEW MOD 45 MIN: CPT | Performed by: INTERNAL MEDICINE

## 2017-11-28 PROCEDURE — 86900 BLOOD TYPING SEROLOGIC ABO: CPT | Performed by: ORTHOPAEDIC SURGERY

## 2017-11-28 PROCEDURE — 93000 ELECTROCARDIOGRAM COMPLETE: CPT | Performed by: INTERNAL MEDICINE

## 2017-11-28 PROCEDURE — 86850 RBC ANTIBODY SCREEN: CPT | Performed by: ORTHOPAEDIC SURGERY

## 2017-11-28 PROCEDURE — 86901 BLOOD TYPING SEROLOGIC RH(D): CPT | Performed by: ORTHOPAEDIC SURGERY

## 2017-11-28 RX ORDER — LISINOPRIL AND HYDROCHLOROTHIAZIDE 25; 20 MG/1; MG/1
1 TABLET ORAL DAILY
COMMUNITY
End: 2017-12-14 | Stop reason: HOSPADM

## 2017-11-28 NOTE — PROGRESS NOTES
Date of Office Visit: 2017  Encounter Provider: German Og MD  Place of Service: [facility] CARDIOLOGY  Patient Name: Joey Schmitt  :1935    Chief Complaint   Patient presents with   • Abnormal ECG   :     HPI: Joey Schmitt is a 82 y.o. male who presents today at the request of Dr. Romeo Chamberlain regarding an abnormal EKG and for preoperative risk assessment prior to shoulder surgery.      He has some dementia and his daughter helps with the history.  He states he has a history of a murmur but denies any other cardiac history.  He has vertigo and describes dizziness commensurate with that but hasn't had lightheadedness or syncope.  He denies chest pain, dyspnea, orthopnea, or leg swelling.  He is a diabetic, and has hypertension.    An EKG was performed at Dr. Chamberlain's office, which I have reviewed.  It shows a left bundle with PVCs and PACs.  He denies palpitations.     Past Medical History:   Diagnosis Date   • Cellulitis of foot    • Dementia    • Hyperlipidemia    • Hypertension    • Hypothyroidism    • Macular degeneration    • Prostate cancer    • Right shoulder pain     SCHEDULED FOR SX   • Seasonal allergies    • Type 2 diabetes mellitus    • Vertigo        Past Surgical History:   Procedure Laterality Date   • INCISION AND DRAINAGE LEG Right 2017    Procedure: INCISION AND DRAINAGE LOWER EXTREMITY;  Surgeon: Mikael Dwyer DPM;  Location: [facility];  Service:    • PROSTATE SURGERY     • SHOULDER SURGERY Left        Social History     Social History   • Marital status:      Spouse name: N/A   • Number of children: N/A   • Years of education: N/A     Occupational History   • Not on file.     Social History Main Topics   • Smoking status: Former Smoker     Packs/day: 1.00     Years: 15.00     Types: Cigarettes     Quit date:    • Smokeless tobacco: Never Used      Comment: caffeine use: one cup of coffee daily.    • Alcohol use No   •  "Drug use: No   • Sexual activity: Defer     Other Topics Concern   • Not on file     Social History Narrative       Family History   Problem Relation Age of Onset   • Heart disease Father        Review of Systems   Cardiovascular: Negative for chest pain, leg swelling and palpitations.   Respiratory: Negative for shortness of breath.    Musculoskeletal: Positive for falls and joint pain.   Neurological: Positive for dizziness.   Psychiatric/Behavioral: Positive for memory loss.       No Known Allergies      Current Outpatient Prescriptions:   •  Blood Glucose Monitoring Suppl (ONE TOUCH ULTRA 2) W/DEVICE kit, USE AS DIRECTED, Disp: , Rfl: 0  •  glipiZIDE (GLUCOTROL) 10 MG tablet, Take 10 mg by mouth 2 (two) times a day before meals, Disp: , Rfl:   •  levothyroxine (SYNTHROID, LEVOTHROID) 100 MCG tablet, Take 100 mcg by mouth Every Morning., Disp: , Rfl:   •  Liraglutide (VICTOZA) 18 MG/3ML solution pen-injector, Inject 1.8 mg under the skin Every Morning., Disp: , Rfl:   •  lisinopril-hydrochlorothiazide (PRINZIDE,ZESTORETIC) 20-25 MG per tablet, Take 1 tablet by mouth Daily., Disp: , Rfl:   •  metFORMIN (GLUCOPHAGE) 1000 MG tablet, Take 1,000 mg by mouth 2 (two) times a day with meals, Disp: , Rfl:      Objective:     Vitals:    11/28/17 1015   BP: 140/66   BP Location: Left arm   Pulse: 79   Weight: 226 lb 6.4 oz (103 kg)   Height: 74\" (188 cm)     Body mass index is 29.07 kg/(m^2).    Physical Exam   Constitutional:   Obese   HENT:   Head: Normocephalic.   Nose: Nose normal.   Mouth/Throat: Oropharynx is clear and moist. Abnormal dentition.   Eyes: Conjunctivae and EOM are normal. Pupils are equal, round, and reactive to light.   Neck: Normal range of motion.   Cannot assess for JVD due to habitus   Cardiovascular: Normal rate, regular rhythm and intact distal pulses.  Frequent extrasystoles are present.   Murmur heard.   Systolic murmur is present with a grade of 2/6   Pulmonary/Chest: Effort normal. "   Abdominal: Soft.   Obesity limits abdominal exam   Musculoskeletal: Normal range of motion. He exhibits no edema.   Neurological: He is alert. No cranial nerve deficit.   Skin: Skin is warm and dry. No rash noted.   Psychiatric: He has a normal mood and affect. His behavior is normal. Cognition and memory are impaired.   Vitals reviewed.        ECG 12 Lead  Date/Time: 11/28/2017 11:50 AM  Performed by: SAMANTA DEJESUS  Authorized by: SAMANTA DEJESUS   Comparison: compared with previous ECG   Comparison to previous ECG: Wenckebach is new    Rhythm: sinus rhythm  Ectopy: PVCs and atrial premature contractions  Conduction: left bundle branch block and 2nd degree (Mobitz 1)  Clinical impression: abnormal ECG              Assessment:       Diagnosis Plan   1. Left bundle branch block  Adult Transthoracic Echo Complete W/ Cont if Necessary Per Protocol    Stress Test With Myocardial Perfusion - One Day   2. PVCs (premature ventricular contractions)  Adult Transthoracic Echo Complete W/ Cont if Necessary Per Protocol    Holter Monitor - 24 Hour   3. PAC (premature atrial contraction)  Adult Transthoracic Echo Complete W/ Cont if Necessary Per Protocol    Holter Monitor - 24 Hour   4. Murmur     5. Preop cardiovascular exam  Stress Test With Myocardial Perfusion - One Day   6. Essential hypertension            Plan:       He has a LBBB, PVCs, and PACs.  On the EKG performed today, I also suspect there is type 1 second degree AVB (Wenckebach).  He has a post-PVC systolic murmur that I don't appreciate in other cardiac cycles.  I do feel he needs appropriate risk stratification prior to general anesthesia. I have recommended a non-walking Cardiolite, an echocardiogram, and a Holter.  Further recommendations will follow these results.    I hope that most of this is simply age-related conduction change.  If his stress and echo are fine, and as long as I don't see high level block on the Holter, he likely will need a low dose of a  beta blocker.    His BP will be addressed after the test results are in.    Sincerely,       German Og MD

## 2017-12-04 ENCOUNTER — HOSPITAL ENCOUNTER (OUTPATIENT)
Dept: CARDIOLOGY | Facility: HOSPITAL | Age: 82
Discharge: HOME | End: 2017-12-04
Attending: INTERNAL MEDICINE

## 2017-12-04 ENCOUNTER — HOSPITAL ENCOUNTER (OUTPATIENT)
Dept: NUCLEAR MEDICINE | Facility: HOSPITAL | Age: 82
Discharge: HOME | End: 2017-12-04
Attending: INTERNAL MEDICINE

## 2017-12-04 VITALS — DIASTOLIC BLOOD PRESSURE: 67 MMHG | SYSTOLIC BLOOD PRESSURE: 146 MMHG

## 2017-12-04 DIAGNOSIS — I44.7 LEFT BUNDLE BRANCH BLOCK: ICD-10-CM

## 2017-12-04 DIAGNOSIS — I49.3 PVCS (PREMATURE VENTRICULAR CONTRACTIONS): ICD-10-CM

## 2017-12-04 DIAGNOSIS — I49.1 PAC (PREMATURE ATRIAL CONTRACTION): ICD-10-CM

## 2017-12-04 DIAGNOSIS — Z01.810 PREOP CARDIOVASCULAR EXAM: ICD-10-CM

## 2017-12-04 LAB
ASCENDING AORTA: 3.8 CM
BH CV ECHO MEAS - AI DEC SLOPE: 211 CM/SEC^2
BH CV ECHO MEAS - AI MAX PG: 48.4 MMHG
BH CV ECHO MEAS - AI MAX VEL: 348 CM/SEC
BH CV ECHO MEAS - AI P1/2T: 483.1 MSEC
BH CV ECHO MEAS - AO MAX PG (FULL): 12.2 MMHG
BH CV ECHO MEAS - AO MAX PG: 21.4 MMHG
BH CV ECHO MEAS - AO MEAN PG (FULL): 5.8 MMHG
BH CV ECHO MEAS - AO MEAN PG: 9.8 MMHG
BH CV ECHO MEAS - AO ROOT AREA (BSA CORRECTED): 1.4
BH CV ECHO MEAS - AO ROOT AREA: 8.6 CM^2
BH CV ECHO MEAS - AO ROOT DIAM: 3.3 CM
BH CV ECHO MEAS - AO V2 MAX: 230.2 CM/SEC
BH CV ECHO MEAS - AO V2 MEAN: 139.4 CM/SEC
BH CV ECHO MEAS - AO V2 VTI: 42.8 CM
BH CV ECHO MEAS - ASC AORTA: 3.8 CM
BH CV ECHO MEAS - AVA(I,A): 2.2 CM^2
BH CV ECHO MEAS - AVA(I,D): 2.2 CM^2
BH CV ECHO MEAS - AVA(V,A): 2.1 CM^2
BH CV ECHO MEAS - AVA(V,D): 2.1 CM^2
BH CV ECHO MEAS - BSA(HAYCOCK): 2.3 M^2
BH CV ECHO MEAS - BSA: 2.3 M^2
BH CV ECHO MEAS - BZI_BMI: 29 KILOGRAMS/M^2
BH CV ECHO MEAS - BZI_METRIC_HEIGHT: 188 CM
BH CV ECHO MEAS - BZI_METRIC_WEIGHT: 102.5 KG
BH CV ECHO MEAS - CONTRAST EF (2CH): 52.9 ML/M^2
BH CV ECHO MEAS - CONTRAST EF 4CH: 48 ML/M^2
BH CV ECHO MEAS - EDV(CUBED): 131.9 ML
BH CV ECHO MEAS - EDV(MOD-SP2): 155 ML
BH CV ECHO MEAS - EDV(MOD-SP4): 156 ML
BH CV ECHO MEAS - EDV(TEICH): 123.2 ML
BH CV ECHO MEAS - EF(CUBED): 62.5 %
BH CV ECHO MEAS - EF(MOD-SP2): 52.9 %
BH CV ECHO MEAS - EF(MOD-SP4): 48 %
BH CV ECHO MEAS - EF(TEICH): 53.7 %
BH CV ECHO MEAS - ESV(CUBED): 49.4 ML
BH CV ECHO MEAS - ESV(MOD-SP2): 73 ML
BH CV ECHO MEAS - ESV(MOD-SP4): 81.1 ML
BH CV ECHO MEAS - ESV(TEICH): 57 ML
BH CV ECHO MEAS - FS: 27.9 %
BH CV ECHO MEAS - IVS/LVPW: 0.96
BH CV ECHO MEAS - IVSD: 1.1 CM
BH CV ECHO MEAS - LAT PEAK E' VEL: 9 CM/SEC
BH CV ECHO MEAS - LV DIASTOLIC VOL/BSA (35-75): 68.2 ML/M^2
BH CV ECHO MEAS - LV MASS(C)D: 230.8 GRAMS
BH CV ECHO MEAS - LV MASS(C)DI: 100.8 GRAMS/M^2
BH CV ECHO MEAS - LV MAX PG: 9.1 MMHG
BH CV ECHO MEAS - LV MEAN PG: 4 MMHG
BH CV ECHO MEAS - LV SYSTOLIC VOL/BSA (12-30): 35.4 ML/M^2
BH CV ECHO MEAS - LV V1 MAX: 151 CM/SEC
BH CV ECHO MEAS - LV V1 MEAN: 94.8 CM/SEC
BH CV ECHO MEAS - LV V1 VTI: 30.3 CM
BH CV ECHO MEAS - LVIDD: 5.1 CM
BH CV ECHO MEAS - LVIDS: 3.7 CM
BH CV ECHO MEAS - LVLD AP2: 9.5 CM
BH CV ECHO MEAS - LVLD AP4: 10.1 CM
BH CV ECHO MEAS - LVLS AP2: 8.1 CM
BH CV ECHO MEAS - LVLS AP4: 8.5 CM
BH CV ECHO MEAS - LVOT AREA (M): 3.1 CM^2
BH CV ECHO MEAS - LVOT AREA: 3.1 CM^2
BH CV ECHO MEAS - LVOT DIAM: 2 CM
BH CV ECHO MEAS - LVPWD: 1.2 CM
BH CV ECHO MEAS - MED PEAK E' VEL: 6 CM/SEC
BH CV ECHO MEAS - MR MAX PG: 93.4 MMHG
BH CV ECHO MEAS - MR MAX VEL: 483 CM/SEC
BH CV ECHO MEAS - MV A DUR: 0.14 SEC
BH CV ECHO MEAS - MV A MAX VEL: 114 CM/SEC
BH CV ECHO MEAS - MV DEC SLOPE: 309 CM/SEC^2
BH CV ECHO MEAS - MV DEC TIME: 0.28 SEC
BH CV ECHO MEAS - MV E MAX VEL: 86.2 CM/SEC
BH CV ECHO MEAS - MV E/A: 0.76
BH CV ECHO MEAS - MV MAX PG: 6.2 MMHG
BH CV ECHO MEAS - MV MEAN PG: 2 MMHG
BH CV ECHO MEAS - MV P1/2T MAX VEL: 103 CM/SEC
BH CV ECHO MEAS - MV P1/2T: 97.6 MSEC
BH CV ECHO MEAS - MV V2 MAX: 124 CM/SEC
BH CV ECHO MEAS - MV V2 MEAN: 68.2 CM/SEC
BH CV ECHO MEAS - MV V2 VTI: 39.1 CM
BH CV ECHO MEAS - MVA P1/2T LCG: 2.1 CM^2
BH CV ECHO MEAS - MVA(P1/2T): 2.3 CM^2
BH CV ECHO MEAS - MVA(VTI): 2.4 CM^2
BH CV ECHO MEAS - PA ACC TIME: 0.12 SEC
BH CV ECHO MEAS - PA MAX PG (FULL): 1.3 MMHG
BH CV ECHO MEAS - PA MAX PG: 4.2 MMHG
BH CV ECHO MEAS - PA PR(ACCEL): 25 MMHG
BH CV ECHO MEAS - PA V2 MAX: 103 CM/SEC
BH CV ECHO MEAS - PULM A REVS DUR: 0.17 SEC
BH CV ECHO MEAS - PULM A REVS VEL: 42 CM/SEC
BH CV ECHO MEAS - PULM DIAS VEL: 54.4 CM/SEC
BH CV ECHO MEAS - PULM S/D: 1.6
BH CV ECHO MEAS - PULM SYS VEL: 87 CM/SEC
BH CV ECHO MEAS - PVA(V,A): 2.6 CM^2
BH CV ECHO MEAS - PVA(V,D): 2.6 CM^2
BH CV ECHO MEAS - QP/QS: 0.56
BH CV ECHO MEAS - RAP SYSTOLE: 3 MMHG
BH CV ECHO MEAS - RV MAX PG: 2.9 MMHG
BH CV ECHO MEAS - RV MEAN PG: 1 MMHG
BH CV ECHO MEAS - RV V1 MAX: 85.2 CM/SEC
BH CV ECHO MEAS - RV V1 MEAN: 51.2 CM/SEC
BH CV ECHO MEAS - RV V1 VTI: 17.1 CM
BH CV ECHO MEAS - RVOT AREA: 3.1 CM^2
BH CV ECHO MEAS - RVOT DIAM: 2 CM
BH CV ECHO MEAS - SI(AO): 160 ML/M^2
BH CV ECHO MEAS - SI(CUBED): 36 ML/M^2
BH CV ECHO MEAS - SI(LVOT): 41.6 ML/M^2
BH CV ECHO MEAS - SI(MOD-SP2): 35.8 ML/M^2
BH CV ECHO MEAS - SI(MOD-SP4): 32.7 ML/M^2
BH CV ECHO MEAS - SI(TEICH): 28.9 ML/M^2
BH CV ECHO MEAS - SV(AO): 366.2 ML
BH CV ECHO MEAS - SV(CUBED): 82.4 ML
BH CV ECHO MEAS - SV(LVOT): 95.2 ML
BH CV ECHO MEAS - SV(MOD-SP2): 82 ML
BH CV ECHO MEAS - SV(MOD-SP4): 74.9 ML
BH CV ECHO MEAS - SV(RVOT): 53.7 ML
BH CV ECHO MEAS - SV(TEICH): 66.2 ML
BH CV ECHO MEAS - TAPSE (>1.6): 2.1 CM2
BH CV NUCLEAR PRIOR STUDY: 3
BH CV STRESS BP STAGE 1: NORMAL
BH CV STRESS BP STAGE 2: NORMAL
BH CV STRESS BP STAGE 3: NORMAL
BH CV STRESS BP STAGE 4: NORMAL
BH CV STRESS BP STAGE 5: NORMAL
BH CV STRESS COMMENTS STAGE 1: NORMAL
BH CV STRESS COMMENTS STAGE 2: NORMAL
BH CV STRESS DOSE REGADENOSON STAGE 1: 0.4
BH CV STRESS DURATION MIN STAGE 1: 0
BH CV STRESS DURATION MIN STAGE 2: 1
BH CV STRESS DURATION MIN STAGE 3: 3
BH CV STRESS DURATION MIN STAGE 4: 4
BH CV STRESS DURATION SEC STAGE 1: 15
BH CV STRESS DURATION SEC STAGE 2: 0
BH CV STRESS HR STAGE 1: 56
BH CV STRESS HR STAGE 2: 85
BH CV STRESS HR STAGE 3: 109
BH CV STRESS HR STAGE 4: 101
BH CV STRESS HR STAGE 5: 98
BH CV STRESS O2 STAGE 1: 99
BH CV STRESS O2 STAGE 2: 93
BH CV STRESS O2 STAGE 3: 100
BH CV STRESS O2 STAGE 4: 100
BH CV STRESS O2 STAGE 5: 100
BH CV STRESS PROTOCOL 1: NORMAL
BH CV STRESS RECOVERY BP: NORMAL MMHG
BH CV STRESS RECOVERY HR: 98 BPM
BH CV STRESS RECOVERY O2: 100 %
BH CV STRESS STAGE 1: 1
BH CV STRESS STAGE 2: 2
BH CV STRESS STAGE 3: 3
BH CV STRESS STAGE 4: 4
BH CV STRESS STAGE 5: 5
BH CV VAS BP RIGHT ARM: NORMAL MMHG
BH CV XLRA - RV BASE: 3.2 CM
BH CV XLRA - TDI S': 11 CM/SEC
E/E' RATIO: 12
LEFT ATRIUM VOLUME INDEX: 36 ML/M2
LV EF NUC BP: 34 %
MAXIMAL PREDICTED HEART RATE: 139 BPM
MAXIMAL PREDICTED HEART RATE: 139 BPM
PERCENT MAX PREDICTED HR: 82.01 %
STRESS BASELINE BP: NORMAL MMHG
STRESS BASELINE HR: 56 BPM
STRESS O2 SAT REST: 99 %
STRESS PERCENT HR: 96 %
STRESS POST ESTIMATED WORKLOAD: 1 METS
STRESS POST EXERCISE DUR SEC: 30 SEC
STRESS POST O2 SAT PEAK: 100 %
STRESS POST PEAK BP: NORMAL MMHG
STRESS POST PEAK HR: 114 BPM
STRESS TARGET HR: 118 BPM
STRESS TARGET HR: 118 BPM

## 2017-12-04 PROCEDURE — 0 TECHNETIUM SESTAMIBI: Performed by: INTERNAL MEDICINE

## 2017-12-04 PROCEDURE — 93017 CV STRESS TEST TRACING ONLY: CPT

## 2017-12-04 PROCEDURE — 78452 HT MUSCLE IMAGE SPECT MULT: CPT | Performed by: INTERNAL MEDICINE

## 2017-12-04 PROCEDURE — 93225 XTRNL ECG REC<48 HRS REC: CPT

## 2017-12-04 PROCEDURE — 25010000002 PERFLUTREN (DEFINITY) 8.476 MG IN SODIUM CHLORIDE 0.9 % 10 ML INJECTION: Performed by: INTERNAL MEDICINE

## 2017-12-04 PROCEDURE — 93226 XTRNL ECG REC<48 HR SCAN A/R: CPT

## 2017-12-04 PROCEDURE — A9500 TC99M SESTAMIBI: HCPCS | Performed by: INTERNAL MEDICINE

## 2017-12-04 PROCEDURE — 93016 CV STRESS TEST SUPVJ ONLY: CPT | Performed by: INTERNAL MEDICINE

## 2017-12-04 PROCEDURE — 25010000002 REGADENOSON 0.4 MG/5ML SOLUTION: Performed by: INTERNAL MEDICINE

## 2017-12-04 PROCEDURE — 93018 CV STRESS TEST I&R ONLY: CPT | Performed by: INTERNAL MEDICINE

## 2017-12-04 PROCEDURE — 93306 TTE W/DOPPLER COMPLETE: CPT

## 2017-12-04 PROCEDURE — 78452 HT MUSCLE IMAGE SPECT MULT: CPT

## 2017-12-04 PROCEDURE — 93306 TTE W/DOPPLER COMPLETE: CPT | Performed by: INTERNAL MEDICINE

## 2017-12-04 RX ADMIN — PERFLUTREN 3 ML: 6.52 INJECTION, SUSPENSION INTRAVENOUS at 10:20

## 2017-12-04 RX ADMIN — TECHNETIUM TC-99M SESTAMIBI 1 DOSE: 1 INJECTION INTRAVENOUS at 07:00

## 2017-12-04 RX ADMIN — TECHNETIUM TC-99M SESTAMIBI 1 DOSE: 1 INJECTION INTRAVENOUS at 09:30

## 2017-12-04 RX ADMIN — REGADENOSON 0.4 MG: 0.08 INJECTION, SOLUTION INTRAVENOUS at 09:30

## 2017-12-05 ENCOUNTER — ANESTHESIA EVENT (OUTPATIENT)
Dept: PERIOP | Facility: HOSPITAL | Age: 82
End: 2017-12-05

## 2017-12-05 ENCOUNTER — DOCUMENTATION (OUTPATIENT)
Dept: CARDIOLOGY | Facility: CLINIC | Age: 82
End: 2017-12-05

## 2017-12-05 PROCEDURE — 93227 XTRNL ECG REC<48 HR R&I: CPT | Performed by: INTERNAL MEDICINE

## 2017-12-05 NOTE — PROGRESS NOTES
Date of Office Visit:  2017  Encounter Provider:  German Og MD  Place of Service:  [facility] CARDIOLOGY  Patient Name: Joey Schmitt  :  1935        Dear Dr. Bhardwaj,    Mr. Schmitt has orthopedic surgery upcoming on .    He has a left bundle branch block and frequent ectopy.  Further cardiac testing was performed.  A Cardiolite stress reported no ischemia, but suggested a prior infarct and moderate systolic dysfunction.  However, an echo showed no evidence of infarct, and normal systolic function, and the stress findings were likely due to the left bundle and ectopy.    A Holter showed very frequent monomorphic PVCs, accounting for 30% of all beats.    I have recommended low dose metoprolol be started prior to surgery, 25mg BID.  I do not feel he's at prohibitive risk of major adverse cardiovascular events during surgery, although his overall risk is elevated due to age, dementia, diabetes, and the cardiac findings.     Please contact our office with any questions or concerns. As always, it has been a pleasure to participate in your patient's care.      German Og MD  [facility]

## 2017-12-07 ENCOUNTER — TELEPHONE (OUTPATIENT)
Dept: ORTHOPEDIC SURGERY | Facility: CLINIC | Age: 82
End: 2017-12-07

## 2017-12-07 ENCOUNTER — OFFICE VISIT (OUTPATIENT)
Dept: ORTHOPEDIC SURGERY | Facility: CLINIC | Age: 82
End: 2017-12-07

## 2017-12-07 DIAGNOSIS — M19.011 PRIMARY OSTEOARTHRITIS OF BOTH SHOULDERS: Primary | ICD-10-CM

## 2017-12-07 DIAGNOSIS — M19.012 PRIMARY OSTEOARTHRITIS OF BOTH SHOULDERS: Primary | ICD-10-CM

## 2017-12-07 PROCEDURE — S0260 H&P FOR SURGERY: HCPCS | Performed by: ORTHOPAEDIC SURGERY

## 2017-12-07 NOTE — H&P
Orthopedic Surgery    Patient Care Team:  Romeo Chamberlain MD as PCP - General (Family Medicine)    CHIEF COMPLAINT: Painful right shoulder    HISTORY OF PRESENT ILLNESS: 82-year-old male right-hand-dominant with end-stage degenerative arthritis of the right shoulder failed to respond to nonoperative management is being admitted this time to undergo a left total reverse shoulder.  Patient fell respond to nonoperative management is discussed with the patient and family detail the risk of surgery which include but not limited to infection and the need for multiple procedures including implant removal to eradicate infection, dislocation, nerve injury and paralysis, vascular injury, periprosthetic fracture and the need for revision surgery and persistent pain and discomfort despite a well implanted total reverse shoulder.  He understands and agrees to proceed.          Past Medical History:   Diagnosis Date   • Cellulitis of foot    • Dementia    • Hyperlipidemia    • Hypertension    • Hypothyroidism    • Macular degeneration    • Prostate cancer    • Right shoulder pain     SCHEDULED FOR SX   • Seasonal allergies    • Type 2 diabetes mellitus    • Vertigo      Past Surgical History:   Procedure Laterality Date   • INCISION AND DRAINAGE LEG Right 1/11/2017    Procedure: INCISION AND DRAINAGE LOWER EXTREMITY;  Surgeon: Mikael Dwyer DPM;  Location: [facility];  Service:    • PROSTATE SURGERY     • SHOULDER SURGERY Left      Family History   Problem Relation Age of Onset   • Heart disease Father      Social History   Substance Use Topics   • Smoking status: Former Smoker     Packs/day: 1.00     Years: 15.00     Types: Cigarettes     Quit date: 1980   • Smokeless tobacco: Never Used      Comment: caffeine use: one cup of coffee daily.    • Alcohol use No     No prescriptions prior to admission.     Allergies:  Review of patient's allergies indicates no known allergies.    REVIEW OF SYSTEMS:  Please see the above history  of present illness for pertinent positives and negatives.  The remainder of the patient's systems have been reviewed and are negative.    Vital Signs            Physical Exam:  Physical Exam   Constitutional: Patient appears well-developed and well-nourished and in no acute distress   HEENT:   Head: Normocephalic and atraumatic.   Eyes:  Pupils are equal, round, and reactive to light.  Mouth and Throat: Patient has moist mucous membranes. Oropharynx is clear of any erythema or exudate.     Neck: Neck supple. No JVD present. No thyromegaly present. No lymphadenopathy present.  Cardiovascular: Regular rate, regular rhythm.  Pulmonary/Chest: Lungs are clear to auscultation bilaterally.  Abdominal:benign,soft with bowel sounds  Musculoskeletal: Normal posture.  Extremities: Right upper extremity is good distal pulses with no motor deficit.  No sensory loss.  Deltoid function is +3 out of 5 secondary to pain.  He has only 30° of active abduction or extension.  External rotation is limited to 30°.  Internal rotation about 25   Neurological: Patient is alert and oriented.  Psychological:   Mood and behavior appropriate.  Skin: Skin is warm and dry.     Results Review:    I reviewed the patient's new clinical results.  Lab Results (most recent)     None          Imaging Results (most recent)     None            ECG/EMG Results (most recent)     None          Assessment/Plan right glenohumeral arthritis with chronic rotator cuff tendinopathy        I discussed the patients findings and my recommendations with patient and plan to proceed with a right total reverse shoulder    Goyo Bhardwaj MD  12/07/17  12:21 PM

## 2017-12-07 NOTE — PROGRESS NOTES
Subjective: Right shoulder pain     Patient ID: Joey Schmitt is a 82 y.o. male.    Chief Complaint:    History of Present Illness patient seen for H&P to undergo right total reverse shoulder tomorrow       Social History     Occupational History   • Not on file.     Social History Main Topics   • Smoking status: Former Smoker     Packs/day: 1.00     Years: 15.00     Types: Cigarettes     Quit date: 1980   • Smokeless tobacco: Never Used      Comment: caffeine use: one cup of coffee daily.    • Alcohol use No   • Drug use: No   • Sexual activity: Defer      Review of Systems   Constitutional: Negative for chills, diaphoresis, fever and unexpected weight change.   HENT: Negative for hearing loss, nosebleeds, sore throat and tinnitus.    Eyes: Negative for pain and visual disturbance.   Respiratory: Negative for cough, shortness of breath and wheezing.    Cardiovascular: Negative for chest pain and palpitations.   Gastrointestinal: Negative for abdominal pain, diarrhea, nausea and vomiting.   Endocrine: Negative for cold intolerance, heat intolerance and polydipsia.   Genitourinary: Negative for difficulty urinating, dysuria and hematuria.   Musculoskeletal: Positive for arthralgias. Negative for joint swelling and myalgias.   Skin: Negative for rash and wound.   Allergic/Immunologic: Negative for environmental allergies.   Neurological: Negative for dizziness, syncope and numbness.   Hematological: Does not bruise/bleed easily.   Psychiatric/Behavioral: Negative for dysphoric mood and sleep disturbance. The patient is not nervous/anxious.          Past Medical History:   Diagnosis Date   • Cellulitis of foot    • Dementia    • Hyperlipidemia    • Hypertension    • Hypothyroidism    • Macular degeneration    • Prostate cancer    • Right shoulder pain     SCHEDULED FOR SX   • Seasonal allergies    • Type 2 diabetes mellitus    • Vertigo      Past Surgical History:   Procedure Laterality Date   • INCISION AND  DRAINAGE LEG Right 1/11/2017    Procedure: INCISION AND DRAINAGE LOWER EXTREMITY;  Surgeon: Mikael Dwyer DPM;  Location: [facility];  Service:    • PROSTATE SURGERY     • SHOULDER SURGERY Left      Family History   Problem Relation Age of Onset   • Heart disease Father          Objective:  There were no vitals filed for this visit.  There were no vitals filed for this visit.  There is no height or weight on file to calculate BMI.       Ortho Exam  H&P completed    Assessment:       1. Primary osteoarthritis of both shoulders          Plan:      all questions answered      Work Status:    MYRA query complete.    Orders:  No orders of the defined types were placed in this encounter.      Medications:  No orders of the defined types were placed in this encounter.      Followup:  Return in about 2 weeks (around 12/21/2017).          Dragon transcription disclaimer     Much of this encounter note is an electronic transcription/translation of spoken language to printed text. The electronic translation of spoken language may permit erroneous, or at times, nonsensical words or phrases to be inadvertently transcribed. Although I have reviewed the note for such errors, some may still exist.

## 2017-12-08 ENCOUNTER — APPOINTMENT (OUTPATIENT)
Dept: GENERAL RADIOLOGY | Facility: HOSPITAL | Age: 82
End: 2017-12-08

## 2017-12-08 ENCOUNTER — HOSPITAL ENCOUNTER (INPATIENT)
Facility: HOSPITAL | Age: 82
LOS: 6 days | Discharge: NURSING, REHAB OR LONG-TERM CARE | End: 2017-12-14
Attending: ORTHOPAEDIC SURGERY | Admitting: ORTHOPAEDIC SURGERY

## 2017-12-08 ENCOUNTER — ANESTHESIA (OUTPATIENT)
Dept: PERIOP | Facility: HOSPITAL | Age: 82
End: 2017-12-08

## 2017-12-08 DIAGNOSIS — N18.30 TYPE 2 DIABETES MELLITUS WITH STAGE 3 CHRONIC KIDNEY DISEASE, WITHOUT LONG-TERM CURRENT USE OF INSULIN (HCC): Primary | ICD-10-CM

## 2017-12-08 DIAGNOSIS — E11.22 TYPE 2 DIABETES MELLITUS WITH STAGE 3 CHRONIC KIDNEY DISEASE, WITHOUT LONG-TERM CURRENT USE OF INSULIN (HCC): Primary | ICD-10-CM

## 2017-12-08 DIAGNOSIS — Z85.9 HISTORY OF CANCER: ICD-10-CM

## 2017-12-08 DIAGNOSIS — R41.841 COGNITIVE COMMUNICATION DEFICIT: ICD-10-CM

## 2017-12-08 DIAGNOSIS — M67.911 TENDINOPATHY OF RIGHT ROTATOR CUFF: ICD-10-CM

## 2017-12-08 DIAGNOSIS — M19.011 OSTEOARTHRITIS OF RIGHT GLENOHUMERAL JOINT: ICD-10-CM

## 2017-12-08 LAB
ABO GROUP BLD: NORMAL
GLUCOSE BLDC GLUCOMTR-MCNC: 125 MG/DL (ref 70–130)
GLUCOSE BLDC GLUCOMTR-MCNC: 178 MG/DL (ref 70–130)
GLUCOSE BLDC GLUCOMTR-MCNC: 187 MG/DL (ref 70–130)
HCT VFR BLD AUTO: 40.2 % (ref 42–52)
HGB BLD-MCNC: 13.5 G/DL (ref 14–18)
POTASSIUM BLD-SCNC: 4.8 MMOL/L (ref 3.5–5.2)
RH BLD: POSITIVE

## 2017-12-08 PROCEDURE — 25010000002 VANCOMYCIN: Performed by: ORTHOPAEDIC SURGERY

## 2017-12-08 PROCEDURE — 84132 ASSAY OF SERUM POTASSIUM: CPT | Performed by: NURSE ANESTHETIST, CERTIFIED REGISTERED

## 2017-12-08 PROCEDURE — 86901 BLOOD TYPING SEROLOGIC RH(D): CPT

## 2017-12-08 PROCEDURE — 25010000002 NEOSTIGMINE PER 0.5 MG: Performed by: ANESTHESIOLOGY

## 2017-12-08 PROCEDURE — 94799 UNLISTED PULMONARY SVC/PX: CPT

## 2017-12-08 PROCEDURE — 25010000002 MIDAZOLAM PER 1 MG: Performed by: NURSE ANESTHETIST, CERTIFIED REGISTERED

## 2017-12-08 PROCEDURE — 85018 HEMOGLOBIN: CPT | Performed by: ORTHOPAEDIC SURGERY

## 2017-12-08 PROCEDURE — 25010000002 ONDANSETRON PER 1 MG: Performed by: NURSE ANESTHETIST, CERTIFIED REGISTERED

## 2017-12-08 PROCEDURE — 25010000002 PHENYLEPHRINE PER 1 ML: Performed by: ANESTHESIOLOGY

## 2017-12-08 PROCEDURE — 25010000003 CEFAZOLIN PER 500 MG: Performed by: ORTHOPAEDIC SURGERY

## 2017-12-08 PROCEDURE — 25010000002 ROPIVACAINE PER 1 MG: Performed by: NURSE ANESTHETIST, CERTIFIED REGISTERED

## 2017-12-08 PROCEDURE — 93010 ELECTROCARDIOGRAM REPORT: CPT | Performed by: INTERNAL MEDICINE

## 2017-12-08 PROCEDURE — 86900 BLOOD TYPING SEROLOGIC ABO: CPT

## 2017-12-08 PROCEDURE — 93005 ELECTROCARDIOGRAM TRACING: CPT | Performed by: ORTHOPAEDIC SURGERY

## 2017-12-08 PROCEDURE — 82962 GLUCOSE BLOOD TEST: CPT

## 2017-12-08 PROCEDURE — 23472 RECONSTRUCT SHOULDER JOINT: CPT | Performed by: ORTHOPAEDIC SURGERY

## 2017-12-08 PROCEDURE — 85014 HEMATOCRIT: CPT | Performed by: ORTHOPAEDIC SURGERY

## 2017-12-08 PROCEDURE — 25010000002 PROPOFOL 10 MG/ML EMULSION: Performed by: ANESTHESIOLOGY

## 2017-12-08 PROCEDURE — 73030 X-RAY EXAM OF SHOULDER: CPT

## 2017-12-08 PROCEDURE — 99221 1ST HOSP IP/OBS SF/LOW 40: CPT | Performed by: INTERNAL MEDICINE

## 2017-12-08 PROCEDURE — 0RRJ00Z REPLACEMENT OF RIGHT SHOULDER JOINT WITH REVERSE BALL AND SOCKET SYNTHETIC SUBSTITUTE, OPEN APPROACH: ICD-10-PCS | Performed by: ORTHOPAEDIC SURGERY

## 2017-12-08 PROCEDURE — C1713 ANCHOR/SCREW BN/BN,TIS/BN: HCPCS | Performed by: ORTHOPAEDIC SURGERY

## 2017-12-08 PROCEDURE — 25010000002 VANCOMYCIN 1500 MG/250ML SOLUTION: Performed by: ORTHOPAEDIC SURGERY

## 2017-12-08 PROCEDURE — C1776 JOINT DEVICE (IMPLANTABLE): HCPCS | Performed by: ORTHOPAEDIC SURGERY

## 2017-12-08 PROCEDURE — 25010000002 FENTANYL CITRATE (PF) 100 MCG/2ML SOLUTION: Performed by: ANESTHESIOLOGY

## 2017-12-08 PROCEDURE — 25010000002 VANCOMYCIN PER 500 MG: Performed by: ORTHOPAEDIC SURGERY

## 2017-12-08 DEVICE — BASEPLATE
Type: IMPLANTABLE DEVICE | Site: SHOULDER | Status: FUNCTIONAL
Brand: REUNION

## 2017-12-08 DEVICE — 6.5MM CENTER SCREW
Type: IMPLANTABLE DEVICE | Site: SHOULDER | Status: FUNCTIONAL
Brand: REUNION

## 2017-12-08 DEVICE — X3 HUMERAL INSERT, STANDARD
Type: IMPLANTABLE DEVICE | Site: SHOULDER | Status: FUNCTIONAL
Brand: REUNION

## 2017-12-08 DEVICE — 4.5MM PERIPHERAL SCREW
Type: IMPLANTABLE DEVICE | Site: SHOULDER | Status: FUNCTIONAL
Brand: REUNION

## 2017-12-08 DEVICE — PRESS-FIT HUMERAL STEM
Type: IMPLANTABLE DEVICE | Site: SHOULDER | Status: FUNCTIONAL
Brand: REUNION

## 2017-12-08 DEVICE — GLENOSPHERE , 2MM ECCENTRIC
Type: IMPLANTABLE DEVICE | Site: SHOULDER | Status: FUNCTIONAL
Brand: REUNION

## 2017-12-08 DEVICE — TOTL SHLDER REV REUNION STRYKER S4: Type: IMPLANTABLE DEVICE | Site: SHOULDER | Status: FUNCTIONAL

## 2017-12-08 DEVICE — HUMERAL CUP
Type: IMPLANTABLE DEVICE | Site: SHOULDER | Status: FUNCTIONAL
Brand: REUNION

## 2017-12-08 RX ORDER — BACITRACIN ZINC 500 [USP'U]/G
OINTMENT TOPICAL AS NEEDED
Status: DISCONTINUED | OUTPATIENT
Start: 2017-12-08 | End: 2017-12-08 | Stop reason: HOSPADM

## 2017-12-08 RX ORDER — LEVOTHYROXINE SODIUM 0.1 MG/1
100 TABLET ORAL EVERY MORNING
Status: DISCONTINUED | OUTPATIENT
Start: 2017-12-09 | End: 2017-12-14 | Stop reason: HOSPADM

## 2017-12-08 RX ORDER — FENTANYL CITRATE 50 UG/ML
INJECTION, SOLUTION INTRAMUSCULAR; INTRAVENOUS AS NEEDED
Status: DISCONTINUED | OUTPATIENT
Start: 2017-12-08 | End: 2017-12-08 | Stop reason: SURG

## 2017-12-08 RX ORDER — NALOXONE HCL 0.4 MG/ML
0.4 VIAL (ML) INJECTION
Status: DISCONTINUED | OUTPATIENT
Start: 2017-12-08 | End: 2017-12-14 | Stop reason: HOSPADM

## 2017-12-08 RX ORDER — SODIUM CHLORIDE 0.9 % (FLUSH) 0.9 %
1-10 SYRINGE (ML) INJECTION AS NEEDED
Status: DISCONTINUED | OUTPATIENT
Start: 2017-12-08 | End: 2017-12-08 | Stop reason: HOSPADM

## 2017-12-08 RX ORDER — PANTOPRAZOLE SODIUM 20 MG/1
20 TABLET, DELAYED RELEASE ORAL ONCE
Status: COMPLETED | OUTPATIENT
Start: 2017-12-08 | End: 2017-12-08

## 2017-12-08 RX ORDER — GLYCOPYRROLATE 0.2 MG/ML
INJECTION INTRAMUSCULAR; INTRAVENOUS AS NEEDED
Status: DISCONTINUED | OUTPATIENT
Start: 2017-12-08 | End: 2017-12-08 | Stop reason: SURG

## 2017-12-08 RX ORDER — OXYCODONE AND ACETAMINOPHEN 7.5; 325 MG/1; MG/1
1 TABLET ORAL EVERY 4 HOURS PRN
Status: DISCONTINUED | OUTPATIENT
Start: 2017-12-08 | End: 2017-12-09

## 2017-12-08 RX ORDER — SODIUM CHLORIDE 0.9 % (FLUSH) 0.9 %
1-10 SYRINGE (ML) INJECTION AS NEEDED
Status: DISCONTINUED | OUTPATIENT
Start: 2017-12-08 | End: 2017-12-08

## 2017-12-08 RX ORDER — GLIPIZIDE 10 MG/1
10 TABLET ORAL
Status: DISCONTINUED | OUTPATIENT
Start: 2017-12-08 | End: 2017-12-14 | Stop reason: HOSPADM

## 2017-12-08 RX ORDER — EPHEDRINE SULFATE 50 MG/ML
INJECTION, SOLUTION INTRAVENOUS AS NEEDED
Status: DISCONTINUED | OUTPATIENT
Start: 2017-12-08 | End: 2017-12-08 | Stop reason: SURG

## 2017-12-08 RX ORDER — SODIUM CHLORIDE 9 MG/ML
INJECTION, SOLUTION INTRAVENOUS AS NEEDED
Status: DISCONTINUED | OUTPATIENT
Start: 2017-12-08 | End: 2017-12-08 | Stop reason: HOSPADM

## 2017-12-08 RX ORDER — MAGNESIUM HYDROXIDE 1200 MG/15ML
LIQUID ORAL AS NEEDED
Status: DISCONTINUED | OUTPATIENT
Start: 2017-12-08 | End: 2017-12-08 | Stop reason: HOSPADM

## 2017-12-08 RX ORDER — SODIUM CHLORIDE, SODIUM LACTATE, POTASSIUM CHLORIDE, CALCIUM CHLORIDE 600; 310; 30; 20 MG/100ML; MG/100ML; MG/100ML; MG/100ML
30 INJECTION, SOLUTION INTRAVENOUS CONTINUOUS
Status: DISCONTINUED | OUTPATIENT
Start: 2017-12-08 | End: 2017-12-11

## 2017-12-08 RX ORDER — SODIUM CHLORIDE 9 MG/ML
40 INJECTION, SOLUTION INTRAVENOUS AS NEEDED
Status: DISCONTINUED | OUTPATIENT
Start: 2017-12-08 | End: 2017-12-08 | Stop reason: HOSPADM

## 2017-12-08 RX ORDER — LIDOCAINE HYDROCHLORIDE 20 MG/ML
INJECTION, SOLUTION INFILTRATION; PERINEURAL AS NEEDED
Status: DISCONTINUED | OUTPATIENT
Start: 2017-12-08 | End: 2017-12-08 | Stop reason: SURG

## 2017-12-08 RX ORDER — DOCUSATE SODIUM 100 MG/1
100 CAPSULE, LIQUID FILLED ORAL 2 TIMES DAILY
Status: DISCONTINUED | OUTPATIENT
Start: 2017-12-08 | End: 2017-12-14 | Stop reason: HOSPADM

## 2017-12-08 RX ORDER — MIDAZOLAM HYDROCHLORIDE 1 MG/ML
2 INJECTION INTRAMUSCULAR; INTRAVENOUS
Status: DISCONTINUED | OUTPATIENT
Start: 2017-12-08 | End: 2017-12-08 | Stop reason: HOSPADM

## 2017-12-08 RX ORDER — ONDANSETRON 2 MG/ML
4 INJECTION INTRAMUSCULAR; INTRAVENOUS ONCE AS NEEDED
Status: COMPLETED | OUTPATIENT
Start: 2017-12-08 | End: 2017-12-08

## 2017-12-08 RX ORDER — SODIUM CHLORIDE 0.9 % (FLUSH) 0.9 %
3 SYRINGE (ML) INJECTION AS NEEDED
Status: DISCONTINUED | OUTPATIENT
Start: 2017-12-08 | End: 2017-12-08 | Stop reason: HOSPADM

## 2017-12-08 RX ORDER — ROCURONIUM BROMIDE 10 MG/ML
INJECTION, SOLUTION INTRAVENOUS AS NEEDED
Status: DISCONTINUED | OUTPATIENT
Start: 2017-12-08 | End: 2017-12-08 | Stop reason: SURG

## 2017-12-08 RX ORDER — VANCOMYCIN HCL-SODIUM CHLORIDE IV SOLN 1.5 GM/250ML-0.9% 1.5-0.9/25 GM/ML-%
15 SOLUTION INTRAVENOUS ONCE
Status: COMPLETED | OUTPATIENT
Start: 2017-12-08 | End: 2017-12-08

## 2017-12-08 RX ORDER — MIDAZOLAM HYDROCHLORIDE 1 MG/ML
1 INJECTION INTRAMUSCULAR; INTRAVENOUS
Status: DISCONTINUED | OUTPATIENT
Start: 2017-12-08 | End: 2017-12-08 | Stop reason: HOSPADM

## 2017-12-08 RX ORDER — SODIUM CHLORIDE, SODIUM LACTATE, POTASSIUM CHLORIDE, CALCIUM CHLORIDE 600; 310; 30; 20 MG/100ML; MG/100ML; MG/100ML; MG/100ML
1000 INJECTION, SOLUTION INTRAVENOUS CONTINUOUS PRN
Status: DISCONTINUED | OUTPATIENT
Start: 2017-12-08 | End: 2017-12-11

## 2017-12-08 RX ORDER — HYDROMORPHONE HCL 110MG/55ML
1 PATIENT CONTROLLED ANALGESIA SYRINGE INTRAVENOUS
Status: DISCONTINUED | OUTPATIENT
Start: 2017-12-08 | End: 2017-12-11

## 2017-12-08 RX ORDER — SODIUM CHLORIDE 9 MG/ML
40 INJECTION, SOLUTION INTRAVENOUS AS NEEDED
Status: DISCONTINUED | OUTPATIENT
Start: 2017-12-08 | End: 2017-12-08

## 2017-12-08 RX ORDER — LIDOCAINE HYDROCHLORIDE 10 MG/ML
0.5 INJECTION, SOLUTION INFILTRATION; PERINEURAL ONCE AS NEEDED
Status: COMPLETED | OUTPATIENT
Start: 2017-12-08 | End: 2017-12-08

## 2017-12-08 RX ORDER — ROPIVACAINE HYDROCHLORIDE 5 MG/ML
INJECTION, SOLUTION EPIDURAL; INFILTRATION; PERINEURAL AS NEEDED
Status: DISCONTINUED | OUTPATIENT
Start: 2017-12-08 | End: 2017-12-08 | Stop reason: SURG

## 2017-12-08 RX ORDER — MELOXICAM 7.5 MG/1
15 TABLET ORAL ONCE
Status: COMPLETED | OUTPATIENT
Start: 2017-12-08 | End: 2017-12-08

## 2017-12-08 RX ORDER — OXYCODONE HCL 10 MG/1
10 TABLET, FILM COATED, EXTENDED RELEASE ORAL ONCE
Status: COMPLETED | OUTPATIENT
Start: 2017-12-08 | End: 2017-12-08

## 2017-12-08 RX ORDER — PREGABALIN 75 MG/1
150 CAPSULE ORAL ONCE
Status: COMPLETED | OUTPATIENT
Start: 2017-12-08 | End: 2017-12-08

## 2017-12-08 RX ORDER — FAMOTIDINE 10 MG/ML
20 INJECTION, SOLUTION INTRAVENOUS ONCE
Status: DISCONTINUED | OUTPATIENT
Start: 2017-12-08 | End: 2017-12-08

## 2017-12-08 RX ORDER — PROPOFOL 10 MG/ML
VIAL (ML) INTRAVENOUS AS NEEDED
Status: DISCONTINUED | OUTPATIENT
Start: 2017-12-08 | End: 2017-12-08 | Stop reason: SURG

## 2017-12-08 RX ADMIN — ROCURONIUM BROMIDE 10 MG: 10 INJECTION INTRAVENOUS at 10:31

## 2017-12-08 RX ADMIN — OXYCODONE HYDROCHLORIDE 10 MG: 10 TABLET, FILM COATED, EXTENDED RELEASE ORAL at 08:26

## 2017-12-08 RX ADMIN — ONDANSETRON 4 MG: 2 INJECTION, SOLUTION INTRAMUSCULAR; INTRAVENOUS at 08:57

## 2017-12-08 RX ADMIN — ROCURONIUM BROMIDE 25 MG: 10 INJECTION INTRAVENOUS at 09:54

## 2017-12-08 RX ADMIN — DOCUSATE SODIUM 100 MG: 100 CAPSULE, LIQUID FILLED ORAL at 21:04

## 2017-12-08 RX ADMIN — FENTANYL CITRATE 25 MCG: 50 INJECTION, SOLUTION INTRAMUSCULAR; INTRAVENOUS at 11:14

## 2017-12-08 RX ADMIN — FENTANYL CITRATE 25 MCG: 50 INJECTION, SOLUTION INTRAMUSCULAR; INTRAVENOUS at 09:52

## 2017-12-08 RX ADMIN — FENTANYL CITRATE 25 MCG: 50 INJECTION, SOLUTION INTRAMUSCULAR; INTRAVENOUS at 10:32

## 2017-12-08 RX ADMIN — TRANEXAMIC ACID 1000 MG: 100 INJECTION, SOLUTION INTRAVENOUS at 11:15

## 2017-12-08 RX ADMIN — PHENYLEPHRINE HYDROCHLORIDE 50 MCG: 10 INJECTION INTRAVENOUS at 10:10

## 2017-12-08 RX ADMIN — OXYCODONE HYDROCHLORIDE AND ACETAMINOPHEN 1 TABLET: 7.5; 325 TABLET ORAL at 21:04

## 2017-12-08 RX ADMIN — PREGABALIN 150 MG: 75 CAPSULE ORAL at 08:26

## 2017-12-08 RX ADMIN — GLYCOPYRROLATE 0.2 MG: 0.2 INJECTION INTRAMUSCULAR; INTRAVENOUS at 11:42

## 2017-12-08 RX ADMIN — NEOSTIGMINE METHYLSULFATE 1 MG: 1 INJECTION, SOLUTION INTRAMUSCULAR; INTRAVENOUS; SUBCUTANEOUS at 11:42

## 2017-12-08 RX ADMIN — VANCOMYCIN HCL-SODIUM CHLORIDE IV SOLN 1.5 GM/250ML-0.9% 1500 MG: 1.5-0.9/25 SOLUTION at 08:34

## 2017-12-08 RX ADMIN — LIDOCAINE HYDROCHLORIDE 80 MG: 20 INJECTION, SOLUTION INFILTRATION; PERINEURAL at 09:53

## 2017-12-08 RX ADMIN — MIDAZOLAM HYDROCHLORIDE 1 MG: 1 INJECTION, SOLUTION INTRAMUSCULAR; INTRAVENOUS at 08:57

## 2017-12-08 RX ADMIN — SODIUM CHLORIDE, POTASSIUM CHLORIDE, SODIUM LACTATE AND CALCIUM CHLORIDE 1000 ML: 600; 310; 30; 20 INJECTION, SOLUTION INTRAVENOUS at 08:34

## 2017-12-08 RX ADMIN — ROPIVACAINE HYDROCHLORIDE 25 ML: 5 INJECTION, SOLUTION EPIDURAL; INFILTRATION; PERINEURAL at 09:11

## 2017-12-08 RX ADMIN — CEFAZOLIN SODIUM 2 G: 2 SOLUTION INTRAVENOUS at 10:06

## 2017-12-08 RX ADMIN — TRANEXAMIC ACID 1000 MG: 100 INJECTION, SOLUTION INTRAVENOUS at 09:59

## 2017-12-08 RX ADMIN — METFORMIN HYDROCHLORIDE 1000 MG: 500 TABLET ORAL at 17:28

## 2017-12-08 RX ADMIN — LIDOCAINE HYDROCHLORIDE 0.5 ML: 10 INJECTION, SOLUTION EPIDURAL; INFILTRATION; INTRACAUDAL; PERINEURAL at 08:33

## 2017-12-08 RX ADMIN — EPHEDRINE SULFATE 5 MG: 50 INJECTION INTRAMUSCULAR; INTRAVENOUS; SUBCUTANEOUS at 10:27

## 2017-12-08 RX ADMIN — ROCURONIUM BROMIDE 5 MG: 10 INJECTION INTRAVENOUS at 09:53

## 2017-12-08 RX ADMIN — GLIPIZIDE 10 MG: 10 TABLET ORAL at 17:29

## 2017-12-08 RX ADMIN — EPHEDRINE SULFATE 5 MG: 50 INJECTION INTRAMUSCULAR; INTRAVENOUS; SUBCUTANEOUS at 10:19

## 2017-12-08 RX ADMIN — PANTOPRAZOLE SODIUM 20 MG: 20 TABLET, DELAYED RELEASE ORAL at 08:26

## 2017-12-08 RX ADMIN — EPHEDRINE SULFATE 10 MG: 50 INJECTION INTRAMUSCULAR; INTRAVENOUS; SUBCUTANEOUS at 10:45

## 2017-12-08 RX ADMIN — METOPROLOL TARTRATE 25 MG: 25 TABLET, FILM COATED ORAL at 17:29

## 2017-12-08 RX ADMIN — EPHEDRINE SULFATE 10 MG: 50 INJECTION INTRAMUSCULAR; INTRAVENOUS; SUBCUTANEOUS at 10:55

## 2017-12-08 RX ADMIN — VANCOMYCIN HYDROCHLORIDE 1500 MG: 750 INJECTION, POWDER, LYOPHILIZED, FOR SOLUTION INTRAVENOUS at 21:04

## 2017-12-08 RX ADMIN — PROPOFOL 30 MG: 10 INJECTION, EMULSION INTRAVENOUS at 10:31

## 2017-12-08 RX ADMIN — MELOXICAM 15 MG: 7.5 TABLET ORAL at 08:26

## 2017-12-08 RX ADMIN — CEFAZOLIN SODIUM 2 G: 2 SOLUTION INTRAVENOUS at 17:15

## 2017-12-08 RX ADMIN — PROPOFOL 120 MG: 10 INJECTION, EMULSION INTRAVENOUS at 09:54

## 2017-12-08 RX ADMIN — EPHEDRINE SULFATE 5 MG: 50 INJECTION INTRAMUSCULAR; INTRAVENOUS; SUBCUTANEOUS at 10:42

## 2017-12-08 NOTE — ANESTHESIA PROCEDURE NOTES
Peripheral Block    Patient location during procedure: pre-op  Start time: 12/8/2017 9:09 AM  Stop time: 12/8/2017 9:12 AM  Reason for block: post-op pain management  Preanesthetic Checklist  Completed: patient identified, site marked, surgical consent, pre-op evaluation, timeout performed, IV checked, risks and benefits discussed and monitors and equipment checked  Prep:  Pt Position: sitting  Sterile barriers:cap, gloves, mask and sterile barriers  Prep: ChloraPrep  Patient monitoring: blood pressure monitoring, continuous pulse oximetry and EKG  Procedure  Sedation:yes  Performed under: MAC  Guidance:ultrasound guided and nerve stimulator  Images:still images obtained    Laterality:right  Block Type:interscalene  Injection Technique:single-shot  Needle Type:echogenic and short-bevel  Needle Gauge:21 G  Resistance on Injection: none  Medications  Local Injected:ropivacaine 0.5% Local Amount Injected:25mL

## 2017-12-08 NOTE — H&P
CHIEF COMPLAINT: Painful right shoulder     HISTORY OF PRESENT ILLNESS: 82-year-old male right-hand-dominant with end-stage degenerative arthritis of the right shoulder failed to respond to nonoperative management is being admitted this time to undergo a right total reverse shoulder.  Patient fell respond to nonoperative management is discussed with the patient and family detail the risk of surgery which include but not limited to infection and the need for multiple procedures including implant removal to eradicate infection, dislocation, nerve injury and paralysis, vascular injury, periprosthetic fracture and the need for revision surgery and persistent pain and discomfort despite a well implanted total reverse shoulder.  He understands and agrees to proceed.               Medical History         Past Medical History:   Diagnosis Date   • Cellulitis of foot     • Dementia     • Hyperlipidemia     • Hypertension     • Hypothyroidism     • Macular degeneration     • Prostate cancer     • Right shoulder pain       SCHEDULED FOR SX   • Seasonal allergies     • Type 2 diabetes mellitus     • Vertigo            Surgical History          Past Surgical History:   Procedure Laterality Date   • INCISION AND DRAINAGE LEG Right 1/11/2017     Procedure: INCISION AND DRAINAGE LOWER EXTREMITY;  Surgeon: Mikael Dwyer DPM;  Location: [facility];  Service:    • PROSTATE SURGERY       • SHOULDER SURGERY Left                 Family History   Problem Relation Age of Onset   • Heart disease Father               Social History   Substance Use Topics   • Smoking status: Former Smoker       Packs/day: 1.00       Years: 15.00       Types: Cigarettes       Quit date: 1980   • Smokeless tobacco: Never Used         Comment: caffeine use: one cup of coffee daily.    • Alcohol use No       Prescriptions Prior to Admission    No prescriptions prior to admission.         Allergies:  Review of patient's allergies indicates no known  allergies.     REVIEW OF SYSTEMS:  Please see the above history of present illness for pertinent positives and negatives.  The remainder of the patient's systems have been reviewed and are negative.     Vital Signs               Physical Exam:  Physical Exam   Constitutional: Patient appears well-developed and well-nourished and in no acute distress   HEENT:   Head: Normocephalic and atraumatic.   Eyes:  Pupils are equal, round, and reactive to light.  Mouth and Throat: Patient has moist mucous membranes. Oropharynx is clear of any erythema or exudate.     Neck: Neck supple. No JVD present. No thyromegaly present. No lymphadenopathy present.  Cardiovascular: Regular rate, regular rhythm.  Pulmonary/Chest: Lungs are clear to auscultation bilaterally.  Abdominal:benign,soft with bowel sounds  Musculoskeletal: Normal posture.  Extremities: Right upper extremity is good distal pulses with no motor deficit.  No sensory loss.  Deltoid function is +3 out of 5 secondary to pain.  He has only 30° of active abduction or extension.  External rotation is limited to 30°.  Internal rotation about 25   Neurological: Patient is alert and oriented.  Psychological:   Mood and behavior appropriate.  Skin: Skin is warm and dry.      Results Review:                         I reviewed the patient's new clinical results.      Lab Results (most recent)      None                 Imaging Results (most recent)      None                    ECG/EMG Results (most recent)      None                Assessment/Plan    right glenohumeral arthritis with chronic rotator cuff tendinopathy           I discussed the patients findings and my recommendations with patient and plan to proceed with a right total reverse shoulder     Goyo Bhardwaj MD  12/07/17  12:21 PM                          Admission (Current) on 12/8/2017              Routing History              Detailed Report

## 2017-12-08 NOTE — ANESTHESIA POSTPROCEDURE EVALUATION
Patient: Joey Schmitt    Procedure Summary     Date Anesthesia Start Anesthesia Stop Room / Location    12/08/17 0941 1151 [facility] / [facility]       Procedure Diagnosis Surgeon Provider    TOTAL SHOULDER REVERSE ARTHROPLASTY (Right Shoulder) Osteoarthritis of right glenohumeral joint; Tendinopathy of right rotator cuff  (Osteoarthritis of right glenohumeral joint [M19.011]; Tendinopathy of right rotator cuff [M67.911]) MD Letitia Fernández MD          Anesthesia Type: general, regional  Last vitals  BP   137/71 (12/08/17 1338)   Temp   97.4 °F (36.3 °C) (12/08/17 1338)   Pulse   (!) 45 (12/08/17 1338)   Resp   18 (12/08/17 1308)     SpO2   94 % (12/08/17 1338)     Post Anesthesia Care and Evaluation    Patient participation: complete - patient participated  Level of consciousness: awake and alert  Pain score: 0  Pain management: adequate  Airway patency: patent  Anesthetic complications: No anesthetic complications  PONV Status: none  Cardiovascular status: acceptable  Respiratory status: acceptable  Hydration status: acceptable

## 2017-12-08 NOTE — ANESTHESIA PROCEDURE NOTES
Airway  Urgency: elective    Date/Time: 12/8/2017 9:57 AM  End Time:12/8/2017 9:58 AM  Airway not difficult    General Information and Staff    Patient location during procedure: OR  Anesthesiologist: LOIS STOUT    Indications and Patient Condition  Indications for airway management: airway protection    Preoxygenated: yes  MILS maintained throughout  Mask difficulty assessment: 1 - vent by mask    Final Airway Details  Final airway type: endotracheal airway      Successful airway: ETT  Cuffed: yes   Successful intubation technique: direct laryngoscopy  Facilitating devices/methods: intubating stylet  Endotracheal tube insertion site: oral  Blade: Gil  Blade size: #3  ETT size: 7.5 mm  Cormack-Lehane Classification: grade I - full view of glottis  Placement verified by: chest auscultation and capnometry   Cuff volume (mL): 5  Measured from: lips  ETT to lips (cm): 22  Number of attempts at approach: 2

## 2017-12-08 NOTE — OP NOTE
"Preoperative Diagnosis: Osteoarthritis right glenohumeral joint and rotator cuff tendinopathy    Postoperative Diagnosis: Same    Procedure Performed:  Right total reverse shoulder-George #17 modular humeral stem press-fit, 28mm base plate with 36 mm, 2 mm eccentric glenoid sphere, with a 36 mm humeral cup and a 36+4 humeral insert    Surgeon: Benton      Assistant:  Abhijit Torres    Anesthesia: Gen.  With interscalene block      Anesthetist: Dr. Letitia Lundy    Drains: ×1    Estimated blood loss: 100 cc    Complications: None        Indications for procedure: 82-year-old male with end-stage degenerative arthritis of the right glenohumeral joint with chronic rotator cuff tendinopathy and it failed to respond to nonoperative management          Operative Note: Patient brought to the operating room and after satisfactory anesthesia was obtained patient placed in a beachchair position.  The right shoulder and arm was then prepped and after the prep drive for 3 minutes was draped in a sterile from the usual manner.  Timeouts completed identifying the patient procedure correctly before and after the draping phase.  The deltopectoral groove was marked and incision was then made in the deltopectoral interval was identified and developed with the cephalic vein retracted with the deltoid.  The conjoined tendon interval was identified and exposed and with the \"head was brought into the wound showing a chronically torn rotator cuff.  At that time is noted that the biceps tendon had already ruptured.  The remainder of the remnants of the rotator cuff were excised except for the external rotators and then using the external guide with the arm in the appropriate position the head was cut at the appropriate angle.  Reaming was then begun first with straight reamers up to a #16 reamer as it was then evident that a #17 stem would be used.  Broaching begun first with the 11 broach then progressed up to the 16 broach with the 16 broach in " place that is now directed towards the glenoid.  Circumferential retractors were inserted remainder of the glenoid labral tissue was totally excised exposing the glenoid circumferentially.  Targeting device was then inserted and the guidepin was inserted into the glenoid the appropriate place and then reaming of the glenoid was then carried out with the reamer.  Once adequate reaming was begun with determined that a 32 mm screw be necessary for the 28 mm baseplate 20 mg glenoid base plate was then tightened with the central 20 mm screw.   position at about the 1 o'clock position.  4 additional screw holes are made in the plate and screws were filled with a 32 28/2/16 millimeters screws.  Eccentric glenoid sphere was then snapped into place over the baseplate.  Trial reductions then carried out with the standard for inserts and 4 cup which noted excellent stability throughout the arc of motion.  Some slight impingement superiorly greater tuberosity was smoothed and debrided until there was no impingement with full abduction.  The stem was then removed along with the trial components.  #17 reamer was then carried out completely seated within the cup.  The 17 modular stem and the humeral cup components were then assembled on the back table.  30 cc of the Exparel solution was injected around the glenoid.  The wound is irrigated with Betadine and normal saline.  1 g of vancomycin powder was removed was readied and a small amount placed down the canal before that #17 stem was press-fit into place.  Shoulder was then reduced and noted to have excellent stability and tension on the deltoid and full abduction without impingement.  The remainder of the Exparel solution was then injected.  The remainder of the vancomycin powder was emphasized in deep and superficial tissues.  Hemovac was placed in the deep recesses of the wound and the deltopectoral groove was reapproximated with #2 Vicryl.  Subcutaneous is closed  with 0 Strata fix and a subcuticular closure was carried out with 3-0 strata fix.  Pereno Dermabond dressing was applied to the wound.  A sterile dressing was applied.  Patient placed in a postop shoulder immobilizer.  He is awake and taken to recovery having top procedure well.  All counts were correct.

## 2017-12-08 NOTE — INTERVAL H&P NOTE
"H&P updated. The patient was examined and vital /83 (BP Location: Left arm, Patient Position: Lying)  Pulse 75  Temp 97.6 °F (36.4 °C) (Oral)   Resp 16  Ht 188 cm (74\")  Wt 105 kg (231 lb)  SpO2 98%  BMI 29.66 kg/m2  "

## 2017-12-08 NOTE — ANESTHESIA PREPROCEDURE EVALUATION
Anesthesia Evaluation     Patient summary reviewed and Nursing notes reviewed   no history of anesthetic complications:  NPO Solid Status: > 8 hours  NPO Liquid Status: < 2 hours     Airway   Mallampati: II  TM distance: >3 FB  Neck ROM: full  no difficulty expected  Dental          Pulmonary - normal exam    breath sounds clear to auscultation  (+) recent URI (bronchitis 1 month ago) resolved,   Cardiovascular   Exercise tolerance: poor (<4 METS)    ECG reviewed  Patient on routine beta blocker and Beta blocker given within 24 hours of surgery  Rhythm: irregular  Rate: normal    (+) hypertension well controlled, hyperlipidemia    ROS comment: Rhythm: sinus rhythm  Ectopy: PVCs and atrial premature contractions  Conduction: left bundle branch block and 2nd degree (Mobitz 1)    Echo 12/17  Left Ventricle  Calculated EF = 48%. Normal left ventricular cavity size noted. Left ventricular wall thickness is consistent with mild concentric hypertrophy. Left ventricular diastolic dysfunction is noted (grade I) consistent with impaired relaxation. LVEF is difficult to assess due to LBBB and frequent ectopy, but appears to be low normal (~ 50%).  The mitral valve is abnormal in structure. Mild mitral annular calcification is present. Mild-to-moderate mitral valve regurgitation is present. No significant mitral valve stenosis is present.    Neuro/Psych  (+) dizziness/light headedness, dementia, poor historian.,    GI/Hepatic/Renal/Endo    (+) obesity,  diabetes mellitus type 2 using insulin, hypothyroidism,     Musculoskeletal     Abdominal   (+) obese,    Substance History - negative use     OB/GYN          Other   (+) arthritis   history of cancer (prostate) remission    ROS/Med Hx Other: Cardiac Clearance:     Mr. Schmitt has orthopedic surgery upcoming on December 8.     He has a left bundle branch block and frequent ectopy.  Further cardiac testing was performed.  A Cardiolite stress reported no ischemia, but suggested  a prior infarct and moderate systolic dysfunction.  However, an echo showed no evidence of infarct, and normal systolic function, and the stress findings were likely due to the left bundle and ectopy.     A Holter showed very frequent monomorphic PVCs, accounting for 30% of all beats.     I have recommended low dose metoprolol be started prior to surgery, 25mg BID.  I do not feel he's at prohibitive risk of major adverse cardiovascular events during surgery, although his overall risk is elevated due to age, dementia, diabetes, and the cardiac findings.      Please contact our office with any questions or concerns. As always, it has been a pleasure to participate in your patient's care.        German Og MD  [facility]                              Anesthesia Plan    ASA 3     general and regional   (ISB)  intravenous induction   Anesthetic plan and risks discussed with patient.  Use of blood products discussed with patient  Consented to blood products.

## 2017-12-09 LAB
GLUCOSE BLDC GLUCOMTR-MCNC: 202 MG/DL (ref 70–130)
GLUCOSE BLDC GLUCOMTR-MCNC: 205 MG/DL (ref 70–130)
GLUCOSE BLDC GLUCOMTR-MCNC: 226 MG/DL (ref 70–130)
GLUCOSE BLDC GLUCOMTR-MCNC: 295 MG/DL (ref 70–130)
HCT VFR BLD AUTO: 36.9 % (ref 42–52)
HGB BLD-MCNC: 12.2 G/DL (ref 14–18)

## 2017-12-09 PROCEDURE — 99024 POSTOP FOLLOW-UP VISIT: CPT | Performed by: ORTHOPAEDIC SURGERY

## 2017-12-09 PROCEDURE — 99232 SBSQ HOSP IP/OBS MODERATE 35: CPT | Performed by: FAMILY MEDICINE

## 2017-12-09 PROCEDURE — 63710000001 INSULIN ASPART PER 5 UNITS: Performed by: FAMILY MEDICINE

## 2017-12-09 PROCEDURE — 94799 UNLISTED PULMONARY SVC/PX: CPT

## 2017-12-09 PROCEDURE — 25010000003 CEFAZOLIN PER 500 MG: Performed by: ORTHOPAEDIC SURGERY

## 2017-12-09 PROCEDURE — 85018 HEMOGLOBIN: CPT | Performed by: ORTHOPAEDIC SURGERY

## 2017-12-09 PROCEDURE — 82962 GLUCOSE BLOOD TEST: CPT

## 2017-12-09 PROCEDURE — 85014 HEMATOCRIT: CPT | Performed by: ORTHOPAEDIC SURGERY

## 2017-12-09 RX ORDER — TRAMADOL HYDROCHLORIDE 50 MG/1
50 TABLET ORAL EVERY 6 HOURS PRN
Status: DISCONTINUED | OUTPATIENT
Start: 2017-12-09 | End: 2017-12-12

## 2017-12-09 RX ORDER — DEXTROSE MONOHYDRATE 25 G/50ML
25 INJECTION, SOLUTION INTRAVENOUS
Status: DISCONTINUED | OUTPATIENT
Start: 2017-12-09 | End: 2017-12-14 | Stop reason: HOSPADM

## 2017-12-09 RX ORDER — ACETAMINOPHEN 325 MG/1
650 TABLET ORAL EVERY 6 HOURS PRN
Status: DISCONTINUED | OUTPATIENT
Start: 2017-12-09 | End: 2017-12-09

## 2017-12-09 RX ORDER — ACETAMINOPHEN 500 MG
500 TABLET ORAL EVERY 4 HOURS PRN
Status: DISCONTINUED | OUTPATIENT
Start: 2017-12-09 | End: 2017-12-12

## 2017-12-09 RX ORDER — NICOTINE POLACRILEX 4 MG
15 LOZENGE BUCCAL
Status: DISCONTINUED | OUTPATIENT
Start: 2017-12-09 | End: 2017-12-14 | Stop reason: HOSPADM

## 2017-12-09 RX ADMIN — CEFAZOLIN SODIUM 2 G: 2 SOLUTION INTRAVENOUS at 08:44

## 2017-12-09 RX ADMIN — INSULIN ASPART 3 UNITS: 100 INJECTION, SOLUTION INTRAVENOUS; SUBCUTANEOUS at 17:02

## 2017-12-09 RX ADMIN — SODIUM CHLORIDE, POTASSIUM CHLORIDE, SODIUM LACTATE AND CALCIUM CHLORIDE 30 ML/HR: 600; 310; 30; 20 INJECTION, SOLUTION INTRAVENOUS at 07:21

## 2017-12-09 RX ADMIN — CEFAZOLIN SODIUM 2 G: 2 SOLUTION INTRAVENOUS at 11:36

## 2017-12-09 RX ADMIN — CEFAZOLIN SODIUM 2 G: 2 SOLUTION INTRAVENOUS at 02:07

## 2017-12-09 RX ADMIN — DOCUSATE SODIUM 100 MG: 100 CAPSULE, LIQUID FILLED ORAL at 17:05

## 2017-12-09 RX ADMIN — GLIPIZIDE 10 MG: 10 TABLET ORAL at 08:34

## 2017-12-09 RX ADMIN — METOPROLOL TARTRATE 25 MG: 25 TABLET, FILM COATED ORAL at 08:35

## 2017-12-09 RX ADMIN — OXYCODONE HYDROCHLORIDE AND ACETAMINOPHEN 1 TABLET: 7.5; 325 TABLET ORAL at 02:47

## 2017-12-09 RX ADMIN — OXYCODONE HYDROCHLORIDE AND ACETAMINOPHEN 1 TABLET: 7.5; 325 TABLET ORAL at 18:07

## 2017-12-09 RX ADMIN — METFORMIN HYDROCHLORIDE 1000 MG: 500 TABLET ORAL at 17:05

## 2017-12-09 RX ADMIN — OXYCODONE HYDROCHLORIDE AND ACETAMINOPHEN 1 TABLET: 7.5; 325 TABLET ORAL at 08:42

## 2017-12-09 RX ADMIN — DOCUSATE SODIUM 100 MG: 100 CAPSULE, LIQUID FILLED ORAL at 08:51

## 2017-12-09 RX ADMIN — METOPROLOL TARTRATE 25 MG: 25 TABLET, FILM COATED ORAL at 17:05

## 2017-12-09 RX ADMIN — METFORMIN HYDROCHLORIDE 1000 MG: 500 TABLET ORAL at 08:34

## 2017-12-09 RX ADMIN — GLIPIZIDE 10 MG: 10 TABLET ORAL at 17:05

## 2017-12-09 RX ADMIN — INSULIN ASPART 4 UNITS: 100 INJECTION, SOLUTION INTRAVENOUS; SUBCUTANEOUS at 11:44

## 2017-12-09 RX ADMIN — LEVOTHYROXINE SODIUM 100 MCG: 50 TABLET ORAL at 08:34

## 2017-12-09 NOTE — NURSING NOTE
During hourly rounding, patient sleeping. Asked family if they wanted patient changed, they requested that we wait until later.

## 2017-12-09 NOTE — NURSING NOTE
Upon hourly rounding, offered to turn and change patient, patient and patient's family wants to wait until later to do so.

## 2017-12-09 NOTE — NURSING NOTE
Discharge Planning Assessment  MERCEDEZ Reyes     Patient Name: Joey Schmitt  MRN: [id number]  Today's Date: 12/9/2017    Admit Date: 12/8/2017          Discharge Needs Assessment       12/09/17 0986    Living Environment    Lives With spouse    Living Arrangements house    Home Accessibility no concerns    Stair Railings at Home none    Type of Financial/Environmental Concern none    Transportation Available car;family or friend will provide    Living Environment    Provides Primary Care For no one, unable/limited ability to care for self    Quality Of Family Relationships supportive;helpful;involved    Able to Return to Prior Living Arrangements yes    Discharge Needs Assessment    Concerns To Be Addressed no discharge needs identified    Readmission Within The Last 30 Days no previous admission in last 30 days    Anticipated Changes Related to Illness --   Limited mobility/ROM    Equipment Currently Used at Home glucometer    Equipment Needed After Discharge none    Discharge Planning Comments Spoke with Mr Schmitt and his daughter (with permission) at bedside.  His daughter is feeding him breakfast as he is right-handed and the surgery was on the right shoulder which is immobilized.  He lives in a house with his wife.  He has had home health in the past but cannot remember which agency.  He has a glucometer and checks his blood sugar regularly.  Prior to surgery he was indepndent with his ADL's.  He normally drives.  The daughter states she will take him to appointments.  The daughter Xiomara, is his POA.  His pharmacy is [facility] in [place] [place].  There are no issues with paying forhis prescriptions.  He does not have a living will but would be interested in some information regaring such.  Will do a spiritual consult.  Facesheet verified. Plan will be home when stable.  Will continue to follow.              Discharge Plan       12/09/17 4295    Case Management/Social Work Plan    Plan Home when stable     Patient/Family In Agreement With Plan yes    Additional Comments Spoke with Mr Schmitt and his daughter (with permission) at bedside.  His daughter is feeding him breakfast as he is right-handed and the surgery was on the right shoulder which is immobilized.  He lives in a house with his wife.  He has had home health in the past but cannot remember which agency.  He has a glucometer and checks his blood sugar regularly.  Prior to surgery he was indepndent with his ADL's.  He normally drives.  The daughter states she will take him to appointments.  The daughter Xiomara, is his POA.  His pharmacy is [facility] in [facility].  There are no issues with paying forhis prescriptions.  He does not have a living will but would be interested in some information regaring such.  Will do a spiritual consult.  Facesheet verified. Plan will be home when stable.  Will continue to follow.          Discharge Placement     No information found                Demographic Summary       12/09/17 0958    Referral Information    Admission Type inpatient    Arrived From home or self-care    Referral Source admission list    Reason For Consult discharge planning    Record Reviewed medical record    Contact Information    Permission Granted to Share Information With ;family/designee;power of  for healthcare            Functional Status     None            Psychosocial     None            Abuse/Neglect     None            Legal     None            Substance Abuse     None            Patient Forms     None          Sandra Goodwin RN

## 2017-12-09 NOTE — CONSULTS
HOSPITALIST SERVICES  @ [facility], [place]            [facility] Hospitalist Team    CONSULTATION NOTE      Patient Care Team:  Romeo Chamberlain MD as PCP - General (Family Medicine)    CHIEF COMPLAINT:     Pain Rt shoulder with end stage degenerative osteoarthritis requiring Rt total reverse shoulder done by Dr Bhardwaj today    HISTORY OF PRESENT ILLNESS:    Mr. Joey Schmitt is an 82 year old  male who is known to have HTN, HLD, Type II DM, Hypothyroidism, dementia, prostate cancer and macular degeneration, was admitted by Dr Bhardwaj with Osteoarthritis right glenohumeral joint and rotator cuff tendinopathy and Dr Bhardwaj took him to OR for Right total reverse shoulder. The Hospitalist services were consulted for management of his medical issues. Patient is doing well after surgery. Denies any sx of pain or n/v. Patient denies any sx of fever, headache, chest pain, shortness of breath, abdominal pain, nausea/ vomiting, dysuria, urgency/ frequency or any recent hx of blood in stool. No other medical history available.        Past Medical History:   Diagnosis Date   • Cellulitis of foot    • Dementia    • Hyperlipidemia    • Hypertension    • Hypothyroidism    • Macular degeneration    • Prostate cancer    • Right shoulder pain     SCHEDULED FOR SX   • Seasonal allergies    • Type 2 diabetes mellitus    • Vertigo      Past Surgical History:   Procedure Laterality Date   • INCISION AND DRAINAGE LEG Right 1/11/2017    Procedure: INCISION AND DRAINAGE LOWER EXTREMITY;  Surgeon: Mikael Dwyer DPM;  Location: [facility];  Service:    • PROSTATE SURGERY     • SHOULDER SURGERY Left      Family History   Problem Relation Age of Onset   • Heart disease Father      Social History   Substance Use Topics   • Smoking status: Former Smoker     Packs/day: 1.00     Years: 15.00     Types: Cigarettes     Quit date: 1980   • Smokeless tobacco: Never Used      Comment: caffeine use: one cup of  "coffee daily.    • Alcohol use No     Prescriptions Prior to Admission   Medication Sig Dispense Refill Last Dose   • Blood Glucose Monitoring Suppl (ONE TOUCH ULTRA 2) W/DEVICE kit USE AS DIRECTED  0 Taking   • glipiZIDE (GLUCOTROL) 10 MG tablet Take 10 mg by mouth 2 (two) times a day before meals   12/7/2017 at 1800   • levothyroxine (SYNTHROID, LEVOTHROID) 100 MCG tablet Take 100 mcg by mouth Every Morning.   12/7/2017 at 0800   • Liraglutide (VICTOZA) 18 MG/3ML solution pen-injector Inject 1.8 mg under the skin Every Morning.   12/7/2017 at 0800   • lisinopril-hydrochlorothiazide (PRINZIDE,ZESTORETIC) 20-25 MG per tablet Take 1 tablet by mouth Daily.   12/7/2017 at 0800   • metFORMIN (GLUCOPHAGE) 1000 MG tablet Take 1,000 mg by mouth 2 (two) times a day with meals   12/7/2017 at 1800   • metoprolol tartrate (LOPRESSOR) 25 MG tablet Take 1 tablet (25 mg total) by mouth 2 (Two) Times a Day 60 tablet 1 12/7/2017 at 1800     Allergies:  Review of patient's allergies indicates no known allergies.    REVIEW OF SYSTEMS:  Please see the above history of present illness for pertinent positives and negatives.  The remainder of the patient's systems have been reviewed and are negative.     Vital Signs  Temp:  [97 °F (36.1 °C)-97.8 °F (36.6 °C)] 97.2 °F (36.2 °C)  Heart Rate:  [42-87] 44  Resp:  [14-20] 18  BP: (112-145)/(55-85) 122/69    Flowsheet Rows         First Filed Value    Admission Height  188 cm (74\") Documented at 12/08/2017 0730    Admission Weight  105 kg (231 lb) Documented at 12/08/2017 0730           Physical Exam:    PHYSICAL EXAMINATION:       Physical Exam   Constitutional: Patient appears well-developed and well-nourished and in no acute distress   HEENT:   Head: Normocephalic and atraumatic.   Eyes:  Pupils are equal, round, and reactive to light. EOM are intact. Sclera are anicteric and non-injected.  Mouth and Throat: Patient has moist mucous membranes. Oropharynx is clear of any erythema or exudate. "     Neck: Neck supple. No JVD present. No thyromegaly present. No lymphadenopathy present.  Cardiovascular: Regular rate, regular rhythm, S1 normal and S2 normal.  Exam reveals no gallop and no friction rub.  No murmur heard.  Pulmonary/Chest: Lungs are clear to auscultation bilaterally. No respiratory distress. No wheezes. No rhonchi. No rales.   Abdominal: Soft. Bowel sounds are normal. No distension and no mass. There is no hepatosplenomegaly. There is no tenderness.   Musculoskeletal: S/P Rt total reverse shoulder. Normal Muscle tone  Extremities: S/P Rt total reverse shoulder. No ankle/pedal edema. Pulses are palpable in all 4 extremities.  Neurological: Patient is alert and oriented to person, place, and time. Cranial nerves II-XII are grossly intact with no focal deficits.  Skin: Skin is warm. No rash noted. Nails show no clubbing.  No cyanosis or erythema.               Results Review:    I reviewed the patient's new clinical results.  Lab Results (most recent)     Procedure Component Value Units Date/Time    POC Glucose Fingerstick [[id number]]  (Normal) Collected:  12/08/17 0837    Specimen:  Blood Updated:  12/08/17 0843     Glucose 125 mg/dL     Narrative:       Meter: [id number] : [id number] Tiny Garcia RN    Potassium [[id number]]  (Normal) Collected:  12/08/17 0839    Specimen:  Blood Updated:  12/08/17 0911     Potassium 4.8 mmol/L     Hemoglobin & Hematocrit, Blood [[id number]]  (Abnormal) Collected:  12/08/17 1332    Specimen:  Blood Updated:  12/08/17 1336     Hemoglobin 13.5 (L) g/dL      Hematocrit 40.2 (L) %     POC Glucose Fingerstick [[id number]]  (Abnormal) Collected:  12/08/17 1715    Specimen:  Blood Updated:  12/08/17 1722     Glucose 178 (H) mg/dL     Narrative:       Meter: [id number] : [id number] Aly GONSALES ASSISTANT          Imaging Results (most recent)     Procedure Component Value Units Date/Time    XR Shoulder 2+ View Right [[id number]] Collected:  12/08/17 1257      Updated:  12/08/17 1259    Narrative:       INDICATION: Right shoulder pain status post right total shoulder  arthroplasty.     COMPARISON: 12/01/2016.     FINDINGS: 1 views of the right shoulder.   No fracture or dislocation.   There is anatomic alignment of the reverse shoulder arthroplasty.   Mild  right acromioclavicular osteoarthropathy.  There is some atelectasis in  the right lower lobe.       Impression:       Anatomic alignment of the right shoulder arthroplasty.     This report was finalized on 12/8/2017 12:57 PM by Dr. Demetri Martin MD.           reviewed    ECG/EMG Results (most recent)     Procedure Component Value Units Date/Time    ECG 12 Lead [[id number]] Collected:  12/08/17 1717     Updated:  12/08/17 1722    Narrative:       RR Interval= 714 ms  AL Interval= 204 ms  QRSD Interval= 152 ms  QT Interval= 456 ms  QTc Interval= 540 ms  Heart Rate= 84 ms  P Axis= 23 deg  QRS Axis= -33 deg  T Wave Axis= 116 deg  I: 40 Axis= -25 deg  T: 40 Axis= -39 deg  ST Axis= 134 deg  SINUS RHYTHM  VENTRICULAR BIGEMINY  LEFT BUNDLE BRANCH BLOCK  Electronically Signed by:  Date and Time of Study: 2017-12-08 17:17:53        reviewed    Assessment/Plan         ASSESSMENT AND PLAN:       SUMMARY:    ?   PROPHYLAXIS:   -Oxygen Saturation: As per Nurses' notes.   -DVT Prophylaxis: On SCDs   -Gastritis Prophylaxis: Pantoprazole 40 mg PO qAM   -Constipation Prophylaxis: colace 100 mg po BID   -Immunizations: N/A  -Smoking/ Nicotine issues: N/A      NUTRITION AND FLUIDS:  -Diet/ Nutrition: Regular, consistent carbohydrate, cardiac diet   -Fluid Status/Electrolytes: LR 30 mL/Hr      THERAPEUTIC:   ALLERGIES: as per admission H&P   ANTIBIOTICS: as per order sheet as per Dr Bhardwaj   PAIN MANAGEMENT: Inj Dilaudid and Percocet  INSULIN THERAPY: N/A   CHEST PAIN: N/A   NEBULIZER TREATMENT: N/A   ANXIETY: N/A    DEPRESSION: N/A    INSOMNIA: N/A              PLAN:    Labs and diagnostic tests reviewed: K 4.8, Gluc 178, Hb/HCT  13.5/40.2    Diagnostic tests reviewed:    EKG  SINUS RHYTHM  VENTRICULAR BIGEMINY  LEFT BUNDLE BRANCH BLOCK  Electronically Signed by:  Date and Time of Study: 2017-12-08 17:17:53        XRAY Rt Shoulder  IMPRESSION:  Anatomic alignment of the right shoulder arthroplasty.      This report was finalized on 12/8/2017 12:57 PM by Dr. Demetri Martin MD.          Patient is clinically and hemodynamically stable    Reason for consultation: Management of medical issues in this postsurgical patient    Any new recommendations: To continue current management and supportive care    New Labs ordered: As per Dr Bhardwaj    New diagnostic tests ordered:N/A    Any changes in medications: N/A    To continue current management and supportive care    Pain management issues: Inj Dilaudid and Percocet    Discharge planning issues: Should be able to go home once ready for discharge    Will follow patient closely    Nothing new to add for right now            DIAGNOSES:    PRIMARY DIAGNOSES:      HTN: last /67. On Lopressor    HLD: Hx noted. Not on any statins.    Type II DM: On Metformin and Glipizide    Hypothyroidism: On replacement.    GERD: On Pantoprazole. No acute issues at this time.    DJD: On Meloxicam. No acute issues at this time.    Macular degeneration: Hx noted. No acute issues at this time.    Hx of prostate cancer: Hx noted. No acute issues at this time.    Hx of seasonal allergies: Hx noted. No acute issues at this time.    Hx of vertigo: Hx noted. No acute issues at this time.    Osteoarthritis right glenohumeral joint and rotator cuff tendinopathy: Diagnosis noted     S/P Right total reverse shoulder: Surgery done today by Dr Bhardwaj    DVT Prophylaxis: On SCDs       ?     SECONDARY DIAGNOSES:  ?     As above      SURGICAL DIAGNOSES:      As per Problem List       Thanks for the opportunity to see this patient today on consultation. We, as the Hospitalist group, will be happy to follow this patient with you while the  "patient is hospitalized here and we will address all the medical (nonsurgical) problems.          I discussed the patients findings and my recommendations with patient and his family and they are agreeable to current treatment and management plan.     Jourdan Pelayo MD  12/08/17  7:23 PM          Jourdan (\"JOHNNIE\") FLEX Pelayo M.D., [facility]  Internal Medicine/ Hospitalist        Time:       "

## 2017-12-09 NOTE — PLAN OF CARE
Problem: Patient Care Overview (Adult)  Goal: Plan of Care Review  Outcome: Ongoing (interventions implemented as appropriate)    12/09/17 0336   Coping/Psychosocial Response Interventions   Plan Of Care Reviewed With patient   Patient Care Overview   Progress improving   Outcome Evaluation   Outcome Summary/Follow up Plan vss, family at bedside on telemetry sinus rhythm with BBB and pacs, administering po pain meds to control pain, Right shoulder in sling, resting comfortably at this time       Goal: Adult Individualization and Mutuality  Outcome: Ongoing (interventions implemented as appropriate)    12/09/17 0336   Individualization   Patient Specific Preferences none voiced   Patient Specific Interventions family at bedside         Problem: Infection, Risk/Actual (Adult)  Goal: Identify Related Risk Factors and Signs and Symptoms  Outcome: Ongoing (interventions implemented as appropriate)    12/09/17 0336   Infection, Risk/Actual   Infection, Risk/Actual: Related Risk Factors surgery/procedure   Signs and Symptoms (Infection, Risk/Actual) pain       Goal: Infection Prevention/Resolution  Outcome: Ongoing (interventions implemented as appropriate)    12/09/17 0336   Infection, Risk/Actual (Adult)   Infection Prevention/Resolution making progress toward outcome         Problem: Pain, Acute (Adult)  Goal: Identify Related Risk Factors and Signs and Symptoms  Outcome: Ongoing (interventions implemented as appropriate)    12/09/17 0336   Pain, Acute   Related Risk Factors (Acute Pain) surgery   Signs and Symptoms (Acute Pain) verbalization of pain descriptors;BADLs/IADLs reluctance/inability to perform       Goal: Acceptable Pain Control/Comfort Level  Outcome: Ongoing (interventions implemented as appropriate)    12/09/17 0336   Pain, Acute (Adult)   Acceptable Pain Control/Comfort Level making progress toward outcome         Problem: Activity Intolerance (Adult)  Goal: Identify Related Risk Factors and Signs and  Symptoms  Outcome: Ongoing (interventions implemented as appropriate)    12/09/17 0336   Activity Intolerance   Activity Intolerance: Related Risk Factors pain   Signs and Symptoms (Activity Intolerance) pain/discomfort       Goal: Activity Tolerance  Outcome: Ongoing (interventions implemented as appropriate)    12/09/17 0336   Activity Intolerance (Adult)   Activity Tolerance making progress toward outcome         Problem: Arrhythmia/Dysrhythmia (Symptomatic) (Adult)  Goal: Signs and Symptoms of Listed Potential Problems Will be Absent or Manageable (Arrhythmia/Dysrhythmia)  Outcome: Ongoing (interventions implemented as appropriate)    12/09/17 0336   Arrhythmia/Dysrhythmia (Symptomatic)   Problems Assessed (Arrhythmia/Dysrhythmia) all   Problems Present (Arrhythmia/Dysrhythmia) none

## 2017-12-09 NOTE — PROGRESS NOTES
Orthopedic Progress Note   Chief Complaint:  Status post right total reverse shoulder    Subjective     Interval History: Patient is postop day 1.  He is afebrile hemodynamically stable hemoglobin 13.5.  Is alert and oriented ×3.  He's having minimal pain well-controlled oral medication.          Objective     Vital Signs  Temp:  [97 °F (36.1 °C)-99.7 °F (37.6 °C)] 99.7 °F (37.6 °C)  Heart Rate:  [42-87] 75  Resp:  [14-20] 16  BP: (108-137)/(55-85) 124/75  Body mass index is 29.66 kg/(m^2).    Intake/Output Summary (Last 24 hours) at 12/09/17 0839  Last data filed at 12/09/17 0721   Gross per 24 hour   Intake             2170 ml   Output              290 ml   Net             1880 ml     I/O this shift:  In: 1000 [I.V.:1000]  Out: -        Physical Exam:   General: patient awake, alert and cooperative   Cardiovascular: regular rhythm and rate   Pulm: clear to auscultation bilaterally   Abdomen: Benign.  Soft bowel sounds   Extremities:   Right upper extremity is good distal pulses and no motor deficit no sensory loss.  He's had 90 cc a Hemovac drainage.  Dressing is dry Neurologic: Normal mood and behavior     Results Review:     I reviewed the patient's new clinical results.      WBC No results found for: WBCS   HGB Hemoglobin   Date Value Ref Range Status   12/09/2017 12.2 (L) 14.0 - 18.0 g/dL Final   12/08/2017 13.5 (L) 14.0 - 18.0 g/dL Final      HCT Hematocrit   Date Value Ref Range Status   12/09/2017 36.9 (L) 42.0 - 52.0 % Final   12/08/2017 40.2 (L) 42.0 - 52.0 % Final      Platlets No results found for: LABPLAT     PT/INR:  No results found for: PROTIME/No results found for: INR    Sodium No results found for: NA   Potassium Potassium   Date Value Ref Range Status   12/08/2017 4.8 3.5 - 5.2 mmol/L Final      Chloride No results found for: CL   Bicarbonate No results found for: PLASMABICARB   BUN No results found for: BUN   Creatinine No results found for: CREATININE   Calcium No results found for: CALCIUM    Magnesium  AST  ALT  Bilirubin, Total  AlkPhos  Albumin    Amylase  Lipase    Radiology: No results found for: MG  No components found for: AST.*  No components found for: ALT.*  No components found for: BILIRUBIN, TOTAL.*    No components found for: ALKPHOS.*  No components found for: ALBUMIN.*      No components found for: AMYLASE.*  No components found for: LIPASE.*            Imaging Results (most recent)     Procedure Component Value Units Date/Time    XR Shoulder 2+ View Right [[id number]] Collected:  12/08/17 1257     Updated:  12/08/17 1259    Narrative:       INDICATION: Right shoulder pain status post right total shoulder  arthroplasty.     COMPARISON: 12/01/2016.     FINDINGS: 1 views of the right shoulder.   No fracture or dislocation.   There is anatomic alignment of the reverse shoulder arthroplasty.   Mild  right acromioclavicular osteoarthropathy.  There is some atelectasis in  the right lower lobe.       Impression:       Anatomic alignment of the right shoulder arthroplasty.     This report was finalized on 12/8/2017 12:57 PM by Dr. Demetri Martin MD.                  lactated ringers 1,000 mL Last Rate: 1,000 mL (12/08/17 0834)   lactated ringers 30 mL/hr Last Rate: 30 mL/hr (12/09/17 0721)         Assessment/Plan     Patient Active Problem List   Diagnosis Code   (none) - all problems resolved or deleted       DC Hemovac.  Anticipate discharge home tomorrow if stable from cardiac standpoint      Goyo Bhardwaj MD  12/09/17  8:39 AM

## 2017-12-09 NOTE — PROGRESS NOTES
Patient: Joey Schmitt  Procedure(s):  TOTAL SHOULDER REVERSE ARTHROPLASTY  Anesthesia type: [unfilled]    Patient location: [facility] Surgical Floor  Last vitals:   Vitals:    12/09/17 1456   BP: 124/78   Pulse: 71   Resp: 18   Temp: 97.1 °F (36.2 °C)   SpO2: 94%     Level of consciousness: awake, alert and oriented    Post-anesthesia pain: adequate analgesia  Airway patency: patent  Respiratory: unassisted  Cardiovascular: stable and blood pressure at baseline  Hydration: euvolemic    Anesthetic complications: no

## 2017-12-09 NOTE — PROGRESS NOTES
"Hospitalist Team      CONSULT PROGRESS NOTE    Patient Care Team:  Romeo Chamberlain MD as PCP - General (Family Medicine)        Chief Complaint:  Hypertension, hyperlipidemia, diabetes    Subjective:  HPI:  Patient postop day 1 from right total reverse shoulder replacement.  Pain is well-controlled.  Being managed by primary orthopedics.  No acute complaints.  Tolerating rehabilitation and by mouth.  Per nursing staff his glucose checks have been elevated at 200, 295.  Tolerating home medications.      Review of Systems   Constitutional: Negative for fever.   All other systems reviewed and are negative.        Objective:  Vital Signs  Temp:  [97 °F (36.1 °C)-99.7 °F (37.6 °C)] 97 °F (36.1 °C)  Heart Rate:  [42-87] 83  Resp:  [14-20] 20  BP: (108-137)/(55-85) 121/73  Oxygen Therapy  SpO2: 90 %  Pulse Oximetry Type: Continuous  O2 Device: room air  Flow (L/min): 2  EtCO2 (mm Hg) (Respiratory Monitoring): 43}  Flowsheet Rows         First Filed Value    Admission Height  188 cm (74\") Documented at 12/08/2017 0730    Admission Weight  105 kg (231 lb) Documented at 12/08/2017 0730            Physical Exam:  Physical Exam   Constitutional: He is oriented to person, place, and time. He appears well-developed and well-nourished. No distress.   Eyes: Conjunctivae and EOM are normal. Pupils are equal, round, and reactive to light.   Cardiovascular: Normal rate, regular rhythm, normal heart sounds and intact distal pulses.    No murmur heard.  Pulmonary/Chest: Effort normal and breath sounds normal. No respiratory distress. He has no wheezes. He has no rales.   Abdominal: Soft. Bowel sounds are normal. He exhibits no distension and no mass. There is no tenderness. There is no rebound and no guarding.   Neurological: He is alert and oriented to person, place, and time.   Skin: Skin is warm and dry. He is not diaphoretic.   Nursing note and vitals reviewed.        Results Review:     I reviewed the patient's new clinical " results.    Lab Results (last 24 hours)     Procedure Component Value Units Date/Time    Hemoglobin & Hematocrit, Blood [[id number]]  (Abnormal) Collected:  12/08/17 1332    Specimen:  Blood Updated:  12/08/17 1336     Hemoglobin 13.5 (L) g/dL      Hematocrit 40.2 (L) %     POC Glucose Fingerstick [[id number]]  (Abnormal) Collected:  12/08/17 1715    Specimen:  Blood Updated:  12/08/17 1722     Glucose 178 (H) mg/dL     Narrative:       Meter: [id number] : [id number] Aly Pacheco NURSING ASSISTANT    POC Glucose Fingerstick [[id number]]  (Abnormal) Collected:  12/08/17 2127    Specimen:  Blood Updated:  12/08/17 2133     Glucose 187 (H) mg/dL     Narrative:       Meter: [id number] : [id number] Marycarmen Anthony) Mary Monitor Tech    Hemoglobin & Hematocrit, Blood [[id number]]  (Abnormal) Collected:  12/09/17 0449    Specimen:  Blood Updated:  12/09/17 0457     Hemoglobin 12.2 (L) g/dL      Hematocrit 36.9 (L) %     POC Glucose Fingerstick [[id number]]  (Abnormal) Collected:  12/09/17 0729    Specimen:  Blood Updated:  12/09/17 0755     Glucose 202 (H) mg/dL     Narrative:       Meter: [id number] : [id number] Yannick Araya NURSING ASSISTANT          Imaging Results (last 24 hours)     Procedure Component Value Units Date/Time    XR Shoulder 2+ View Right [[id number]] Collected:  12/08/17 1257     Updated:  12/08/17 1259    Narrative:       INDICATION: Right shoulder pain status post right total shoulder  arthroplasty.     COMPARISON: 12/01/2016.     FINDINGS: 1 views of the right shoulder.   No fracture or dislocation.   There is anatomic alignment of the reverse shoulder arthroplasty.   Mild  right acromioclavicular osteoarthropathy.  There is some atelectasis in  the right lower lobe.       Impression:       Anatomic alignment of the right shoulder arthroplasty.     This report was finalized on 12/8/2017 12:57 PM by Dr. Demetri Martin MD.             Xray reviewed personally by physician.      ECG reviewed  personally by physician  ECG/EMG Results (most recent)     Procedure Component Value Units Date/Time    ECG 12 Lead [[id number]] Collected:  12/08/17 1717     Updated:  12/08/17 1722    Narrative:       RR Interval= 714 ms  NE Interval= 204 ms  QRSD Interval= 152 ms  QT Interval= 456 ms  QTc Interval= 540 ms  Heart Rate= 84 ms  P Axis= 23 deg  QRS Axis= -33 deg  T Wave Axis= 116 deg  I: 40 Axis= -25 deg  T: 40 Axis= -39 deg  ST Axis= 134 deg  SINUS RHYTHM  VENTRICULAR BIGEMINY  LEFT BUNDLE BRANCH BLOCK  Electronically Signed by:  Date and Time of Study: 2017-12-08 17:17:53          Medication Review:   I have reviewed the patient's current medication list    Current Facility-Administered Medications:   •  dextrose (D50W) solution 25 g, 25 g, Intravenous, Q15 Min PRN, Jaciel Charles MD  •  dextrose (GLUTOSE) oral gel 15 g, 15 g, Oral, Q15 Min PRN, Jaciel Charles MD  •  docusate sodium (COLACE) capsule 100 mg, 100 mg, Oral, BID, Jourdan Pelayo MD, 100 mg at 12/09/17 0851  •  glipiZIDE (GLUCOTROL) tablet 10 mg, 10 mg, Oral, BID AC, Goyo Bhardwaj MD, 10 mg at 12/09/17 0834  •  glucagon (GLUCAGEN) injection 1 mg, 1 mg, Subcutaneous, Q15 Min PRN, Jaciel Charles MD  •  HYDROmorphone (DILAUDID) injection 1 mg, 1 mg, Intravenous, Q2H PRN **AND** naloxone (NARCAN) injection 0.4 mg, 0.4 mg, Intravenous, Q5 Min PRN, Goyo Bhardwaj MD  •  insulin aspart (novoLOG) injection 0-7 Units, 0-7 Units, Subcutaneous, 4x Daily AC & at Bedtime, Jaciel Charles MD  •  lactated ringers infusion 1,000 mL, 1,000 mL, Intravenous, Continuous PRN, Gabriella Lomeli CRNA, Last Rate: 25 mL/hr at 12/08/17 0834, 1,000 mL at 12/08/17 0834  •  lactated ringers infusion, 30 mL/hr, Intravenous, Continuous, Goyo Bhardwaj MD, Last Rate: 30 mL/hr at 12/09/17 0721, 30 mL/hr at 12/09/17 0721  •  levothyroxine (SYNTHROID, LEVOTHROID) tablet 100 mcg, 100 mcg, Oral, QAM, Goyo Bhardwaj MD, 100 mcg at 12/09/17 0834  •  metFORMIN (GLUCOPHAGE) tablet 1,000 mg, 1,000  mg, Oral, BID With Meals, Goyo Bhardwaj MD, 1,000 mg at 12/09/17 0834  •  metoprolol tartrate (LOPRESSOR) tablet 25 mg, 25 mg, Oral, BID, Goyo Bhardwaj MD, 25 mg at 12/09/17 0835  •  oxyCODONE-acetaminophen (PERCOCET) 7.5-325 MG per tablet 1 tablet, 1 tablet, Oral, Q4H PRN, Goyo Bhardwaj MD, 1 tablet at 12/09/17 0842      Assessment/Plan     1.  Status post day 1 right total reverse shoulder arthroplasty.  Pain well controlled.  Or the following and primary.    2.  Hypertension.  Stable on metoprolol.    3.  Diabetes type 2.  On metformin and glipizide.  Will add sliding scale while here in the hospital.    4.  GERD.  On pantoprazole.    Plan for disposition: Per orthopedics.    Jaciel Charles MD  12/09/17  11:36 AM

## 2017-12-09 NOTE — PLAN OF CARE
Problem: Patient Care Overview (Adult)  Goal: Discharge Needs Assessment  Outcome: Ongoing (interventions implemented as appropriate)    12/09/17 3546   Discharge Needs Assessment   Concerns To Be Addressed no discharge needs identified   Readmission Within The Last 30 Days no previous admission in last 30 days   Equipment Needed After Discharge none   Discharge Planning Comments Spoke with Mr Schmitt and his daughter (with permission) at bedside. His daughter is feeding him breakfast as he is right-handed and the surgery was on the right shoulder which is immobilized. He lives in a house with his wife. He has had home health in the past but cannot remember which agency. He has a glucometer and checks his blood sugar regularly. Prior to surgery he was indepndent with his ADL's. He normally drives. The daughter states she will take him to appointments. The daughter Xiomara, is his POA. His pharmacy is [facility]. There are no issues with paying forhis prescriptions. He does not have a living will but would be interested in some information regaring such. Will do a spiritual consult. Facesheet verified. Plan will be home when stable. Will continue to follow.    Current Health   Anticipated Changes Related to Illness (Limited mobility/ROM)   Living Environment   Transportation Available car;family or friend will provide   Self-Care   Equipment Currently Used at Home glucometer

## 2017-12-09 NOTE — NURSING NOTE
During hourly rounding, patient sleeping. Asked family if they wanted patient changed, they requested we wait until later.

## 2017-12-10 PROBLEM — M19.011 OSTEOARTHRITIS OF RIGHT GLENOHUMERAL JOINT: Status: ACTIVE | Noted: 2017-12-10

## 2017-12-10 LAB
ANION GAP SERPL CALCULATED.3IONS-SCNC: 14.7 MMOL/L
BUN BLD-MCNC: 37 MG/DL (ref 8–23)
BUN/CREAT SERPL: 23.3 (ref 7–25)
CALCIUM SPEC-SCNC: 8.2 MG/DL (ref 8.8–10.5)
CHLORIDE SERPL-SCNC: 99 MMOL/L (ref 98–107)
CO2 SERPL-SCNC: 22.3 MMOL/L (ref 22–29)
CREAT BLD-MCNC: 1.59 MG/DL (ref 0.76–1.27)
GFR SERPL CREATININE-BSD FRML MDRD: 42 ML/MIN/1.73
GLUCOSE BLD-MCNC: 181 MG/DL (ref 65–99)
GLUCOSE BLDC GLUCOMTR-MCNC: 181 MG/DL (ref 70–130)
GLUCOSE BLDC GLUCOMTR-MCNC: 229 MG/DL (ref 70–130)
GLUCOSE BLDC GLUCOMTR-MCNC: 231 MG/DL (ref 70–130)
GLUCOSE BLDC GLUCOMTR-MCNC: 259 MG/DL (ref 70–130)
HBA1C MFR BLD: 6.9 % (ref 4.8–5.6)
HCT VFR BLD AUTO: 36 % (ref 42–52)
HGB BLD-MCNC: 12.2 G/DL (ref 14–18)
POTASSIUM BLD-SCNC: 4 MMOL/L (ref 3.5–5.2)
SODIUM BLD-SCNC: 136 MMOL/L (ref 136–145)

## 2017-12-10 PROCEDURE — 85018 HEMOGLOBIN: CPT | Performed by: ORTHOPAEDIC SURGERY

## 2017-12-10 PROCEDURE — 97162 PT EVAL MOD COMPLEX 30 MIN: CPT

## 2017-12-10 PROCEDURE — 80048 BASIC METABOLIC PNL TOTAL CA: CPT | Performed by: ORTHOPAEDIC SURGERY

## 2017-12-10 PROCEDURE — 99024 POSTOP FOLLOW-UP VISIT: CPT | Performed by: ORTHOPAEDIC SURGERY

## 2017-12-10 PROCEDURE — 99232 SBSQ HOSP IP/OBS MODERATE 35: CPT | Performed by: HOSPITALIST

## 2017-12-10 PROCEDURE — G8979 MOBILITY GOAL STATUS: HCPCS

## 2017-12-10 PROCEDURE — G8978 MOBILITY CURRENT STATUS: HCPCS

## 2017-12-10 PROCEDURE — 63710000001 INSULIN ASPART PER 5 UNITS: Performed by: FAMILY MEDICINE

## 2017-12-10 PROCEDURE — 83036 HEMOGLOBIN GLYCOSYLATED A1C: CPT | Performed by: HOSPITALIST

## 2017-12-10 PROCEDURE — 63710000001 INSULIN ASPART PER 5 UNITS: Performed by: HOSPITALIST

## 2017-12-10 PROCEDURE — 82962 GLUCOSE BLOOD TEST: CPT

## 2017-12-10 PROCEDURE — 85014 HEMATOCRIT: CPT | Performed by: ORTHOPAEDIC SURGERY

## 2017-12-10 RX ORDER — DEXTROSE MONOHYDRATE 25 G/50ML
25 INJECTION, SOLUTION INTRAVENOUS
Status: DISCONTINUED | OUTPATIENT
Start: 2017-12-10 | End: 2017-12-14 | Stop reason: HOSPADM

## 2017-12-10 RX ORDER — NICOTINE POLACRILEX 4 MG
15 LOZENGE BUCCAL
Status: DISCONTINUED | OUTPATIENT
Start: 2017-12-10 | End: 2017-12-14 | Stop reason: HOSPADM

## 2017-12-10 RX ADMIN — DOCUSATE SODIUM 100 MG: 100 CAPSULE, LIQUID FILLED ORAL at 17:25

## 2017-12-10 RX ADMIN — METOPROLOL TARTRATE 25 MG: 25 TABLET, FILM COATED ORAL at 08:38

## 2017-12-10 RX ADMIN — METFORMIN HYDROCHLORIDE 1000 MG: 500 TABLET ORAL at 08:38

## 2017-12-10 RX ADMIN — TRAMADOL HYDROCHLORIDE 50 MG: 50 TABLET, FILM COATED ORAL at 07:26

## 2017-12-10 RX ADMIN — DOCUSATE SODIUM 100 MG: 100 CAPSULE, LIQUID FILLED ORAL at 08:38

## 2017-12-10 RX ADMIN — TRAMADOL HYDROCHLORIDE 50 MG: 50 TABLET, FILM COATED ORAL at 01:11

## 2017-12-10 RX ADMIN — INSULIN ASPART 3 UNITS: 100 INJECTION, SOLUTION INTRAVENOUS; SUBCUTANEOUS at 08:35

## 2017-12-10 RX ADMIN — GLIPIZIDE 10 MG: 10 TABLET ORAL at 08:38

## 2017-12-10 RX ADMIN — GLIPIZIDE 10 MG: 10 TABLET ORAL at 17:25

## 2017-12-10 RX ADMIN — INSULIN ASPART 3 UNITS: 100 INJECTION, SOLUTION INTRAVENOUS; SUBCUTANEOUS at 12:04

## 2017-12-10 RX ADMIN — METOPROLOL TARTRATE 25 MG: 25 TABLET, FILM COATED ORAL at 17:25

## 2017-12-10 RX ADMIN — INSULIN ASPART 2 UNITS: 100 INJECTION, SOLUTION INTRAVENOUS; SUBCUTANEOUS at 23:20

## 2017-12-10 RX ADMIN — TRAMADOL HYDROCHLORIDE 50 MG: 50 TABLET, FILM COATED ORAL at 17:16

## 2017-12-10 RX ADMIN — LEVOTHYROXINE SODIUM 100 MCG: 50 TABLET ORAL at 08:37

## 2017-12-10 RX ADMIN — INSULIN ASPART 2 UNITS: 100 INJECTION, SOLUTION INTRAVENOUS; SUBCUTANEOUS at 17:24

## 2017-12-10 RX ADMIN — TRAMADOL HYDROCHLORIDE 50 MG: 50 TABLET, FILM COATED ORAL at 23:18

## 2017-12-10 NOTE — PLAN OF CARE
Problem: Patient Care Overview (Adult)  Goal: Plan of Care Review  Outcome: Ongoing (interventions implemented as appropriate)    12/10/17 0138   Coping/Psychosocial Response Interventions   Plan Of Care Reviewed With patient   Patient Care Overview   Progress no change   Outcome Evaluation   Outcome Summary/Follow up Plan vss, sling to Right arm, incision clean, dry, intact, daughter at bedside       Goal: Adult Individualization and Mutuality  Outcome: Ongoing (interventions implemented as appropriate)    12/10/17 0138   Individualization   Patient Specific Preferences door closed   Patient Specific Interventions family at bedside         Problem: Infection, Risk/Actual (Adult)  Goal: Identify Related Risk Factors and Signs and Symptoms  Outcome: Ongoing (interventions implemented as appropriate)    12/10/17 0138   Infection, Risk/Actual   Infection, Risk/Actual: Related Risk Factors surgery/procedure   Signs and Symptoms (Infection, Risk/Actual) pain       Goal: Infection Prevention/Resolution  Outcome: Ongoing (interventions implemented as appropriate)    12/10/17 0138   Infection, Risk/Actual (Adult)   Infection Prevention/Resolution making progress toward outcome         Problem: Pain, Acute (Adult)  Goal: Identify Related Risk Factors and Signs and Symptoms  Outcome: Ongoing (interventions implemented as appropriate)    12/10/17 0138   Pain, Acute   Related Risk Factors (Acute Pain) surgery   Signs and Symptoms (Acute Pain) verbalization of pain descriptors;BADLs/IADLs reluctance/inability to perform       Goal: Acceptable Pain Control/Comfort Level  Outcome: Ongoing (interventions implemented as appropriate)    12/10/17 0138   Pain, Acute (Adult)   Acceptable Pain Control/Comfort Level making progress toward outcome         Problem: Activity Intolerance (Adult)  Goal: Identify Related Risk Factors and Signs and Symptoms  Outcome: Ongoing (interventions implemented as appropriate)    12/10/17 0138   Activity  Intolerance   Activity Intolerance: Related Risk Factors functional decline;generalized weakness;neurological disorder   Signs and Symptoms (Activity Intolerance) unable to complete BADL/IADL       Goal: Activity Tolerance  Outcome: Ongoing (interventions implemented as appropriate)    12/10/17 0138   Activity Intolerance (Adult)   Activity Tolerance making progress toward outcome       Goal: Effective Energy Conservation Techniques  Outcome: Ongoing (interventions implemented as appropriate)    12/10/17 0138   Activity Intolerance (Adult)   Effective Energy Conservation Techniques making progress toward outcome         Problem: Arrhythmia/Dysrhythmia (Symptomatic) (Adult)  Goal: Signs and Symptoms of Listed Potential Problems Will be Absent or Manageable (Arrhythmia/Dysrhythmia)  Outcome: Ongoing (interventions implemented as appropriate)    12/10/17 0138   Arrhythmia/Dysrhythmia (Symptomatic)   Problems Assessed (Arrhythmia/Dysrhythmia) all   Problems Present (Arrhythmia/Dysrhythmia) none

## 2017-12-10 NOTE — PLAN OF CARE
Problem: Inpatient Physical Therapy  Goal: Bed Mobility Goal STG- PT  Outcome: Ongoing (interventions implemented as appropriate)    12/10/17 1028   Bed Mobility PT STG   Bed Mobility PT STG, Date Established 12/10/17   Bed Mobility PT STG, Time to Achieve 3 days   Bed Mobility PT STG, Activity Type all bed mobility   Bed Mobility PT STG, Termo Level supervision required       Goal: Transfer Training Goal 1 STG- PT  Outcome: Ongoing (interventions implemented as appropriate)    12/10/17 1028   Transfer Training PT STG   Transfer Training PT STG, Date Established 12/10/17   Transfer Training PT STG, Time to Achieve 3 days   Transfer Training PT STG, Termo Level contact guard assist   Transfer Training PT STG, Assist Device (with appropriate device)       Goal: Gait Training Goal STG- PT  Outcome: Ongoing (interventions implemented as appropriate)    12/10/17 1028   Gait Training PT STG   Gait Training Goal PT STG, Date Established 12/10/17   Gait Training Goal PT STG, Time to Achieve 3 days   Gait Training Goal PT STG, Termo Level contact guard assist   Gait Training Goal PT STG, Assist Device (with appropriate device)   Gait Training Goal PT STG, Distance to Achieve 100       Goal: Patient Education Goal STG- PT  Outcome: Ongoing (interventions implemented as appropriate)    12/10/17 1028   Patient Education PT STG   Patient Education PT STG, Date Established 12/10/17   Patient Education PT STG, Time to Achieve 3 days   Patient Education PT STG, Education Type precaution per surgeon   Patient Education PT STG, Education Understanding demonstrate adequately;verbalize understanding

## 2017-12-10 NOTE — PLAN OF CARE
Problem: Patient Care Overview (Adult)  Goal: Plan of Care Review  Outcome: Ongoing (interventions implemented as appropriate)    12/10/17 1028   Coping/Psychosocial Response Interventions   Plan Of Care Reviewed With patient   Outcome Evaluation   Outcome Summary/Follow up Plan Physical therapy evaluation complete. Patient with significant confusion throughout evaluation, oriented to person only. Patient requires mod assist for bed mobility and min-mod assist for sit to stand transfers with significant posterior lean noted. Patient able to perform gait x50 feet, min -mod assist x2 with hand held assistance. Patient noted to have increased posterior lean and short shuffling gait pattern with continued difficulty to initiate movement. Patient requires frequently repeated cues and increased time to follow one step commands throughout evaluation. Patient initially not agreeable to use device for gait, however agreed following evaluation to attempt with device at subsequent session. Discussed current level of mobility with family, RN, and . Discussed patient's chronic mobility deficits, frequent falls, and decreased receptivity to mobility activities related to recovery and functional independence with patient's family. Will continue to assess and make recommendations for home vs SNF.

## 2017-12-10 NOTE — PROGRESS NOTES
"Hospitalist Team      Patient Care Team:  Romeo Chamberlain MD as PCP - General (Family Medicine)        Chief Complaint:  F/U HTN and DM-2    Subjective    Interval History and ROS:     Patient is confused today.  Wife and daughter are at bedside and note this has happened with previous surgeries and usually takes several days to clear up. The patient is afebrile.  He notes mild pain in his right shoulder. He denies any SOA, CP, N/V/D.      Objective    Vital Signs  Temp:  [96.7 °F (35.9 °C)-98 °F (36.7 °C)] 97 °F (36.1 °C)  Heart Rate:  [69-85] 71  Resp:  [18] 18  BP: (119-156)/(69-79) 119/79  Oxygen Therapy  SpO2: 96 %  Pulse Oximetry Type: Continuous  O2 Device: room air      Flowsheet Rows         First Filed Value    Admission Height  188 cm (74\") Documented at 12/08/2017 0730    Admission Weight  105 kg (231 lb) Documented at 12/08/2017 0730            Physical Exam:  Constitutional: Patient appears well-developed, obese and in no acute distress   HEENT:   Head: Normocephalic and atraumatic.   Eyes:  Pupils are equal, round, and reactive to light. EOM are intact. Sclera are anicteric and non-injected.  Mouth and Throat: Patient has moist mucous membranes.     Neck: Neck supple. Unable to assess JVD secondary to body habitus.  Cardiovascular: Regular rate, regular rhythm, S1 normal and S2 normal.  Exam reveals no gallop and no friction rub.  1-2/6 systolic murmur heard.  Pulmonary/Chest: Lungs are clear to auscultation bilaterally. No respiratory distress. No wheezes. No rhonchi. No rales.   Abdominal: Obese. Soft. Bowel sounds are normal. There is no tenderness.   Extremities: No edema. Pulses are palpable in all 4 extremities. RUE in sling and incision is C/D/I.  Neurological: Patient is alert and oriented to person and place but not time.  Is confused in conversation.   Skin: Skin is warm. No rash noted. Nails show no clubbing.  No cyanosis or erythema.      Results Review:     I reviewed the patient's new " clinical results.    Lab Results (last 24 hours)     Procedure Component Value Units Date/Time    POC Glucose Fingerstick [[id number]]  (Abnormal) Collected:  12/09/17 1619    Specimen:  Blood Updated:  12/09/17 1626     Glucose 226 (H) mg/dL     Narrative:       Meter: [id number] : [id number] Yannick Araya NURSING ASSISTANT    POC Glucose Fingerstick [[id number]]  (Abnormal) Collected:  12/09/17 2117    Specimen:  Blood Updated:  12/09/17 2123     Glucose 205 (H) mg/dL     Narrative:       Meter: [id number] : [id number] Bo Barnhart NURSING ASSISTANT    Hemoglobin & Hematocrit, Blood [[id number]]  (Abnormal) Collected:  12/10/17 0346    Specimen:  Blood Updated:  12/10/17 0445     Hemoglobin 12.2 (L) g/dL      Hematocrit 36.0 (L) %     Basic Metabolic Panel [[id number]]  (Abnormal) Collected:  12/10/17 0346    Specimen:  Blood Updated:  12/10/17 0509     Glucose 181 (H) mg/dL      BUN 37 (H) mg/dL      Creatinine 1.59 (H) mg/dL      Sodium 136 mmol/L      Potassium 4.0 mmol/L      Chloride 99 mmol/L      CO2 22.3 mmol/L      Calcium 8.2 (L) mg/dL      eGFR Non African Amer 42 (L) mL/min/1.73      BUN/Creatinine Ratio 23.3     Anion Gap 14.7 mmol/L     Narrative:       The MDRD GFR formula is only valid for adults with stable renal function between ages 18 and 70.    POC Glucose Fingerstick [[id number]]  (Abnormal) Collected:  12/10/17 0730    Specimen:  Blood Updated:  12/10/17 0739     Glucose 231 (H) mg/dL     Narrative:       Meter: [id number] : [id number] Faulker Lorenza NA CERT    POC Glucose Fingerstick [[id number]]  (Abnormal) Collected:  12/10/17 1102    Specimen:  Blood Updated:  12/10/17 1137     Glucose 229 (H) mg/dL     Narrative:       Meter: [id number] : [id number] Faulker Lorenza NA CERT          Imaging Results (last 24 hours)     ** No results found for the last 24 hours. **        ECG/EMG Results (most recent)     Procedure Component Value Units Date/Time    ECG 12 Lead [[id number]]  Collected:  12/08/17 1717     Updated:  12/09/17 1412    Narrative:       RR Interval= 714 ms  TN Interval= 204 ms  QRSD Interval= 152 ms  QT Interval= 456 ms  QTc Interval= 540 ms  Heart Rate= 84 ms  P Axis= 23 deg  QRS Axis= -33 deg  T Wave Axis= 116 deg  I: 40 Axis= -25 deg  T: 40 Axis= -39 deg  ST Axis= 134 deg  SINUS RHYTHM  VENTRICULAR BIGEMINY, new compared with prior  LEFT BUNDLE BRANCH BLOCK  Electronically Signed by:  Naheed Cifuentes ([facility]) 09-Dec-2017 14:10:16  Date and Time of Study: 2017-12-08 17:17:53          Medication Review:   I have reviewed the patient's current medication list    Current Facility-Administered Medications:   •  acetaminophen (TYLENOL) tablet 500 mg, 500 mg, Oral, Q4H PRN, Goyo Bhardwaj MD  •  dextrose (D50W) solution 25 g, 25 g, Intravenous, Q15 Min PRN, Jaciel Charles MD  •  dextrose (GLUTOSE) oral gel 15 g, 15 g, Oral, Q15 Min PRN, Jaciel Charles MD  •  docusate sodium (COLACE) capsule 100 mg, 100 mg, Oral, BID, Jourdan Pelayo MD, 100 mg at 12/10/17 0838  •  glipiZIDE (GLUCOTROL) tablet 10 mg, 10 mg, Oral, BID AC, Goyo Bhardwaj MD, 10 mg at 12/10/17 0838  •  glucagon (GLUCAGEN) injection 1 mg, 1 mg, Subcutaneous, Q15 Min PRN, Jaciel Charles MD  •  HYDROmorphone (DILAUDID) injection 1 mg, 1 mg, Intravenous, Q2H PRN **AND** naloxone (NARCAN) injection 0.4 mg, 0.4 mg, Intravenous, Q5 Min PRN, Goyo Bhardwaj MD  •  insulin aspart (novoLOG) injection 0-7 Units, 0-7 Units, Subcutaneous, 4x Daily AC & at Bedtime, Jaciel Charles MD, 3 Units at 12/10/17 1204  •  lactated ringers infusion 1,000 mL, 1,000 mL, Intravenous, Continuous PRN, Gabriella Lomeli CRNA, Last Rate: 25 mL/hr at 12/08/17 0834, 1,000 mL at 12/08/17 0834  •  lactated ringers infusion, 30 mL/hr, Intravenous, Continuous, Goyo Bhardwaj MD, Last Rate: 30 mL/hr at 12/09/17 0721, 30 mL/hr at 12/09/17 0721  •  levothyroxine (SYNTHROID, LEVOTHROID) tablet 100 mcg, 100 mcg, Oral, QAM, Goyo Bhardwaj MD, 100 mcg at 12/10/17  0837  •  metFORMIN (GLUCOPHAGE) tablet 1,000 mg, 1,000 mg, Oral, BID With Meals, Goyo Bhardwaj MD, 1,000 mg at 12/10/17 0838  •  metoprolol tartrate (LOPRESSOR) tablet 25 mg, 25 mg, Oral, BID, Goyo Bhardwaj MD, 25 mg at 12/10/17 0838  •  traMADol (ULTRAM) tablet 50 mg, 50 mg, Oral, Q6H PRN, Goyo Bhardwaj MD, 50 mg at 12/10/17 0726      Assessment/Plan     1. Hypertension: BP is intermittently elevated here. Likely related to pain. On home metoprolol. Monitor. Patient's home lisinopril/HCTZ is on hold here. Monitor.     2. DM-2: On Metformin and Glipizide, Home victoza was not continued because it is not on formulary here.  Will D/C metformin as this is not a great choice in this patient with CKDIII. Will have family bring in home victoza to use here. Will check HbA1c and increase SSI to moderate dose.  Bedsides here are elevated.     3. CKD stage III: Over the last 1 yr Cr has ranged from 1.25 to 1.61 which appears to be his baseline. Renal function today is in that range. Continue to monitor post-op.    4. Mild post-operative anemia: Mild, Monitor.    5. HLD: On no medications for this. F/U with PCP.     6. Hypothyroidism: On home replacement.     7. GERD: On no chronic medications for this. No acute issues.     8. Macular degeneration: No acute issues currently..      9. Hx of vertigo: No acute issues currently.  Patient denies dizziness.     10. Osteoarthritis right glenohumeral joint and rotator cuff tendinopathy s/p Right total reverse shoulder: POD#1.  Dr. Bhardwaj managing.  Patient with confusion post-op which family notes has happened in the past.  Suspect some underlying dementia. Pain meds have been changed to tylenol and ultram. Patient with right arm in sling and PT/OT are working with patient.      11. DVT Prophylaxis: SCDs     Plan for disposition:  vs SNF (per ortho)    Gwen Townsend MD  12/10/17  2:52 PM

## 2017-12-10 NOTE — NURSING NOTE
Continued Stay Note  MERCEDEZ Reyes     Patient Name: Joey Schmitt  MRN: [id number]  Today's Date: 12/10/2017    Admit Date: 12/8/2017          Discharge Plan       12/10/17 1330    Case Management/Social Work Plan    Additional Comments Spoke with patient and family at bedside, staff arrives to assist patient to BSC, CM & family move to family conference room. IMM explained, signed and copy provided. Discussion of obs vs inpt status. Patients wife, Betsy, and two of his daughters, Xiomara (TRINH) and Rosalia, are present. They state that patient has a shuffle gait when he walks and has had several falls lately. They state that the patient did better getting up and working with PT today than he did yesterday and are aware that PT will see him again tomorrow. They indicate that if short term rehab is needed, their first choice is [facility] and second choice is [facility]. Informed we will review PT progress and make referrals for either home with  or short term rehab depending on progress. Very pleasant and supportive family, will continue to follow.               Discharge Codes     None            Ken Oconnor RN

## 2017-12-10 NOTE — PROGRESS NOTES
Orthopedic Progress Note   Chief Complaint:  Status post right total reverse shoulder    Subjective     Interval History: Patient is afebrile this morning hemodynamically stable.  He is oriented to person but at times confused place and time.  Unaware also at times that he's had surgery to the shoulder.  According to the family this has occurred wheezing surgery in the past.  His hemoglobin is 12.2.  Glucose is 231.  Renal function slightly elevated          Objective     Vital Signs  Temp:  [96.7 °F (35.9 °C)-98 °F (36.7 °C)] 97.3 °F (36.3 °C)  Heart Rate:  [69-85] 69  Resp:  [18-20] 18  BP: (121-156)/(69-78) 135/77  Body mass index is 29.66 kg/(m^2).    Intake/Output Summary (Last 24 hours) at 12/10/17 0842  Last data filed at 12/09/17 1817   Gross per 24 hour   Intake             1320 ml   Output               30 ml   Net             1290 ml             Physical Exam:   General: patient awake, alert and cooperative   Cardiovascular: regular rhythm and rate   Pulm: clear to auscultation bilaterally   Abdomen: Benign.  Soft bowel sounds   Extremities: Right upper extremity is good distal pulses no motor deficit no sensory loss in his wound is benign   Neurologic: Normal mood and behavior     Results Review:     I reviewed the patient's new clinical results.      WBC No results found for: WBCS   HGB Hemoglobin   Date Value Ref Range Status   12/10/2017 12.2 (L) 14.0 - 18.0 g/dL Final   12/09/2017 12.2 (L) 14.0 - 18.0 g/dL Final   12/08/2017 13.5 (L) 14.0 - 18.0 g/dL Final      HCT Hematocrit   Date Value Ref Range Status   12/10/2017 36.0 (L) 42.0 - 52.0 % Final   12/09/2017 36.9 (L) 42.0 - 52.0 % Final   12/08/2017 40.2 (L) 42.0 - 52.0 % Final      Platlets No results found for: LABPLAT     PT/INR:  No results found for: PROTIME/No results found for: INR    Sodium Sodium   Date Value Ref Range Status   12/10/2017 136 136 - 145 mmol/L Final      Potassium Potassium   Date Value Ref Range Status   12/10/2017 4.0 3.5  - 5.2 mmol/L Final   12/08/2017 4.8 3.5 - 5.2 mmol/L Final      Chloride Chloride   Date Value Ref Range Status   12/10/2017 99 98 - 107 mmol/L Final      Bicarbonate No results found for: PLASMABICARB   BUN BUN   Date Value Ref Range Status   12/10/2017 37 (H) 8 - 23 mg/dL Final      Creatinine Creatinine   Date Value Ref Range Status   12/10/2017 1.59 (H) 0.76 - 1.27 mg/dL Final      Calcium Calcium   Date Value Ref Range Status   12/10/2017 8.2 (L) 8.8 - 10.5 mg/dL Final      Magnesium  AST  ALT  Bilirubin, Total  AlkPhos  Albumin    Amylase  Lipase    Radiology: No results found for: MG  No components found for: AST.*  No components found for: ALT.*  No components found for: BILIRUBIN, TOTAL.*    No components found for: ALKPHOS.*  No components found for: ALBUMIN.*      No components found for: AMYLASE.*  No components found for: LIPASE.*            Imaging Results (most recent)     Procedure Component Value Units Date/Time    XR Shoulder 2+ View Right [[id number]] Collected:  12/08/17 1257     Updated:  12/08/17 1259    Narrative:       INDICATION: Right shoulder pain status post right total shoulder  arthroplasty.     COMPARISON: 12/01/2016.     FINDINGS: 1 views of the right shoulder.   No fracture or dislocation.   There is anatomic alignment of the reverse shoulder arthroplasty.   Mild  right acromioclavicular osteoarthropathy.  There is some atelectasis in  the right lower lobe.       Impression:       Anatomic alignment of the right shoulder arthroplasty.     This report was finalized on 12/8/2017 12:57 PM by Dr. Demetri Martin MD.                  lactated ringers 1,000 mL Last Rate: 1,000 mL (12/08/17 0834)   lactated ringers 30 mL/hr Last Rate: 30 mL/hr (12/09/17 0721)         Assessment/Plan     Patient Active Problem List   Diagnosis Code   (none) - all problems resolved or deleted       Percocet and Dilaudid have been DC'd.  We'll prescribe Tylenol for pain control with Ultram for breakthrough pain  only      Goyo Bhardwaj MD  12/10/17  8:42 AM

## 2017-12-10 NOTE — THERAPY EVALUATION
Acute Care - Physical Therapy Initial Evaluation   [place]     Patient Name: Joey Schmitt  : 1935  MRN: [id number]  Today's Date: 12/10/2017   Onset of Illness/Injury or Date of Surgery Date: 17  Date of Referral to PT: 17  Referring Physician: Dr Pelayo      Admit Date: 2017     Visit Dx:    ICD-10-CM ICD-9-CM   1. Osteoarthritis of right glenohumeral joint M19.011 715.91   2. Tendinopathy of right rotator cuff M67.911 727.9   3. History of cancer Z85.9 V10.90     Patient Active Problem List   Diagnosis   (none) - all problems resolved or deleted     Past Medical History:   Diagnosis Date   • Cellulitis of foot    • Dementia    • Hyperlipidemia    • Hypertension    • Hypothyroidism    • Macular degeneration    • Prostate cancer    • Right shoulder pain     SCHEDULED FOR SX   • Seasonal allergies    • Type 2 diabetes mellitus    • Vertigo      Past Surgical History:   Procedure Laterality Date   • INCISION AND DRAINAGE LEG Right 2017    Procedure: INCISION AND DRAINAGE LOWER EXTREMITY;  Surgeon: Mikael Dwyer DPM;  Location: [facility];  Service:    • PROSTATE SURGERY     • SHOULDER SURGERY Left           PT ASSESSMENT (last 72 hours)      PT Evaluation       12/10/17 0909        Document Type evaluation  -JW    Subjective Information agree to therapy;no complaints  -JW    Patient Effort, Rehab Treatment adequate  -JW    Symptoms Noted Comment pt with significant confusion throughout evaluation  -JW       Patient Profile Review yes  -JW    Onset of Illness/Injury or Date of Surgery Date 17  -JW    Referring Physician Dr Pelayo  -JW    General Observations pt supine, right UE in brace.  pt agreeable to evaluation, daughters present  -JW    Pertinent History Of Current Problem pt with worsening right shoulder pain.  pt s/p right reverse total shoulder  -JW    Precautions/Limitations non-weight bearing status   right UE NWB, significant confusion  -JW    Prior Level of Function --  "  independent with mobility, however very sedentary per family  -JW    Equipment Currently Used at Home none   per family, pt has refused to use DME  -JW    Plans/Goals Discussed With patient and family;agreed upon  -JW    Risks Reviewed patient and family:;increased discomfort  -JW    Benefits Reviewed patient and family:;improve function;increase independence;increase strength;increase balance  -JW    Barriers to Rehab cognitive status;previous functional deficit  -JW       Lives With spouse   daughters available to assist as needed  -JW    Living Arrangements house  -JW    Home Accessibility stairs to enter home  -JW    Number of Stairs to Enter Home 1  -JW    Stair Railings at Home none  -JW    Living Environment Comment pt is poor historian and unable to give information regarding prior level of function.  Per daughter, patient very sedentary at home and requires encouragement to perform activity, however is independent with mobility.  per family, patient has refused to use any DME and refused home health PT in the past for balance deficits.  pt has history of shuffling gait and has had several falls over the past year.  daughters report \"there have been at least 6 or 7 major falls with injuries but no broken bones\"  -JW       Date of Referral to PT 12/09/17  -JW    Patient/Family Goals Statement \"get better\"  -    Criteria for Skilled Therapeutic Interventions Met yes;treatment indicated  -JW    Rehab Potential fair, will monitor progress closely  -    Predicted Duration of Therapy Intervention (days/wks) 3 days  -JW       Pain Assessment No/denies pain  -JW       Visual Impairment Comment daughter reports history of poor vision due to macular degeneration  -JW       Current Cognitive/Communication Assessment impaired  -JW    Orientation Status oriented to;person   unable to give current year/season or current location  -JW    Follows Commands/Answers Questions able to follow single-step instructions;75% " of the time;needs cueing;needs increased time;needs repetition  -    Personal Safety severe impairment;decreased insight to deficits;impulsive  -    Personal Safety Interventions gait belt;nonskid shoes/slippers when out of bed  -       General ROM other (see comments)   LE ROM WFL, right UE not tested  -       General MMT Assessment Detail LE strength 5/5 bilaterally, repeated cues required to follow directions  -       Muscle Tone Assessment     Bilateral Upper Extremities Muscle Tone Assessment     Bilateral Lower Extremities Muscle Tone Assessment        Bed Mobility, Assistive Device bed rails;head of bed elevated  -    Bed Mob, Supine to Sit, [place] moderate assist (50% patient effort);verbal cues required   repeated cues for sequencing  -    Bed Mobility, Comment increased time required to complete transfer to EOB  -       Transfers, Sit-Stand [place] minimum assist (75% patient effort);moderate assist (50% patient effort);verbal cues required  -    Transfers, Stand-Sit [place] minimum assist (75% patient effort);verbal cues required  -    Transfers, Sit-Stand-Sit, Assist Device other (see comments)   no device used  -    Transfer, Comment pt requires verbal cues for hand placement and proper sequencing.  pt with pronounced posterior lean upon standing requiring repeated cues to correct.    -       Gait, [place] Level minimum assist (75% patient effort);2 person assist required;verbal cues required;moderate assist (50% patient effort)  -    Gait, Assistive Device other (see comments)   handheld assist. no DME used   -    Gait, Distance (Feet) 50  -    Gait, Gait Pattern Analysis swing-to gait  -    Gait, Gait Deviations [place] decreased;forward flexed posture;narrow base;other (see comments)   shuffling gait, posterior lean  -    Gait, Safety Issues loses balance backward;sequencing ability decreased;step length decreased  -    Gait, Comment pt with  initial posterior lean in standing, requires cues to correct.  patient initially requires mod assist x2 for weight shifting and initiation of gait.  After repeated cues and assistance to initiate, patient able to complete gait with min assist x2 for balance.  Attempted gait with hand held assistance as patient not initially receptive to utilizing device, however pt agreeable following gait to attempt device at subsequent sessions.  -       Sensory Impairment --   pt unable to follow formal sensory testing  -       Pre-Treatment Position in bed  -    Post Treatment Position chair  -JW    In Chair notified nsg;reclined;call light within reach;encouraged to call for assist;with family/caregiver  -            User Key  (r) = Recorded By, (t) = Taken By, (c) = Cosigned By    Initials Name Provider Type    CC Violetta Batista, RN Registered Nurse    BRANDI Franks, RN Registered Nurse    DINAH Goodwin RN Case Manager    NIGEL Hollingsworth PT Physical Therapist          Physical Therapy Education     Title: PT OT SLP Therapies (Active)     Topic: Physical Therapy (Active)     Point: Mobility training (Active)    Learning Progress Summary    Learner Readiness Method Response Comment Documented by Status   Patient Acceptance E NR   12/10/17 1026 Active               Point: Precautions (Active)    Learning Progress Summary    Learner Readiness Method Response Comment Documented by Status   Patient Acceptance E NR   12/10/17 1026 Active                      User Key     Initials Effective Dates Name Provider Type Discipline     12/01/15 -  Alexandra Hollingsworth PT Physical Therapist PT                PT Recommendation and Plan  Anticipated Equipment Needs At Discharge: other (see comments) (will continue to assess pts willingness/ability to use DME)  Anticipated Discharge Disposition: other (see comments) (will continue to assess home vs SNF)  Planned Therapy Interventions: balance training, bed  mobility training, gait training, home exercise program, transfer training, patient/family education  PT Frequency: daily (6x/week)  Plan of Care Review  Plan Of Care Reviewed With: patient  Outcome Summary/Follow up Plan: Physical therapy evaluation complete.  Patient with significant confusion throughout evaluation, oriented to person only.  Patient requires mod assist for bed mobility and min-mod assist for sit to stand transfers with significant posterior lean noted.  Patient able to perform gait x50 feet, min -mod assist x2 with hand held assistance.  Patient noted to have increased posterior lean and short shuffling gait pattern with continued difficulty to initiate movement.  Patient requires frequently repeated cues and increased time to follow one step commands throughout evaluation.  Patient initially not agreeable to use device for gait, however agreed following evaluation to attempt with device at subsequent session.  Discussed current level of mobility with family, RN, and .  Discussed patient's chronic mobility deficits and decreased receptivity to mobility activities related to recovery and functional independence with patient's family Will continue to assess and make recommendations for home vs SNF.          IP PT Goals       12/10/17 1028          Bed Mobility PT STG    Bed Mobility PT STG, Date Established 12/10/17  -      Bed Mobility PT STG, Time to Achieve 3 days  -      Bed Mobility PT STG, Activity Type all bed mobility  -      Bed Mobility PT STG, [place] Level supervision required  -      Transfer Training PT STG    Transfer Training PT STG, Date Established 12/10/17  -      Transfer Training PT STG, Time to Achieve 3 days  -      Transfer Training PT STG, [place] Level contact guard assist  -      Transfer Training PT STG, Assist Device --   with appropriate device  -      Gait Training PT STG    Gait Training Goal PT STG, Date Established 12/10/17  -       Gait Training Goal PT STG, Time to Achieve 3 days  -JW      Gait Training Goal PT STG, [place] Level contact guard assist  -JW      Gait Training Goal PT STG, Assist Device --   with appropriate device  -JW      Gait Training Goal PT STG, Distance to Achieve 100  -JW      Patient Education PT STG    Patient Education PT STG, Date Established 12/10/17  -JW      Patient Education PT STG, Time to Achieve 3 days  -JW      Patient Education PT STG, Education Type precaution per surgeon  -JW      Patient Education PT STG, Education Understanding demonstrate adequately;verbalize understanding  -JW        User Key  (r) = Recorded By, (t) = Taken By, (c) = Cosigned By    Initials Name Provider Type    NIGEL Hollingsworth, PT Physical Therapist                Outcome Measures       12/10/17 0909          How much help from another person do you currently need...    Turning from your back to your side while in flat bed without using bedrails? 2  -JW      Moving from lying on back to sitting on the side of a flat bed without bedrails? 2  -JW      Moving to and from a bed to a chair (including a wheelchair)? 2  -JW      Standing up from a chair using your arms (e.g., wheelchair, bedside chair)? 2  -JW      Climbing 3-5 steps with a railing? 1  -JW      To walk in hospital room? 2  -JW      AM-PAC 6 Clicks Score 11  -JW      Functional Assessment    Outcome Measure Options AM-PAC 6 Clicks Basic Mobility (PT)  -JW        User Key  (r) = Recorded By, (t) = Taken By, (c) = Cosigned By    Initials Name Provider Type    NIGEL Hollingsworth PT Physical Therapist           Time Calculation:         PT Charges       12/10/17 1033          Time Calculation    Start Time 0909  -JW      Stop Time 0958  -JW      Time Calculation (min) 49 min  -JW      PT Received On 12/10/17  -JW      PT - Next Appointment 12/11/17  -NIGEL        User Key  (r) = Recorded By, (t) = Taken By, (c) = Cosigned By    Initials Name Provider Type    NIGEL Morris  Edel, PT Physical Therapist          Therapy Charges for Today     Code Description Service Date Service Provider Modifiers Qty    05176966703 HC PT MOBILITY CURRENT 12/10/2017 Alexandra Hollingsworth, PT GP, CK 1    88125942222 HC PT MOBILITY PROJECTED 12/10/2017 Alexandra Hollingsworth, PT GP, CJ 1    54058256182 HC PT EVAL MOD COMPLEXITY 3 12/10/2017 Alexandra Hollingsworth, PT GP 1    87996487270 HC PT THER SUPP EA 15 MIN 12/10/2017 Alexandra Hollingsworth, PT GP 1          PT G-Codes  PT Professional Judgement Used?: Yes  Outcome Measure Options: AM-PAC 6 Clicks Basic Mobility (PT)  Functional Limitation: Mobility: Walking and moving around  Mobility: Walking and Moving Around Current Status (): At least 40 percent but less than 60 percent impaired, limited or restricted  Mobility: Walking and Moving Around Goal Status (): At least 20 percent but less than 40 percent impaired, limited or restricted      Alexandra Hollingsworth PT  12/10/2017

## 2017-12-11 ENCOUNTER — APPOINTMENT (OUTPATIENT)
Dept: GENERAL RADIOLOGY | Facility: HOSPITAL | Age: 82
End: 2017-12-11

## 2017-12-11 LAB
ANION GAP SERPL CALCULATED.3IONS-SCNC: 12.6 MMOL/L
BUN BLD-MCNC: 27 MG/DL (ref 8–23)
BUN/CREAT SERPL: 22 (ref 7–25)
CALCIUM SPEC-SCNC: 8.6 MG/DL (ref 8.8–10.5)
CHLORIDE SERPL-SCNC: 99 MMOL/L (ref 98–107)
CO2 SERPL-SCNC: 25.4 MMOL/L (ref 22–29)
CREAT BLD-MCNC: 1.23 MG/DL (ref 0.76–1.27)
GFR SERPL CREATININE-BSD FRML MDRD: 56 ML/MIN/1.73
GLUCOSE BLD-MCNC: 198 MG/DL (ref 65–99)
GLUCOSE BLDC GLUCOMTR-MCNC: 113 MG/DL (ref 70–130)
GLUCOSE BLDC GLUCOMTR-MCNC: 208 MG/DL (ref 70–130)
GLUCOSE BLDC GLUCOMTR-MCNC: 213 MG/DL (ref 70–130)
GLUCOSE BLDC GLUCOMTR-MCNC: 66 MG/DL (ref 70–130)
HCT VFR BLD AUTO: 35.6 % (ref 42–52)
HGB BLD-MCNC: 12.1 G/DL (ref 14–18)
POTASSIUM BLD-SCNC: 4.1 MMOL/L (ref 3.5–5.2)
SODIUM BLD-SCNC: 137 MMOL/L (ref 136–145)

## 2017-12-11 PROCEDURE — 97166 OT EVAL MOD COMPLEX 45 MIN: CPT

## 2017-12-11 PROCEDURE — 94799 UNLISTED PULMONARY SVC/PX: CPT

## 2017-12-11 PROCEDURE — 85018 HEMOGLOBIN: CPT | Performed by: ORTHOPAEDIC SURGERY

## 2017-12-11 PROCEDURE — 74000 HC ABDOMEN KUB: CPT

## 2017-12-11 PROCEDURE — 85014 HEMATOCRIT: CPT | Performed by: ORTHOPAEDIC SURGERY

## 2017-12-11 PROCEDURE — 63710000001 INSULIN ASPART PER 5 UNITS: Performed by: HOSPITALIST

## 2017-12-11 PROCEDURE — 97110 THERAPEUTIC EXERCISES: CPT

## 2017-12-11 PROCEDURE — 99232 SBSQ HOSP IP/OBS MODERATE 35: CPT | Performed by: NURSE PRACTITIONER

## 2017-12-11 PROCEDURE — 80048 BASIC METABOLIC PNL TOTAL CA: CPT | Performed by: HOSPITALIST

## 2017-12-11 PROCEDURE — 71010 HC CHEST PA OR AP: CPT

## 2017-12-11 PROCEDURE — 82962 GLUCOSE BLOOD TEST: CPT

## 2017-12-11 PROCEDURE — 92523 SPEECH SOUND LANG COMPREHEN: CPT

## 2017-12-11 RX ORDER — GUAIFENESIN 600 MG/1
TABLET, EXTENDED RELEASE ORAL
Status: DISCONTINUED
Start: 2017-12-11 | End: 2017-12-11 | Stop reason: WASHOUT

## 2017-12-11 RX ORDER — FAMOTIDINE 20 MG/1
20 TABLET, FILM COATED ORAL DAILY
Status: DISCONTINUED | OUTPATIENT
Start: 2017-12-11 | End: 2017-12-14 | Stop reason: HOSPADM

## 2017-12-11 RX ORDER — CHOLECALCIFEROL (VITAMIN D3) 125 MCG
5 CAPSULE ORAL NIGHTLY
Status: DISCONTINUED | OUTPATIENT
Start: 2017-12-11 | End: 2017-12-14 | Stop reason: HOSPADM

## 2017-12-11 RX ORDER — GUAIFENESIN AND DEXTROMETHORPHAN HYDROBROMIDE 600; 30 MG/1; MG/1
1 TABLET, EXTENDED RELEASE ORAL 2 TIMES DAILY PRN
Status: DISCONTINUED | OUTPATIENT
Start: 2017-12-11 | End: 2017-12-14 | Stop reason: HOSPADM

## 2017-12-11 RX ORDER — ONDANSETRON 2 MG/ML
4 INJECTION INTRAMUSCULAR; INTRAVENOUS EVERY 6 HOURS PRN
Status: DISCONTINUED | OUTPATIENT
Start: 2017-12-11 | End: 2017-12-14 | Stop reason: HOSPADM

## 2017-12-11 RX ADMIN — GLIPIZIDE 10 MG: 10 TABLET ORAL at 08:17

## 2017-12-11 RX ADMIN — TRAMADOL HYDROCHLORIDE 50 MG: 50 TABLET, FILM COATED ORAL at 06:35

## 2017-12-11 RX ADMIN — GUAIFENESIN AND DEXTROMETHORPHAN HYDROBROMIDE 1 TABLET: 600; 30 TABLET, EXTENDED RELEASE ORAL at 22:32

## 2017-12-11 RX ADMIN — ACETAMINOPHEN 500 MG: 500 TABLET, FILM COATED ORAL at 22:35

## 2017-12-11 RX ADMIN — INSULIN ASPART 4 UNITS: 100 INJECTION, SOLUTION INTRAVENOUS; SUBCUTANEOUS at 08:17

## 2017-12-11 RX ADMIN — DOCUSATE SODIUM 100 MG: 100 CAPSULE, LIQUID FILLED ORAL at 17:24

## 2017-12-11 RX ADMIN — METFORMIN HYDROCHLORIDE 1000 MG: 500 TABLET ORAL at 08:17

## 2017-12-11 RX ADMIN — METFORMIN HYDROCHLORIDE 1000 MG: 500 TABLET ORAL at 17:25

## 2017-12-11 RX ADMIN — FAMOTIDINE 20 MG: 20 TABLET, FILM COATED ORAL at 12:17

## 2017-12-11 RX ADMIN — METOPROLOL TARTRATE 25 MG: 25 TABLET, FILM COATED ORAL at 08:17

## 2017-12-11 RX ADMIN — LEVOTHYROXINE SODIUM 100 MCG: 50 TABLET ORAL at 06:35

## 2017-12-11 RX ADMIN — Medication 5 MG: at 20:25

## 2017-12-11 RX ADMIN — GUAIFENESIN AND DEXTROMETHORPHAN HYDROBROMIDE 1 TABLET: 600; 30 TABLET, EXTENDED RELEASE ORAL at 15:40

## 2017-12-11 RX ADMIN — INSULIN ASPART 4 UNITS: 100 INJECTION, SOLUTION INTRAVENOUS; SUBCUTANEOUS at 12:18

## 2017-12-11 RX ADMIN — GLIPIZIDE 10 MG: 10 TABLET ORAL at 18:07

## 2017-12-11 RX ADMIN — METOPROLOL TARTRATE 25 MG: 25 TABLET, FILM COATED ORAL at 18:07

## 2017-12-11 RX ADMIN — TRAMADOL HYDROCHLORIDE 50 MG: 50 TABLET, FILM COATED ORAL at 20:26

## 2017-12-11 RX ADMIN — DOCUSATE SODIUM 100 MG: 100 CAPSULE, LIQUID FILLED ORAL at 08:17

## 2017-12-11 NOTE — PLAN OF CARE
Problem: Patient Care Overview (Adult)  Goal: Plan of Care Review  Outcome: Ongoing (interventions implemented as appropriate)    12/10/17 2352   Coping/Psychosocial Response Interventions   Plan Of Care Reviewed With patient   Patient Care Overview   Progress progress towards functional goals is fair         Problem: Infection, Risk/Actual (Adult)  Goal: Identify Related Risk Factors and Signs and Symptoms  Outcome: Ongoing (interventions implemented as appropriate)    12/10/17 2352   Infection, Risk/Actual   Infection, Risk/Actual: Related Risk Factors treatment plan;skin integrity impairment   Signs and Symptoms (Infection, Risk/Actual) pain

## 2017-12-11 NOTE — PLAN OF CARE
Problem: Patient Care Overview (Adult)  Goal: Plan of Care Review  Outcome: Ongoing (interventions implemented as appropriate)    12/11/17 1020   Coping/Psychosocial Response Interventions   Plan Of Care Reviewed With patient   Outcome Evaluation   Outcome Summary/Follow up Plan PT: Patient requires mod assist x2 for bed mobility and sit to stand transfers. Patient trialed quad cane, hemiwalker, and SPC during gait, however pt with significant difficulty sequencing device despite multiple cues and assistance from therapist. Patient continues to have difficulty with initiation of movement and following one step commands during mobility. Continue to recommend further rehab in facility where patient can remain for extended period of time until right UE weight bearing restrictions lifted.

## 2017-12-11 NOTE — THERAPY EVALUATION
Acute Care - Occupational Therapy Initial Evaluation   [place]     Patient Name: Joey Schmitt  : 1935  MRN: [id number]  Today's Date: 2017  Onset of Illness/Injury or Date of Surgery Date: 17  Date of Referral to OT: 17  Referring Physician: Gita    Admit Date: 2017       ICD-10-CM ICD-9-CM   1. Osteoarthritis of right glenohumeral joint M19.011 715.91   2. Tendinopathy of right rotator cuff M67.911 727.9   3. History of cancer Z85.9 V10.90     Patient Active Problem List   Diagnosis   • Osteoarthritis of right glenohumeral joint     Past Medical History:   Diagnosis Date   • Cellulitis of foot    • Dementia    • Hyperlipidemia    • Hypertension    • Hypothyroidism    • Macular degeneration    • Prostate cancer    • Right shoulder pain     SCHEDULED FOR SX   • Seasonal allergies    • Type 2 diabetes mellitus    • Vertigo      Past Surgical History:   Procedure Laterality Date   • INCISION AND DRAINAGE LEG Right 2017    Procedure: INCISION AND DRAINAGE LOWER EXTREMITY;  Surgeon: Mikael Dwyer DPM;  Location: [facility];  Service:    • PROSTATE SURGERY     • SHOULDER SURGERY Left           OT ASSESSMENT FLOWSHEET (last 72 hours)      OT Evaluation       17 1314 17 0840          Document Type  evaluation  -SD    Subjective Information  agree to therapy;complains of;fatigue  -SD    Patient Effort, Rehab Treatment  fair  -SD    Symptoms Noted During/After Treatment  other (see comments)   confusion  -SD    Symptoms Noted Comment  Pt confused.  He required cues to follow one step commands  -SD       Patient Profile Review  yes  -SD    Onset of Illness/Injury or Date of Surgery Date  17  -SD    Referring Physician  Gita  -SD    General Observations  Pt in bed wearing right shoulder sling, SCD on LE's and daughter in room.  Pt gave permission to complete evaluation with family present.    -SD    Pertinent History Of Current Problem  pt with worsening right  shoulder pain.  pt s/p right reverse total shoulder  -SD    Precautions/Limitations  non-weight bearing status;fall precautions;shoulder precautions;brace on when up   RUE  -SD    Prior Level of Function  independent:;ADL's;all household mobility   per family  -SD    Equipment Currently Used at Home  --   pt has a lift chair, but does not use lift mechanism  -SD    Plans/Goals Discussed With  patient and family;agreed upon  -SD    Risks Reviewed  patient and family:;LOB  -SD    Benefits Reviewed  patient and family:;increase knowledge   regarding surgical precautions/activity restrictions  -SD    Barriers to Rehab  cognitive status  -SD       Lives With  spouse  -SD    Living Arrangements  house  -SD    Home Accessibility  stairs to enter home  -SD    Number of Stairs to Enter Home  1  -SD    Stair Railings at Home  none  -SD    Living Environment Comment  pt is poor historian and unable to give information regarding prior level of function.  Per daughter, patient very sedentary at home and requires encouragement to perform activity, however is independent with mobility and basic adl activity.  per family, patient has refused to use any DME and refused home health services in the past for balance deficits.  pt has history of shuffling gait and has had several falls over the past year.   -SD       Date of Referral to OT  12/11/17  -SD    OT Diagnosis  weakness  -SD    Prognosis  fair  -SD    Functional Level At Time Of Evaluation  Pt currently requires max assist with adl activity due to confusion and limited use of RUE following surgery.  Pt needs mod assist for bed mobility, functional mobility and sit to stand transfers.  Pt required max assist for stand to sit transfer.  Pt appears physially capable of improved performance with transfers and mobility, but he had difficulty following commands and initiating activity.   Pt will continue to require significant physical assistance with adl tasks due to his  "restrictions from surgery for several weeks  -SD    Impairments Found (describe specific impairments)  arousal, attention, and cognition;gait, locomotion, and balance;ROM   limited rom and use of RUE due to surgery  -SD    Patient/Family Goals Statement  none stated   -SD    Criteria for Skilled Therapeutic Interventions Met  yes;treatment indicated  -SD    Rehab Potential  fair, will monitor progress closely   cognitive status   -SD    Therapy Frequency  2-3 times/wk  -SD    Predicted Duration of Therapy Intervention (days/wks)  anticipate discharge from acute care in 2-3 days  -SD    Anticipated Equipment Needs At Discharge  --   pt will need assistive device for mobility  -SD    Anticipated Discharge Disposition skilled nursing facility   pt needs 24 hour supervision due to cognitive status  -SD skilled nursing facility   may need extended stay  -SD       Pain Assessment  No/denies pain  -SD       Vision Comment  Pt stated \"I don't see well. I cant read the paper anymore.\"  -SD       Current Cognitive/Communication Assessment  impaired  -SD    Orientation Status  oriented to;person  -SD    Follows Commands/Answers Questions  able to follow single-step instructions;25% of the time;needs cueing;needs repetition;needs increased time  -SD    Personal Safety  severe impairment;impulsive  -SD    Personal Safety Interventions  gait belt;fall prevention program maintained;nonskid shoes/slippers when out of bed;supervised activity  -SD       General ROM Detail  LUE rom wfl.  arom of right and wrist wfl.    -SD       General MMT Assessment Detail  Pt not able to follow directions for mmt of LUE.  strenght appeared functional during activity.   -SD       Muscle Tone Assessment      Bilateral Upper Extremities Muscle Tone Assessment      Bilateral Lower Extremities Muscle Tone Assessment         Bed Mobility, Assistive Device  bed rails;head of bed elevated  -SD    Bed Mobility, Roll Left, [place]  dependent (less than " 25% patient effort)  -SD    Bed Mobility, Roll Right, [place]  maximum assist (25% patient effort)  -SD    Bed Mob, Supine to Sit, [place]  moderate assist (50% patient effort);2 person assist required;verbal cues required  -SD    Bed Mobility, Comment  Pt required increased time to follow simple one step commands for bed mobility  -SD       Transfers, Sit-Stand [place]  moderate assist (50% patient effort);verbal cues required;2 person assist required   posterior lean noted  -SD    Transfers, Stand-Sit [place]  maximum assist (25% patient effort);nonverbal cues required (demo/gesture);verbal cues required   pt had difficulty problem solving to sit down   -SD    Transfers, Sit-Stand-Sit, Assist Device  quad cane  -SD    Transfer, Comment  Pt had difficulty problem solving and initiating movements for functional transfers.   -SD       Functional Mobility- Ind. Level  moderate assist (50% patient effort);2 person assist required;verbal cues required  -SD    Functional Mobility- Device  other (see comments)   quad cane, straight cane, libby walker  -SD    Functional Mobility-Distance (Feet)  20  -SD    Functional Mobility- Safety Issues  step length decreased;sequencing ability decreased;balance decreased during turns;loses balance backward  -SD    Functional Mobility- Comment  Pt required significant verbal/tactile cues to utilize assistive devices.   -SD       UB Bathing Assess/Train, [place] Level  maximum assist (25% patient effort)  -SD    UB Bathing Assess/Train, Impairments  ROM decreased;strength decreased   RUE due to surgical restrictions  -SD       LB Bathing Assess/Train, [place] Level  dependent (less than 25% patient effort)  -SD    LB Bathing Assess/Train, Impairments  ROM decreased;strength decreased;impaired balance   RUE due to surgical restrictions  -SD       UB Dressing Assess/Train, [place]  maximum assist (25% patient effort)  -SD    UB Dressing Assess/Train,  Impairments  ROM decreased;strength decreased   RUE due to surgical restrictions  -SD       LB Dressing Assess/Train, [place]  dependent (less than 25% patient effort)  -SD    LB Dressing Assess/Train, Impairments  ROM decreased;strength decreased;impaired balance  -SD       Toileting Assess/Train, [place] Level  dependent (less than 25% patient effort)  -SD       Right Upper Extremity  hand pumps;AROM:   wrist rom (education with rom of non-involved joints)  -SD       Planned Therapy Interventions  other (see comments)   caregiver education,   -SD    Home Exercise Program  education with rom program of non-involved joints of RUE  -SD       Pre-Treatment Position  in bed  -SD    Post Treatment Position  chair  -SD    In Chair  sitting;with other staff;with family/caregiver   with nurse practioner  -SD      User Key  (r) = Recorded By, (t) = Taken By, (c) = Cosigned By    Initials Name Effective Dates    CLARA Tai, OTR 06/22/16 -            Occupational Therapy Education     Title: PT OT SLP Therapies (Active)     Topic: Occupational Therapy (Active)     Point: ADL training (Active)    Description: Instruct learner(s) on proper safety adaptation and remediation techniques during self care or transfers.   Instruct in proper use of assistive devices.    Learning Progress Summary    Learner Readiness Method Response Comment Documented by Status   Patient Acceptance E NR Education regardiing OT services, activity and rom restrictions following surgery and impact on daily tasks, and rom program for non-involved joints of RUE..  Pt is confused.  He needed reinforcement. SD 12/11/17 1304 Active   Family Acceptance E NR Education regardiing OT services, activity and rom restrictions following surgery and impact on daily tasks, and rom program for non-involved joints of RUE..  Pt is confused.  He needed reinforcement. SD 12/11/17 1304 Active               Point: Home exercise program (Active)    Description:  Instruct learner(s) on appropriate technique for monitoring, assisting and/or progressing therapeutic exercises/activities.    Learning Progress Summary    Learner Readiness Method Response Comment Documented by Status   Patient Acceptance E NR Education regardiing OT services, activity and rom restrictions following surgery and impact on daily tasks, and rom program for non-involved joints of RUE..  Pt is confused.  He needed reinforcement. SD 12/11/17 1304 Active   Family Acceptance E NR Education regardiing OT services, activity and rom restrictions following surgery and impact on daily tasks, and rom program for non-involved joints of RUE..  Pt is confused.  He needed reinforcement. SD 12/11/17 1304 Active                      User Key     Initials Effective Dates Name Provider Type Discipline    SD 06/22/16 -  Bunny TUCKER Deburger, OTR Occupational Therapist OT                  OT Recommendation and Plan  Anticipated Equipment Needs At Discharge:  (pt will need assistive device for mobility)  Anticipated Discharge Disposition: skilled nursing facility (pt needs 24 hour supervision due to cognitive status)  Planned Therapy Interventions: other (see comments) (caregiver education, )  Therapy Frequency: 2-3 times/wk  Plan of Care Review  Plan Of Care Reviewed With: patient, daughter  Outcome Summary/Follow up Plan: OT evaluation completed.  Pt currently requires max assist with adl activity due to confusion and limited use of RUE following surgery.  Pt needs mod assist for bed mobility, functional mobility and sit to stand transfers.  Pt required max assist for stand to sit transfer.  Pt appears physially capable of improved performance with transfers and mobility, but he had difficulty following commands and initiating activity.   Pt will continue to require significant physical assistance with adl tasks due to his restrictions from surgery for several weeks          OT Goals       12/11/17 1307          Caregiver  Training OT STG    Caregiver Training OT STG, Date Established 12/11/17  -SD      Caregiver Training OT STG, Time to Achieve by discharge  -SD      Caregiver Training OT STG, Activity Type Caregiver able to demonstrate ability to don/doff shoulder support to allow for adl activity.   -SD      Caregiver Training OT STG, [place] Level able to assist adequately  -SD      Caregiver Training OT LTG    Caregiver Training OT LTG, Date Established 12/11/17  -SD      Caregiver Training OT LTG, Time to Achieve by discharge  -SD      Caregiver Training OT LTG, Activity Type Caregiver to verbalize techniques for dressing/bathing to adquately support RUE during adl tasks (due to surgical restricitons/precautions)  -SD      Patient Education OT STG    Patient Education OT STG, Date Established 12/11/17  -SD      Patient Education OT STG, Time to Achieve by discharge  -SD      Patient Education OT STG, Education Type HEP  -SD      Patient Education OT STG, Education Understanding demonstrates adequately  -SD      Patient Education OT STG, Additional Goal Pt to perform arom program of non-involved joints of RUE with min cues.  -SD        User Key  (r) = Recorded By, (t) = Taken By, (c) = Cosigned By    Initials Name Provider Type    CLARA Tai, OTR Occupational Therapist                Outcome Measures        12/11/17 0840     How much help from another is currently needed...    Putting on and taking off regular lower body clothing?  1  -SD     Bathing (including washing, rinsing, and drying)  2  -SD     Toileting (which includes using toilet bed pan or urinal)  1  -SD     Putting on and taking off regular upper body clothing  1  -SD     Taking care of personal grooming (such as brushing teeth)  2  -SD     Eating meals  3  -SD     Score  10  -SD     Functional Assessment    Outcome Measure Options  AM-PAC 6 Clicks Daily Activity (OT)  -SD       User Key  (r) = Recorded By, (t) = Taken By, (c) = Cosigned By    Initials  Name Provider Type    SD Bunny Tai OTR Occupational Therapist    NIGEL Hollingsworth, PT Physical Therapist          Time Calculation:   OT Start Time: 0840  OT Stop Time: 0936  OT Time Calculation (min): 56 min  OT Non-Billable Time (min): 15 min (nursing in room to assist patient)    Therapy Charges for Today     Code Description Service Date Service Provider Modifiers Qty    [id number] HC OT EVAL MOD COMPLEXITY 3 12/11/2017 Bunny Tai OTR GO 1               Bunny Tai OTR  12/11/2017

## 2017-12-11 NOTE — PLAN OF CARE
Problem: Patient Care Overview (Adult)  Goal: Plan of Care Review  Outcome: Ongoing (interventions implemented as appropriate)    12/11/17 1505   Coping/Psychosocial Response Interventions   Plan Of Care Reviewed With patient;spouse   Outcome Evaluation   Outcome Summary/Follow up Plan SLP/Cognitive Communication Eval: Pt demonstrates a mod-severe to severe cognitive communication decline r/t current medical status and prior dementia. Rec to provide patient with consistent reinforcement of information for recall and family teaching to aid pt with recall of new information. Pt needs frequent reorientation and assist with all activities at this time for safety and optimal participation in his care.          Problem: Inpatient SLP  Goal: Cognitive-linguistic-Patient will improve Cognitive-linguistic skills to allow optimal participation in care  Outcome: Ongoing (interventions implemented as appropriate)    12/11/17 1505   Cognitive Linguistic- Optimal Participation in Care   Cognitive Linguistic Optimal Participation in Care- SLP, Date Established 12/11/17   Cognitive Linguistic Optimal Participation in Care- SLP, Time to Achieve 2 days   Cognitive Linguistic Optimal Participation in Care- SLP, Activity Level Patient will improve awareness of basic personal information for increased safety in environment;Patient will improve orientation for increased safety in environment   Cognitive Linguistic Optimal Participation in Care- SLP, Additional Goal Family will be able to utilize compensatory strategies and external aids to improve awareness of basic personal information and improved orientation in order to improve his safe participation in care by verbalizing and demonstrating strategies back to SLP after initial demonstration.

## 2017-12-11 NOTE — PLAN OF CARE
Problem: Patient Care Overview (Adult)  Goal: Plan of Care Review  Outcome: Ongoing (interventions implemented as appropriate)    12/11/17 1307   Coping/Psychosocial Response Interventions   Plan Of Care Reviewed With patient;daughter   Outcome Evaluation   Outcome Summary/Follow up Plan OT evaluation completed. Pt currently requires max assist with adl activity due to confusion and limited use of RUE following surgery. Pt needs mod assist for bed mobility, functional mobility and sit to stand transfers. Pt required max assist for stand to sit transfer. Pt appears physially capable of improved performance with transfers and mobility, but he had difficulty following commands and initiating activity. Pt will continue to require significant physical assistance with adl tasks due to his restrictions from surgery for several weeks         Problem: Inpatient Occupational Therapy  Goal: Caregiver Training Goal STG- OT    12/11/17 1307   Caregiver Training OT STG   Caregiver Training OT STG, Date Established 12/11/17   Caregiver Training OT STG, Time to Achieve by discharge   Caregiver Training OT STG, Activity Type Caregiver able to demonstrate ability to don/doff shoulder support to allow for adl activity.    Caregiver Training OT STG, [place] Level able to assist adequately       Goal: Caregiver Training Goal LTG- OT  Outcome: Ongoing (interventions implemented as appropriate)    12/11/17 1307   Caregiver Training OT LTG   Caregiver Training OT LTG, Date Established 12/11/17   Caregiver Training OT LTG, Time to Achieve by discharge   Caregiver Training OT LTG, Activity Type Caregiver to verbalize techniques for dressing/bathing to adquately support RUE during adl tasks (due to surgical restricitons/precautions)       Goal: Patient Education Goal STG- OT  Outcome: Ongoing (interventions implemented as appropriate)    12/11/17 1307   Patient Education OT STG   Patient Education OT STG, Date Established 12/11/17    Patient Education OT STG, Time to Achieve by discharge   Patient Education OT STG, Education Type HEP   Patient Education OT STG, Education Understanding demonstrates adequately   Patient Education OT STG, Additional Goal Pt to perform arom program of non-involved joints of RUE with min cues.

## 2017-12-11 NOTE — THERAPY EVALUATION
"Acute Care - Speech Language Pathology Initial Evaluation   Zulma Fraire     Patient Name: Joey Schmitt  : 1935  MRN: [id number]  Today's Date: 2017  Onset of Illness/Injury or Date of Surgery Date: 17  Date of Referral to SLP: 17  Referring Physician: JILLIAN Strange      Admit Date: 2017     Visit Dx:    ICD-10-CM ICD-9-CM   1. Osteoarthritis of right glenohumeral joint M19.011 715.91   2. Tendinopathy of right rotator cuff M67.911 727.9   3. History of cancer Z85.9 V10.90   4. Cognitive communication deficit R41.841 799.52     Patient Active Problem List   Diagnosis   • Osteoarthritis of right glenohumeral joint     Past Medical History:   Diagnosis Date   • Cellulitis of foot    • Dementia    • Hyperlipidemia    • Hypertension    • Hypothyroidism    • Macular degeneration    • Prostate cancer    • Right shoulder pain     SCHEDULED FOR SX   • Seasonal allergies    • Type 2 diabetes mellitus    • Vertigo      Past Surgical History:   Procedure Laterality Date   • INCISION AND DRAINAGE LEG Right 2017    Procedure: INCISION AND DRAINAGE LOWER EXTREMITY;  Surgeon: Mikael Dwyer DPM;  Location: [facility];  Service:    • PROSTATE SURGERY     • SHOULDER SURGERY Left           SLP EVALUATION (last 72 hours)      SLP Evaluation       17 1153    Rehab Evaluation    Document Type evaluation  -AD    Subjective Information no complaints;agree to therapy   Wife present during the evaluation  -AD    Patient Effort, Rehab Treatment fair  -AD    Patient Effort, Rehab Treatment Comment Pt with limited effort on tasks. When he wasn't sure of a response, he would respond with \"I don't know\" or \"it doesn't really make a difference\" and made no further effort to answer questions.   -AD    Symptoms Noted During/After Treatment none  -AD    General Information    Patient Profile Review yes  -AD    Onset of Illness/Injury 17  -AD    Date of Surgery 17   Right total reverse " shoulder  -AD    Referring Physician JILLIAN Strange  -AD    Subjective Patient Observations Pt seen at bedside with wife present and observing. He was alert and cooperative. He does close his eyes frequently but is responsive to all questions.   -AD    Pertinent History Of Current Problem Pt admitted to the hospital for elective surgery for a right total reverse shoulder. Pt with confusion following surgery and history of dementia. Cognitive Eval ordered to further assess mental status.  Prior history of dementia documented in the chart. Wife states diagnosed by Dr. Romeo Chamberlain, his PCP. He also had macular degeneration.   -AD    Current Diet Limitations thin liquids;regular solid  -AD    Precautions/Limitations, Vision vision impairment, bilaterally   macular degeneration  -AD    Precautions/Limitations, Hearing WFL  -AD    Prior Level of Function- Communication functional for ADL's without assistance;other (comment)   per family; frequent disorientation to time reported by wife  -AD    Prior Level of Function- Swallowing no diet consistency restrictions;safe, efficient swallowing in all situations;dietary restrictions (e.g. consistent carb, low salt, etc)   cardiac consistent carbs  -AD    Plans/Goals Discussed With patient;spouse/S.O.;agreed upon  -AD    Barriers to Rehab previous functional deficit;cognitive status;visual deficit  -AD    Living Environment    Lives With spouse  -AD    Living Arrangements house  -AD    Transportation Available --   per wife, patient does not drive and has not for some time  -AD    Clinical Impression    Date of Referral to SLP 12/11/17  -AD    SLP Diagnosis Mod/Severe to Severe Cognitive Communication deficits  -AD    Prognosis Guarded due to decreased motivation, prior cognitive deficits and visual deficits.   -AD    Functional Level At Time Of Evaluation impaired  -AD    Patient's Goals For Discharge patient did not state  -AD    Family Goals For Discharge patient  able to provide self care with only supervision;patient able to return to all previous activities/roles  -AD    Criteria for Skilled Therapeutic Interventions Met skilled criteria for cognitive linguistic intervention met   short term  -AD    Rehab Potential fair, will monitor progress closely  -AD    Therapy Frequency daily;other (see comments)   two visits during stay  -AD    Predicted Duration of Therapy Intervention (days/wks) 1-2 days  -AD    Expected Duration of Therapy Session (min) 15-30 minutes  -AD    Anticipated Discharge Disposition skilled nursing facility  -AD    Demonstrates Need for Referral to Another Service neuropsychology services  -AD    Pain Assessment    Pain Assessment No/denies pain  -AD    Cognitive Assessment/Intervention    Current Cognitive/Communication Assessment impaired  -AD    Orientation Status oriented to;person;place;situation;disoriented to;time  -AD    Follows Commands/Answers Questions able to follow single-step instructions;75% of the time;needs increased time;needs repetition;other (see comments)   difficulty answering questions  -AD    Personal Safety at risk behaviors demonstrated;decreased awareness, need for assist;decreased awareness, need for safety;decreased insight to deficits;unaware of cognitive deficits;unaware of consequences of deficits;unaware of functional deficits  -AD    Short/Long Term Memory moderate impairment, short term memory;mild impairment, long term memory;requires frequent cues;requires frequent re-orientation;decreased recall, precautions;decreased recall, biographical info  -AD    Cognitive Assessment Intervention    Behavior/Mood Observations alert;confused;cooperative;distractible;confabulatory  -AD    Attention moderate impairment;needs re-direction;difficulty attending to task/directions;distractible  -AD    Pragmatics mild impairment;flat affect;topic maintenance problems  -AD    Problem Solving severe impairment  -AD    Executive Function  Skills severe impairment;insight/awareness issues;lack of mental flexibility;organizational issues;planning issues;lack of self correction  -AD    Reasoning severe impairment  -AD    Sequencing moderate impairment;severe impairment  -AD    Organization severe impairment  -AD    Diffuse Language Characteristics diffuse language characteristics present;delayed responses noted  -AD    Cognitive Assessment/Treatment Comment Patient presents with a mod-severe to severe cognitive communication deficit per clinical observation and testing via the FROMAJE. Unable to complete paper tasks due to right shoulder surgery and macular degeneration. Pt with an overall score on the FROMAJE of 18 indicating a severe dementia or depression (13 or greater is the cut off). This correlates to a Level 6 on the GDS (Global Deterioration Scale) indicating severe cognitive decline (moderately severe dementia). SLP feels that his baseline may be more at a Level 5 on the GDS per wife's response to interview questions. He achieved scores in each domain as folllows: Function-3, Reasoning-3, Orientation-3, Memory-2, Arithmetic-3, Judgement-3, Emotion-1. (A score of 1-3 is assigned to each aspect above with an overall rating being the sum of all scores. The purpose of the FROMAJE is to classify individuals into one of four categories as determined by the overall rating as follows: 7-8 No significant abnormality in behavior or cognition; 9-10 Mild dementia or depression; 11-12 Moderate dementia or depression; 13 or greater Severe dementia or depression.) SLP feels that his patient may demonstrate improved scores after clearance of all anesthesia and/ or pain medication is cleared from his system. Rec 1-2 visits to provide family with information to allow pt to optimally participate in his care.   -AD    Communication Assessment/Intervention    Communication Assessment Slurred Speech   Mild due to lethargy & medications; no other deficits noted.   -AD    Improve awareness of basic personal information    Improve awareness of basic personal information through: naming self, family members;answering open-ended questions regarding personal/biographical information;70%;with inconsistent cues  -AD    Status: Improve awareness of basic personal information New  -AD    Improve orientation    Improve orientation through: demonstrating orientation to day;demonstrating orientation to month;demonstrating orientation to year;use environmental aids to assist with orientation;70%;with consistent cues  -AD    Status: Improve orientation through New  -AD    Positioning and Restraints    Pre-Treatment Position in bed  -AD    Post Treatment Position bed  -AD    In Bed supine;call light within reach;encouraged to call for assist;with family/caregiver;side rails up x3  -AD      User Key  (r) = Recorded By, (t) = Taken By, (c) = Cosigned By    Initials Name Effective Dates    AD Blanca Tai MS CCC-SLP 06/22/16 -            EDUCATION  The patient has been educated in the following areas:   Cognitive Impairment Communication Impairment. Pt not able to demonstrate understanding of reason for eval, results and recommendations. Wife verbalizes understanding of all.     SLP Recommendation and Plan  SLP Diagnosis: Mod/Severe to Severe Cognitive Communication deficits  Prognosis: Guarded due to decreased motivation, prior cognitive deficits and visual deficits.   Rehab Potential: fair, will monitor progress closely  Criteria for Skilled Therapeutic Interventions Met: skilled criteria for cognitive linguistic intervention met (short term)  Anticipated Discharge Disposition: skilled nursing facility  Demonstrates Need for Referral to Another Service: neuropsychology services  Therapy Frequency: daily, other (see comments) (two visits during stay)  Predicted Duration of Therapy Intervention (days/wks): 1-2 days  Expected Duration of Therapy Session (min): 15-30 minutes    Plan of  Care Review  Plan Of Care Reviewed With: patient, spouse  Outcome Summary/Follow up Plan: SLP/Cognitive Communication Eval: Pt demonstrates a mod-severe to severe cognitive communication decline r/t current medical status and prior dementia. Rec to provide patient with consistent reinforcement of information for recall and family teaching to aid pt with recall of new information. Pt needs frequent reorientation and assist with all activities at this time for safety and optimal participation in his care.           IP SLP Goals       12/11/17 1505          Cognitive Linguistic- Optimal Participation in Care    Cognitive Linguistic Optimal Participation in Care- SLP, Date Established 12/11/17  -AD      Cognitive Linguistic Optimal Participation in Care- SLP, Time to Achieve 2 days  -AD      Cognitive Linguistic Optimal Participation in Care- SLP, Activity Level Patient will improve awareness of basic personal information for increased safety in environment;Patient will improve orientation for increased safety in environment  -AD      Cognitive Linguistic Optimal Participation in Care- SLP, Additional Goal Family will be able to utilize compensatory strategies and external aids to improve awareness of basic personal information and improved orientation in order to improve his safe participation in care by verbalizing and demonstrating strategies back to SLP after initial demonstration.   -AD        User Key  (r) = Recorded By, (t) = Taken By, (c) = Cosigned By    Initials Name Provider Type    JUAN DIEGO Tai, MS [facility]-SLP Speech Therapist        Time Calculation:         Time Calculation- SLP       12/11/17 1512          Time Calculation- SLP    SLP Start Time 1126  -AD      SLP Stop Time 1153  -AD      SLP Time Calculation (min) 27 min  -AD      Total Timed Code Minutes- SLP 0 minute(s)  -AD      SLP Non-Billable Time (min) 0 min  -AD      TCU Minutes- SLP 0 min  -AD      SLP Received On 12/11/17  -AD      SLP Goal  Re-Cert Due Date 12/15/17  -AD        User Key  (r) = Recorded By, (t) = Taken By, (c) = Cosigned By    Initials Name Provider Type    JUAN DIEGO Tai, MS CCC-SLP Speech Therapist          Therapy Charges for Today     Code Description Service Date Service Provider Modifiers Qty    11763575099 HC ST EVAL SPEECH AND PROD W LANG  2 12/11/2017 Blanca Tai, MS CCC-SLP GN 1             ADULT NOMS (last 72 hours)      Adult NOMS       12/11/17 1500                FCM Scores    Motor Speech FCM Score 6  -AD        Attention FCM Score 3  -AD        Memory FCM Score 4  -AD          User Key  (r) = Recorded By, (t) = Taken By, (c) = Cosigned By    Initials Name Effective Dates    JUAN DIEGO Tai, MS TAYLER-SLP 06/22/16 -         Blanca Tai MS TAYLER-SLP  12/11/2017

## 2017-12-11 NOTE — PROGRESS NOTES
"SERVICE: [facility] HOSPITALIST    CONSULTANTS: Ortho    CHIEF COMPLAINT: f/u HTN/DM    SUBJECTIVE: The patient reports pain is well-controlled, ambulating with PT/OT, but remains very confused. Daughter at bedside report the patient is up all night coughing, sleeps during the day and is not complaining of any pain. She reports this morning she complained of a \"sour stomach\" and was not hungry for breakfast this morning.  She reports that nursing noted that he was wheezing last night and his confusion has gotten much worse.  She reports preference for [facility] for rehabilitation. Staff otherwise denies f/c/soa/chest pain/n/v/d/abdominal pain or other new concerns.    OBJECTIVE:    /85 (BP Location: Left arm, Patient Position: Lying)  Pulse 85  Temp 98.2 °F (36.8 °C) (Oral)   Resp 21  Ht 188 cm (74\")  Wt 105 kg (231 lb)  SpO2 98%  BMI 29.66 kg/m2    MEDS/LABS REVIEWED AND ORDERED    docusate sodium 100 mg Oral BID   famotidine 20 mg Oral Daily   glipiZIDE 10 mg Oral BID AC   insulin aspart 0-9 Units Subcutaneous 4x Daily AC & at Bedtime   levothyroxine 100 mcg Oral QAM   Liraglutide 1.8 mg Subcutaneous Daily   melatonin 5 mg Oral Nightly   metFORMIN 1,000 mg Oral BID With Meals   metoprolol tartrate 25 mg Oral BID     Physical Exam   Constitutional: He appears well-developed and well-nourished.   HENT:   Head: Normocephalic and atraumatic.   Eyes: EOM are normal. Pupils are equal, round, and reactive to light.   Cardiovascular: Normal rate.    Irregular rhythm, grade 2/6 systolic murmur   Pulmonary/Chest: Effort normal.   Diminished bases bilaterally   Abdominal:   Mildly distended, able to palpate all quadrants without pain. BS+ all quadrants   Musculoskeletal: He exhibits no edema.   RUE in sling with CMS intact.   Neurological:   Unable to determine orientation, very confused   Skin: Skin is warm and dry. No erythema.   Right shoulder incision dressing clean, dry and intact "   Psychiatric:   Confused, calm, unable to follow commands well, very slow to react. When sitting, somnolent during the day.   Vitals reviewed.    LAB/DIAGNOSTICS:    Lab Results (last 24 hours)     Procedure Component Value Units Date/Time    POC Glucose Fingerstick [[id number]]  (Abnormal) Collected:  12/10/17 1102    Specimen:  Blood Updated:  12/10/17 1137     Glucose 229 (H) mg/dL     Narrative:       Meter: [id number] : [id number] Johanaker Lorenza NA CERT    POC Glucose Fingerstick [[id number]]  (Abnormal) Collected:  12/10/17 1717    Specimen:  Blood Updated:  12/10/17 1724     Glucose 181 (H) mg/dL     Narrative:       Meter: [id number] : [id number] Faulker Lorenza NA CERT    Hemoglobin A1c [[id number]]  (Abnormal) Collected:  12/10/17 0346    Specimen:  Blood Updated:  12/10/17 1759     Hemoglobin A1C 6.90 (H) %     Narrative:       Hemoglobin A1C Ranges:    Increased Risk for Diabetes  5.7% to 6.4%  Diabetes                     >= 6.5%  Diabetic Goal                < 7.0%    POC Glucose Fingerstick [[id number]]  (Abnormal) Collected:  12/10/17 2008    Specimen:  Blood Updated:  12/10/17 2015     Glucose 259 (H) mg/dL     Narrative:       Meter: [id number] : [id number] Shelby Olivas CNA    Hemoglobin & Hematocrit, Blood [[id number]]  (Abnormal) Collected:  12/11/17 0417    Specimen:  Blood Updated:  12/11/17 0449     Hemoglobin 12.1 (L) g/dL      Hematocrit 35.6 (L) %     Basic Metabolic Panel [[id number]]  (Abnormal) Collected:  12/11/17 0417    Specimen:  Blood Updated:  12/11/17 0529     Glucose 198 (H) mg/dL      BUN 27 (H) mg/dL      Creatinine 1.23 mg/dL      Sodium 137 mmol/L      Potassium 4.1 mmol/L      Chloride 99 mmol/L      CO2 25.4 mmol/L      Calcium 8.6 (L) mg/dL      eGFR Non African Amer 56 (L) mL/min/1.73      BUN/Creatinine Ratio 22.0     Anion Gap 12.6 mmol/L     Narrative:       The MDRD GFR formula is only valid for adults with stable renal function between ages 18 and 70.    POC  Glucose Fingerstick [[id number]]  (Abnormal) Collected:  12/11/17 0652    Specimen:  Blood Updated:  12/11/17 0658     Glucose 208 (H) mg/dL     Narrative:       Meter: [id number] : [id number] Shelby Anthonylynn  ELENA        ASSESSMENT/PLAN:  1. Hypertension:   BP overall near goal for age on metoprolol  Resume lisinopril at half home dose today  Continue to hold HCTZ      2. DM-2: A1C 6.9% this admit  Metformin held this admit due to renal function, however creatinine remains at baseline  Continued on home glipizide 10 mg twice daily/home victoza 1.8 mg SC daily  Will resume home metformin today and monitor renal function  Continues on moderate dose SSI/accuchecks ac/hs     3. CKD stage III:   Baseline range 1.25 to 1.61  Creatinine 1.23 today, no acute issues, monitor with resuming metformin    4. Grade 1 diastolic dysfunction/mild to mod AR/low normal EF: per echo 12/4/17  No acute issues here, EF noted approx 50%     5. Mild post-operative anemia: hemoglobin stable at 12.1 without active blood loss noted     6. HLD: no meds, no current acute issues      7. Hypothyroidism: No acute issues on home replacement.      8. GERD: will add pepcid with nausea today no meds noted      9. Macular degeneration: No acute issues currently.      10. H/O vertigo: no acute issues here    11. Suspected dementia: likely confusion worsened by admission/pain meds etc.  Cognitively unsafe to be at home per staff  Will ask SLP to do cognitive evaluation  Plan f/u outpatient cognitive eval after discharge once off pain meds    12. Cough: check CXR now  Add mucinex DM if no acute findings    13. Nausea/vomiting: add zofran, monitor, checking KUB now      14. Osteoarthritis right glenohumeral joint and rotator cuff tendinopathy s/p Right total reverse shoulder: POD#2.  Dr. Bhardwaj managing.   DVT Prophylaxis per ortho     Plan: check CXR/KUB for c/o coughing/abdominal distention with n/v  Add melatonin at night for sleep  Add mucinex DM for  cough if CXR negative for acute infection  Await SLP evaluation

## 2017-12-11 NOTE — PLAN OF CARE
Problem: Patient Care Overview (Adult)  Goal: Plan of Care Review  Outcome: Ongoing (interventions implemented as appropriate)    12/11/17 6223   Coping/Psychosocial Response Interventions   Plan Of Care Reviewed With patient;spouse;daughter   Patient Care Overview   Progress improving   Outcome Evaluation   Outcome Summary/Follow up Plan PRN med given for cough with little relief. VSS. alert reinforcement of proceedures and reminders to time and placeFamily at bedside       Goal: Adult Individualization and Mutuality  Outcome: Ongoing (interventions implemented as appropriate)  Goal: Discharge Needs Assessment  Outcome: Ongoing (interventions implemented as appropriate)    Problem: Infection, Risk/Actual (Adult)  Goal: Identify Related Risk Factors and Signs and Symptoms  Outcome: Ongoing (interventions implemented as appropriate)  Goal: Infection Prevention/Resolution  Outcome: Ongoing (interventions implemented as appropriate)    Problem: Pain, Acute (Adult)  Goal: Identify Related Risk Factors and Signs and Symptoms  Outcome: Ongoing (interventions implemented as appropriate)  Goal: Acceptable Pain Control/Comfort Level  Outcome: Ongoing (interventions implemented as appropriate)    Problem: Activity Intolerance (Adult)  Goal: Identify Related Risk Factors and Signs and Symptoms  Outcome: Ongoing (interventions implemented as appropriate)  Goal: Activity Tolerance  Outcome: Ongoing (interventions implemented as appropriate)  Goal: Effective Energy Conservation Techniques  Outcome: Ongoing (interventions implemented as appropriate)    Problem: Arrhythmia/Dysrhythmia (Symptomatic) (Adult)  Goal: Signs and Symptoms of Listed Potential Problems Will be Absent or Manageable (Arrhythmia/Dysrhythmia)  Outcome: Ongoing (interventions implemented as appropriate)

## 2017-12-11 NOTE — NURSING NOTE
Continued Stay Note  MERCEDEZ Reyes     Patient Name: Joey Schmitt  MRN: [id number]  Today's Date: 12/11/2017    Admit Date: 12/8/2017          Discharge Plan       12/11/17 1522    Case Management/Social Work Plan    Additional Comments Spoke with the wife at bedside (patient is confused) concerning referral to [facility].  Spoke with Germania @ [facility] and she review for admission to [facility] [place]. Will continue to follow.              Discharge Codes     None            Sandra Goodwin RN

## 2017-12-11 NOTE — THERAPY TREATMENT NOTE
Acute Care - Physical Therapy Treatment Note   Zulma Fraire     Patient Name: Joey Schmitt  : 1935  MRN: [id number]  Today's Date: 2017  Onset of Illness/Injury or Date of Surgery Date: 17  Date of Referral to PT: 17  Referring Physician: Dr Pelayo    Admit Date: 2017    Visit Dx:    ICD-10-CM ICD-9-CM   1. Osteoarthritis of right glenohumeral joint M19.011 715.91   2. Tendinopathy of right rotator cuff M67.911 727.9   3. History of cancer Z85.9 V10.90     Patient Active Problem List   Diagnosis   • Osteoarthritis of right glenohumeral joint               Adult Rehabilitation Note       17 0920          Rehab Assessment/Intervention    Discipline physical therapist  -      Document Type therapy note (daily note)  -JW      Subjective Information agree to therapy;complains of;fatigue   requires cues to maintain alertness  -JW      Patient Effort, Rehab Treatment fair  -JW      Symptoms Noted Comment pt with significant confusion throughout treatment  -JW      Precautions/Limitations non-weight bearing status   right UE in abd brace  -JW      Recorded by [NIGEL] Alexandra Hollingsworth, PT      Pain Assessment    Pain Assessment No/denies pain  -JW      Recorded by [NIGEL] Alexandra Hollingsworth, PT      Cognitive Assessment/Intervention    Current Cognitive/Communication Assessment impaired  -      Orientation Status oriented to;person   unable to accurately report current year/month  -JW      Follows Commands/Answers Questions able to follow single-step instructions;25% of the time;needs cueing;needs increased time;needs repetition  -JW      Personal Safety severe impairment;impulsive;decreased insight to deficits;unaware of functional deficits;decreased awareness, need for assist;decreased awareness, need for safety  -      Personal Safety Interventions gait belt;nonskid shoes/slippers when out of bed  -JW      Recorded by [NIGEL] Alexandra Hollingsworth, PT      Bed Mobility, Assessment/Treatment    Bed  Mobility, Assistive Device bed rails;head of bed elevated  -      Bed Mob, Supine to Sit, [place] moderate assist (50% patient effort);2 person assist required;verbal cues required   significantly increased time required to complete  -      Bed Mobility, Comment pt requires increased time to complete task with multiple cues given for 1 step directions  -JW      Recorded by [JW] Alexandra Hollingsworth, PT      Transfer Assessment/Treatment    Transfers, Sit-Stand [place] moderate assist (50% patient effort);2 person assist required;verbal cues required   significant posterior lean upon immediate standing  -      Transfers, Stand-Sit [place] maximum assist (25% patient effort);2 person assist required;verbal cues required  -      Transfers, Sit-Stand-Sit, Assist Device quad cane  -JW      Transfer, Comment pt with difficulty initiating transfers.  patient requires max verbal cues for safety and sequencing.  pt requires approximately 2.5 minutes to successfully perform stand to sit transfer with significant tactile and verbal cues required.  -      Recorded by [JW] Alexandra Hollingsworth, PT      Gait Assessment/Treatment    Gait, [place] Level moderate assist (50% patient effort);2 person assist required;verbal cues required  -      Gait, Assistive Device other (see comments)   multiple devices attempted during session  -      Gait, Distance (Feet) 20  -      Gait, Gait Deviations [place] decreased;forward flexed posture;narrow base;step length decreased;decreased heel strike  -      Gait, Safety Issues sequencing ability decreased;step length decreased;weight-shifting ability decreased  -      Gait, Comment pt with significant posterior lean in standing, requires assistance from therapist to correct.  patient trialed quad cane, single point cane and hemiwalker, however pt with difficulty sequencing with device despite max verbal cues and assistance from therapist for device placement.  pt  requires verbal cues for proper weight shifting to advance LE during swing phase.  Patient with difficulty obtaining proper hand placement on device.    -JW      Recorded by [JW] Alexandra Hollingsworth, PT      Positioning and Restraints    Pre-Treatment Position in bed  -JW      Post Treatment Position chair  -JW      In Chair notified nsg;call light within reach;encouraged to call for assist;with family/caregiver   nurse pracitioner in room to discuss progress with family  -JW      Recorded by [JW] Alexandra Hollingsworth, PT        User Key  (r) = Recorded By, (t) = Taken By, (c) = Cosigned By    Initials Name Effective Dates    NIGEL Hollingsworth, PT 12/01/15 -                 IP PT Goals       12/10/17 1028          Bed Mobility PT STG    Bed Mobility PT STG, Date Established 12/10/17  -JW      Bed Mobility PT STG, Time to Achieve 3 days  -JW      Bed Mobility PT STG, Activity Type all bed mobility  -JW      Bed Mobility PT STG, [place] Level supervision required  -JW      Transfer Training PT STG    Transfer Training PT STG, Date Established 12/10/17  -JW      Transfer Training PT STG, Time to Achieve 3 days  -JW      Transfer Training PT STG, [place] Level contact guard assist  -JW      Transfer Training PT STG, Assist Device --   with appropriate device  -JW      Gait Training PT STG    Gait Training Goal PT STG, Date Established 12/10/17  -JW      Gait Training Goal PT STG, Time to Achieve 3 days  -JW      Gait Training Goal PT STG, [place] Level contact guard assist  -JW      Gait Training Goal PT STG, Assist Device --   with appropriate device  -JW      Gait Training Goal PT STG, Distance to Achieve 100  -JW      Patient Education PT STG    Patient Education PT STG, Date Established 12/10/17  -JW      Patient Education PT STG, Time to Achieve 3 days  -JW      Patient Education PT STG, Education Type precaution per surgeon  -JW      Patient Education PT STG, Education Understanding demonstrate  adequately;verbalize understanding  -        User Key  (r) = Recorded By, (t) = Taken By, (c) = Cosigned By    Initials Name Provider Type    NIGEL Hollingsworth PT Physical Therapist          Physical Therapy Education     Title: PT OT SLP Therapies (Active)     Topic: Physical Therapy (Active)     Point: Mobility training (Done)    Learning Progress Summary    Learner Readiness Method Response Comment Documented by Status   Patient Acceptance E VU   12/11/17 1019 Done    Acceptance E NR   12/10/17 1026 Active               Point: Precautions (Done)    Learning Progress Summary    Learner Readiness Method Response Comment Documented by Status   Patient Acceptance E VU   12/11/17 1019 Done    Acceptance E NR   12/10/17 1026 Active                      User Key     Initials Effective Dates Name Provider Type Discipline     12/01/15 -  Alexandra Hollingsworth PT Physical Therapist PT                    PT Recommendation and Plan  Anticipated Equipment Needs At Discharge: other (see comments) (will continue to assess pts willingness/ability to use DME)  Anticipated Discharge Disposition: skilled nursing facility (with potential to stay for extended time period until right UE weight bearing restrictions lifted)  Planned Therapy Interventions: balance training, bed mobility training, gait training, home exercise program, transfer training, patient/family education  PT Frequency: daily (6x/week)  Plan of Care Review  Plan Of Care Reviewed With: patient  Outcome Summary/Follow up Plan: PT: Patient requires mod assist x2 for bed mobility and sit to stand transfers.  Patient trialed quad cane, hemiwalker, and SPC during gait, however pt with significant difficulty sequencing device despite multiple cues and assistance from therapist.  Patient continues to have difficulty with initiation of movement and following one step commands during mobility.  Continue to recommend further rehab in facility where patient can remain  for extended period of time until right UE weight bearing restrictions lifted.          Outcome Measures       12/11/17 0920 12/10/17 0909       How much help from another person do you currently need...    Turning from your back to your side while in flat bed without using bedrails? 2  -JW 2  -JW     Moving from lying on back to sitting on the side of a flat bed without bedrails? 1  -JW 2  -JW     Moving to and from a bed to a chair (including a wheelchair)? 1  -JW 2  -JW     Standing up from a chair using your arms (e.g., wheelchair, bedside chair)? 1  -JW 2  -JW     Climbing 3-5 steps with a railing? 1  -JW 1  -JW     To walk in hospital room? 1  -JW 2  -JW     AM-PAC 6 Clicks Score 7  -JW 11  -JW     Functional Assessment    Outcome Measure Options AM-PAC 6 Clicks Basic Mobility (PT)  -JW AM-PAC 6 Clicks Basic Mobility (PT)  -       User Key  (r) = Recorded By, (t) = Taken By, (c) = Cosigned By    Initials Name Provider Type    NIGEL Hollingsworth PT Physical Therapist           Time Calculation:         PT Charges       12/11/17 1027          Time Calculation    Start Time 0920  -      Stop Time 0955  -      Time Calculation (min) 35 min  -      PT Received On 12/11/17  -NIGEL      PT - Next Appointment 12/12/17  -NIGEL        User Key  (r) = Recorded By, (t) = Taken By, (c) = Cosigned By    Initials Name Provider Type    NIGEL Hollingsworth PT Physical Therapist          Therapy Charges for Today     Code Description Service Date Service Provider Modifiers Qty    20928028235 HC PT MOBILITY CURRENT 12/10/2017 Alexandra Hollingsworth, PT GP, CK 1    86756304172 HC PT MOBILITY PROJECTED 12/10/2017 Alexandra Hollingsworth, PT GP, CJ 1    35829060550 HC PT EVAL MOD COMPLEXITY 3 12/10/2017 Alexandra Hollingsworth PT GP 1    62882044642 HC PT THER SUPP EA 15 MIN 12/10/2017 Alexandra Hollingsworth PT GP 1    16272681693 HC PT THER PROC EA 15 MIN 12/11/2017 Alexandra Hollingsworth, PT GP 1    10280146666 HC PT CARE PLAN EACH 15 MIN 12/11/2017 Alexandra Hollingsworth PT  GP 1          PT G-Codes  PT Professional Judgement Used?: Yes  Outcome Measure Options: AM-PAC 6 Clicks Basic Mobility (PT)  Functional Limitation: Mobility: Walking and moving around  Mobility: Walking and Moving Around Current Status (): At least 40 percent but less than 60 percent impaired, limited or restricted  Mobility: Walking and Moving Around Goal Status (): At least 20 percent but less than 40 percent impaired, limited or restricted    Alexandra Hollingsworth, PT  12/11/2017

## 2017-12-12 LAB
ANION GAP SERPL CALCULATED.3IONS-SCNC: 14.2 MMOL/L
BUN BLD-MCNC: 29 MG/DL (ref 8–23)
BUN/CREAT SERPL: 21.6 (ref 7–25)
CALCIUM SPEC-SCNC: 8.5 MG/DL (ref 8.8–10.5)
CHLORIDE SERPL-SCNC: 101 MMOL/L (ref 98–107)
CO2 SERPL-SCNC: 23.8 MMOL/L (ref 22–29)
CREAT BLD-MCNC: 1.34 MG/DL (ref 0.76–1.27)
GFR SERPL CREATININE-BSD FRML MDRD: 51 ML/MIN/1.73
GLUCOSE BLD-MCNC: 100 MG/DL (ref 65–99)
GLUCOSE BLDC GLUCOMTR-MCNC: 109 MG/DL (ref 70–130)
GLUCOSE BLDC GLUCOMTR-MCNC: 159 MG/DL (ref 70–130)
GLUCOSE BLDC GLUCOMTR-MCNC: 168 MG/DL (ref 70–130)
GLUCOSE BLDC GLUCOMTR-MCNC: 236 MG/DL (ref 70–130)
HCT VFR BLD AUTO: 35.2 % (ref 42–52)
HGB BLD-MCNC: 12 G/DL (ref 14–18)
POTASSIUM BLD-SCNC: 4.3 MMOL/L (ref 3.5–5.2)
SODIUM BLD-SCNC: 139 MMOL/L (ref 136–145)

## 2017-12-12 PROCEDURE — 97530 THERAPEUTIC ACTIVITIES: CPT

## 2017-12-12 PROCEDURE — 25010000002 ONDANSETRON PER 1 MG: Performed by: NURSE PRACTITIONER

## 2017-12-12 PROCEDURE — 82962 GLUCOSE BLOOD TEST: CPT

## 2017-12-12 PROCEDURE — 99024 POSTOP FOLLOW-UP VISIT: CPT | Performed by: ORTHOPAEDIC SURGERY

## 2017-12-12 PROCEDURE — 94799 UNLISTED PULMONARY SVC/PX: CPT

## 2017-12-12 PROCEDURE — 85014 HEMATOCRIT: CPT | Performed by: ORTHOPAEDIC SURGERY

## 2017-12-12 PROCEDURE — 85018 HEMOGLOBIN: CPT | Performed by: ORTHOPAEDIC SURGERY

## 2017-12-12 PROCEDURE — 99232 SBSQ HOSP IP/OBS MODERATE 35: CPT | Performed by: NURSE PRACTITIONER

## 2017-12-12 PROCEDURE — 97110 THERAPEUTIC EXERCISES: CPT

## 2017-12-12 PROCEDURE — 80048 BASIC METABOLIC PNL TOTAL CA: CPT | Performed by: NURSE PRACTITIONER

## 2017-12-12 RX ORDER — LEVOTHYROXINE SODIUM 0.05 MG/1
TABLET ORAL
Status: DISPENSED
Start: 2017-12-12 | End: 2017-12-12

## 2017-12-12 RX ORDER — ACETAMINOPHEN 500 MG
1000 TABLET ORAL EVERY 6 HOURS PRN
Status: DISCONTINUED | OUTPATIENT
Start: 2017-12-12 | End: 2017-12-14 | Stop reason: HOSPADM

## 2017-12-12 RX ADMIN — ONDANSETRON 4 MG: 2 INJECTION, SOLUTION INTRAMUSCULAR; INTRAVENOUS at 03:03

## 2017-12-12 RX ADMIN — GUAIFENESIN AND DEXTROMETHORPHAN HYDROBROMIDE 1 TABLET: 600; 30 TABLET, EXTENDED RELEASE ORAL at 20:14

## 2017-12-12 RX ADMIN — Medication 5 MG: at 20:13

## 2017-12-12 RX ADMIN — METOPROLOL TARTRATE 25 MG: 25 TABLET, FILM COATED ORAL at 09:16

## 2017-12-12 RX ADMIN — METOPROLOL TARTRATE 25 MG: 25 TABLET, FILM COATED ORAL at 17:30

## 2017-12-12 RX ADMIN — GLIPIZIDE 10 MG: 10 TABLET ORAL at 09:16

## 2017-12-12 RX ADMIN — METFORMIN HYDROCHLORIDE 1000 MG: 500 TABLET ORAL at 09:16

## 2017-12-12 RX ADMIN — TRAMADOL HYDROCHLORIDE 50 MG: 50 TABLET, FILM COATED ORAL at 03:18

## 2017-12-12 RX ADMIN — DOCUSATE SODIUM 100 MG: 100 CAPSULE, LIQUID FILLED ORAL at 13:52

## 2017-12-12 RX ADMIN — ONDANSETRON 4 MG: 2 INJECTION, SOLUTION INTRAMUSCULAR; INTRAVENOUS at 09:13

## 2017-12-12 RX ADMIN — GLIPIZIDE 10 MG: 10 TABLET ORAL at 17:30

## 2017-12-12 RX ADMIN — LEVOTHYROXINE SODIUM 100 MCG: 50 TABLET ORAL at 07:04

## 2017-12-12 RX ADMIN — DOCUSATE SODIUM 100 MG: 100 CAPSULE, LIQUID FILLED ORAL at 17:30

## 2017-12-12 RX ADMIN — FAMOTIDINE 20 MG: 20 TABLET, FILM COATED ORAL at 09:16

## 2017-12-12 RX ADMIN — METFORMIN HYDROCHLORIDE 1000 MG: 500 TABLET ORAL at 17:30

## 2017-12-12 NOTE — PROGRESS NOTES
"SERVICE: [facility] HOSPITALIST    CONSULTANTS: Ortho    CHIEF COMPLAINT: f/u right total reverse shoulder    SUBJECTIVE: Patient awakens and reports he is having no pain at all. Daughter and wife are at bedside and report he is pretty \"wild\" all night long, awake and then sleeps all day. They are concerned about him being reversed day/night. Cough is much less today. They report he didn't eat well at breakfast but did much better at lunch. Awaiting [facility] home for rehab. They otherwise deny f/c/cough/soa/chest pain/n/v/d/abdominal pain or other new concerns.    OBJECTIVE:    /71 (BP Location: Left arm, Patient Position: Lying)  Pulse 52  Temp 98.1 °F (36.7 °C) (Oral)   Resp 20  Ht 188 cm (74\")  Wt 105 kg (231 lb)  SpO2 95%  BMI 29.66 kg/m2    MEDS/LABS REVIEWED AND ORDERED    docusate sodium 100 mg Oral BID   famotidine 20 mg Oral Daily   glipiZIDE 10 mg Oral BID AC   insulin aspart 0-9 Units Subcutaneous 4x Daily AC & at Bedtime   levothyroxine      levothyroxine 100 mcg Oral QAM   Liraglutide 1.8 mg Subcutaneous Daily   melatonin 5 mg Oral Nightly   metFORMIN 1,000 mg Oral BID With Meals   metoprolol tartrate 25 mg Oral BID     Physical Exam   Constitutional: He appears well-developed and well-nourished.   HENT:   Head: Normocephalic and atraumatic.   Eyes: EOM are normal. Pupils are equal, round, and reactive to light.   Cardiovascular: Normal rate and regular rhythm.    Pulmonary/Chest: Effort normal and breath sounds normal. No respiratory distress. He has no wheezes. He has no rales.   Abdominal: Soft. Bowel sounds are normal. He exhibits no distension. There is no tenderness.   Musculoskeletal: He exhibits no edema.   Neurological:   Sleepy, unable to determine orientation   Skin: Skin is warm and dry. No erythema.   Right shoulder incision dressings clean, dry and intact without erythema in immobilizer. CMS to right arm intact   Psychiatric: He has a normal mood and affect. " His behavior is normal.   Sleepy but awakens to verbal/tactile   Vitals reviewed.    LAB/DIAGNOSTICS:    Lab Results (last 24 hours)     Procedure Component Value Units Date/Time    POC Glucose Fingerstick [[id number]]  (Normal) Collected:  12/11/17 1620    Specimen:  Blood Updated:  12/11/17 1627     Glucose 113 mg/dL     Narrative:       Meter: [id number] : [id number] Fahad Goldman NURSING ASSISTANT    POC Glucose Fingerstick [[id number]]  (Abnormal) Collected:  12/11/17 2047    Specimen:  Blood Updated:  12/11/17 2053     Glucose 66 (L) mg/dL     Narrative:       Meter: [id number] : [id number] Bo Barnhart NURSING ASSISTANT    Hemoglobin & Hematocrit, Blood [[id number]]  (Abnormal) Collected:  12/12/17 0405    Specimen:  Blood Updated:  12/12/17 0411     Hemoglobin 12.0 (L) g/dL      Hematocrit 35.2 (L) %     Basic Metabolic Panel [[id number]]  (Abnormal) Collected:  12/12/17 0405    Specimen:  Blood Updated:  12/12/17 0435     Glucose 100 (H) mg/dL      BUN 29 (H) mg/dL      Creatinine 1.34 (H) mg/dL      Sodium 139 mmol/L      Potassium 4.3 mmol/L      Chloride 101 mmol/L      CO2 23.8 mmol/L      Calcium 8.5 (L) mg/dL      eGFR Non African Amer 51 (L) mL/min/1.73      BUN/Creatinine Ratio 21.6     Anion Gap 14.2 mmol/L     Narrative:       The MDRD GFR formula is only valid for adults with stable renal function between ages 18 and 70.    POC Glucose Once [[id number]]  (Abnormal) Collected:  12/12/17 0726    Specimen:  Blood Updated:  12/12/17 0736     Glucose 159 (H) mg/dL     Narrative:       Meter: [id number] : [id number] Fahad Goldman NURSING ASSISTANT    POC Glucose Once [[id number]]  (Abnormal) Collected:  12/12/17 1122    Specimen:  Blood Updated:  12/12/17 1130     Glucose 236 (H) mg/dL     Narrative:       Meter: [id number] : [id number] Fahad Goldman NURSING ASSISTANT        ASSESSMENT/PLAN:  1. Hypertension:   BP overall near goal for age on metoprolol  Resume lisinopril at half home  dose today  Continue to hold HCTZ      2. DM-2: A1C 6.9% this admit  Glucose variable on metformin/glipizide 10 mg twice daily/home victoza 1.8 mg SC daily  Continues on moderate dose SSI/accuchecks ac/hs  Monitor      3. CKD stage III:   Baseline range 1.25 to 1.61  Creatinine 1.34 today, no acute issues  monitor     4. Grade 1 diastolic dysfunction/mild to mod AR/low normal EF: per echo 12/4/17  No acute issues here, EF noted approx 50%     5. Mild post-operative anemia: hemoglobin stable at 12.0 without active blood loss noted      6. HLD: no meds, no current acute issues      7. Hypothyroidism: No acute issues on home replacement.      8. GERD: continue pepcid, no acute issues      9. Macular degeneration: No acute issues currently.      10. H/O vertigo: no acute issues here     11. Moderate-Severe to Severe cognitive communication decline, suspected dementia vs metabolic encephalopathy due to anesthesia/pain meds:   SLP evaluation completed  D/C tramadol, tylenol only  Cognitively unsafe to be at home per staff, [facility]  Plan f/u outpatient cognitive eval after discharge once off pain meds     12. Cough:   CXR negative for acute findings  Better today, no acute issues      13. Nausea/vomiting: Resolved   Eating better this afternoon  KUB negative for acute findings  Continue zofran, monitor      14. Osteoarthritis right glenohumeral joint and rotator cuff tendinopathy s/p Right total reverse shoulder: POD#3.  Dr. Bhardwaj managed   DVT Prophylaxis per ortho   Tylenol only for pain     Plan:   Awaiting [facility] for rehab  Lab holiday tomorrow

## 2017-12-12 NOTE — PLAN OF CARE
Problem: Patient Care Overview (Adult)  Goal: Plan of Care Review  Outcome: Ongoing (interventions implemented as appropriate)    12/12/17 1024   Coping/Psychosocial Response Interventions   Plan Of Care Reviewed With patient   Outcome Evaluation   Outcome Summary/Follow up Plan OT: Pt was cooperative with therapy. Education with rom program for non-involved joints of RUE. Pt was able to follow directions more consistently for transfers/mobility today. Pt requires significant assitance for safety. Plan is for patient to transfer to SNF.          Problem: Inpatient Occupational Therapy  Goal: Caregiver Training Goal STG- OT    12/12/17 1024   Caregiver Training OT STG   Caregiver Training OT STG, Date Goal Reviewed 12/12/17   Caregiver Training OT STG, Outcome goal ongoing       Goal: Caregiver Training Goal LTG- OT  Outcome: Outcome(s) achieved Date Met:  12/12/17 12/11/17 1307 12/12/17 1024   Caregiver Training OT LTG   Caregiver Training OT LTG, Activity Type Caregiver to verbalize techniques for dressing/bathing to adquately support RUE during adl tasks (due to surgical restricitons/precautions) --    Caregiver Training OT LTG, Date Goal Reviewed --  12/12/17   Caregiver Training OT LTG, Outcome --  goal met       Goal: Patient Education Goal STG- OT  Outcome: Outcome(s) achieved Date Met:  12/12/17 12/11/17 1307 12/12/17 1024   Patient Education OT STG   Patient Education OT STG, Education Type HEP --    Patient Education OT STG, Education Understanding demonstrates adequately --    Patient Education OT STG, Additional Goal Pt to perform arom program of non-involved joints of RUE with min cues. --    Patient Education OT STG, Date Goal Reviewed --  12/12/17   Patient Education OT STG Outcome --  goal met  (min cues)

## 2017-12-12 NOTE — THERAPY TREATMENT NOTE
Acute Care - Occupational Therapy Treatment Note  MERCEDEZ Reyes     Patient Name: Joey Schmitt  : 1935  MRN: [id number]  Today's Date: 2017  Onset of Illness/Injury or Date of Surgery Date: 17  Date of Referral to OT: 17  Referring Physician: Gita      Admit Date: 2017    Visit Dx:     ICD-10-CM ICD-9-CM   1. Osteoarthritis of right glenohumeral joint M19.011 715.91   2. Tendinopathy of right rotator cuff M67.911 727.9   3. History of cancer Z85.9 V10.90   4. Cognitive communication deficit R41.841 799.52     Patient Active Problem List   Diagnosis   • Osteoarthritis of right glenohumeral joint             Adult Rehabilitation Note       17 0945           Discipline occupational therapist  -SD    Document Type therapy note (daily note)  -SD    Subjective Information agree to therapy;no complaints  -SD    Patient Effort, Rehab Treatment fair  -SD    Symptoms Noted During/After Treatment dizziness   pt c/o dizziness duirng mobility  -SD    Symptoms Noted Comment     Precautions/Limitations non-weight bearing status;fall precautions;shoulder precautions  -SD    Precautions/Limitations, Vision other (see comments)   macular degeneration  -SD    Recorded by [SD] Bunny Tai OTR       Pain Assessment No/denies pain  -SD    Recorded by [SD] DAMARI Solomon       Current Cognitive/Communication Assessment impaired  -SD    Orientation Status     Follows Commands/Answers Questions able to follow single-step instructions;50% of the time;needs cueing;needs repetition  -SD    Personal Safety     Personal Safety Interventions gait belt;fall prevention program maintained;nonskid shoes/slippers when out of bed;supervised activity  -SD    Recorded by [SD] DAMARI Solomon       Cognitive Assessment/Treatment Comment Pt was able to follow directions more consistently today during transfer/mobility activity and with UE HEP.    -SD    Recorded by [SD] Bunny Tai OTR        Bed Mobility, Assistive Device bed rails;head of bed elevated  -SD    Bed Mob, Supine to Sit, [place] moderate assist (50% patient effort);2 person assist required  -SD    Bed Mobility, Comment     Recorded by [SD] DAMARI Solomon       Transfers, Sit-Stand [place] moderate assist (50% patient effort);maximum assist (25% patient effort);verbal cues required   mod A from EOB (elevated), max A from recliner  -SD    Transfers, Stand-Sit [place] maximum assist (25% patient effort);2 person assist required;verbal cues required  -SD    Transfers, Sit-Stand-Sit, Assist Device quad cane  -SD    Transfer, Comment Pt with posterior lean when intially coming to a standing position.    -SD    Recorded by [SD] DAMARI Solomon          Right Upper Extremity AROM:;10 reps   arom of non-involved joints (elbow to hand)  -SD    Left Upper Extremity AROM:   education with benefits of rom for LUE to maintain rom/stren  -SD    Recorded by [SD] DAMARI Solomon       Pre-Treatment Position in bed  -SD    Post Treatment Position chair  -SD    In Chair reclined;call light within reach;encouraged to call for assist;with family/caregiver;with nsg;with brace   pillow positioned under RUE for support in chair  -SD    Recorded by [SD] DAMARI Solomon      User Key  (r) = Recorded By, (t) = Taken By, (c) = Cosigned By    Initials Name Effective Dates    SD Bunny Tai OTR 06/22/16 -     NIGEL Hollingsworth, PT 12/01/15 -                 OT Goals       12/12/17 1024 12/11/17 1307       Caregiver Training OT STG    Caregiver Training OT STG, Date Established  12/11/17  -SD     Caregiver Training OT STG, Time to Achieve  by discharge  -SD     Caregiver Training OT STG, Activity Type  Caregiver able to demonstrate ability to don/doff shoulder support to allow for adl activity.   -SD     Caregiver Training OT STG, [place] Level  able to assist adequately  -SD     Caregiver Training OT STG, Date Goal  Reviewed 12/12/17  -SD      Caregiver Training OT STG, Outcome goal ongoing  -SD      Caregiver Training OT LTG    Caregiver Training OT LTG, Date Established  12/11/17  -SD     Caregiver Training OT LTG, Time to Achieve  by discharge  -SD     Caregiver Training OT LTG, Activity Type  Caregiver to verbalize techniques for dressing/bathing to adquately support RUE during adl tasks (due to surgical restricitons/precautions)  -SD     Caregiver Training OT LTG, Date Goal Reviewed 12/12/17  -SD      Caregiver Training OT LTG, Outcome goal met   family understands need to assist with adl's   -SD      Patient Education OT STG    Patient Education OT STG, Date Established  12/11/17  -SD     Patient Education OT STG, Time to Achieve  by discharge  -SD     Patient Education OT STG, Education Type  HEP  -SD     Patient Education OT STG, Education Understanding  demonstrates adequately  -SD     Patient Education OT STG, Additional Goal  Pt to perform arom program of non-involved joints of RUE with min cues.  -SD     Patient Education OT STG, Date Goal Reviewed 12/12/17  -SD      Patient Education OT STG Outcome goal met   min cues  -SD        User Key  (r) = Recorded By, (t) = Taken By, (c) = Cosigned By    Initials Name Provider Type    CLARA Tai, OTR Occupational Therapist          Occupational Therapy Education     Title: PT OT SLP Therapies (Active)     Topic: Occupational Therapy (Active)     Point: ADL training (Active)    Description: Instruct learner(s) on proper safety adaptation and remediation techniques during self care or transfers.   Instruct in proper use of assistive devices.    Learning Progress Summary    Learner Readiness Method Response Comment Documented by Status   Patient Acceptance E NR Education regardiing OT services, activity and rom restrictions following surgery and impact on daily tasks, and rom program for non-involved joints of RUE..  Pt is confused.  He needed reinforcement. SD  12/11/17 1304 Active   Family Acceptance E NR Education regardiing OT services, activity and rom restrictions following surgery and impact on daily tasks, and rom program for non-involved joints of RUE..  Pt is confused.  He needed reinforcement. SD 12/11/17 1304 Active               Point: Home exercise program (Done)    Description: Instruct learner(s) on appropriate technique for monitoring, assisting and/or progressing therapeutic exercises/activities.    Learning Progress Summary    Learner Readiness Method Response Comment Documented by Status   Patient Acceptance E,D,TB DU,NR Pt able to perform arom program for non-invovled joints of RUE (elbow to hand) with verbal cues.  Education with benefits of arom for LUE to maintain rom/strength. SD 12/12/17 1022 Done    Acceptance E NR Education regardiing OT services, activity and rom restrictions following surgery and impact on daily tasks, and rom program for non-involved joints of RUE..  Pt is confused.  He needed reinforcement. SD 12/11/17 1304 Active   Family Acceptance E,D,TB DU,NR Pt able to perform arom program for non-invovled joints of RUE (elbow to hand) with verbal cues.  Education with benefits of arom for LUE to maintain rom/strength. SD 12/12/17 1022 Done    Acceptance E NR Education regardiing OT services, activity and rom restrictions following surgery and impact on daily tasks, and rom program for non-involved joints of RUE..  Pt is confused.  He needed reinforcement. SD 12/11/17 1304 Active                      User Key     Initials Effective Dates Name Provider Type Discipline    SD 06/22/16 -  Bunny Tai, OTR Occupational Therapist OT                  OT Recommendation and Plan  Anticipated Equipment Needs At Discharge:  Quad cane  Anticipated Discharge Disposition: skilled nursing facility  Planned Therapy Interventions: other (see comments) (caregiver education, )  Therapy Frequency: 2-3 times/wk  Plan of Care Review  Plan Of Care  Reviewed With: patient  Outcome Summary/Follow up Plan: OT:  Pt was cooperative with therapy.  Education with rom program for non-involved joints of RUE.  Pt was able to follow directions more consistently for transfers/mobility today.  Pt requires significant assitance for safety.  Plan is for patient to transfer to SNF.         Outcome Measures       12/12/17 1000      How much help from another is currently needed...    Putting on and taking off regular lower body clothing? 1  -SD      Bathing (including washing, rinsing, and drying) 2  -SD      Toileting (which includes using toilet bed pan or urinal) 1  -SD      Putting on and taking off regular upper body clothing 2  -SD      Taking care of personal grooming (such as brushing teeth) 2  -SD      Eating meals 3  -SD      Score 11  -SD      Functional Assessment    Outcome Measure Options AM-PAC 6 Clicks Daily Activity (OT)  -SD        12/10/17 0909            User Key  (r) = Recorded By, (t) = Taken By, (c) = Cosigned By    Initials Name Provider Type    DAMARI Jensen Occupational Therapist           Time Calculation:         Time Calculation- OT       12/12/17 1029          Time Calculation- OT    OT Start Time 0915  -SD      OT Stop Time 0954  -SD      OT Time Calculation (min) 39 min  -SD        User Key  (r) = Recorded By, (t) = Taken By, (c) = Cosigned By    Initials Name Provider Type    DAMARI Jensen Occupational Therapist           Therapy Charges for Today     Code Description Service Date Service Provider Modifiers Qty    88928051288 HC OT THERAPEUTIC ACT EA 15 MIN 12/12/2017 DAMARI Solomon GO 3               DAMARI Philippe  12/12/2017

## 2017-12-12 NOTE — PROGRESS NOTES
"Adult Nutrition  Assessment/PES    Patient Name:  Joey Schmitt  YOB: 1935  MRN: [id number]  Admit Date:  12/8/2017    Assessment Date:  12/12/2017    Comments:  Will send CIB Lite mixed with strawberry ice cream & milk 3 times per day to aid with po.  Notified diet office of pt prefs.           Reason for Assessment       12/12/17 1020    Reason for Assessment    Reason For Assessment/Visit education    list    Cardiac HTN    Neurological Dementia    Ortho Other (comment)   s/p shoulder repair    Other diagnosis hx prostate CA            Data Eval/ Notes       12/12/17 1025     Notes    Note Pt asleep in chair, spoke with wife and daughter, NIKO. Pt refused am meal past two days, daughter brought in sausage/biscuit for pt only took bites. Normally better eater and likes sweets. Wife states he likes strawberry ice cream & they are open to sending him CIB supplement. He likes cottage cheese/peaches as well.             Nutrition/Diet History       12/12/17 1026    Nutrition/Diet History    Patient Reported Diet/Restrictions/Preferences regular            Anthropometrics       12/12/17 1026    Anthropometrics    RD Documented Current Weight  105 kg (231 lb)    Anthropometrics (Special Considerations)    RD Calculated BMI (kg/m2) 29.6    Body Mass Index (BMI)    BMI Grade 25 - 29.9 - overweight            Labs/Tests/Procedures/Meds       12/12/17 1026    Labs/Tests/Procedures/Meds    Labs/Tests Review Reviewed;Glucose;Creat;BUN    Medication Review Reviewed, pertinent;Diabetic Oral Agent;Insulin              Estimated/Assessed Needs       12/12/17 1027    Calculation Measurements    Weight Used For Calculations 105 kg (231 lb 7.7 oz)    Height Used for Calculations 1.88 m (6' 2\")    Estimated/Assessed Energy Needs    Energy Need Method Ray-St Ramonor    Age 82    RMR (Ray-St. Jeor Equation) 1824.75    Activity Factors (Ray St Ramonor)  Confined to bed  1.2    " Estimated Kcal Range  1938 kcal (218 gm CHO, 45% kcal )    Estimated/Assessed Protein Needs    Weight Used for Protein Calculation 105 kg (231 lb 7.7 oz)    Protein (gm/kg) 1.0    1.0 Gm Protein (gm) 105    Estimated/Assessed Fluid Needs    Fluid Need Method RDA method    RDA Method (mL)  1938            Nutrition Prescription Ordered       12/12/17 1028    Nutrition Prescription PO    Common Modifiers Cardiac;Consistent Carbohydrate            Evaluation of Received Nutrient/Fluid Intake       12/12/17 1028    Evaluation of Received Nutrient/Fluid Intake    Nutrition Delivered Fluid Evaluation    Fluid Intake Evaluation    Oral Fluid (mL) 1040    Total Fluid Intake (mL) 1040    % Fluid Needs 54    PO Evaluation    Number of Meals 8    % PO Intake 47            Problem/Interventions:        Problem 1       12/12/17 1029    Nutrition Diagnoses Problem 1    Problem 1 Inadequate Intake/Infusion    Inadequate Intake Type Oral    Etiology (related to) Factors Affecting Nutrition    Signs/Symptoms (evidenced by) Report/Observation;PO Intake                    Intervention Goal       12/12/17 1029    Intervention Goal    General Meet nutritional needs for age/condition    PO Increase intake;PO intake (%)    PO Intake % 65 %   pt will consume 65% of meals & fluids consistently            Nutrition Intervention       12/12/17 1030    Nutrition Intervention    RD/Tech Action Interview for preference;Advise alternate selection;Encourage intake;Recommend/ordered;Follow Tx progress    Recommended/Ordered Supplement              Education/Evaluation       12/12/17 1030    Education    Education Other (comment)   Pt not appropriate, asleep and noted confusion. Spoke with daughter & wife, no desire for edu at this time.     Monitor/Evaluation    Monitor Per protocol;I&O;PO intake;Pertinent labs;Weight;Supplement intake;Symptoms        Electronically signed by:  Bianca Tang RD  12/12/17 10:30 AM

## 2017-12-12 NOTE — THERAPY TREATMENT NOTE
Acute Care - Physical Therapy Treatment Note   Zulma Fraire     Patient Name: Joey Schmitt  : 1935  MRN: [id number]  Today's Date: 2017  Onset of Illness/Injury or Date of Surgery Date: 17  Date of Referral to PT: 17  Referring Physician: Gita    Admit Date: 2017    Visit Dx:    ICD-10-CM ICD-9-CM   1. Osteoarthritis of right glenohumeral joint M19.011 715.91   2. Tendinopathy of right rotator cuff M67.911 727.9   3. History of cancer Z85.9 V10.90   4. Cognitive communication deficit R41.841 799.52     Patient Active Problem List   Diagnosis   • Osteoarthritis of right glenohumeral joint               Adult Rehabilitation Note       17 0945 17 0917 17 0920    Rehab Assessment/Intervention    Discipline occupational therapist  -SD physical therapy assistant  -KM physical therapist  -JW    Document Type therapy note (daily note)  -SD therapy note (daily note)  -KM therapy note (daily note)  -JW    Subjective Information agree to therapy;no complaints  -SD agree to therapy;no complaints  -KM agree to therapy;complains of;fatigue   requires cues to maintain alertness  -JW    Patient Effort, Rehab Treatment fair  -SD fair  -KM fair  -JW    Symptoms Noted During/After Treatment dizziness   pt c/o dizziness duirng mobility  -SD dizziness   reports history of vertigo  -KM     Symptoms Noted Comment   pt with significant confusion throughout treatment  -JW    Precautions/Limitations non-weight bearing status;fall precautions;shoulder precautions  -SD non-weight bearing status;fall precautions   precautions due to recent surgery  -KM non-weight bearing status   right UE in abd brace  -JW    Precautions/Limitations, Vision other (see comments)   macular degeneration  -SD other (see comments)   macular degeneration  -KM     Specific Treatment Considerations  pt has posterior lean with standing- increases with turns  -KM     Recorded by [SD] Bunny Tai, OTR [KM] Dorys TUCKER  MINERVA Botello [JW] Alexandra Hollingsworth, PT    Pain Assessment    Pain Assessment No/denies pain  -SD No/denies pain  -KM No/denies pain  -JW    Recorded by [SD] Bunny Tai OTR [KM] Dorys Botello PTA [JW] Alexandra Hollingsworth, PT    Cognitive Assessment/Intervention    Current Cognitive/Communication Assessment impaired  -SD impaired  -KM impaired  -JW    Orientation Status   oriented to;person   unable to accurately report current year/month  -JW    Follows Commands/Answers Questions able to follow single-step instructions;50% of the time;needs cueing;needs repetition  -SD  able to follow single-step instructions;25% of the time;needs cueing;needs increased time;needs repetition  -JW    Personal Safety   severe impairment;impulsive;decreased insight to deficits;unaware of functional deficits;decreased awareness, need for assist;decreased awareness, need for safety  -JW    Personal Safety Interventions gait belt;fall prevention program maintained;nonskid shoes/slippers when out of bed;supervised activity  -SD gait belt;nonskid shoes/slippers when out of bed  -KM gait belt;nonskid shoes/slippers when out of bed  -JW    Recorded by [SD] DAMARI Solomon [KM] Dorys Botello PTA [JW] Alexandra Hollingsworth, LINWOOD    Cognitive Assessment Intervention    Cognitive Assessment/Treatment Comment Pt was able to follow directions more consistently today during transfer/mobility activity and with UE HEP.    -SD      Recorded by [SD] Bunny Tai OTR      Bed Mobility, Assessment/Treatment    Bed Mobility, Assistive Device bed rails;head of bed elevated  -SD bed rails;head of bed elevated  -KM bed rails;head of bed elevated  -JW    Bed Mob, Supine to Sit, [place] moderate assist (50% patient effort);2 person assist required  -SD moderate assist (50% patient effort);2 person assist required  -KM moderate assist (50% patient effort);2 person assist required;verbal cues required   significantly increased time required to complete  -JW     Bed Mobility, Comment  pt able to sit on the side of the bed with assist of one.  Cues to allow feet to rest on floor and at times assist due to pt leaning backwards  -KM pt requires increased time to complete task with multiple cues given for 1 step directions  -JW    Recorded by [SD] Bunny Tai, OTR [KM] Dorys Botello, PTA [JW] Alexandra Hollingsworth, PT    Transfer Assessment/Treatment    Transfers, Sit-Stand [place] moderate assist (50% patient effort);maximum assist (25% patient effort);verbal cues required   mod A from EOB (elevated), max A from recliner  -SD moderate assist (50% patient effort);maximum assist (25% patient effort);2 person assist required   required max assist of two to stand from recliner  -KM moderate assist (50% patient effort);2 person assist required;verbal cues required   significant posterior lean upon immediate standing  -JW    Transfers, Stand-Sit [place] maximum assist (25% patient effort);2 person assist required;verbal cues required  -SD maximum assist (25% patient effort);2 person assist required  -KM maximum assist (25% patient effort);2 person assist required;verbal cues required  -JW    Transfers, Sit-Stand-Sit, Assist Device quad cane  -SD quad cane  -KM quad cane  -JW    Transfer, Safety Issues  --   OT assisting with RLE to maintain NWB status  -KM     Transfer, Comment Pt with posterior lean when intially coming to a standing position.    -SD pt with posterior lean with standing- if standing next to bed/chair- will use bed/chair to lean against.  Cues for hand placement  -KM pt with difficulty initiating transfers.  patient requires max verbal cues for safety and sequencing.  pt requires approximately 2.5 minutes to successfully perform stand to sit transfer with significant tactile and verbal cues required.  -JW    Recorded by [SD] Bunny Tai, OTR [KM] Dorys Botello, MINERVA [JW] Alexandra Hollingsworth, PT    Gait Assessment/Treatment    Gait, [place] Level   moderate assist (50% patient effort);maximum assist (25% patient effort);2 person assist required  -KM moderate assist (50% patient effort);2 person assist required;verbal cues required  -    Gait, Assistive Device  quad cane  -KM other (see comments)   multiple devices attempted during session  -JW    Gait, Distance (Feet)  8  -KM 20  -JW    Gait, Gait Deviations   melida decreased;forward flexed posture;narrow base;step length decreased;decreased heel strike  -    Gait, Safety Issues   sequencing ability decreased;step length decreased;weight-shifting ability decreased  -    Gait, Comment  Pt required increased assist with turns and backing with ambulation- has posterior lean and one loss of balance with ambulation.  Pt fatigued also from standing in order for staff to change brief..  -KM pt with significant posterior lean in standing, requires assistance from therapist to correct.  patient trialed quad cane, single point cane and hemiwalker, however pt with difficulty sequencing with device despite max verbal cues and assistance from therapist for device placement.  pt requires verbal cues for proper weight shifting to advance LE during swing phase.  Patient with difficulty obtaining proper hand placement on device.    -JW    Recorded by  [KM] Dorys Botello, PTA [JW] Alexandra Hollingsworth, PT    Therapy Exercises    Right Upper Extremity AROM:;10 reps   arom of non-involved joints (elbow to hand)  -SD      Left Upper Extremity AROM:   education with benefits of rom for LUE to maintain rom/stren  -SD      Recorded by [SD] Bunny Tai, OTR      Positioning and Restraints    Pre-Treatment Position in bed  -SD in bed  -KM in bed  -JW    Post Treatment Position chair  -SD chair  -KM chair  -JW    In Chair reclined;call light within reach;encouraged to call for assist;with family/caregiver;with nsg;with brace   pillow positioned under RUE for support in chair  -SD notified nsg;reclined;call light within  reach;encouraged to call for assist;with OT;RUE elevated;legs elevated  -KM notified nsg;call light within reach;encouraged to call for assist;with family/caregiver   nurse pracitioner in room to discuss progress with family  -JW    Recorded by [SD] Bunny Tai, OTR [KM] Dorys Botello, PTA [JW] Alexandra Higueramaryellen, PT      User Key  (r) = Recorded By, (t) = Taken By, (c) = Cosigned By    Initials Name Effective Dates    KM Dorys Botello, PTA 08/11/15 -     SD Bunny Tai, OTR 06/22/16 -     JW Alexandra Higueramaryellen, PT 12/01/15 -                 IP PT Goals       12/10/17 1028          Bed Mobility PT STG    Bed Mobility PT STG, Date Established 12/10/17  -JW      Bed Mobility PT STG, Time to Achieve 3 days  -JW      Bed Mobility PT STG, Activity Type all bed mobility  -JW      Bed Mobility PT STG, Gary Level supervision required  -JW      Transfer Training PT STG    Transfer Training PT STG, Date Established 12/10/17  -JW      Transfer Training PT STG, Time to Achieve 3 days  -JW      Transfer Training PT STG, [place] Level contact guard assist  -JW      Transfer Training PT STG, Assist Device --   with appropriate device  -JW      Gait Training PT STG    Gait Training Goal PT STG, Date Established 12/10/17  -JW      Gait Training Goal PT STG, Time to Achieve 3 days  -JW      Gait Training Goal PT STG, [place] Level contact guard assist  -JW      Gait Training Goal PT STG, Assist Device --   with appropriate device  -JW      Gait Training Goal PT STG, Distance to Achieve 100  -JW      Patient Education PT STG    Patient Education PT STG, Date Established 12/10/17  -JW      Patient Education PT STG, Time to Achieve 3 days  -JW      Patient Education PT STG, Education Type precaution per surgeon  -JW      Patient Education PT STG, Education Understanding demonstrate adequately;verbalize understanding  -JW        User Key  (r) = Recorded By, (t) = Taken By, (c) = Cosigned By    Initials Name Provider  Type    JW Alexandra Hollingsworth, PT Physical Therapist          Physical Therapy Education     Title: PT OT SLP Therapies (Active)     Topic: Physical Therapy (Active)     Point: Mobility training (Done)    Learning Progress Summary    Learner Readiness Method Response Comment Documented by Status   Patient Acceptance E,D VU discussed positioning with pt and family and discussed transfers/ambulation with pt, family and nursing.  Pt reports issues with veritgo prior to surgery.  12/12/17 1120 Done    Acceptance E VU   12/11/17 1019 Done    Acceptance E NR   12/10/17 1026 Active               Point: Precautions (Done)    Learning Progress Summary    Learner Readiness Method Response Comment Documented by Status   Patient Acceptance E,D VU discussed positioning with pt and family and discussed transfers/ambulation with pt, family and nursing.  Pt reports issues with veritgo prior to surgery.  12/12/17 1120 Done    Acceptance E VU   12/11/17 1019 Done    Acceptance E NR   12/10/17 1026 Active                      User Key     Initials Effective Dates Name Provider Type Discipline     08/11/15 -  Dorys Botello, PTA Physical Therapy Assistant PT     12/01/15 -  Alexandra Hollingsworth, PT Physical Therapist PT                Sling/brace on RUE - co treat with OT.   Family reports pt likes sitting in recliner.  May need to consider using wheelchair at times to work on increasing trunk strength so pt may improve with posterior lean.  Also may be able to instruct pt in wheelchair mobility.  Will instruct pt in LE ex next visit.  Pt able to stand with assist while second person changed brief.  Feel pt could benefit from STR and possibly stay until  RLE restrictions are lifted.      PT Recommendation and Plan  Anticipated Equipment Needs At Discharge: other (see comments) (will continue to assess pts willingness/ability to use DME)  Anticipated Discharge Disposition: skilled nursing facility (with potential to stay for  extended time period until right UE weight bearing restrictions lifted)  Planned Therapy Interventions: balance training, bed mobility training, gait training, home exercise program, transfer training, patient/family education  PT Frequency: daily (6x/week)  Plan of Care Review  Plan Of Care Reviewed With: patient  Outcome Summary/Follow up Plan: PT:  Pt was able to participate with therapy this am- pt sat on the side of the bed with assist of 2 but once positioned- required only assist of one.  Pt has posterior lean with standing and does better with ambulation straight ahead- requires increased assist with turns and backing.  Used pillows for positioning.  Discussed progress with pt, family and nursing.  Better participation from pt with therapy today.  Pt will need assistance for safety with mobility.  Plan is for pt to transfer to SNF ([facility])          Outcome Measures       12/12/17 1000 12/12/17 0925 12/11/17 0920    How much help from another person do you currently need...    Turning from your back to your side while in flat bed without using bedrails?  2  -KM 2  -JW    Moving from lying on back to sitting on the side of a flat bed without bedrails?  1  -KM 1  -JW    Moving to and from a bed to a chair (including a wheelchair)?  1  -KM 1  -JW    Standing up from a chair using your arms (e.g., wheelchair, bedside chair)?  1  -KM 1  -JW    Climbing 3-5 steps with a railing?  1  -KM 1  -JW    To walk in hospital room?  1  -KM 1  -JW    AM-PAC 6 Clicks Score  7  -KM 7  -JW    How much help from another is currently needed...    Putting on and taking off regular lower body clothing? 1  -SD      Bathing (including washing, rinsing, and drying) 2  -SD      Toileting (which includes using toilet bed pan or urinal) 1  -SD      Putting on and taking off regular upper body clothing 2  -SD      Taking care of personal grooming (such as brushing teeth) 2  -SD      Eating meals 3  -SD      Score 11  -SD       Functional Assessment    Outcome Measure Options AM-PAC 6 Clicks Daily Activity (OT)  -SD AM-PAC 6 Clicks Basic Mobility (PT)  [facility] (PT)  -      12/11/17 0840 12/10/17 0909       How much help from another person do you currently need...    Turning from your back to your side while in flat bed without using bedrails?  2  -JW     Moving from lying on back to sitting on the side of a flat bed without bedrails?  2  -JW     Moving to and from a bed to a chair (including a wheelchair)?  2  -JW     Standing up from a chair using your arms (e.g., wheelchair, bedside chair)?  2  -JW     Climbing 3-5 steps with a railing?  1  -JW     To walk in hospital room?  2  -JW     AM-PAC 6 Clicks Score  11  -JW     How much help from another is currently needed...    Putting on and taking off regular lower body clothing? 1  -SD      Bathing (including washing, rinsing, and drying) 2  -SD      Toileting (which includes using toilet bed pan or urinal) 1  -SD      Putting on and taking off regular upper body clothing 1  -SD      Taking care of personal grooming (such as brushing teeth) 2  -SD      Eating meals 3  -SD      Score 10  -SD      Functional Assessment    Outcome Measure Options AM-PAC 6 Clicks Daily Activity (OT)  -SD AM-PAC 6 Clicks Basic Mobility (PT)  -       User Key  (r) = Recorded By, (t) = Taken By, (c) = Cosigned By    Initials Name Provider Type    XIOMARA Botello PTA Physical Therapy Assistant    CLARA Tai, OTR Occupational Therapist    NIGEL Hollingsworth, LINWOOD Physical Therapist           Time Calculation:         PT Charges       12/12/17 1126          Time Calculation    Start Time 0917  -KM      Stop Time 0947  -KM      Time Calculation (min) 30 min  -KM        User Key  (r) = Recorded By, (t) = Taken By, (c) = Cosigned By    Initials Name Provider Type    XIOMARA Botello PTA Physical Therapy Assistant          Therapy Charges for Today     Code Description Service  Date Service Provider Modifiers Qty    94492623359 HC PT THER PROC EA 15 MIN 12/12/2017 Dorys Botello, PTA GP 2          PT G-Codes  PT Professional Judgement Used?: Yes  Outcome Measure Options: AM-PAC 6 Clicks Daily Activity (OT)  Functional Limitation: Mobility: Walking and moving around  Mobility: Walking and Moving Around Current Status (): At least 40 percent but less than 60 percent impaired, limited or restricted  Mobility: Walking and Moving Around Goal Status (): At least 20 percent but less than 40 percent impaired, limited or restricted    Dorys Botello PTA  12/12/2017

## 2017-12-12 NOTE — PROGRESS NOTES
Orthopedic Progress Note   Chief Complaint:  Status post right total reverse shoulder    Subjective     Interval History: Patient postop day 4.  He is stable this morning.  According to the daughter still having significant confusion and disorientation at night but better during the day.  Still needing maximum assistance as far as ambulation and patient is to be transferred to a nursing facility when discharge from the hospital for continued PT and OT and rehabilitation.          Objective     Vital Signs  Temp:  [96.9 °F (36.1 °C)-97.9 °F (36.6 °C)] 97.9 °F (36.6 °C)  Heart Rate:  [58-70] 60  Resp:  [20] 20  BP: (134-156)/(65-89) 156/71  Body mass index is 29.66 kg/(m^2).    Intake/Output Summary (Last 24 hours) at 12/12/17 0706  Last data filed at 12/11/17 1809   Gross per 24 hour   Intake              840 ml   Output                0 ml   Net              840 ml             Physical Exam:   General: patient awake, alert and cooperative   Cardiovascular: regular rhythm and rate   Pulm: clear to auscultation bilaterally   Abdomen: Benign.  Soft bowel sounds   Extremities: Right wound remains benign.  His no apparent motor deficit no sensory loss.  Skin is cool to touch no significant swelling or erythema noted.   Neurologic: Normal mood and behavior     Results Review:     I reviewed the patient's new clinical results.      WBC No results found for: WBCS   HGB Hemoglobin   Date Value Ref Range Status   12/12/2017 12.0 (L) 14.0 - 18.0 g/dL Final   12/11/2017 12.1 (L) 14.0 - 18.0 g/dL Final   12/10/2017 12.2 (L) 14.0 - 18.0 g/dL Final      HCT Hematocrit   Date Value Ref Range Status   12/12/2017 35.2 (L) 42.0 - 52.0 % Final   12/11/2017 35.6 (L) 42.0 - 52.0 % Final   12/10/2017 36.0 (L) 42.0 - 52.0 % Final      Platlets No results found for: LABPLAT     PT/INR:  No results found for: PROTIME/No results found for: INR    Sodium Sodium   Date Value Ref Range Status   12/12/2017 139 136 - 145 mmol/L Final   12/11/2017  137 136 - 145 mmol/L Final   12/10/2017 136 136 - 145 mmol/L Final      Potassium Potassium   Date Value Ref Range Status   12/12/2017 4.3 3.5 - 5.2 mmol/L Final   12/11/2017 4.1 3.5 - 5.2 mmol/L Final   12/10/2017 4.0 3.5 - 5.2 mmol/L Final      Chloride Chloride   Date Value Ref Range Status   12/12/2017 101 98 - 107 mmol/L Final   12/11/2017 99 98 - 107 mmol/L Final   12/10/2017 99 98 - 107 mmol/L Final      Bicarbonate No results found for: PLASMABICARB   BUN BUN   Date Value Ref Range Status   12/12/2017 29 (H) 8 - 23 mg/dL Final   12/11/2017 27 (H) 8 - 23 mg/dL Final   12/10/2017 37 (H) 8 - 23 mg/dL Final      Creatinine Creatinine   Date Value Ref Range Status   12/12/2017 1.34 (H) 0.76 - 1.27 mg/dL Final   12/11/2017 1.23 0.76 - 1.27 mg/dL Final   12/10/2017 1.59 (H) 0.76 - 1.27 mg/dL Final      Calcium Calcium   Date Value Ref Range Status   12/12/2017 8.5 (L) 8.8 - 10.5 mg/dL Final   12/11/2017 8.6 (L) 8.8 - 10.5 mg/dL Final   12/10/2017 8.2 (L) 8.8 - 10.5 mg/dL Final      Magnesium  AST  ALT  Bilirubin, Total  AlkPhos  Albumin    Amylase  Lipase    Radiology: No results found for: MG  No components found for: AST.*  No components found for: ALT.*  No components found for: BILIRUBIN, TOTAL.*    No components found for: ALKPHOS.*  No components found for: ALBUMIN.*      No components found for: AMYLASE.*  No components found for: LIPASE.*            Imaging Results (most recent)     Procedure Component Value Units Date/Time    XR Shoulder 2+ View Right [[id number]] Collected:  12/08/17 1257     Updated:  12/08/17 1259    Narrative:       INDICATION: Right shoulder pain status post right total shoulder  arthroplasty.     COMPARISON: 12/01/2016.     FINDINGS: 1 views of the right shoulder.   No fracture or dislocation.   There is anatomic alignment of the reverse shoulder arthroplasty.   Mild  right acromioclavicular osteoarthropathy.  There is some atelectasis in  the right lower lobe.       Impression:        Anatomic alignment of the right shoulder arthroplasty.     This report was finalized on 12/8/2017 12:57 PM by Dr. Demetri Martin MD.       XR Chest 1 View [[id number]] Collected:  12/11/17 1126     Updated:  12/11/17 1130    Narrative:       CHEST X-RAY, 12/11/2017:     HISTORY:   82-year-old male with a productive cough since shoulder surgery last  week.     TECHNIQUE:  AP portable upright chest x-ray.     COMPARISON:  *  11/20/2017.     FINDINGS:  Low lung volumes with mild bibasilar scarring, unchanged since the  preoperative chest x-ray. No visible pulmonary infiltrate or pleural  effusion. Heart size and pulmonary vascularity are within normal limits.  Postop changes reverse right total shoulder arthroplasty.       Impression:       1. No active disease.  2. Low lung volumes with mild bibasilar scarring.  3. No change since 11/20/2017.     This report was finalized on 12/11/2017 11:28 AM by Dr. Milind Andrade MD.       XR Abdomen KUB [[id number]] Collected:  12/11/17 1128     Updated:  12/11/17 1131    Narrative:       KUB, 12/11/2017:     HISTORY:   82-year-old male postop shoulder arthroplasty surgery three days ago. He  complains of abdomen pain and lack of bowel movements since surgery.     TECHNIQUE:  AP portable radiographs of the abdomen and pelvis.     FINDINGS:  Bowel gas pattern is within normal limits with no evidence of bowel  obstruction. Moderate volume stool is scattered in portions of the  colon. Surgical clips in the low pelvis.       Impression:       Negative KUB. Bowel gas pattern is within normal limits.     This report was finalized on 12/11/2017 11:29 AM by Dr. Milind Andrade MD.                       Assessment/Plan     Patient Active Problem List   Diagnosis Code   • Osteoarthritis of right glenohumeral joint M19.011       Lengthy discussion with the daughter this morning regarding care following discharge.  He is apparently to be discharged to [facility] in [place] and leave  them instructions regarding his shoulder therapy.  In addition to lower extremity therapy they can begin circumduction exercises with regard to the shoulder and he can do range of motion of exercises to the elbow but he is to wear the sling when not exercising that arm.  He has a scheduled appointment is seen in the office in approximately 8-9 days keep the scheduled appointment.      Goyo Bhardwaj MD  12/12/17  7:27 AM

## 2017-12-12 NOTE — PLAN OF CARE
Problem: Patient Care Overview (Adult)  Goal: Plan of Care Review  Outcome: Ongoing (interventions implemented as appropriate)    12/12/17 1121   Coping/Psychosocial Response Interventions   Plan Of Care Reviewed With patient   Outcome Evaluation   Outcome Summary/Follow up Plan PT: Pt was able to participate with therapy this am- pt sat on the side of the bed with assist of 2 but once positioned- required only assist of one. Pt has posterior lean with standing and does better with ambulation straight ahead- requires increased assist with turns and backing. Used pillows for positioning. Discussed progress with pt, family and nursing. Better participation from pt with therapy today. Pt will need assistance for safety with mobility. Plan is for pt to transfer to SNF ([facility])

## 2017-12-12 NOTE — NURSING NOTE
Continued Stay Note  MERCEDEZ Reyes     Patient Name: Joey Schmitt  MRN: [id number]  Today's Date: 12/12/2017    Admit Date: 12/8/2017          Discharge Plan       12/12/17 1030    Case Management/Social Work Plan    Additional Comments Mr Schmitt is confused and nauseated.  Daughter at bedside.  Awaiting answer from [facility] at [facility].              Discharge Codes     None            Sandra Goodwin RN

## 2017-12-12 NOTE — PLAN OF CARE
Problem: Patient Care Overview (Adult)  Goal: Plan of Care Review  Outcome: Ongoing (interventions implemented as appropriate)    12/12/17 0058   Coping/Psychosocial Response Interventions   Plan Of Care Reviewed With patient   Patient Care Overview   Progress progress towards functional goals is fair         Problem: Pain, Acute (Adult)  Goal: Acceptable Pain Control/Comfort Level  Outcome: Ongoing (interventions implemented as appropriate)    12/12/17 0058   Pain, Acute (Adult)   Acceptable Pain Control/Comfort Level making progress toward outcome

## 2017-12-13 LAB
GLUCOSE BLDC GLUCOMTR-MCNC: 118 MG/DL (ref 70–130)
GLUCOSE BLDC GLUCOMTR-MCNC: 125 MG/DL (ref 70–130)
GLUCOSE BLDC GLUCOMTR-MCNC: 171 MG/DL (ref 70–130)
GLUCOSE BLDC GLUCOMTR-MCNC: 185 MG/DL (ref 70–130)
GLUCOSE BLDC GLUCOMTR-MCNC: 51 MG/DL (ref 70–130)
GLUCOSE BLDC GLUCOMTR-MCNC: 64 MG/DL (ref 70–130)
GLUCOSE BLDC GLUCOMTR-MCNC: 66 MG/DL (ref 70–130)

## 2017-12-13 PROCEDURE — 94799 UNLISTED PULMONARY SVC/PX: CPT

## 2017-12-13 PROCEDURE — 97110 THERAPEUTIC EXERCISES: CPT

## 2017-12-13 PROCEDURE — 63710000001 DIPHENHYDRAMINE PER 50 MG: Performed by: NURSE PRACTITIONER

## 2017-12-13 PROCEDURE — 82962 GLUCOSE BLOOD TEST: CPT

## 2017-12-13 PROCEDURE — 97535 SELF CARE MNGMENT TRAINING: CPT

## 2017-12-13 PROCEDURE — 25010000002 ONDANSETRON PER 1 MG: Performed by: NURSE PRACTITIONER

## 2017-12-13 PROCEDURE — 63710000001 INSULIN ASPART PER 5 UNITS: Performed by: HOSPITALIST

## 2017-12-13 PROCEDURE — 99232 SBSQ HOSP IP/OBS MODERATE 35: CPT | Performed by: NURSE PRACTITIONER

## 2017-12-13 PROCEDURE — 99024 POSTOP FOLLOW-UP VISIT: CPT | Performed by: ORTHOPAEDIC SURGERY

## 2017-12-13 RX ORDER — LISINOPRIL 10 MG/1
10 TABLET ORAL
Status: DISCONTINUED | OUTPATIENT
Start: 2017-12-13 | End: 2017-12-14 | Stop reason: HOSPADM

## 2017-12-13 RX ORDER — POLYETHYLENE GLYCOL 3350 17 G/17G
17 POWDER, FOR SOLUTION ORAL DAILY
Status: ON HOLD
Start: 2017-12-13 | End: 2019-03-05

## 2017-12-13 RX ORDER — ACETAMINOPHEN 500 MG
1000 TABLET ORAL EVERY 6 HOURS PRN
Start: 2017-12-13

## 2017-12-13 RX ORDER — PSEUDOEPHEDRINE HCL 30 MG
100 TABLET ORAL 2 TIMES DAILY
Start: 2017-12-13 | End: 2019-11-24

## 2017-12-13 RX ORDER — DIPHENHYDRAMINE HCL 25 MG
25 CAPSULE ORAL NIGHTLY
Status: DISCONTINUED | OUTPATIENT
Start: 2017-12-13 | End: 2017-12-14 | Stop reason: HOSPADM

## 2017-12-13 RX ORDER — LISINOPRIL 10 MG/1
10 TABLET ORAL
Start: 2017-12-13 | End: 2020-12-10

## 2017-12-13 RX ORDER — CHOLECALCIFEROL (VITAMIN D3) 125 MCG
5 CAPSULE ORAL NIGHTLY
Status: ON HOLD
Start: 2017-12-13 | End: 2019-03-05

## 2017-12-13 RX ORDER — DIPHENHYDRAMINE HCL 25 MG
25 CAPSULE ORAL NIGHTLY PRN
Status: DISCONTINUED | OUTPATIENT
Start: 2017-12-13 | End: 2017-12-13

## 2017-12-13 RX ORDER — FAMOTIDINE 20 MG/1
20 TABLET, FILM COATED ORAL DAILY
Status: ON HOLD
Start: 2017-12-13 | End: 2019-03-05

## 2017-12-13 RX ORDER — GUAIFENESIN AND DEXTROMETHORPHAN HYDROBROMIDE 600; 30 MG/1; MG/1
1 TABLET, EXTENDED RELEASE ORAL 2 TIMES DAILY PRN
Status: ON HOLD
Start: 2017-12-13 | End: 2019-03-05

## 2017-12-13 RX ADMIN — ACETAMINOPHEN 1000 MG: 500 TABLET, FILM COATED ORAL at 00:34

## 2017-12-13 RX ADMIN — LEVOTHYROXINE SODIUM 100 MCG: 50 TABLET ORAL at 08:37

## 2017-12-13 RX ADMIN — METFORMIN HYDROCHLORIDE 1000 MG: 500 TABLET ORAL at 17:19

## 2017-12-13 RX ADMIN — DOCUSATE SODIUM 100 MG: 100 CAPSULE, LIQUID FILLED ORAL at 17:19

## 2017-12-13 RX ADMIN — LISINOPRIL 10 MG: 10 TABLET ORAL at 09:16

## 2017-12-13 RX ADMIN — DOCUSATE SODIUM 100 MG: 100 CAPSULE, LIQUID FILLED ORAL at 08:36

## 2017-12-13 RX ADMIN — Medication 5 MG: at 21:39

## 2017-12-13 RX ADMIN — INSULIN ASPART 2 UNITS: 100 INJECTION, SOLUTION INTRAVENOUS; SUBCUTANEOUS at 12:30

## 2017-12-13 RX ADMIN — METOPROLOL TARTRATE 25 MG: 25 TABLET, FILM COATED ORAL at 08:36

## 2017-12-13 RX ADMIN — GLIPIZIDE 10 MG: 10 TABLET ORAL at 17:19

## 2017-12-13 RX ADMIN — ONDANSETRON 4 MG: 2 INJECTION, SOLUTION INTRAMUSCULAR; INTRAVENOUS at 00:39

## 2017-12-13 RX ADMIN — GUAIFENESIN AND DEXTROMETHORPHAN HYDROBROMIDE 1 TABLET: 600; 30 TABLET, EXTENDED RELEASE ORAL at 04:45

## 2017-12-13 RX ADMIN — DEXTROSE 15 G: 15 GEL ORAL at 07:56

## 2017-12-13 RX ADMIN — DIPHENHYDRAMINE HYDROCHLORIDE 25 MG: 25 CAPSULE ORAL at 21:39

## 2017-12-13 RX ADMIN — FAMOTIDINE 20 MG: 20 TABLET, FILM COATED ORAL at 08:36

## 2017-12-13 RX ADMIN — ACETAMINOPHEN 1000 MG: 500 TABLET, FILM COATED ORAL at 19:48

## 2017-12-13 RX ADMIN — METOPROLOL TARTRATE 25 MG: 25 TABLET, FILM COATED ORAL at 17:19

## 2017-12-13 NOTE — DISCHARGE INSTR - APPOINTMENTS
Follow up appointment with Dr Chamberlain on 12/19/17 at 11:20 AM.    Follow up appointment with Dr. Bhardwaj on 12/27/17 at 1:30 pm.

## 2017-12-13 NOTE — PROGRESS NOTES
"SERVICE: [facility] HOSPITALIST    CONSULTANTS: ortho    CHIEF COMPLAINT: f/u right total reverse shoulder    SUBJECTIVE: Daughter reports continued wakefulness at night. Denies shoulder pain. Tolerating some po but not much in am. Daughter requests medication to sleep. Denies f/c/cough/soa/chest pain/n/v/d/abdominal pain or other new concerns.    OBJECTIVE:    /66 (BP Location: Left arm, Patient Position: Sitting)  Pulse 79  Temp 98.1 °F (36.7 °C) (Oral)   Resp 18  Ht 188 cm (74\")  Wt 105 kg (231 lb)  SpO2 98%  BMI 29.66 kg/m2    MEDS/LABS REVIEWED AND ORDERED    docusate sodium 100 mg Oral BID   famotidine 20 mg Oral Daily   glipiZIDE 10 mg Oral BID AC   insulin aspart 0-9 Units Subcutaneous 4x Daily AC & at Bedtime   levothyroxine 100 mcg Oral QAM   Liraglutide 1.8 mg Subcutaneous Daily   lisinopril 10 mg Oral Q24H   melatonin 5 mg Oral Nightly   metFORMIN 1,000 mg Oral BID With Meals   metoprolol tartrate 25 mg Oral BID     Physical Exam  Constitutional: Patient appears well-developed and well-nourished and in no acute distress   HEENT:   Head: Normocephalic and atraumatic.   Eyes:  Pupils are equal, round, and reactive to light. EOM are intact. Sclera are anicteric and non-injected.  Mouth and Throat: Patient has moist mucous membranes. Oropharynx is clear of any erythema or exudate.     Neck: Neck supple. No JVD present. No thyromegaly present. No lymphadenopathy present.  Cardiovascular: Regular rate, regular rhythm, S1 normal and S2 normal.  Exam reveals no gallop and no friction rub.  No murmur heard.  Pulmonary/Chest: Lungs are clear to auscultation bilaterally. No respiratory distress. No wheezes. No rhonchi. No rales.   Abdominal: Soft. Bowel sounds are normal. No distension and no mass. There is no hepatosplenomegaly. There is no tenderness.   Musculoskeletal: decreased muscle bulk. CMS intact to RUE  Extremities: No edema. Pulses are palpable in all 4 " "extremities.  Neurological: Patient is alert, confused, oriented to self only.  Skin: Right shoulder dressings clean, dry and intact. Mild ecchymosis upper arm. Skin is warm. No rash noted. Nails show no clubbing.  No cyanosis or erythema.  LAB/DIAGNOSTICS:    Lab Results (last 24 hours)     Procedure Component Value Units Date/Time    POC Glucose Once [[id number]]  (Normal) Collected:  12/12/17 2029    Specimen:  Blood Updated:  12/12/17 2035     Glucose 109 mg/dL     Narrative:       Meter: [id number] : [id number] Bo Barnhart NURSING ASSISTANT    POC Glucose Once [[id number]]  (Abnormal) Collected:  12/13/17 0740    Specimen:  Blood Updated:  12/13/17 0752     Glucose 51 (L) mg/dL     Narrative:       Meter: [id number] : [id number] Catrachito De La Vega RN    POC Glucose Once [[id number]]  (Abnormal) Collected:  12/13/17 0755    Specimen:  Blood Updated:  12/13/17 0812     Glucose 66 (L) mg/dL     Narrative:       Meter: [id number] : [id number] Kaylee Mota RN    POC Glucose Once [[id number]]  (Abnormal) Collected:  12/13/17 0821    Specimen:  Blood Updated:  12/13/17 0828     Glucose 64 (L) mg/dL     Narrative:       Meter: [id number] : [id number] Kaylee Mota RN    POC Glucose Once [[id number]]  (Normal) Collected:  12/13/17 1004    Specimen:  Blood Updated:  12/13/17 1010     Glucose 125 mg/dL     Narrative:       Meter: [id number] : [id number] Kaylee Mota RN    POC Glucose Once [[id number]]  (Abnormal) Collected:  12/13/17 1143    Specimen:  Blood Updated:  12/13/17 1149     Glucose 185 (H) mg/dL     Narrative:       Meter: [id number] : [id number] Catrachito De La Vega RN        ASSESSMENT/PLAN:  1. Osteoarthritis right glenohumeral joint and rotator cuff tendinopathy s/p Right total reverse shoulder: POD#4.  Dr. Bhardwaj managed   Taken off all narcotic medications secondary to confusion  Much improved with tylenol only  Per Dr. Bhardwaj:  \"May remove the sling for circumduction exercises " "to shoulder and range of motion exercises. Otherwise is to wear sling at all times.  He may shower.  He is to leave dressing in place until seen in the office in 2 weeks.  He has scheduled appointment\"     2. Moderate-Severe to Severe cognitive communication decline, suspected dementia with metabolic encephalopathy due to anesthesia/pain meds:   SLP evaluation completed  D/C'd all narcotics, continue tylenol only  Cognitively unsafe to be at home per staff, awaiting [facility] home placement for rehab  Plan f/u outpatient cognitive eval after discharge once off pain meds     3. Hypertension:   BP overall near goal for age on metoprolol  Resumed lisinopril at half home dose today   Continue to hold HCTZ, monitor      4. DM-2: A1C 6.9% this admit  Glucose variable depending on oral intake, but currently at goal on metformin/glipizide 10 mg twice daily  Will d/c Victoza altogether, no other substitute for this medication non-injectable  Glucose 70s this am, suspect orals plus SSI will be sufficient until taking po better  Continue on moderate dose SSI  Change accuchecks to twice daily at [facility] home  F/U Romeo Chamberlain MD after d/c       5. CKD stage III:   Baseline range 1.25 to 1.61  Creatinine 1.34 yesterday, stable      6. Grade 1 diastolic dysfunction/mild to mod AR/low normal EF: per echo 12/4/17  No acute issues here, EF noted approx 50%      7. Mild post-operative anemia: hemoglobin stable at 12.0 without active blood loss noted      8. HLD: no meds, no current acute issues      9. Hypothyroidism: No acute issues on home replacement.      10. GERD: continue pepcid, no acute issues      11. Macular degeneration: No acute issues currently.      12. H/O vertigo: no acute issues here      13. Cough: Resolved  CXR negative for acute findings  No further issues      14. Nausea/vomiting: Resolved, occurred post operatively, intake much improved  KUB negative for acute findings  Continue zofran, monitor      15. " "Insomnia: some \"sundowning\" likely  Melatonin added previously  Add benadryl at night  Monitor for effect    "

## 2017-12-13 NOTE — DISCHARGE SUMMARY
Joey Schmitt  1935  [id number]    Hospitalists Discharge Summary    Date of Admission: 12/8/2017  Date of Discharge:  12/13/2017    Primary Discharge Diagnoses:   1. Osteoarthritis right glenohumeral joint and rotator cuff tendinopathy s/p Right total reverse shoulder  2. Moderate-Severe to Severe cognitive communication decline, suspected dementia with metabolic encephalopathy   Secondary Discharge Diagnoses:   3. Hypertension    4. DM-2    5. CKD stage III    6. Grade 1 diastolic dysfunction/mild to mod AR/low normal EF: per echo 12/4/17  7. Mild post-operative anemia    8. HLD    9. Hypothyroidism    10. GERD    9. Macular degeneration    10. H/O vertigo    11. Cough    12. Nausea/vomiting  PCP  Patient Care Team:  Romeo Chamberlain MD as PCP - General (Family Medicine)    Consults:   Consults     Date and Time Order Name Status Description    12/8/2017 1304 Inpatient Consult to Hospitalist          Operations and Procedures Performed:  Procedure(s):  TOTAL SHOULDER REVERSE ARTHROPLASTY     Xr Chest 2 Vw    Result Date: 11/20/2017  Narrative: Chest x-ray  INDICATION: Preoperative exam for shoulder surgery. Osteoarthritis. Patient has a history of prostate cancer, hyperlipidemia, and hypertension.  FINDINGS: 2 views of the chest were obtained. No comparison. Cardiac size is normal. Thoracic aorta is tortuous. There is some mild scarring or atelectasis in the bases. The lungs are otherwise clear. No pneumothorax.      Impression: Mild bibasilar scarring or atelectasis, otherwise no active disease.  This report was finalized on 11/20/2017 11:11 AM by Dr. Compa Jasso MD.      Xr Shoulder 2+ View Right    Result Date: 12/8/2017  Narrative: INDICATION: Right shoulder pain status post right total shoulder arthroplasty.  COMPARISON: 12/01/2016.  FINDINGS: 1 views of the right shoulder.   No fracture or dislocation. There is anatomic alignment of the reverse shoulder arthroplasty.   Mild right  acromioclavicular osteoarthropathy.  There is some atelectasis in the right lower lobe.      Impression: Anatomic alignment of the right shoulder arthroplasty.  This report was finalized on 12/8/2017 12:57 PM by Dr. Demetri Martin MD.      Xr Chest 1 View    Result Date: 12/11/2017  Narrative: CHEST X-RAY, 12/11/2017:  HISTORY: 82-year-old male with a productive cough since shoulder surgery last week.  TECHNIQUE: AP portable upright chest x-ray.  COMPARISON: *  11/20/2017.  FINDINGS: Low lung volumes with mild bibasilar scarring, unchanged since the preoperative chest x-ray. No visible pulmonary infiltrate or pleural effusion. Heart size and pulmonary vascularity are within normal limits. Postop changes reverse right total shoulder arthroplasty.      Impression: 1. No active disease. 2. Low lung volumes with mild bibasilar scarring. 3. No change since 11/20/2017.  This report was finalized on 12/11/2017 11:28 AM by Dr. Milind Andrade MD.      Xr Abdomen Kub    Result Date: 12/11/2017  Narrative: KUB, 12/11/2017:  HISTORY: 82-year-old male postop shoulder arthroplasty surgery three days ago. He complains of abdomen pain and lack of bowel movements since surgery.  TECHNIQUE: AP portable radiographs of the abdomen and pelvis.  FINDINGS: Bowel gas pattern is within normal limits with no evidence of bowel obstruction. Moderate volume stool is scattered in portions of the colon. Surgical clips in the low pelvis.      Impression: Negative KUB. Bowel gas pattern is within normal limits.  This report was finalized on 12/11/2017 11:29 AM by Dr. Milind Andrade MD.      Allergies:  has No Known Allergies.    Andrew  n/a    Discharge Medications:   Joey Schmitt   Home Medication Instructions DARLINE:[id number]    Printed on:12/13/17 5275   Medication Information                      acetaminophen (TYLENOL) 500 MG tablet  Take 2 tablets by mouth Every 6 (Six) Hours As Needed for Mild Pain , Moderate Pain  or Headache.      "        Blood Glucose Monitoring Suppl (ONE TOUCH ULTRA 2) W/DEVICE kit  USE AS DIRECTED             docusate sodium 100 MG capsule  Take 100 mg by mouth 2 (Two) Times a Day.             famotidine (PEPCID) 20 MG tablet  Take 1 tablet by mouth Daily.             glipiZIDE (GLUCOTROL) 10 MG tablet  Take 10 mg by mouth 2 (two) times a day before meals             guaifenesin-dextromethorphan (MUCINEX DM)  MG tablet sustained-release 12 hour tablet  Take 1 tablet by mouth 2 (Two) Times a Day As Needed (cough).             insulin aspart (novoLOG) 100 UNIT/ML injection  Inject 0-9 Units under the skin 4 (Four) Times a Day Before Meals & at Bedtime.             levothyroxine (SYNTHROID, LEVOTHROID) 100 MCG tablet  Take 100 mcg by mouth Every Morning.             lisinopril (PRINIVIL,ZESTRIL) 10 MG tablet  Take 1 tablet by mouth Daily.             melatonin 5 MG tablet tablet  Take 1 tablet by mouth Every Night.             metFORMIN (GLUCOPHAGE) 1000 MG tablet  Take 1,000 mg by mouth 2 (two) times a day with meals             metoprolol tartrate (LOPRESSOR) 25 MG tablet  Take 1 tablet (25 mg total) by mouth 2 (Two) Times a Day             polyethylene glycol (MIRALAX) packet  Take 17 g by mouth Daily.               History of Present Illness: Taken from [facility] on admit per Dr. Bhardwaj:  \"82-year-old male right-hand-dominant with end-stage degenerative arthritis of the right shoulder failed to respond to nonoperative management is being admitted this time to undergo a right total reverse shoulder.  Patient fell respond to nonoperative management is discussed with the patient and family detail the risk of surgery which include but not limited to infection and the need for multiple procedures including implant removal to eradicate infection, dislocation, nerve injury and paralysis, vascular injury, periprosthetic fracture and the need for revision surgery and persistent pain and discomfort despite a well implanted total " "reverse shoulder.  He understands and agrees to proceed.\"    Taken from [facility] on consult per Dr. Pelayo:  \"Mr. Joey Schmitt is an 82 year old  male who is known to have HTN, HLD, Type II DM, Hypothyroidism, dementia, prostate cancer and macular degeneration, was admitted by Dr Bhardwaj with Osteoarthritis right glenohumeral joint and rotator cuff tendinopathy and Dr Bhardwaj took him to OR for Right total reverse shoulder. The Hospitalist services were consulted for management of his medical issues. Patient is doing well after surgery. Denies any sx of pain or n/v. Patient denies any sx of fever, headache, chest pain, shortness of breath, abdominal pain, nausea/ vomiting, dysuria, urgency/ frequency or any recent hx of blood in stool. No other medical history available.\"    Hospital Course  1. Osteoarthritis right glenohumeral joint and rotator cuff tendinopathy s/p Right total reverse shoulder: POD#4.  Dr. Bhardwaj managed   Taken off all narcotic medications secondary to confusion  Much improved with tylenol only  Per Dr. Bhardwaj:  \"May remove the sling for circumduction exercises to shoulder and range of motion exercises. Otherwise is to wear sling at all times.  He may shower.  He is to leave dressing in place until seen in the office in 2 weeks.  He has scheduled appointment\"    2. Moderate-Severe to Severe cognitive communication decline, suspected dementia with metabolic encephalopathy due to anesthesia/pain meds:   SLP evaluation completed  D/C'd all narcotics, continue tylenol only  Cognitively unsafe to be at home per staff, awaiting [facility] for rehab  Plan f/u outpatient cognitive eval after discharge once off pain meds    3. Hypertension:   BP overall near goal for age on metoprolol  Resumed lisinopril at half home dose today  Continue to hold HCTZ, monitor      4. DM-2: A1C 6.9% this admit  Glucose variable depending on oral intake, but currently at goal on metformin/glipizide 10 mg twice " daily  Will d/c Victoza altogether, no other substitute for this medication non-injectable  Glucose 70s this am, suspect orals plus SSI will be sufficient  Continue on moderate dose SSI  Change accuchecks to twice daily at [facility]  F/U Romeo Chamberlain MD after d/c       5. CKD stage III:   Baseline range 1.25 to 1.61  Creatinine 1.34 yesterday, stable      6. Grade 1 diastolic dysfunction/mild to mod AR/low normal EF: per echo 12/4/17  No acute issues here, EF noted approx 50%      7. Mild post-operative anemia: hemoglobin stable at 12.0 without active blood loss noted      8. HLD: no meds, no current acute issues      9. Hypothyroidism: No acute issues on home replacement.      10. GERD: continue pepcid, no acute issues      9. Macular degeneration: No acute issues currently.      10. H/O vertigo: no acute issues here      11. Cough: Resolved  CXR negative for acute findings  No further issues      12. Nausea/vomiting: Resolved, occurred post operatively, intake much improved  KUB negative for acute findings  Continue zofran, monitor      Last Lab Results:   Lab Results (most recent)     Procedure Component Value Units Date/Time    POC Glucose Fingerstick [[id number]]  (Normal) Collected:  12/08/17 0837    Specimen:  Blood Updated:  12/08/17 0843     Glucose 125 mg/dL     Narrative:       Meter: [id number] : [id number] Tiny Garcia RN    Potassium [[id number]]  (Normal) Collected:  12/08/17 0839    Specimen:  Blood Updated:  12/08/17 0911     Potassium 4.8 mmol/L     Hemoglobin & Hematocrit, Blood [[id number]]  (Abnormal) Collected:  12/08/17 1332    Specimen:  Blood Updated:  12/08/17 1336     Hemoglobin 13.5 (L) g/dL      Hematocrit 40.2 (L) %     POC Glucose Fingerstick [[id number]]  (Abnormal) Collected:  12/08/17 1715    Specimen:  Blood Updated:  12/08/17 1722     Glucose 178 (H) mg/dL     Narrative:       Meter: [id number] : [id number] Aly Pacheco NURSING ASSISTANT    POC Glucose Fingerstick  [[id number]]  (Abnormal) Collected:  12/08/17 2127    Specimen:  Blood Updated:  12/08/17 2133     Glucose 187 (H) mg/dL     Narrative:       Meter: [id number] : [id number] Conroy (Pate) Mary Monitor Tech    Hemoglobin & Hematocrit, Blood [[id number]]  (Abnormal) Collected:  12/09/17 0449    Specimen:  Blood Updated:  12/09/17 0457     Hemoglobin 12.2 (L) g/dL      Hematocrit 36.9 (L) %     POC Glucose Fingerstick [[id number]]  (Abnormal) Collected:  12/09/17 0729    Specimen:  Blood Updated:  12/09/17 0755     Glucose 202 (H) mg/dL     Narrative:       Meter: [id number] : [id number] Yannick Araya NURSING ASSISTANT    POC Glucose Fingerstick [[id number]]  (Abnormal) Collected:  12/09/17 1124    Specimen:  Blood Updated:  12/09/17 1149     Glucose 295 (H) mg/dL     Narrative:       Meter: [id number] : [id number] Yannick Araya NURSING ASSISTANT    POC Glucose Fingerstick [[id number]]  (Abnormal) Collected:  12/09/17 1619    Specimen:  Blood Updated:  12/09/17 1626     Glucose 226 (H) mg/dL     Narrative:       Meter: [id number] : [id number] Yannick Araya NURSING ASSISTANT    POC Glucose Fingerstick [[id number]]  (Abnormal) Collected:  12/09/17 2117    Specimen:  Blood Updated:  12/09/17 2123     Glucose 205 (H) mg/dL     Narrative:       Meter: [id number] : [id number] Bo Barnhart NURSING ASSISTANT    Hemoglobin & Hematocrit, Blood [[id number]]  (Abnormal) Collected:  12/10/17 0346    Specimen:  Blood Updated:  12/10/17 0445     Hemoglobin 12.2 (L) g/dL      Hematocrit 36.0 (L) %     Basic Metabolic Panel [[id number]]  (Abnormal) Collected:  12/10/17 0346    Specimen:  Blood Updated:  12/10/17 0509     Glucose 181 (H) mg/dL      BUN 37 (H) mg/dL      Creatinine 1.59 (H) mg/dL      Sodium 136 mmol/L      Potassium 4.0 mmol/L      Chloride 99 mmol/L      CO2 22.3 mmol/L      Calcium 8.2 (L) mg/dL      eGFR Non African Amer 42 (L) mL/min/1.73      BUN/Creatinine Ratio 23.3     Anion Gap 14.7 mmol/L      Narrative:       The MDRD GFR formula is only valid for adults with stable renal function between ages 18 and 70.    POC Glucose Fingerstick [[id number]]  (Abnormal) Collected:  12/10/17 0730    Specimen:  Blood Updated:  12/10/17 0739     Glucose 231 (H) mg/dL     Narrative:       Meter: [id number] : [id number] [employer] NA CERT    POC Glucose Fingerstick [[id number]]  (Abnormal) Collected:  12/10/17 1102    Specimen:  Blood Updated:  12/10/17 1137     Glucose 229 (H) mg/dL     Narrative:       Meter: [id number] : [id number] Faulker Lorenza NA CERT    POC Glucose Fingerstick [[id number]]  (Abnormal) Collected:  12/10/17 1717    Specimen:  Blood Updated:  12/10/17 1724     Glucose 181 (H) mg/dL     Narrative:       Meter: [id number] : [id number] Faulker Lorenza NA CERT    Hemoglobin A1c [[id number]]  (Abnormal) Collected:  12/10/17 0346    Specimen:  Blood Updated:  12/10/17 1759     Hemoglobin A1C 6.90 (H) %     Narrative:       Hemoglobin A1C Ranges:    Increased Risk for Diabetes  5.7% to 6.4%  Diabetes                     >= 6.5%  Diabetic Goal                < 7.0%    POC Glucose Fingerstick [[id number]]  (Abnormal) Collected:  12/10/17 2008    Specimen:  Blood Updated:  12/10/17 2015     Glucose 259 (H) mg/dL     Narrative:       Meter: [id number] : [id number] Shelby Olivas CNA    Hemoglobin & Hematocrit, Blood [[id number]]  (Abnormal) Collected:  12/11/17 0417    Specimen:  Blood Updated:  12/11/17 0449     Hemoglobin 12.1 (L) g/dL      Hematocrit 35.6 (L) %     Basic Metabolic Panel [[id number]]  (Abnormal) Collected:  12/11/17 0417    Specimen:  Blood Updated:  12/11/17 0529     Glucose 198 (H) mg/dL      BUN 27 (H) mg/dL      Creatinine 1.23 mg/dL      Sodium 137 mmol/L      Potassium 4.1 mmol/L      Chloride 99 mmol/L      CO2 25.4 mmol/L      Calcium 8.6 (L) mg/dL      eGFR Non African Amer 56 (L) mL/min/1.73      BUN/Creatinine Ratio 22.0     Anion Gap 12.6 mmol/L     Narrative:       The  MDRD GFR formula is only valid for adults with stable renal function between ages 18 and 70.    POC Glucose Fingerstick [[id number]]  (Abnormal) Collected:  12/11/17 0652    Specimen:  Blood Updated:  12/11/17 0658     Glucose 208 (H) mg/dL     Narrative:       Meter: [id number] : [id number] Salomechaunceysuraj Deisy  ELENA    POC Glucose Fingerstick [[id number]]  (Abnormal) Collected:  12/11/17 1143    Specimen:  Blood Updated:  12/11/17 1149     Glucose 213 (H) mg/dL     Narrative:       Meter: [id number] : [id number] Fahad Goldman NURSING ASSISTANT    POC Glucose Fingerstick [[id number]]  (Normal) Collected:  12/11/17 1620    Specimen:  Blood Updated:  12/11/17 1627     Glucose 113 mg/dL     Narrative:       Meter: [id number] : [id number] Fahad Goldman NURSING ASSISTANT    POC Glucose Fingerstick [[id number]]  (Abnormal) Collected:  12/11/17 2047    Specimen:  Blood Updated:  12/11/17 2053     Glucose 66 (L) mg/dL     Narrative:       Meter: [id number] : [id number] Bo Barnhart NURSING ASSISTANT    Hemoglobin & Hematocrit, Blood [[id number]]  (Abnormal) Collected:  12/12/17 0405    Specimen:  Blood Updated:  12/12/17 0411     Hemoglobin 12.0 (L) g/dL      Hematocrit 35.2 (L) %     Basic Metabolic Panel [[id number]]  (Abnormal) Collected:  12/12/17 0405    Specimen:  Blood Updated:  12/12/17 0435     Glucose 100 (H) mg/dL      BUN 29 (H) mg/dL      Creatinine 1.34 (H) mg/dL      Sodium 139 mmol/L      Potassium 4.3 mmol/L      Chloride 101 mmol/L      CO2 23.8 mmol/L      Calcium 8.5 (L) mg/dL      eGFR Non African Amer 51 (L) mL/min/1.73      BUN/Creatinine Ratio 21.6     Anion Gap 14.2 mmol/L     Narrative:       The MDRD GFR formula is only valid for adults with stable renal function between ages 18 and 70.    POC Glucose Once [[id number]]  (Abnormal) Collected:  12/12/17 0726    Specimen:  Blood Updated:  12/12/17 0736     Glucose 159 (H) mg/dL     Narrative:       Meter: [id number] : [id number] Vásquez  Susi NURSING ASSISTANT    POC Glucose Once [[id number]]  (Abnormal) Collected:  12/12/17 1122    Specimen:  Blood Updated:  12/12/17 1130     Glucose 236 (H) mg/dL     Narrative:       Meter: [id number] : [id number] Fahad Goldman NURSING ASSISTANT    POC Glucose Once [[id number]]  (Abnormal) Collected:  12/12/17 1625    Specimen:  Blood Updated:  12/12/17 1632     Glucose 168 (H) mg/dL     Narrative:       Meter: [id number] : [id number] Vásquez Susi NURSING ASSISTANT    POC Glucose Once [[id number]]  (Normal) Collected:  12/12/17 2029    Specimen:  Blood Updated:  12/12/17 2035     Glucose 109 mg/dL     Narrative:       Meter: [id number] : [id number] Bo Barnhart NURSING ASSISTANT        Imaging Results (most recent)     Procedure Component Value Units Date/Time    XR Shoulder 2+ View Right [[id number]] Collected:  12/08/17 1257     Updated:  12/08/17 1259    Narrative:       INDICATION: Right shoulder pain status post right total shoulder  arthroplasty.     COMPARISON: 12/01/2016.     FINDINGS: 1 views of the right shoulder.   No fracture or dislocation.   There is anatomic alignment of the reverse shoulder arthroplasty.   Mild  right acromioclavicular osteoarthropathy.  There is some atelectasis in  the right lower lobe.       Impression:       Anatomic alignment of the right shoulder arthroplasty.     This report was finalized on 12/8/2017 12:57 PM by Dr. Demetri Martin MD.       XR Chest 1 View [[id number]] Collected:  12/11/17 1126     Updated:  12/11/17 1130    Narrative:       CHEST X-RAY, 12/11/2017:     HISTORY:   82-year-old male with a productive cough since shoulder surgery last  week.     TECHNIQUE:  AP portable upright chest x-ray.     COMPARISON:  *  11/20/2017.     FINDINGS:  Low lung volumes with mild bibasilar scarring, unchanged since the  preoperative chest x-ray. No visible pulmonary infiltrate or pleural  effusion. Heart size and pulmonary vascularity are within normal  limits.  Postop changes reverse right total shoulder arthroplasty.       Impression:       1. No active disease.  2. Low lung volumes with mild bibasilar scarring.  3. No change since 11/20/2017.     This report was finalized on 12/11/2017 11:28 AM by Dr. Milind Andrade MD.       XR Abdomen KUB [[id number]] Collected:  12/11/17 1128     Updated:  12/11/17 1131    Narrative:       KUB, 12/11/2017:     HISTORY:   82-year-old male postop shoulder arthroplasty surgery three days ago. He  complains of abdomen pain and lack of bowel movements since surgery.     TECHNIQUE:  AP portable radiographs of the abdomen and pelvis.     FINDINGS:  Bowel gas pattern is within normal limits with no evidence of bowel  obstruction. Moderate volume stool is scattered in portions of the  colon. Surgical clips in the low pelvis.       Impression:       Negative KUB. Bowel gas pattern is within normal limits.     This report was finalized on 12/11/2017 11:29 AM by Dr. Milind Andrade MD.           PROCEDURES  Procedure(s):  TOTAL SHOULDER REVERSE ARTHROPLASTY    Condition on Discharge:  Stable    Physical Exam at Discharge  Vital Signs  Temp:  [97.8 °F (36.6 °C)-98.4 °F (36.9 °C)] 98.2 °F (36.8 °C)  Heart Rate:  [51-87] 75  Resp:  [20] 20  BP: (123-144)/(67-81) 144/81    Physical Exam:  Physical Exam   Constitutional: Patient appears well-developed and well-nourished and in no acute distress   HEENT:   Head: Normocephalic and atraumatic.   Eyes:  Pupils are equal, round, and reactive to light. EOM are intact. Sclera are anicteric and non-injected.  Mouth and Throat: Patient has moist mucous membranes. Oropharynx is clear of any erythema or exudate.     Neck: Neck supple. No JVD present. No thyromegaly present. No lymphadenopathy present.  Cardiovascular: Regular rate, regular rhythm, S1 normal and S2 normal.  Exam reveals no gallop and no friction rub.  No murmur heard.  Pulmonary/Chest: Lungs are clear to auscultation bilaterally.  No respiratory distress. No wheezes. No rhonchi. No rales.   Abdominal: Soft. Bowel sounds are normal. No distension and no mass. There is no hepatosplenomegaly. There is no tenderness.   Musculoskeletal: decreased muscle bulk. CMS intact to RUE  Extremities: No edema. Pulses are palpable in all 4 extremities.  Neurological: Patient is alert, confused, oriented to self only.  Skin: Right shoulder dressings clean, dry and intact. Mild ecchymosis upper arm. Skin is warm. No rash noted. Nails show no clubbing.  No cyanosis or erythema.    Discharge Disposition  [facility]    Visiting Nurse:    As per facility    Home PT/OT:  As per facility    Home Safety Evaluation:  As per facility    DME  Right arm sling    Discharge Diet:         Dietary Orders            Start     Ordered    12/12/17 1200  Dietary Nutrition Supplement: [place] Breakfast Essentials  Daily With Breakfast, Lunch & Dinner     Comments:  CIB Lite strawberry mixed with strawberry ice cream and milk   Question:  Select Supplement:  Answer:  [place] Breakfast Essentials    12/12/17 1003    12/08/17 1305  Diet Regular; Consistent Carbohydrate, Cardiac  Diet Effective Now     Question Answer Comment   Diet Texture / Consistency Regular    Common Modifiers Consistent Carbohydrate    Common Modifiers Cardiac        12/08/17 1304        Activity at Discharge:  As per Dr. Bhardwaj    Pre-discharge education  Diabetic, Cardiac, Wound Care, medications, follow up    Follow-up Appointments  Future Appointments  Date Time Provider Department Center   12/14/2017 10:45 AM Goyo Bhardwaj MD [facility] None     Additional Instructions for the Follow-ups that You Need to Schedule     Discharge Follow-up with PCP    As directed    Follow Up Details:  1 week           Discharge Follow-up with Specified Provider: Dr. Bhardwaj; 2 Weeks    As directed    To:  Dr. Bhardwaj    Follow Up:  2 Weeks               Additional information on Labs and Follow-ups:      F/U with   Bulmaro 12/19 @ 11:20 am.                  Test Results Pending at Discharge: NONE     Deanna Goldberg, JILLIAN  12/13/17  9:50 AM    Time: Discharge over 30 min (if over 30 minutes give explanation as to why it took greater than 30 minutes)  Secondary to:  Extensive discussions with family  D/W case management  Coordination of care/follow up  Medication reconciliation

## 2017-12-13 NOTE — PLAN OF CARE
Problem: Patient Care Overview (Adult)  Goal: Plan of Care Review  Outcome: Ongoing (interventions implemented as appropriate)    12/13/17 1329   Coping/Psychosocial Response Interventions   Plan Of Care Reviewed With patient;spouse;daughter   Patient Care Overview   Progress progress towards functional goals is fair   Outcome Evaluation   Outcome Summary/Follow up Plan Pt. refusing meds and Glucogel until wife arrived. Pt. states he thinks everyone is conspiring against him. Poor PO intake, is drinking CIB. Up x2-3 with gait belt and cane. Pt. requires constant queing to move feet, hand positioning. Pt was due to D/C today but will be staying until tomorrow d/t unforseen circumstances at [facility]. Tele monitoring d/c.        Goal: Adult Individualization and Mutuality  Outcome: Ongoing (interventions implemented as appropriate)    12/13/17 1329   Mutuality/Individual Preferences   What Anxieties, Fears or Concerns Do You Have About Your Health or Care? feels everyone is conspiring against him          Problem: Perioperative Period (Adult)  Goal: Signs and Symptoms of Listed Potential Problems Will be Absent or Manageable (Perioperative Period)  Outcome: Ongoing (interventions implemented as appropriate)    12/13/17 1329   Perioperative Period   Problems Assessed (Perioperative Period) all   Problems Present (Perioperative Period) situational response;physiologic stress response         Problem: Infection, Risk/Actual (Adult)  Goal: Identify Related Risk Factors and Signs and Symptoms  Outcome: Ongoing (interventions implemented as appropriate)    12/13/17 1329   Infection, Risk/Actual   Infection, Risk/Actual: Related Risk Factors sleep disturbance;surgery/procedure   Signs and Symptoms (Infection, Risk/Actual) blood glucose changes;feeding/eating intolerance;lab value changes;mental/behavioral changes;weakness       Goal: Infection Prevention/Resolution  Outcome: Ongoing (interventions implemented  as appropriate)    12/13/17 1329   Infection, Risk/Actual (Adult)   Infection Prevention/Resolution making progress toward outcome         Problem: Pain, Acute (Adult)  Goal: Identify Related Risk Factors and Signs and Symptoms  Outcome: Outcome(s) achieved Date Met:  12/13/17  Goal: Acceptable Pain Control/Comfort Level  Outcome: Outcome(s) achieved Date Met:  12/13/17    Problem: Activity Intolerance (Adult)  Goal: Identify Related Risk Factors and Signs and Symptoms  Outcome: Ongoing (interventions implemented as appropriate)    12/13/17 1329   Activity Intolerance   Activity Intolerance: Related Risk Factors functional decline;generalized weakness;insufficient sleep/rest;pain   Signs and Symptoms (Activity Intolerance) unable to complete BADL/IADL       Goal: Activity Tolerance  Outcome: Ongoing (interventions implemented as appropriate)    12/13/17 1329   Activity Intolerance (Adult)   Activity Tolerance making progress toward outcome       Goal: Effective Energy Conservation Techniques  Outcome: Ongoing (interventions implemented as appropriate)    12/13/17 1329   Activity Intolerance (Adult)   Effective Energy Conservation Techniques making progress toward outcome         Problem: Arrhythmia/Dysrhythmia (Symptomatic) (Adult)  Goal: Signs and Symptoms of Listed Potential Problems Will be Absent or Manageable (Arrhythmia/Dysrhythmia)  Outcome: Ongoing (interventions implemented as appropriate)    12/13/17 1329   Arrhythmia/Dysrhythmia (Symptomatic)   Problems Assessed (Arrhythmia/Dysrhythmia) all   Problems Present (Arrhythmia/Dysrhythmia) none         Problem: Diabetes, Type 2 (Adult)  Goal: Signs and Symptoms of Listed Potential Problems Will be Absent or Manageable (Diabetes, Type 2)  Outcome: Ongoing (interventions implemented as appropriate)    12/13/17 1329   Diabetes, Type 2   Problems Assessed (Type 2 Diabetes) all   Problems Present (Type 2 Diabetes) impaired glycemic control;hypoglycemia;situational  response

## 2017-12-13 NOTE — DISCHARGE SUMMARY
Joey Schmitt  1935  [id number]    Hospitalists Discharge Summary    Date of Admission: 12/8/2017  Date of Discharge:  12/13/2017    Primary Discharge Diagnoses:   1. Osteoarthritis right glenohumeral joint and rotator cuff tendinopathy s/p Right total reverse shoulder  2. Moderate-Severe to Severe cognitive communication decline, suspected dementia with metabolic encephalopathy   Secondary Discharge Diagnoses:   3. Hypertension    4. DM-2    5. CKD stage III    6. Grade 1 diastolic dysfunction/mild to mod AR/low normal EF: per echo 12/4/17  7. Mild post-operative anemia    8. HLD    9. Hypothyroidism    10. GERD    9. Macular degeneration    10. H/O vertigo    11. Cough    12. Nausea/vomiting  PCP  Patient Care Team:  Romeo Chamberlain MD as PCP - General (Family Medicine)    Consults:   Consults     Date and Time Order Name Status Description    12/8/2017 1304 Inpatient Consult to Hospitalist          Operations and Procedures Performed:  Procedure(s):  TOTAL SHOULDER REVERSE ARTHROPLASTY     Xr Chest 2 Vw    Result Date: 11/20/2017  Narrative: Chest x-ray  INDICATION: Preoperative exam for shoulder surgery. Osteoarthritis. Patient has a history of prostate cancer, hyperlipidemia, and hypertension.  FINDINGS: 2 views of the chest were obtained. No comparison. Cardiac size is normal. Thoracic aorta is tortuous. There is some mild scarring or atelectasis in the bases. The lungs are otherwise clear. No pneumothorax.      Impression: Mild bibasilar scarring or atelectasis, otherwise no active disease.  This report was finalized on 11/20/2017 11:11 AM by Dr. Compa Jasso MD.      Xr Shoulder 2+ View Right    Result Date: 12/8/2017  Narrative: INDICATION: Right shoulder pain status post right total shoulder arthroplasty.  COMPARISON: 12/01/2016.  FINDINGS: 1 views of the right shoulder.   No fracture or dislocation. There is anatomic alignment of the reverse shoulder arthroplasty.   Mild right  acromioclavicular osteoarthropathy.  There is some atelectasis in the right lower lobe.      Impression: Anatomic alignment of the right shoulder arthroplasty.  This report was finalized on 12/8/2017 12:57 PM by Dr. Demetri Martin MD.      Xr Chest 1 View    Result Date: 12/11/2017  Narrative: CHEST X-RAY, 12/11/2017:  HISTORY: 82-year-old male with a productive cough since shoulder surgery last week.  TECHNIQUE: AP portable upright chest x-ray.  COMPARISON: *  11/20/2017.  FINDINGS: Low lung volumes with mild bibasilar scarring, unchanged since the preoperative chest x-ray. No visible pulmonary infiltrate or pleural effusion. Heart size and pulmonary vascularity are within normal limits. Postop changes reverse right total shoulder arthroplasty.      Impression: 1. No active disease. 2. Low lung volumes with mild bibasilar scarring. 3. No change since 11/20/2017.  This report was finalized on 12/11/2017 11:28 AM by Dr. Milind Andrade MD.      Xr Abdomen Kub    Result Date: 12/11/2017  Narrative: KUB, 12/11/2017:  HISTORY: 82-year-old male postop shoulder arthroplasty surgery three days ago. He complains of abdomen pain and lack of bowel movements since surgery.  TECHNIQUE: AP portable radiographs of the abdomen and pelvis.  FINDINGS: Bowel gas pattern is within normal limits with no evidence of bowel obstruction. Moderate volume stool is scattered in portions of the colon. Surgical clips in the low pelvis.      Impression: Negative KUB. Bowel gas pattern is within normal limits.  This report was finalized on 12/11/2017 11:29 AM by Dr. Milind Andrade MD.      Allergies:  has No Known Allergies.    Andrew  n/a    Discharge Medications:   Joey Schmitt   Home Medication Instructions DARLINE:[id number]    Printed on:12/13/17 8443   Medication Information                      acetaminophen (TYLENOL) 500 MG tablet  Take 2 tablets by mouth Every 6 (Six) Hours As Needed for Mild Pain , Moderate Pain  or Headache.      "        Blood Glucose Monitoring Suppl (ONE TOUCH ULTRA 2) W/DEVICE kit  USE AS DIRECTED             docusate sodium 100 MG capsule  Take 100 mg by mouth 2 (Two) Times a Day.             famotidine (PEPCID) 20 MG tablet  Take 1 tablet by mouth Daily.             glipiZIDE (GLUCOTROL) 10 MG tablet  Take 10 mg by mouth 2 (two) times a day before meals             guaifenesin-dextromethorphan (MUCINEX DM)  MG tablet sustained-release 12 hour tablet  Take 1 tablet by mouth 2 (Two) Times a Day As Needed (cough).             insulin aspart (novoLOG) 100 UNIT/ML injection  Inject 0-9 Units under the skin 4 (Four) Times a Day Before Meals & at Bedtime.             levothyroxine (SYNTHROID, LEVOTHROID) 100 MCG tablet  Take 100 mcg by mouth Every Morning.             Liraglutide (VICTOZA) 18 MG/3ML solution pen-injector  Inject 1.8 mg under the skin Every Morning.             lisinopril (PRINIVIL,ZESTRIL) 10 MG tablet  Take 1 tablet by mouth Daily.             melatonin 5 MG tablet tablet  Take 1 tablet by mouth Every Night.             metFORMIN (GLUCOPHAGE) 1000 MG tablet  Take 1,000 mg by mouth 2 (two) times a day with meals             metoprolol tartrate (LOPRESSOR) 25 MG tablet  Take 1 tablet (25 mg total) by mouth 2 (Two) Times a Day             polyethylene glycol (MIRALAX) packet  Take 17 g by mouth Daily.               History of Present Illness: Taken from [facility] on admit per Dr. Bhardwaj:  \"82-year-old male right-hand-dominant with end-stage degenerative arthritis of the right shoulder failed to respond to nonoperative management is being admitted this time to undergo a right total reverse shoulder.  Patient fell respond to nonoperative management is discussed with the patient and family detail the risk of surgery which include but not limited to infection and the need for multiple procedures including implant removal to eradicate infection, dislocation, nerve injury and paralysis, vascular injury, periprosthetic " "fracture and the need for revision surgery and persistent pain and discomfort despite a well implanted total reverse shoulder.  He understands and agrees to proceed.\"    Taken from [facility] on consult per Dr. Pelayo:  \"Mr. Joey Schmitt is an 82 year old  male who is known to have HTN, HLD, Type II DM, Hypothyroidism, dementia, prostate cancer and macular degeneration, was admitted by Dr Bhardwaj with Osteoarthritis right glenohumeral joint and rotator cuff tendinopathy and Dr Bhardwaj took him to OR for Right total reverse shoulder. The Hospitalist services were consulted for management of his medical issues. Patient is doing well after surgery. Denies any sx of pain or n/v. Patient denies any sx of fever, headache, chest pain, shortness of breath, abdominal pain, nausea/ vomiting, dysuria, urgency/ frequency or any recent hx of blood in stool. No other medical history available.\"    Hospital Course  1. Osteoarthritis right glenohumeral joint and rotator cuff tendinopathy s/p Right total reverse shoulder: POD#4.  Dr. Bhardwaj managed   Taken off all narcotic medications secondary to confusion  Much improved with tylenol only  Per Dr. Bhardwaj:  \"May remove the sling for circumduction exercises to shoulder and range of motion exercises. Otherwise is to wear sling at all times.  He may shower.  He is to leave dressing in place until seen in the office in 2 weeks.  He has scheduled appointment\"    2. Moderate-Severe to Severe cognitive communication decline, suspected dementia with metabolic encephalopathy due to anesthesia/pain meds:   SLP evaluation completed  D/C'd all narcotics, continue tylenol only  Cognitively unsafe to be at home per staff, awaiting [facility] for rehab  Plan f/u outpatient cognitive eval after discharge once off pain meds    3. Hypertension:   BP overall near goal for age on metoprolol  Resumed lisinopril at half home dose today  Continue to hold HCTZ, monitor      4. DM-2: A1C 6.9% this " admit  Glucose variable but currently at goal on metformin/glipizide 10 mg twice daily/home victoza 1.8 mg SC daily  Continued on moderate dose SSI/accuchecks ac/hs while here  Monitor      5. CKD stage III:   Baseline range 1.25 to 1.61  Creatinine 1.34 yesterday, stable      6. Grade 1 diastolic dysfunction/mild to mod AR/low normal EF: per echo 12/4/17  No acute issues here, EF noted approx 50%      7. Mild post-operative anemia: hemoglobin stable at 12.0 without active blood loss noted      8. HLD: no meds, no current acute issues      9. Hypothyroidism: No acute issues on home replacement.      10. GERD: continue pepcid, no acute issues      9. Macular degeneration: No acute issues currently.      10. H/O vertigo: no acute issues here      11. Cough: Resolved  CXR negative for acute findings  No further issues      12. Nausea/vomiting: Resolved, occurred post operatively, intake much improved  KUB negative for acute findings  Continue zofran, monitor      Last Lab Results:   Lab Results (most recent)     Procedure Component Value Units Date/Time    POC Glucose Fingerstick [[id number]]  (Normal) Collected:  12/08/17 0837    Specimen:  Blood Updated:  12/08/17 0843     Glucose 125 mg/dL     Narrative:       Meter: [id number] : [id number] Tiny Garcia RN    Potassium [[id number]]  (Normal) Collected:  12/08/17 0839    Specimen:  Blood Updated:  12/08/17 0911     Potassium 4.8 mmol/L     Hemoglobin & Hematocrit, Blood [[id number]]  (Abnormal) Collected:  12/08/17 1332    Specimen:  Blood Updated:  12/08/17 1336     Hemoglobin 13.5 (L) g/dL      Hematocrit 40.2 (L) %     POC Glucose Fingerstick [[id number]]  (Abnormal) Collected:  12/08/17 1715    Specimen:  Blood Updated:  12/08/17 1722     Glucose 178 (H) mg/dL     Narrative:       Meter: [id number] : [id number] Aly Pacheco NURSING ASSISTANT    POC Glucose Fingerstick [[id number]]  (Abnormal) Collected:  12/08/17 2127    Specimen:  Blood Updated:  12/08/17  2133     Glucose 187 (H) mg/dL     Narrative:       Meter: [id number] : [id number] Marycarmen PaytonVaz Mary Monitor Tech    Hemoglobin & Hematocrit, Blood [[id number]]  (Abnormal) Collected:  12/09/17 0449    Specimen:  Blood Updated:  12/09/17 0457     Hemoglobin 12.2 (L) g/dL      Hematocrit 36.9 (L) %     POC Glucose Fingerstick [[id number]]  (Abnormal) Collected:  12/09/17 0729    Specimen:  Blood Updated:  12/09/17 0755     Glucose 202 (H) mg/dL     Narrative:       Meter: [id number] : [id number] Yannick Araya NURSING ASSISTANT    POC Glucose Fingerstick [[id number]]  (Abnormal) Collected:  12/09/17 1124    Specimen:  Blood Updated:  12/09/17 1149     Glucose 295 (H) mg/dL     Narrative:       Meter: [id number] : [id number] Yannick Araya NURSING ASSISTANT    POC Glucose Fingerstick [[id number]]  (Abnormal) Collected:  12/09/17 1619    Specimen:  Blood Updated:  12/09/17 1626     Glucose 226 (H) mg/dL     Narrative:       Meter: [id number] : [id number] Yannick Araya NURSING ASSISTANT    POC Glucose Fingerstick [[id number]]  (Abnormal) Collected:  12/09/17 2117    Specimen:  Blood Updated:  12/09/17 2123     Glucose 205 (H) mg/dL     Narrative:       Meter: [id number] : [id number] Bo Barnhart NURSING ASSISTANT    Hemoglobin & Hematocrit, Blood [[id number]]  (Abnormal) Collected:  12/10/17 0346    Specimen:  Blood Updated:  12/10/17 0445     Hemoglobin 12.2 (L) g/dL      Hematocrit 36.0 (L) %     Basic Metabolic Panel [[id number]]  (Abnormal) Collected:  12/10/17 0346    Specimen:  Blood Updated:  12/10/17 0509     Glucose 181 (H) mg/dL      BUN 37 (H) mg/dL      Creatinine 1.59 (H) mg/dL      Sodium 136 mmol/L      Potassium 4.0 mmol/L      Chloride 99 mmol/L      CO2 22.3 mmol/L      Calcium 8.2 (L) mg/dL      eGFR Non African Amer 42 (L) mL/min/1.73      BUN/Creatinine Ratio 23.3     Anion Gap 14.7 mmol/L     Narrative:       The MDRD GFR formula is only valid for adults with stable renal function  between ages 18 and 70.    POC Glucose Fingerstick [[id number]]  (Abnormal) Collected:  12/10/17 0730    Specimen:  Blood Updated:  12/10/17 0739     Glucose 231 (H) mg/dL     Narrative:       Meter: [id number] : [id number] [employer] CERT    POC Glucose Fingerstick [[id number]]  (Abnormal) Collected:  12/10/17 1102    Specimen:  Blood Updated:  12/10/17 1137     Glucose 229 (H) mg/dL     Narrative:       Meter: [id number] : [id number] Taigen NA CERT    POC Glucose Fingerstick [[id number]]  (Abnormal) Collected:  12/10/17 1717    Specimen:  Blood Updated:  12/10/17 1724     Glucose 181 (H) mg/dL     Narrative:       Meter: [id number] : [id number] Taigen NA CERT    Hemoglobin A1c [[id number]]  (Abnormal) Collected:  12/10/17 0346    Specimen:  Blood Updated:  12/10/17 1759     Hemoglobin A1C 6.90 (H) %     Narrative:       Hemoglobin A1C Ranges:    Increased Risk for Diabetes  5.7% to 6.4%  Diabetes                     >= 6.5%  Diabetic Goal                < 7.0%    POC Glucose Fingerstick [[id number]]  (Abnormal) Collected:  12/10/17 2008    Specimen:  Blood Updated:  12/10/17 2015     Glucose 259 (H) mg/dL     Narrative:       Meter: [id number] : [id number] Shelby Olivas CNA    Hemoglobin & Hematocrit, Blood [[id number]]  (Abnormal) Collected:  12/11/17 0417    Specimen:  Blood Updated:  12/11/17 0449     Hemoglobin 12.1 (L) g/dL      Hematocrit 35.6 (L) %     Basic Metabolic Panel [[id number]]  (Abnormal) Collected:  12/11/17 0417    Specimen:  Blood Updated:  12/11/17 0529     Glucose 198 (H) mg/dL      BUN 27 (H) mg/dL      Creatinine 1.23 mg/dL      Sodium 137 mmol/L      Potassium 4.1 mmol/L      Chloride 99 mmol/L      CO2 25.4 mmol/L      Calcium 8.6 (L) mg/dL      eGFR Non African Amer 56 (L) mL/min/1.73      BUN/Creatinine Ratio 22.0     Anion Gap 12.6 mmol/L     Narrative:       The MDRD GFR formula is only valid for adults with stable renal function between ages 18 and  70.    POC Glucose Fingerstick [[id number]]  (Abnormal) Collected:  12/11/17 0652    Specimen:  Blood Updated:  12/11/17 0658     Glucose 208 (H) mg/dL     Narrative:       Meter: [id number] : [id number] Shelby Olivas CNA    POC Glucose Fingerstick [[id number]]  (Abnormal) Collected:  12/11/17 1143    Specimen:  Blood Updated:  12/11/17 1149     Glucose 213 (H) mg/dL     Narrative:       Meter: [id number] : [id number] Fahad Goldman NURSING ASSISTANT    POC Glucose Fingerstick [[id number]]  (Normal) Collected:  12/11/17 1620    Specimen:  Blood Updated:  12/11/17 1627     Glucose 113 mg/dL     Narrative:       Meter: [id number] : [id number] Fahad Goldman NURSING ASSISTANT    POC Glucose Fingerstick [[id number]]  (Abnormal) Collected:  12/11/17 2047    Specimen:  Blood Updated:  12/11/17 2053     Glucose 66 (L) mg/dL     Narrative:       Meter: [id number] : [id number] Bo Barnhart NURSING ASSISTANT    Hemoglobin & Hematocrit, Blood [[id number]]  (Abnormal) Collected:  12/12/17 0405    Specimen:  Blood Updated:  12/12/17 0411     Hemoglobin 12.0 (L) g/dL      Hematocrit 35.2 (L) %     Basic Metabolic Panel [[id number]]  (Abnormal) Collected:  12/12/17 0405    Specimen:  Blood Updated:  12/12/17 0435     Glucose 100 (H) mg/dL      BUN 29 (H) mg/dL      Creatinine 1.34 (H) mg/dL      Sodium 139 mmol/L      Potassium 4.3 mmol/L      Chloride 101 mmol/L      CO2 23.8 mmol/L      Calcium 8.5 (L) mg/dL      eGFR Non African Amer 51 (L) mL/min/1.73      BUN/Creatinine Ratio 21.6     Anion Gap 14.2 mmol/L     Narrative:       The MDRD GFR formula is only valid for adults with stable renal function between ages 18 and 70.    POC Glucose Once [[id number]]  (Abnormal) Collected:  12/12/17 0726    Specimen:  Blood Updated:  12/12/17 0736     Glucose 159 (H) mg/dL     Narrative:       Meter: [id number] : [id number] Fahad Goldman NURSING ASSISTANT    POC Glucose Once [[id number]]  (Abnormal) Collected:  12/12/17  1122    Specimen:  Blood Updated:  12/12/17 1130     Glucose 236 (H) mg/dL     Narrative:       Meter: [id number] : [id number] Fahad Goldman NURSING ASSISTANT    POC Glucose Once [[id number]]  (Abnormal) Collected:  12/12/17 1625    Specimen:  Blood Updated:  12/12/17 1632     Glucose 168 (H) mg/dL     Narrative:       Meter: [id number] : [id number] Fahad Goldman NURSING ASSISTANT    POC Glucose Once [[id number]]  (Normal) Collected:  12/12/17 2029    Specimen:  Blood Updated:  12/12/17 2035     Glucose 109 mg/dL     Narrative:       Meter: [id number] : [id number] Bo Barnhart NURSING ASSISTANT        Imaging Results (most recent)     Procedure Component Value Units Date/Time    XR Shoulder 2+ View Right [[id number]] Collected:  12/08/17 1257     Updated:  12/08/17 1259    Narrative:       INDICATION: Right shoulder pain status post right total shoulder  arthroplasty.     COMPARISON: 12/01/2016.     FINDINGS: 1 views of the right shoulder.   No fracture or dislocation.   There is anatomic alignment of the reverse shoulder arthroplasty.   Mild  right acromioclavicular osteoarthropathy.  There is some atelectasis in  the right lower lobe.       Impression:       Anatomic alignment of the right shoulder arthroplasty.     This report was finalized on 12/8/2017 12:57 PM by Dr. Demetri Martin MD.       XR Chest 1 View [[id number]] Collected:  12/11/17 1126     Updated:  12/11/17 1130    Narrative:       CHEST X-RAY, 12/11/2017:     HISTORY:   82-year-old male with a productive cough since shoulder surgery last  week.     TECHNIQUE:  AP portable upright chest x-ray.     COMPARISON:  *  11/20/2017.     FINDINGS:  Low lung volumes with mild bibasilar scarring, unchanged since the  preoperative chest x-ray. No visible pulmonary infiltrate or pleural  effusion. Heart size and pulmonary vascularity are within normal limits.  Postop changes reverse right total shoulder arthroplasty.       Impression:       1. No  active disease.  2. Low lung volumes with mild bibasilar scarring.  3. No change since 11/20/2017.     This report was finalized on 12/11/2017 11:28 AM by Dr. Milind Andrade MD.       XR Abdomen KUB [[id number]] Collected:  12/11/17 1128     Updated:  12/11/17 1131    Narrative:       KUB, 12/11/2017:     HISTORY:   82-year-old male postop shoulder arthroplasty surgery three days ago. He  complains of abdomen pain and lack of bowel movements since surgery.     TECHNIQUE:  AP portable radiographs of the abdomen and pelvis.     FINDINGS:  Bowel gas pattern is within normal limits with no evidence of bowel  obstruction. Moderate volume stool is scattered in portions of the  colon. Surgical clips in the low pelvis.       Impression:       Negative KUB. Bowel gas pattern is within normal limits.     This report was finalized on 12/11/2017 11:29 AM by Dr. Milind Andrade MD.           PROCEDURES  Procedure(s):  TOTAL SHOULDER REVERSE ARTHROPLASTY    Condition on Discharge:  Stable    Physical Exam at Discharge  Vital Signs  Temp:  [97.8 °F (36.6 °C)-98.4 °F (36.9 °C)] 98.2 °F (36.8 °C)  Heart Rate:  [51-87] 80  Resp:  [20] 20  BP: (123-144)/(67-81) 144/81    Physical Exam:  Physical Exam   Constitutional: Patient appears well-developed and well-nourished and in no acute distress   HEENT:   Head: Normocephalic and atraumatic.   Eyes:  Pupils are equal, round, and reactive to light. EOM are intact. Sclera are anicteric and non-injected.  Mouth and Throat: Patient has moist mucous membranes. Oropharynx is clear of any erythema or exudate.     Neck: Neck supple. No JVD present. No thyromegaly present. No lymphadenopathy present.  Cardiovascular: Regular rate, regular rhythm, S1 normal and S2 normal.  Exam reveals no gallop and no friction rub.  No murmur heard.  Pulmonary/Chest: Lungs are clear to auscultation bilaterally. No respiratory distress. No wheezes. No rhonchi. No rales.   Abdominal: Soft. Bowel sounds are  normal. No distension and no mass. There is no hepatosplenomegaly. There is no tenderness.   Musculoskeletal: decreased muscle bulk. CMS intact to RUE  Extremities: No edema. Pulses are palpable in all 4 extremities.  Neurological: Patient is alert, confused, oriented to self only.  Skin: Right shoulder dressings clean, dry and intact. Mild ecchymosis upper arm. Skin is warm. No rash noted. Nails show no clubbing.  No cyanosis or erythema.    Discharge Disposition  [facility]    Visiting Nurse:    As per facility    Home PT/OT:  As per facility    Home Safety Evaluation:  As per facility    DME  Right arm sling    Discharge Diet:         Dietary Orders            Start     Ordered    12/12/17 1200  Dietary Nutrition Supplement: [employer] Breakfast Essentials  Daily With Breakfast, Lunch & Dinner     Comments:  CIB Lite strawberry mixed with strawberry ice cream and milk   Question:  Select Supplement:  Answer:  [employer] Breakfast Essentials    12/12/17 1003    12/08/17 1305  Diet Regular; Consistent Carbohydrate, Cardiac  Diet Effective Now     Question Answer Comment   Diet Texture / Consistency Regular    Common Modifiers Consistent Carbohydrate    Common Modifiers Cardiac        12/08/17 1304        Activity at Discharge:  As per Dr. Bhardwaj    Pre-discharge education  Diabetic, Cardiac, Wound Care, medications, follow up    Follow-up Appointments  Future Appointments  Date Time Provider Department Center   12/14/2017 10:45 AM Goyo Bhardwaj MD [facility] None     Additional Instructions for the Follow-ups that You Need to Schedule     Discharge Follow-up with PCP    As directed    Follow Up Details:  1 week           Discharge Follow-up with Specified Provider: Dr. Bhardwaj; 2 Weeks    As directed    To:  Dr. Bhardwaj    Follow Up:  2 Weeks                   Test Results Pending at Discharge: NONE     Deanna Goldberg, JILLIAN  12/13/17  8:40 AM    Time: Discharge over 30 min (if over 30 minutes give explanation as  to why it took greater than 30 minutes)  Secondary to:  Extensive discussions with family  D/W case management  Coordination of care/follow up  Medication reconciliation

## 2017-12-13 NOTE — PLAN OF CARE
Problem: Patient Care Overview (Adult)  Goal: Plan of Care Review  Outcome: Ongoing (interventions implemented as appropriate)    12/13/17 1415   Coping/Psychosocial Response Interventions   Plan Of Care Reviewed With patient   Outcome Evaluation   Outcome Summary/Follow up Plan PT: Patient requires min assist x2 for sit to stand transfers from chair. Patient requires min-mod assist for gait x165 feet with hand held assistance. Patient attempted gait initially with quad cane however gait mechanics much improved with hand held assistance. Patient with improved posture and gait mechanics during session today compared to previous session. Per , pt to discharge to [facility] tomorrow.

## 2017-12-13 NOTE — PROGRESS NOTES
Orthopedic Progress Note   Chief Complaint:  Status post right total reverse shoulder    Subjective     Interval History: Patient is afebrile this morning hemodynamically stable.  The family reports his mental status is slowly clearing.          Objective     Vital Signs  Temp:  [97.8 °F (36.6 °C)-98.4 °F (36.9 °C)] 98.2 °F (36.8 °C)  Heart Rate:  [51-87] 80  Resp:  [20] 20  BP: (123-156)/(67-80) 139/80  Body mass index is 29.66 kg/(m^2).    Intake/Output Summary (Last 24 hours) at 12/13/17 0642  Last data filed at 12/13/17 0036   Gross per 24 hour   Intake              720 ml   Output              150 ml   Net              570 ml     I/O this shift:  In: -   Out: 150 [Urine:150]       Physical Exam:   General: patient awake, alert and cooperative   Cardiovascular: regular rhythm and rate   Pulm: clear to auscultation bilaterally   Abdomen: Benign.  Soft bowel sounds   Extremities: Right upper extremity wound is benign.   Neurologic: Normal mood and behavior     Results Review:     I reviewed the patient's new clinical results.      WBC No results found for: WBCS   HGB Hemoglobin   Date Value Ref Range Status   12/12/2017 12.0 (L) 14.0 - 18.0 g/dL Final   12/11/2017 12.1 (L) 14.0 - 18.0 g/dL Final      HCT Hematocrit   Date Value Ref Range Status   12/12/2017 35.2 (L) 42.0 - 52.0 % Final   12/11/2017 35.6 (L) 42.0 - 52.0 % Final      Platlets No results found for: LABPLAT     PT/INR:  No results found for: PROTIME/No results found for: INR    Sodium Sodium   Date Value Ref Range Status   12/12/2017 139 136 - 145 mmol/L Final   12/11/2017 137 136 - 145 mmol/L Final      Potassium Potassium   Date Value Ref Range Status   12/12/2017 4.3 3.5 - 5.2 mmol/L Final   12/11/2017 4.1 3.5 - 5.2 mmol/L Final      Chloride Chloride   Date Value Ref Range Status   12/12/2017 101 98 - 107 mmol/L Final   12/11/2017 99 98 - 107 mmol/L Final      Bicarbonate No results found for: PLASMABICARB   BUN BUN   Date Value Ref Range Status    12/12/2017 29 (H) 8 - 23 mg/dL Final   12/11/2017 27 (H) 8 - 23 mg/dL Final      Creatinine Creatinine   Date Value Ref Range Status   12/12/2017 1.34 (H) 0.76 - 1.27 mg/dL Final   12/11/2017 1.23 0.76 - 1.27 mg/dL Final      Calcium Calcium   Date Value Ref Range Status   12/12/2017 8.5 (L) 8.8 - 10.5 mg/dL Final   12/11/2017 8.6 (L) 8.8 - 10.5 mg/dL Final      Magnesium  AST  ALT  Bilirubin, Total  AlkPhos  Albumin    Amylase  Lipase    Radiology: No results found for: MG  No components found for: AST.*  No components found for: ALT.*  No components found for: BILIRUBIN, TOTAL.*    No components found for: ALKPHOS.*  No components found for: ALBUMIN.*      No components found for: AMYLASE.*  No components found for: LIPASE.*            Imaging Results (most recent)     Procedure Component Value Units Date/Time    XR Shoulder 2+ View Right [[id number]] Collected:  12/08/17 1257     Updated:  12/08/17 1259    Narrative:       INDICATION: Right shoulder pain status post right total shoulder  arthroplasty.     COMPARISON: 12/01/2016.     FINDINGS: 1 views of the right shoulder.   No fracture or dislocation.   There is anatomic alignment of the reverse shoulder arthroplasty.   Mild  right acromioclavicular osteoarthropathy.  There is some atelectasis in  the right lower lobe.       Impression:       Anatomic alignment of the right shoulder arthroplasty.     This report was finalized on 12/8/2017 12:57 PM by Dr. Demetri Martin MD.       XR Chest 1 View [[id number]] Collected:  12/11/17 1126     Updated:  12/11/17 1130    Narrative:       CHEST X-RAY, 12/11/2017:     HISTORY:   82-year-old male with a productive cough since shoulder surgery last  week.     TECHNIQUE:  AP portable upright chest x-ray.     COMPARISON:  *  11/20/2017.     FINDINGS:  Low lung volumes with mild bibasilar scarring, unchanged since the  preoperative chest x-ray. No visible pulmonary infiltrate or pleural  effusion. Heart size and pulmonary  vascularity are within normal limits.  Postop changes reverse right total shoulder arthroplasty.       Impression:       1. No active disease.  2. Low lung volumes with mild bibasilar scarring.  3. No change since 11/20/2017.     This report was finalized on 12/11/2017 11:28 AM by Dr. Milind Andrade MD.       XR Abdomen KUB [[id number]] Collected:  12/11/17 1128     Updated:  12/11/17 1131    Narrative:       KUB, 12/11/2017:     HISTORY:   82-year-old male postop shoulder arthroplasty surgery three days ago. He  complains of abdomen pain and lack of bowel movements since surgery.     TECHNIQUE:  AP portable radiographs of the abdomen and pelvis.     FINDINGS:  Bowel gas pattern is within normal limits with no evidence of bowel  obstruction. Moderate volume stool is scattered in portions of the  colon. Surgical clips in the low pelvis.       Impression:       Negative KUB. Bowel gas pattern is within normal limits.     This report was finalized on 12/11/2017 11:29 AM by Dr. Milind Andrade MD.                       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  Patient may remove the sling for circumduction exercises to shoulder and range of motion exercises to Otherwise is to wear sling at all time.  He may shower.  He is to leave dressing in place until seen in the office in 2 weeks.  He has scheduled appointment    Goyo Bhardwaj MD  12/13/17  6:42 AM

## 2017-12-13 NOTE — NURSING NOTE
Continued Stay Note  MERCEDEZ Reyes     Patient Name: Joey Schmitt  MRN: [id number]  Today's Date: 12/13/2017    Admit Date: 12/8/2017          Discharge Plan       12/13/17 1353    Case Management/Social Work Plan    Additional Comments Spoke with Germania at [facility] and she states bed is unavailable and they can accept patient  tomorrow.  Family  (Xiomara), Di Charge Nurse and Svetlana informed of this.  Will continue to follow.              Discharge Codes     None        Expected Discharge Date and Time     Expected Discharge Date Expected Discharge Time    Dec 13, 2017             Sandra Goodwin RN

## 2017-12-13 NOTE — PLAN OF CARE
Problem: Inpatient Physical Therapy  Goal: Bed Mobility Goal STG- PT  Outcome: Unable to achieve outcome(s) by discharge Date Met:  12/13/17    12/10/17 1028 12/13/17 1134   Bed Mobility PT STG   Bed Mobility PT STG, Date Established 12/10/17 --    Bed Mobility PT STG, Time to Achieve 3 days --    Bed Mobility PT STG, Activity Type all bed mobility --    Bed Mobility PT STG, [place] Level supervision required --    Bed Mobility PT STG, Date Goal Reviewed --  12/13/17   Bed Mobility PT STG, Outcome --  goal not met   Bed Mobility PT STG, Reason Goal Not Met --  discharged from facility       Goal: Transfer Training Goal 1 STG- PT  Outcome: Unable to achieve outcome(s) by discharge Date Met:  12/13/17    12/10/17 1028 12/13/17 1134   Transfer Training PT STG   Transfer Training PT STG, Date Established 12/10/17 --    Transfer Training PT STG, Time to Achieve 3 days --    Transfer Training PT STG, [place] Level contact guard assist --    Transfer Training PT STG, Assist Device (with appropriate device) --    Transfer Training PT STG, Date Goal Reviewed --  12/13/17   Transfer Training PT STG, Outcome --  goal not met   Transfer Training PT STG, Reason Goal Not Met --  discharged from facility       Goal: Gait Training Goal STG- PT  Outcome: Unable to achieve outcome(s) by discharge Date Met:  12/13/17    12/10/17 1028 12/13/17 1134   Gait Training PT STG   Gait Training Goal PT STG, Date Established 12/10/17 --    Gait Training Goal PT STG, Time to Achieve 3 days --    Gait Training Goal PT STG, [place] Level contact guard assist --    Gait Training Goal PT STG, Assist Device (with appropriate device) --    Gait Training Goal PT STG, Distance to Achieve 100 --    Gait Training Goal PT STG, Date Goal Reviewed --  12/13/17   Gait Training Goal PT STG, Outcome --  goal not met   Gait Training Goal PT STG, Reason Goal Not Met --  discharged from facility       Goal: Patient Education Goal STG- PT  Outcome:  Unable to achieve outcome(s) by discharge Date Met:  12/13/17    12/10/17 1028 12/13/17 1134   Patient Education PT STG   Patient Education PT STG, Date Established 12/10/17 --    Patient Education PT STG, Time to Achieve 3 days --    Patient Education PT STG, Education Type precaution per surgeon --    Patient Education PT STG, Education Understanding demonstrate adequately;verbalize understanding --    Patient Education PT STG, Date Goal Reviewed --  12/13/17   Patient Education PT STG Outcome --  goal not met   Patient Education PT STG, Reason Goal Not Met --  discharged from facility

## 2017-12-13 NOTE — THERAPY TREATMENT NOTE
Acute Care - Occupational Therapy Treatment Note   Zulma Fraire     Patient Name: Joey Schmitt  : 1935  MRN: [id number]  Today's Date: 2017  Onset of Illness/Injury or Date of Surgery Date: 17  Date of Referral to OT: 17  Referring Physician: Gita      Admit Date: 2017    Visit Dx:     ICD-10-CM ICD-9-CM   1. Type 2 diabetes mellitus with stage 3 chronic kidney disease, without long-term current use of insulin E11.22 250.40    N18.3 585.3   2. Osteoarthritis of right glenohumeral joint M19.011 715.91   3. Tendinopathy of right rotator cuff M67.911 727.9   4. History of cancer Z85.9 V10.90   5. Cognitive communication deficit R41.841 799.52     Patient Active Problem List   Diagnosis   • Osteoarthritis of right glenohumeral joint             Adult Rehabilitation Note       17 1015 17 0945 17 0917    Rehab Assessment/Intervention    Discipline occupational therapist  -JJ occupational therapist  -SD physical therapy assistant  -KM    Document Type therapy note (daily note)  -JJ therapy note (daily note)  -SD therapy note (daily note)  -KM    Subjective Information agree to therapy;no complaints  -JJ agree to therapy;no complaints  -SD agree to therapy;no complaints  -KM    Patient Effort, Rehab Treatment  fair  -SD fair  -KM    Symptoms Noted During/After Treatment  dizziness   pt c/o dizziness duirng mobility  -SD dizziness   reports history of vertigo  -KM    Precautions/Limitations  non-weight bearing status;fall precautions;shoulder precautions  -SD non-weight bearing status;fall precautions   precautions due to recent surgery  -KM    Precautions/Limitations, Vision  other (see comments)   macular degeneration  -SD other (see comments)   macular degeneration  -KM    Specific Treatment Considerations pts spouse, daughter and nurse educated on ub dressing and brace management. fsmily educated on benefits of wearing button down shirts and dressing affected L UE first  when assisting pt with dressing and keeping L shoulder immobilized. family and RN state understanding  -JJ  pt has posterior lean with standing- increases with turns  -KM    Recorded by [JJ] Katina Sullivan, OTR [SD] DAMARI Solomon [KM] Dorys Botello PTA    Pain Assessment    Pain Assessment  No/denies pain  -SD No/denies pain  -KM    Recorded by  [SD] DAMARI Solomon [KM] Dorys Botello PTA    Cognitive Assessment/Intervention    Current Cognitive/Communication Assessment  impaired  -SD impaired  -KM    Follows Commands/Answers Questions  able to follow single-step instructions;50% of the time;needs cueing;needs repetition  -SD     Personal Safety Interventions  gait belt;fall prevention program maintained;nonskid shoes/slippers when out of bed;supervised activity  -SD gait belt;nonskid shoes/slippers when out of bed  -KM    Recorded by  [SD] DAMARI Solomon [KM] Dorys Botello PTA    Cognitive Assessment Intervention    Cognitive Assessment/Treatment Comment  Pt was able to follow directions more consistently today during transfer/mobility activity and with UE HEP.    -SD     Recorded by  [SD] DAMARI Solomon     Bed Mobility, Assessment/Treatment    Bed Mobility, Assistive Device  bed rails;head of bed elevated  -SD bed rails;head of bed elevated  -KM    Bed Mob, Supine to Sit, [place]  moderate assist (50% patient effort);2 person assist required  -SD moderate assist (50% patient effort);2 person assist required  -KM    Bed Mobility, Comment   pt able to sit on the side of the bed with assist of one.  Cues to allow feet to rest on floor and at times assist due to pt leaning backwards  -KM    Recorded by  [SD] Bunny Tai OTR [KM] Dorys Botello PTA    Transfer Assessment/Treatment    Transfers, Sit-Stand [place]  moderate assist (50% patient effort);maximum assist (25% patient effort);verbal cues required   mod A from EOB (elevated), max A from recliner  -SD  moderate assist (50% patient effort);maximum assist (25% patient effort);2 person assist required   required max assist of two to stand from recliner  -KM    Transfers, Stand-Sit [place]  maximum assist (25% patient effort);2 person assist required;verbal cues required  -SD maximum assist (25% patient effort);2 person assist required  -KM    Transfers, Sit-Stand-Sit, Assist Device  quad cane  -SD quad cane  -KM    Transfer, Safety Issues   --   OT assisting with RLE to maintain NWB status  -KM    Transfer, Comment  Pt with posterior lean when intially coming to a standing position.    -SD pt with posterior lean with standing- if standing next to bed/chair- will use bed/chair to lean against.  Cues for hand placement  -KM    Recorded by  [SD] Bunny Tai OTR [KM] Dorys Botello PTA    Gait Assessment/Treatment    Gait, [place] Level   moderate assist (50% patient effort);maximum assist (25% patient effort);2 person assist required  -KM    Gait, Assistive Device   quad cane  -KM    Gait, Distance (Feet)   8  -KM    Gait, Comment   Pt required increased assist with turns and backing with ambulation- has posterior lean and one loss of balance with ambulation.  Pt fatigued also from standing in order for staff to change brief..  -KM    Recorded by   [KM] Dorys Botello PTA    Upper Body Dressing Assessment/Training    UB Dressing Assess/Train, Comment pt max assist x 2 for ub dressing and brace management  -      Recorded by [JJ] Katina Sullivan, HEATHR      Therapy Exercises    Right Upper Extremity  AROM:;10 reps   arom of non-involved joints (elbow to hand)  -SD     Left Upper Extremity  AROM:   education with benefits of rom for LUE to maintain rom/stren  -SD     Recorded by  [SD] Bunny Tai, OTR     Positioning and Restraints    Pre-Treatment Position in bed  -JJ in bed  -SD in bed  -KM    Post Treatment Position bed  -JJ chair  -SD chair  -KM    In Chair sitting;call light within  reach;encouraged to call for assist;with family/caregiver;with nsg  -JJ reclined;call light within reach;encouraged to call for assist;with family/caregiver;with nsg;with brace   pillow positioned under RUE for support in chair  -SD notified nsg;reclined;call light within reach;encouraged to call for assist;with OT;RUE elevated;legs elevated  -KM    Recorded by [JJ] Katina Sullivan, OTR [SD] Bunny Tai, OTR [KM] Dorys Botello, PTA      12/11/17 0920          Rehab Assessment/Intervention    Discipline physical therapist  -      Document Type therapy note (daily note)  -      Subjective Information agree to therapy;complains of;fatigue   requires cues to maintain alertness  -JW      Patient Effort, Rehab Treatment fair  -JW      Symptoms Noted Comment pt with significant confusion throughout treatment  -      Precautions/Limitations non-weight bearing status   right UE in abd brace  -JW      Recorded by [NIGEL] Alexandra Hollingsworth, PT      Pain Assessment    Pain Assessment No/denies pain  -JW      Recorded by [NIGEL] Alexandra Hollingswotrh, PT      Cognitive Assessment/Intervention    Current Cognitive/Communication Assessment impaired  -      Orientation Status oriented to;person   unable to accurately report current year/month  -      Follows Commands/Answers Questions able to follow single-step instructions;25% of the time;needs cueing;needs increased time;needs repetition  -      Personal Safety severe impairment;impulsive;decreased insight to deficits;unaware of functional deficits;decreased awareness, need for assist;decreased awareness, need for safety  -      Personal Safety Interventions gait belt;nonskid shoes/slippers when out of bed  -JW      Recorded by [NIGEL] Alexandra Hollingsworth, PT      Bed Mobility, Assessment/Treatment    Bed Mobility, Assistive Device bed rails;head of bed elevated  -      Bed Mob, Supine to Sit, [place] moderate assist (50% patient effort);2 person assist required;verbal  cues required   significantly increased time required to complete  -      Bed Mobility, Comment pt requires increased time to complete task with multiple cues given for 1 step directions  -JW      Recorded by [JW] Alexandra Hollingsworth, PT      Transfer Assessment/Treatment    Transfers, Sit-Stand [place] moderate assist (50% patient effort);2 person assist required;verbal cues required   significant posterior lean upon immediate standing  -JW      Transfers, Stand-Sit [place] maximum assist (25% patient effort);2 person assist required;verbal cues required  -JW      Transfers, Sit-Stand-Sit, Assist Device quad cane  -JW      Transfer, Comment pt with difficulty initiating transfers.  patient requires max verbal cues for safety and sequencing.  pt requires approximately 2.5 minutes to successfully perform stand to sit transfer with significant tactile and verbal cues required.  -JW      Recorded by [JW] Alexandra Hollingsworth, PT      Gait Assessment/Treatment    Gait, [place] Level moderate assist (50% patient effort);2 person assist required;verbal cues required  -      Gait, Assistive Device other (see comments)   multiple devices attempted during session  -      Gait, Distance (Feet) 20  -      Gait, Gait Deviations [place] decreased;forward flexed posture;narrow base;step length decreased;decreased heel strike  -      Gait, Safety Issues sequencing ability decreased;step length decreased;weight-shifting ability decreased  -      Gait, Comment pt with significant posterior lean in standing, requires assistance from therapist to correct.  patient trialed quad cane, single point cane and hemiwalker, however pt with difficulty sequencing with device despite max verbal cues and assistance from therapist for device placement.  pt requires verbal cues for proper weight shifting to advance LE during swing phase.  Patient with difficulty obtaining proper hand placement on device.    -JW      Recorded by [JW]  Alexandra Hollingsworth, PT      Positioning and Restraints    Pre-Treatment Position in bed  -JW      Post Treatment Position chair  -JW      In Chair notified nsg;call light within reach;encouraged to call for assist;with family/caregiver   nurse pracitioner in room to discuss progress with family  -NIGEL      Recorded by [NIGEL] Alexandra Hollingsworth, PT        User Key  (r) = Recorded By, (t) = Taken By, (c) = Cosigned By    Initials Name Effective Dates    KM Dorys TUCKER Ayan, PTA 08/11/15 -     SD Bunny Tai, OTR 06/22/16 -     YOVANA Ambriz Deetabby Sullivan, OTR 06/22/16 -     NIGEL Hollingsworth, PT 12/01/15 -                 OT Goals       12/12/17 1024 12/11/17 1307       Caregiver Training OT STG    Caregiver Training OT STG, Date Established  12/11/17  -SD     Caregiver Training OT STG, Time to Achieve  by discharge  -SD     Caregiver Training OT STG, Activity Type  Caregiver able to demonstrate ability to don/doff shoulder support to allow for adl activity.   -SD     Caregiver Training OT STG, [place] Level  able to assist adequately  -SD     Caregiver Training OT STG, Date Goal Reviewed 12/12/17  -SD      Caregiver Training OT STG, Outcome goal ongoing  -SD      Caregiver Training OT LTG    Caregiver Training OT LTG, Date Established  12/11/17  -SD     Caregiver Training OT LTG, Time to Achieve  by discharge  -SD     Caregiver Training OT LTG, Activity Type  Caregiver to verbalize techniques for dressing/bathing to adquately support RUE during adl tasks (due to surgical restricitons/precautions)  -SD     Caregiver Training OT LTG, Date Goal Reviewed 12/12/17  -SD      Caregiver Training OT LTG, Outcome goal met   family understands need to assist with adl's   -SD      Patient Education OT STG    Patient Education OT STG, Date Established  12/11/17  -SD     Patient Education OT STG, Time to Achieve  by discharge  -SD     Patient Education OT STG, Education Type  HEP  -SD     Patient Education OT STG, Education Understanding   demonstrates adequately  -SD     Patient Education OT STG, Additional Goal  Pt to perform arom program of non-involved joints of RUE with min cues.  -SD     Patient Education OT STG, Date Goal Reviewed 12/12/17  -SD      Patient Education OT STG Outcome goal met   min cues  -SD        User Key  (r) = Recorded By, (t) = Taken By, (c) = Cosigned By    Initials Name Provider Type    CLARA TUCKER Deburger, OTR Occupational Therapist          Occupational Therapy Education     Title: PT OT SLP Therapies (Active)     Topic: Occupational Therapy (Active)     Point: ADL training (Active)    Description: Instruct learner(s) on proper safety adaptation and remediation techniques during self care or transfers.   Instruct in proper use of assistive devices.    Learning Progress Summary    Learner Readiness Method Response Comment Documented by Status   Patient Acceptance E NR Education regardiing OT services, activity and rom restrictions following surgery and impact on daily tasks, and rom program for non-involved joints of RUE..  Pt is confused.  He needed reinforcement. SD 12/11/17 1304 Active   Family Acceptance E NR Education regardiing OT services, activity and rom restrictions following surgery and impact on daily tasks, and rom program for non-involved joints of RUE..  Pt is confused.  He needed reinforcement. SD 12/11/17 1304 Active               Point: Home exercise program (Done)    Description: Instruct learner(s) on appropriate technique for monitoring, assisting and/or progressing therapeutic exercises/activities.    Learning Progress Summary    Learner Readiness Method Response Comment Documented by Status   Patient Acceptance E,D,TB DU,NR Pt able to perform arom program for non-invovled joints of RUE (elbow to hand) with verbal cues.  Education with benefits of arom for LUE to maintain rom/strength. SD 12/12/17 1022 Done    Acceptance E NR Education regardiing OT services, activity and rom restrictions following  surgery and impact on daily tasks, and rom program for non-involved joints of RUE..  Pt is confused.  He needed reinforcement. SD 12/11/17 1304 Active   Family Acceptance E,D,TB DU,NR Pt able to perform arom program for non-invovled joints of RUE (elbow to hand) with verbal cues.  Education with benefits of arom for LUE to maintain rom/strength. SD 12/12/17 1022 Done    Acceptance E NR Education regardiing OT services, activity and rom restrictions following surgery and impact on daily tasks, and rom program for non-involved joints of RUE..  Pt is confused.  He needed reinforcement. SD 12/11/17 1304 Active                      User Key     Initials Effective Dates Name Provider Type Discipline    SD 06/22/16 -  Bunny Tai, OTR Occupational Therapist OT                  OT Recommendation and Plan  Anticipated Equipment Needs At Discharge:  (pt will need assistive device for mobility)  Anticipated Discharge Disposition: skilled nursing facility  Planned Therapy Interventions: other (see comments) (caregiver education, )  Therapy Frequency: 2-3 times/wk  Plan of Care Review  Plan Of Care Reviewed With: patient  Outcome Summary/Follow up Plan: OT:pt, family and RN educated on ub dressing techniques and brace management, states understanding        Outcome Measures       12/13/17 1015 12/12/17 1000 12/12/17 0925    How much help from another person do you currently need...    Turning from your back to your side while in flat bed without using bedrails?   2  -KM    Moving from lying on back to sitting on the side of a flat bed without bedrails?   1  -KM    Moving to and from a bed to a chair (including a wheelchair)?   1  -KM    Standing up from a chair using your arms (e.g., wheelchair, bedside chair)?   1  -KM    Climbing 3-5 steps with a railing?   1  -KM    To walk in hospital room?   1  -KM    AM-PAC 6 Clicks Score   7  -KM    How much help from another is currently needed...    Putting on and taking off  regular lower body clothing? 1  -JJ 1  -SD     Bathing (including washing, rinsing, and drying) 2  -JJ 2  -SD     Toileting (which includes using toilet bed pan or urinal) 1  -JJ 1  -SD     Putting on and taking off regular upper body clothing 2  -JJ 2  -SD     Taking care of personal grooming (such as brushing teeth) 2  -JJ 2  -SD     Eating meals 3  -JJ 3  -SD     Score 11  -JJ 11  -SD     Functional Assessment    Outcome Measure Options  AM-PAC 6 Clicks Daily Activity (OT)  -SD AM-PAC 6 Clicks Basic Mobility (PT)  -KM      12/11/17 0920 12/11/17 0840       How much help from another person do you currently need...    Turning from your back to your side while in flat bed without using bedrails? 2  -JW      Moving from lying on back to sitting on the side of a flat bed without bedrails? 1  -JW      Moving to and from a bed to a chair (including a wheelchair)? 1  -JW      Standing up from a chair using your arms (e.g., wheelchair, bedside chair)? 1  -JW      Climbing 3-5 steps with a railing? 1  -JW      To walk in hospital room? 1  -JW      AM-PAC 6 Clicks Score 7  -JW      How much help from another is currently needed...    Putting on and taking off regular lower body clothing?  1  -SD     Bathing (including washing, rinsing, and drying)  2  -SD     Toileting (which includes using toilet bed pan or urinal)  1  -SD     Putting on and taking off regular upper body clothing  1  -SD     Taking care of personal grooming (such as brushing teeth)  2  -SD     Eating meals  3  -SD     Score  10  -SD     Functional Assessment    Outcome Measure Options AM-PAC 6 Clicks Basic Mobility (PT)  -JW AM-PAC 6 Clicks Daily Activity (OT)  -SD       User Key  (r) = Recorded By, (t) = Taken By, (c) = Cosigned By    Initials Name Provider Type    XIOMARA Botello, PTA Physical Therapy Assistant    CLARA Tai, OTR Occupational Therapist    YOVANA Sullivan, OTR Occupational Therapist    NIGEL Hollingsworth, PT Physical  Therapist           Time Calculation:         Time Calculation- OT       12/13/17 1328          Time Calculation- OT    OT Start Time 1015  -GARRY      OT Stop Time 1026  -      OT Time Calculation (min) 11 min  -        User Key  (r) = Recorded By, (t) = Taken By, (c) = Cosigned By    Initials Name Provider Type    YOVANA Sullivan, OTR Occupational Therapist           Therapy Charges for Today     Code Description Service Date Service Provider Modifiers Qty    [id number]  OT SELF CARE/MGMT/TRAIN EA 15 MIN 12/13/2017 Katina Sullivan, OTKHADIJAH GO 1               Katina Sullivan, OTR  12/13/2017

## 2017-12-13 NOTE — PLAN OF CARE
Problem: Patient Care Overview (Adult)  Goal: Plan of Care Review    12/13/17 6893   Coping/Psychosocial Response Interventions   Plan Of Care Reviewed With patient   Outcome Evaluation   Outcome Summary/Follow up Plan OT:pt, family and RN educated on ub dressing techniques and brace management, states understanding

## 2017-12-14 VITALS
DIASTOLIC BLOOD PRESSURE: 75 MMHG | OXYGEN SATURATION: 96 % | RESPIRATION RATE: 18 BRPM | HEART RATE: 68 BPM | TEMPERATURE: 98 F | WEIGHT: 231 LBS | BODY MASS INDEX: 29.65 KG/M2 | SYSTOLIC BLOOD PRESSURE: 163 MMHG | HEIGHT: 74 IN

## 2017-12-14 LAB
GLUCOSE BLDC GLUCOMTR-MCNC: 141 MG/DL (ref 70–130)
GLUCOSE BLDC GLUCOMTR-MCNC: 79 MG/DL (ref 70–130)

## 2017-12-14 PROCEDURE — 82962 GLUCOSE BLOOD TEST: CPT

## 2017-12-14 PROCEDURE — 99239 HOSP IP/OBS DSCHRG MGMT >30: CPT | Performed by: NURSE PRACTITIONER

## 2017-12-14 RX ORDER — DIPHENHYDRAMINE HCL 25 MG
25 CAPSULE ORAL NIGHTLY
Start: 2017-12-14 | End: 2017-12-14

## 2017-12-14 RX ORDER — DIPHENHYDRAMINE HCL 25 MG
CAPSULE ORAL
Status: ON HOLD
Start: 2017-12-14 | End: 2019-03-05

## 2017-12-14 RX ADMIN — GLIPIZIDE 10 MG: 10 TABLET ORAL at 08:26

## 2017-12-14 RX ADMIN — GUAIFENESIN AND DEXTROMETHORPHAN HYDROBROMIDE 1 TABLET: 600; 30 TABLET, EXTENDED RELEASE ORAL at 03:55

## 2017-12-14 RX ADMIN — LEVOTHYROXINE SODIUM 100 MCG: 50 TABLET ORAL at 08:26

## 2017-12-14 RX ADMIN — METOPROLOL TARTRATE 25 MG: 25 TABLET, FILM COATED ORAL at 08:26

## 2017-12-14 RX ADMIN — FAMOTIDINE 20 MG: 20 TABLET, FILM COATED ORAL at 08:26

## 2017-12-14 RX ADMIN — DOCUSATE SODIUM 100 MG: 100 CAPSULE, LIQUID FILLED ORAL at 08:26

## 2017-12-14 RX ADMIN — LISINOPRIL 10 MG: 10 TABLET ORAL at 08:26

## 2017-12-14 RX ADMIN — METFORMIN HYDROCHLORIDE 1000 MG: 500 TABLET ORAL at 08:26

## 2017-12-14 NOTE — NURSING NOTE
Case Management Discharge Note    Final Note: Discharged to skilled nursing facility ([facility]).    Discharge Placement     Facility/Agency Request Status Selected? Address Phone Number Fax Number    [facility] Accepted    Yes [address] [facility] [id number] [phone]             Discharge Codes: 03  Discharged/transferred to skilled nursing facility (SNF) with Medicare certification in anticipation of skilled care

## 2017-12-14 NOTE — DISCHARGE SUMMARY
Joey Schmitt  1935  [id number]    Hospitalists Discharge Summary    Date of Admission: 12/8/2017  Date of Discharge:  12/14/2017  Primary Discharge Diagnoses:   1. Osteoarthritis right glenohumeral joint and rotator cuff tendinopathy s/p Right total reverse shoulder  2. Moderate-Severe to Severe cognitive communication decline, suspected dementia with metabolic encephalopathy   Secondary Discharge Diagnoses:   3. Hypertension    4. DM-2    5. CKD stage III    6. Grade 1 diastolic dysfunction/mild to mod AR/low normal EF  7. Mild post-operative anemia    8. HLD    9. Hypothyroidism    10. GERD    11. Macular degeneration    12. H/O vertigo    13. Cough    14. Nausea/vomiting  PCP  Patient Care Team:  Romeo Chamberlain MD as PCP - General (Family Medicine)    Consults:   Consults     Date and Time Order Name Status Description    12/8/2017 1304 Inpatient Consult to Hospitalist          Operations and Procedures Performed:  Procedure(s):  TOTAL SHOULDER REVERSE ARTHROPLASTY     Xr Chest 2 Vw    Result Date: 11/20/2017  Narrative: Chest x-ray  INDICATION: Preoperative exam for shoulder surgery. Osteoarthritis. Patient has a history of prostate cancer, hyperlipidemia, and hypertension.  FINDINGS: 2 views of the chest were obtained. No comparison. Cardiac size is normal. Thoracic aorta is tortuous. There is some mild scarring or atelectasis in the bases. The lungs are otherwise clear. No pneumothorax.      Impression: Mild bibasilar scarring or atelectasis, otherwise no active disease.  This report was finalized on 11/20/2017 11:11 AM by Dr. Compa Jasso MD.      Xr Shoulder 2+ View Right    Result Date: 12/8/2017  Narrative: INDICATION: Right shoulder pain status post right total shoulder arthroplasty.  COMPARISON: 12/01/2016.  FINDINGS: 1 views of the right shoulder.   No fracture or dislocation. There is anatomic alignment of the reverse shoulder arthroplasty.   Mild right acromioclavicular osteoarthropathy.   There is some atelectasis in the right lower lobe.      Impression: Anatomic alignment of the right shoulder arthroplasty.  This report was finalized on 12/8/2017 12:57 PM by Dr. Demetri Martin MD.      Xr Chest 1 View    Result Date: 12/11/2017  Narrative: CHEST X-RAY, 12/11/2017:  HISTORY: 82-year-old male with a productive cough since shoulder surgery last week.  TECHNIQUE: AP portable upright chest x-ray.  COMPARISON: *  11/20/2017.  FINDINGS: Low lung volumes with mild bibasilar scarring, unchanged since the preoperative chest x-ray. No visible pulmonary infiltrate or pleural effusion. Heart size and pulmonary vascularity are within normal limits. Postop changes reverse right total shoulder arthroplasty.      Impression: 1. No active disease. 2. Low lung volumes with mild bibasilar scarring. 3. No change since 11/20/2017.  This report was finalized on 12/11/2017 11:28 AM by Dr. Milind Andrade MD.      Xr Abdomen Kub    Result Date: 12/11/2017  Narrative: KUB, 12/11/2017:  HISTORY: 82-year-old male postop shoulder arthroplasty surgery three days ago. He complains of abdomen pain and lack of bowel movements since surgery.  TECHNIQUE: AP portable radiographs of the abdomen and pelvis.  FINDINGS: Bowel gas pattern is within normal limits with no evidence of bowel obstruction. Moderate volume stool is scattered in portions of the colon. Surgical clips in the low pelvis.      Impression: Negative KUB. Bowel gas pattern is within normal limits.  This report was finalized on 12/11/2017 11:29 AM by Dr. Milind Andrade MD.      Allergies:  has No Known Allergies.    [facility]  n/a    Discharge Medications:   Joey Schmitt   Home Medication Instructions DARLINE:[id number]    Printed on:12/14/17 0943   Medication Information                      acetaminophen (TYLENOL) 500 MG tablet  Take 2 tablets by mouth Every 6 (Six) Hours As Needed for Mild Pain , Moderate Pain  or Headache.             Blood Glucose Monitoring  "Suppl (ONE TOUCH ULTRA 2) W/DEVICE kit  USE AS DIRECTED             diphenhydrAMINE (BENADRYL) 25 mg capsule  May take 1-2 caps nightly for sleep             docusate sodium 100 MG capsule  Take 100 mg by mouth 2 (Two) Times a Day.             famotidine (PEPCID) 20 MG tablet  Take 1 tablet by mouth Daily.             glipiZIDE (GLUCOTROL) 10 MG tablet  Take 10 mg by mouth 2 (two) times a day before meals             guaifenesin-dextromethorphan (MUCINEX DM)  MG tablet sustained-release 12 hour tablet  Take 1 tablet by mouth 2 (Two) Times a Day As Needed (cough).             insulin aspart (novoLOG) 100 UNIT/ML injection  Inject 0-9 Units under the skin 4 (Four) Times a Day Before Meals & at Bedtime.             levothyroxine (SYNTHROID, LEVOTHROID) 100 MCG tablet  Take 100 mcg by mouth Every Morning.             lisinopril (PRINIVIL,ZESTRIL) 10 MG tablet  Take 1 tablet by mouth Daily.             melatonin 5 MG tablet tablet  Take 1 tablet by mouth Every Night.             metFORMIN (GLUCOPHAGE) 1000 MG tablet  Take 1,000 mg by mouth 2 (two) times a day with meals             metoprolol tartrate (LOPRESSOR) 25 MG tablet  Take 1 tablet (25 mg total) by mouth 2 (Two) Times a Day             polyethylene glycol (MIRALAX) packet  Take 17 g by mouth Daily.               History of Present Illness: Taken from [facility] on admit per Dr. Bhardwaj:  \"82-year-old male right-hand-dominant with end-stage degenerative arthritis of the right shoulder failed to respond to nonoperative management is being admitted this time to undergo a right total reverse shoulder.  Patient fell respond to nonoperative management is discussed with the patient and family detail the risk of surgery which include but not limited to infection and the need for multiple procedures including implant removal to eradicate infection, dislocation, nerve injury and paralysis, vascular injury, periprosthetic fracture and the need for revision surgery and " "persistent pain and discomfort despite a well implanted total reverse shoulder.  He understands and agrees to proceed.\"     Taken from \[facility]\"" on consult per Dr. Pelayo:  \"Mr. Joey Schmitt is an 82 year old  male who is known to have HTN, HLD, Type II DM, Hypothyroidism, dementia, prostate cancer and macular degeneration, was admitted by Dr Bhardwaj with Osteoarthritis right glenohumeral joint and rotator cuff tendinopathy and Dr Bhardwaj took him to OR for Right total reverse shoulder. The Hospitalist services were consulted for management of his medical issues. Patient is doing well after surgery. Denies any sx of pain or n/v. Patient denies any sx of fever, headache, chest pain, shortness of breath, abdominal pain, nausea/ vomiting, dysuria, urgency/ frequency or any recent hx of blood in stool. No other medical history available.\"     Hospital Course  1. Osteoarthritis right glenohumeral joint and rotator cuff tendinopathy s/p Right total reverse shoulder: POD#5.  Dr. Bhardwaj managed   Taken off all narcotic medications secondary to confusion  Much improved with tylenol only  Per Dr. Bhardwaj:  \"May remove the sling for circumduction exercises to shoulder and range of motion exercises. Otherwise is to wear sling at all times.  He may shower.  He is to leave dressing in place until seen in the office in 2 weeks.  He has scheduled appointment\"      2. Moderate-Severe to Severe cognitive communication decline, suspected dementia with metabolic encephalopathy due to anesthesia/pain meds:   SLP evaluation completed  D/C'd all narcotics, continue tylenol only  Cognitively unsafe to be at home per staff, awaiting [facility] placement for rehab  Plan f/u outpatient cognitive eval after discharge once off pain meds      3. Hypertension:   BP overall near goal for age on metoprolol  Resumed lisinopril at half home dose today   Continue to hold HCTZ, monitor      4. DM-2: A1C 6.9% this admit  Glucose variable depending on " "oral intake, but currently at goal on metformin/glipizide 10 mg twice daily  Will d/c Victoza altogether, no other substitute for this medication non-injectable (not available at [facility] and family will have to administer, better to d/c for now)  Glucose low on and off, suspect orals plus SSI will be sufficient   Continue on moderate dose SSI  Change accuchecks to twice daily at [facility]  F/U Romeo Chamberlain MD after d/c       5. CKD stage III:   Baseline range 1.25 to 1.61  Creatinine 1.34       6. Grade 1 diastolic dysfunction/mild to mod AR/low normal EF: per echo 12/4/17  No acute issues here, EF noted approx 50%      7. Mild post-operative anemia: hemoglobin stable at 12.0 without active blood loss noted      8. HLD: no meds, no current acute issues      9. Hypothyroidism: No acute issues on home replacement.      10. GERD: continue pepcid, no acute issues      11. Macular degeneration: No acute issues currently.      12. H/O vertigo: no acute issues here      13. Cough: Resolved  CXR negative for acute findings  No further issues      14. Nausea/vomiting: Resolved, occurred post operatively, intake much improved  KUB negative for acute findings  Received zofran as needed, no further issues, occurred multiple days ago       15. Insomnia: some \"sundowning\" likely  Melatonin added previously  Added benadryl at night  Monitor for effect        Last Lab Results:   Lab Results (most recent)     Procedure Component Value Units Date/Time    POC Glucose Fingerstick [[id number]]  (Normal) Collected:  12/08/17 0837    Specimen:  Blood Updated:  12/08/17 0843     Glucose 125 mg/dL     Narrative:       Meter: [id number] : [id number] Tiny Garcia RN    Potassium [[id number]]  (Normal) Collected:  12/08/17 0839    Specimen:  Blood Updated:  12/08/17 0911     Potassium 4.8 mmol/L     Hemoglobin & Hematocrit, Blood [[id number]]  (Abnormal) Collected:  12/08/17 1332    Specimen:  Blood Updated:  12/08/17 1336     " Hemoglobin 13.5 (L) g/dL      Hematocrit 40.2 (L) %     POC Glucose Fingerstick [[id number]]  (Abnormal) Collected:  12/08/17 1715    Specimen:  Blood Updated:  12/08/17 1722     Glucose 178 (H) mg/dL     Narrative:       Meter: [id number] : [id number] Aly Pacheco NURSING ASSISTANT    POC Glucose Fingerstick [[id number]]  (Abnormal) Collected:  12/08/17 2127    Specimen:  Blood Updated:  12/08/17 2133     Glucose 187 (H) mg/dL     Narrative:       Meter: [id number] : [id number] Marycarmen Quijanoe) Mary Monitor Tech    Hemoglobin & Hematocrit, Blood [[id number]]  (Abnormal) Collected:  12/09/17 0449    Specimen:  Blood Updated:  12/09/17 0457     Hemoglobin 12.2 (L) g/dL      Hematocrit 36.9 (L) %     POC Glucose Fingerstick [[id number]]  (Abnormal) Collected:  12/09/17 0729    Specimen:  Blood Updated:  12/09/17 0755     Glucose 202 (H) mg/dL     Narrative:       Meter: [id number] : [id number] Yannick Araya NURSING ASSISTANT    POC Glucose Fingerstick [[id number]]  (Abnormal) Collected:  12/09/17 1124    Specimen:  Blood Updated:  12/09/17 1149     Glucose 295 (H) mg/dL     Narrative:       Meter: [id number] : [id number] Yannick Araya NURSING ASSISTANT    POC Glucose Fingerstick [[id number]]  (Abnormal) Collected:  12/09/17 1619    Specimen:  Blood Updated:  12/09/17 1626     Glucose 226 (H) mg/dL     Narrative:       Meter: [id number] : [id number] Yannick Araya NURSING ASSISTANT    POC Glucose Fingerstick [[id number]]  (Abnormal) Collected:  12/09/17 2117    Specimen:  Blood Updated:  12/09/17 2123     Glucose 205 (H) mg/dL     Narrative:       Meter: [id number] : [id number] Bo Barnhart NURSING ASSISTANT    Hemoglobin & Hematocrit, Blood [[id number]]  (Abnormal) Collected:  12/10/17 0346    Specimen:  Blood Updated:  12/10/17 0445     Hemoglobin 12.2 (L) g/dL      Hematocrit 36.0 (L) %     Basic Metabolic Panel [[id number]]  (Abnormal) Collected:  12/10/17 0346    Specimen:  Blood Updated:  12/10/17  0509     Glucose 181 (H) mg/dL      BUN 37 (H) mg/dL      Creatinine 1.59 (H) mg/dL      Sodium 136 mmol/L      Potassium 4.0 mmol/L      Chloride 99 mmol/L      CO2 22.3 mmol/L      Calcium 8.2 (L) mg/dL      eGFR Non African Amer 42 (L) mL/min/1.73      BUN/Creatinine Ratio 23.3     Anion Gap 14.7 mmol/L     Narrative:       The MDRD GFR formula is only valid for adults with stable renal function between ages 18 and 70.    POC Glucose Fingerstick [[id number]]  (Abnormal) Collected:  12/10/17 0730    Specimen:  Blood Updated:  12/10/17 0739     Glucose 231 (H) mg/dL     Narrative:       Meter: [id number] : [id number] [employer]    POC Glucose Fingerstick [[id number]]  (Abnormal) Collected:  12/10/17 1102    Specimen:  Blood Updated:  12/10/17 1137     Glucose 229 (H) mg/dL     Narrative:       Meter: [id number] : [id number] [employer] 3[employer] NA CERT    POC Glucose Fingerstick [[id number]]  (Abnormal) Collected:  12/10/17 1717    Specimen:  Blood Updated:  12/10/17 1724     Glucose 181 (H) mg/dL     Narrative:       Meter: [id number] : [id number] [employer] CERT    Hemoglobin A1c [[id number]]  (Abnormal) Collected:  12/10/17 0346    Specimen:  Blood Updated:  12/10/17 1759     Hemoglobin A1C 6.90 (H) %     Narrative:       Hemoglobin A1C Ranges:    Increased Risk for Diabetes  5.7% to 6.4%  Diabetes                     >= 6.5%  Diabetic Goal                < 7.0%    POC Glucose Fingerstick [[id number]]  (Abnormal) Collected:  12/10/17 2008    Specimen:  Blood Updated:  12/10/17 2015     Glucose 259 (H) mg/dL     Narrative:       Meter: [id number] : [id number] Shelby Olivas CNA    Hemoglobin & Hematocrit, Blood [[id number]]  (Abnormal) Collected:  12/11/17 0417    Specimen:  Blood Updated:  12/11/17 0449     Hemoglobin 12.1 (L) g/dL      Hematocrit 35.6 (L) %     Basic Metabolic Panel [[id number]]  (Abnormal) Collected:  12/11/17 0417    Specimen:  Blood Updated:  12/11/17 0529     Glucose  198 (H) mg/dL      BUN 27 (H) mg/dL      Creatinine 1.23 mg/dL      Sodium 137 mmol/L      Potassium 4.1 mmol/L      Chloride 99 mmol/L      CO2 25.4 mmol/L      Calcium 8.6 (L) mg/dL      eGFR Non African Amer 56 (L) mL/min/1.73      BUN/Creatinine Ratio 22.0     Anion Gap 12.6 mmol/L     Narrative:       The MDRD GFR formula is only valid for adults with stable renal function between ages 18 and 70.    POC Glucose Fingerstick [[id number]]  (Abnormal) Collected:  12/11/17 0652    Specimen:  Blood Updated:  12/11/17 0658     Glucose 208 (H) mg/dL     Narrative:       Meter: [id number] : [id number] Shelby Olivas CNA    POC Glucose Fingerstick [[id number]]  (Abnormal) Collected:  12/11/17 1143    Specimen:  Blood Updated:  12/11/17 1149     Glucose 213 (H) mg/dL     Narrative:       Meter: [id number] : [id number] Fahad Goldman NURSING ASSISTANT    POC Glucose Fingerstick [[id number]]  (Normal) Collected:  12/11/17 1620    Specimen:  Blood Updated:  12/11/17 1627     Glucose 113 mg/dL     Narrative:       Meter: [id number] : [id number] Fahad Goldman NURSING ASSISTANT    POC Glucose Fingerstick [[id number]]  (Abnormal) Collected:  12/11/17 2047    Specimen:  Blood Updated:  12/11/17 2053     Glucose 66 (L) mg/dL     Narrative:       Meter: [id number] : [id number] Bo Barnhart NURSING ASSISTANT    Hemoglobin & Hematocrit, Blood [[id number]]  (Abnormal) Collected:  12/12/17 0405    Specimen:  Blood Updated:  12/12/17 0411     Hemoglobin 12.0 (L) g/dL      Hematocrit 35.2 (L) %     Basic Metabolic Panel [[id number]]  (Abnormal) Collected:  12/12/17 0405    Specimen:  Blood Updated:  12/12/17 0435     Glucose 100 (H) mg/dL      BUN 29 (H) mg/dL      Creatinine 1.34 (H) mg/dL      Sodium 139 mmol/L      Potassium 4.3 mmol/L      Chloride 101 mmol/L      CO2 23.8 mmol/L      Calcium 8.5 (L) mg/dL      eGFR Non African Amer 51 (L) mL/min/1.73      BUN/Creatinine Ratio 21.6     Anion Gap 14.2 mmol/L      Narrative:       The MDRD GFR formula is only valid for adults with stable renal function between ages 18 and 70.    POC Glucose Once [[id number]]  (Abnormal) Collected:  12/12/17 0726    Specimen:  Blood Updated:  12/12/17 0736     Glucose 159 (H) mg/dL     Narrative:       Meter: [id number] : [id number] Fahad Goldman NURSING ASSISTANT    POC Glucose Once [[id number]]  (Abnormal) Collected:  12/12/17 1122    Specimen:  Blood Updated:  12/12/17 1130     Glucose 236 (H) mg/dL     Narrative:       Meter: [id number] : [id number] Fahad Goldman NURSING ASSISTANT    POC Glucose Once [[id number]]  (Abnormal) Collected:  12/12/17 1625    Specimen:  Blood Updated:  12/12/17 1632     Glucose 168 (H) mg/dL     Narrative:       Meter: [id number] : [id number] Fahad Goldman NURSING ASSISTANT    POC Glucose Once [[id number]]  (Normal) Collected:  12/12/17 2029    Specimen:  Blood Updated:  12/12/17 2035     Glucose 109 mg/dL     Narrative:       Meter: [id number] : [id number] Bo Barnhart NURSING ASSISTANT    POC Glucose Once [[id number]]  (Abnormal) Collected:  12/13/17 0740    Specimen:  Blood Updated:  12/13/17 0752     Glucose 51 (L) mg/dL     Narrative:       Meter: [id number] : [id number] Catrachito De La Vega  RN    POC Glucose Once [[id number]]  (Abnormal) Collected:  12/13/17 0755    Specimen:  Blood Updated:  12/13/17 0812     Glucose 66 (L) mg/dL     Narrative:       Meter: [id number] : [id number] Kaylee Mota RN    POC Glucose Once [[id number]]  (Abnormal) Collected:  12/13/17 0821    Specimen:  Blood Updated:  12/13/17 0828     Glucose 64 (L) mg/dL     Narrative:       Meter: [id number] : [id number] Kaylee Mota RN    POC Glucose Once [[id number]]  (Normal) Collected:  12/13/17 1004    Specimen:  Blood Updated:  12/13/17 1010     Glucose 125 mg/dL     Narrative:       Meter: [id number] : [id number] Kaylee Mota RN    POC Glucose Once [[id number]]  (Abnormal) Collected:  12/13/17 1148     Specimen:  Blood Updated:  12/13/17 1149     Glucose 185 (H) mg/dL     Narrative:       Meter: [id number] : [id number] Catrachito De La Vega  RN    POC Glucose Once [[id number]]  (Normal) Collected:  12/13/17 1642    Specimen:  Blood Updated:  12/13/17 1648     Glucose 118 mg/dL     Narrative:       Meter: [id number] : [id number] Catrachito De La Vega  RN    POC Glucose Once [[id number]]  (Abnormal) Collected:  12/13/17 2109    Specimen:  Blood Updated:  12/13/17 2115     Glucose 171 (H) mg/dL     Narrative:       Meter: [id number] : [id number] Bo Barnhart NURSING ASSISTANT        Imaging Results (most recent)     Procedure Component Value Units Date/Time    XR Shoulder 2+ View Right [[id number]] Collected:  12/08/17 1257     Updated:  12/08/17 1259    Narrative:       INDICATION: Right shoulder pain status post right total shoulder  arthroplasty.     COMPARISON: 12/01/2016.     FINDINGS: 1 views of the right shoulder.   No fracture or dislocation.   There is anatomic alignment of the reverse shoulder arthroplasty.   Mild  right acromioclavicular osteoarthropathy.  There is some atelectasis in  the right lower lobe.       Impression:       Anatomic alignment of the right shoulder arthroplasty.     This report was finalized on 12/8/2017 12:57 PM by Dr. Demetri Martin MD.       XR Chest 1 View [[id number]] Collected:  12/11/17 1126     Updated:  12/11/17 1130    Narrative:       CHEST X-RAY, 12/11/2017:     HISTORY:   82-year-old male with a productive cough since shoulder surgery last  week.     TECHNIQUE:  AP portable upright chest x-ray.     COMPARISON:  *  11/20/2017.     FINDINGS:  Low lung volumes with mild bibasilar scarring, unchanged since the  preoperative chest x-ray. No visible pulmonary infiltrate or pleural  effusion. Heart size and pulmonary vascularity are within normal limits.  Postop changes reverse right total shoulder arthroplasty.       Impression:       1. No active disease.  2. Low lung  volumes with mild bibasilar scarring.  3. No change since 11/20/2017.     This report was finalized on 12/11/2017 11:28 AM by Dr. Milind Andrade MD.       XR Abdomen KUB [[id number]] Collected:  12/11/17 1128     Updated:  12/11/17 1131    Narrative:       KUB, 12/11/2017:     HISTORY:   82-year-old male postop shoulder arthroplasty surgery three days ago. He  complains of abdomen pain and lack of bowel movements since surgery.     TECHNIQUE:  AP portable radiographs of the abdomen and pelvis.     FINDINGS:  Bowel gas pattern is within normal limits with no evidence of bowel  obstruction. Moderate volume stool is scattered in portions of the  colon. Surgical clips in the low pelvis.       Impression:       Negative KUB. Bowel gas pattern is within normal limits.     This report was finalized on 12/11/2017 11:29 AM by Dr. Milind Andrade MD.           PROCEDURES  Procedure(s):  TOTAL SHOULDER REVERSE ARTHROPLASTY    Condition on Discharge:  Stable    Physical Exam at Discharge  Vital Signs  Temp:  [97.5 °F (36.4 °C)-98.8 °F (37.1 °C)] 97.5 °F (36.4 °C)  Heart Rate:  [47-79] 78  Resp:  [18] 18  BP: (138-154)/(66-89) 154/81    Physical Exam:  Constitutional: Patient appears well-developed and well-nourished and in no acute distress   HEENT:   Head: Normocephalic and atraumatic.   Eyes:  Pupils are equal, round, and reactive to light. EOM are intact. Sclera are anicteric and non-injected.  Mouth and Throat: Patient has moist mucous membranes. Oropharynx is clear of any erythema or exudate.     Neck: Neck supple. No JVD present. No thyromegaly present. No lymphadenopathy present.  Cardiovascular: Regular rate, regular rhythm, S1 normal and S2 normal.  Exam reveals no gallop and no friction rub.  No murmur heard.  Pulmonary/Chest: Lungs are clear to auscultation bilaterally. No respiratory distress. No wheezes. No rhonchi. No rales.   Abdominal: Soft. Bowel sounds are normal. No distension and no mass. There is no  hepatosplenomegaly. There is no tenderness.   Musculoskeletal: decreased muscle bulk. CMS intact to RUE  Extremities: No edema. Pulses are palpable in all 4 extremities.  Neurological: Patient is alert, confused, oriented to self only.  Skin: Right shoulder dressings clean, dry and intact. Mild ecchymosis upper arm. Skin is warm. No rash noted. Nails show no clubbing.  No cyanosis or erythema.    [facility]    Visiting Nurse:    As per facility    Home PT/OT:  As per facility    Home Safety Evaluation:  As per facility    DME  As per facility    Discharge Diet:         Dietary Orders            Start     Ordered    12/12/17 1200  Dietary Nutrition Supplement: [employer] Breakfast Essentials  Daily With Breakfast, Lunch & Dinner     Comments:  CIB Lite strawberry mixed with strawberry ice cream and milk   Question:  Select Supplement:  Answer:  [employer] Breakfast Essentials    12/12/17 1003    12/08/17 1305  Diet Regular; Consistent Carbohydrate, Cardiac  Diet Effective Now     Question Answer Comment   Diet Texture / Consistency Regular    Common Modifiers Consistent Carbohydrate    Common Modifiers Cardiac        12/08/17 1304        Activity at Discharge:  As per Dr. Bhardwaj    Pre-discharge education  Diabetic, Wound Care, medications, follow up    Follow-up Appointments  Future Appointments  Date Time Provider Department Center   12/27/2017 1:30 PM Goyo Bhardwaj MD [facility] None     Additional Instructions for the Follow-ups that You Need to Schedule     Discharge Follow-up with PCP    As directed    Follow Up Details:  1 week           Discharge Follow-up with Specified Provider: Dr. Bhardwaj; 2 Weeks    As directed    To:  Dr. Bhardwaj    Follow Up:  2 Weeks               Additional information on Labs and Follow-ups:      F/U with Dr Chamberlain 12/19 @ 11:20 am.                  Test Results Pending at Discharge: NONE     JILLIAN Wilde  12/14/17  9:43 AM    Time: Discharge over  30 min (if over 30 minutes give explanation as to why it took greater than 30 minutes)  Secondary to:  Extensive discussions with family  D/W case management  Coordination of care/follow up  Medication reconciliation

## 2017-12-14 NOTE — NURSING NOTE
Called [facility], spoke with ARMANDO Billy. Gave report on Mr. Schmitt. Pt will be traveling to [facility] by EMS.     Svetlana Page RN 12/14/2017

## 2017-12-14 NOTE — PLAN OF CARE
Problem: Patient Care Overview (Adult)  Goal: Plan of Care Review  Outcome: Ongoing (interventions implemented as appropriate)    12/14/17 0124   Coping/Psychosocial Response Interventions   Plan Of Care Reviewed With patient   Patient Care Overview   Progress progress towards functional goals is fair       Goal: Adult Individualization and Mutuality  Outcome: Ongoing (interventions implemented as appropriate)  Goal: Discharge Needs Assessment  Outcome: Ongoing (interventions implemented as appropriate)    Problem: Perioperative Period (Adult)  Goal: Signs and Symptoms of Listed Potential Problems Will be Absent or Manageable (Perioperative Period)  Outcome: Ongoing (interventions implemented as appropriate)    Problem: Infection, Risk/Actual (Adult)  Goal: Identify Related Risk Factors and Signs and Symptoms  Outcome: Ongoing (interventions implemented as appropriate)  Goal: Infection Prevention/Resolution  Outcome: Ongoing (interventions implemented as appropriate)    Problem: Activity Intolerance (Adult)  Goal: Identify Related Risk Factors and Signs and Symptoms  Outcome: Ongoing (interventions implemented as appropriate)  Goal: Activity Tolerance  Outcome: Ongoing (interventions implemented as appropriate)  Goal: Effective Energy Conservation Techniques  Outcome: Ongoing (interventions implemented as appropriate)    Problem: Arrhythmia/Dysrhythmia (Symptomatic) (Adult)  Goal: Signs and Symptoms of Listed Potential Problems Will be Absent or Manageable (Arrhythmia/Dysrhythmia)  Outcome: Ongoing (interventions implemented as appropriate)    Problem: Diabetes, Type 2 (Adult)  Goal: Signs and Symptoms of Listed Potential Problems Will be Absent or Manageable (Diabetes, Type 2)  Outcome: Ongoing (interventions implemented as appropriate)

## 2018-01-03 ENCOUNTER — OFFICE VISIT (OUTPATIENT)
Dept: ORTHOPEDIC SURGERY | Facility: CLINIC | Age: 83
End: 2018-01-03

## 2018-01-03 DIAGNOSIS — Z96.611 S/P REVERSE TOTAL SHOULDER ARTHROPLASTY, RIGHT: Primary | ICD-10-CM

## 2018-01-03 PROCEDURE — 99024 POSTOP FOLLOW-UP VISIT: CPT | Performed by: ORTHOPAEDIC SURGERY

## 2018-01-03 NOTE — PROGRESS NOTES
Subjective: Status post right total reverse shoulder     Patient ID: Joey Schmitt is a 82 y.o. male.    Chief Complaint:    History of Present Illness 82-year-old male is 3 weeks out the above-stated procedure.  Postoperatively developed significant confusion with been in the [facility] home in [place] since the surgery.  According to the family is much improved as far as his orientation and the confusion.  He complains of no pain in the shoulder is doing quite well and point the surgery.       Social History     Occupational History   • Not on file.     Social History Main Topics   • Smoking status: Former Smoker     Packs/day: 1.00     Years: 15.00     Types: Cigarettes     Quit date: 1980   • Smokeless tobacco: Never Used      Comment: caffeine use: one cup of coffee daily.    • Alcohol use No   • Drug use: No   • Sexual activity: Defer      Review of Systems   Constitutional: Negative for chills, diaphoresis, fever and unexpected weight change.   HENT: Negative for hearing loss, nosebleeds, sore throat and tinnitus.    Eyes: Negative for pain and visual disturbance.   Respiratory: Negative for cough, shortness of breath and wheezing.    Cardiovascular: Negative for chest pain and palpitations.   Gastrointestinal: Negative for abdominal pain, diarrhea, nausea and vomiting.   Endocrine: Negative for cold intolerance, heat intolerance and polydipsia.   Genitourinary: Negative for difficulty urinating, dysuria and hematuria.   Musculoskeletal: Negative for arthralgias, joint swelling and myalgias.   Skin: Negative for rash and wound.   Allergic/Immunologic: Negative for environmental allergies.   Neurological: Negative for dizziness, syncope and numbness.   Hematological: Does not bruise/bleed easily.   Psychiatric/Behavioral: Negative for dysphoric mood and sleep disturbance. The patient is not nervous/anxious.          Past Medical History:   Diagnosis Date   • Cellulitis of foot    • Dementia    •  Hyperlipidemia    • Hypertension    • Hypothyroidism    • Macular degeneration    • Prostate cancer    • Right shoulder pain     SCHEDULED FOR SX   • Seasonal allergies    • Type 2 diabetes mellitus    • Vertigo      Past Surgical History:   Procedure Laterality Date   • INCISION AND DRAINAGE LEG Right 1/11/2017    Procedure: INCISION AND DRAINAGE LOWER EXTREMITY;  Surgeon: Mikael Dwyer DPM;  Location: [facility];  Service:    • PROSTATE SURGERY     • SHOULDER SURGERY Left    • TOTAL SHOULDER ARTHROPLASTY W/ DISTAL CLAVICLE EXCISION Right 12/8/2017    Procedure: TOTAL SHOULDER REVERSE ARTHROPLASTY;  Surgeon: Goyo Bhardwaj MD;  Location: [facility] OR;  Service:      Family History   Problem Relation Age of Onset   • Heart disease Father          Objective:  There were no vitals filed for this visit.  There were no vitals filed for this visit.  There is no height or weight on file to calculate BMI.       Ortho Exam   he is alert and oriented today.  The right upper shoulder shows no motor deficit as far as radial ulnar median nerve function.  His wound is completely benign.  He can actively abduct and extend the shoulder only about 40°.  The skin is cool to touch he has good capillary refill is no swelling noted.    Assessment:       1. S/P reverse total shoulder arthroplasty, right          Plan:      he should begin physical therapy in the [facility] home and should continue after discharge with home health.  They may begin moist heat and active assisted range of motion to the shoulder.  And when discharged that should be arranged through home health for continued heat and active assisted range of motion to the shoulder.  He is to continue wearing the sling until seen back in the office in 3 weeks but the pillow can be DC'd at this time.  Return to see me in 3 weeks with an x-ray of the shoulder on return      Work Status:    MYRA query complete.    Orders:  No orders of the defined types were placed in this  encounter.      Medications:  No orders of the defined types were placed in this encounter.      Followup:  Return in about 3 weeks (around 1/24/2018).          Dragon transcription disclaimer     Much of this encounter note is an electronic transcription/translation of spoken language to printed text. The electronic translation of spoken language may permit erroneous, or at times, nonsensical words or phrases to be inadvertently transcribed. Although I have reviewed the note for such errors, some may still exist.

## 2018-01-24 ENCOUNTER — OFFICE VISIT (OUTPATIENT)
Dept: ORTHOPEDIC SURGERY | Facility: CLINIC | Age: 83
End: 2018-01-24

## 2018-01-24 VITALS — HEIGHT: 74 IN | BODY MASS INDEX: 28.49 KG/M2 | WEIGHT: 222 LBS

## 2018-01-24 DIAGNOSIS — Z96.611 S/P REVERSE TOTAL SHOULDER ARTHROPLASTY, RIGHT: ICD-10-CM

## 2018-01-24 DIAGNOSIS — M19.011 OSTEOARTHRITIS OF RIGHT GLENOHUMERAL JOINT: Primary | ICD-10-CM

## 2018-01-24 PROCEDURE — 73030 X-RAY EXAM OF SHOULDER: CPT | Performed by: ORTHOPAEDIC SURGERY

## 2018-01-24 PROCEDURE — 99024 POSTOP FOLLOW-UP VISIT: CPT | Performed by: ORTHOPAEDIC SURGERY

## 2018-01-24 NOTE — PROGRESS NOTES
Subjective: Status post right total reverse shoulder     Patient ID: Joey Schmitt is a 82 y.o. male.    Chief Complaint:    History of Present Illness 82-year-old male is now 6 weeks out is doing significantly better.  His confusion has resolved shoulder is doing extremely well from resolution of all of his preoperative pain.  He is off all pain medication.       Social History     Occupational History   • Not on file.     Social History Main Topics   • Smoking status: Former Smoker     Packs/day: 1.00     Years: 15.00     Types: Cigarettes     Quit date: 1980   • Smokeless tobacco: Never Used      Comment: caffeine use: one cup of coffee daily.    • Alcohol use No   • Drug use: No   • Sexual activity: Defer      Review of Systems   Constitutional: Negative for chills, diaphoresis, fever and unexpected weight change.   HENT: Negative for hearing loss, nosebleeds, sore throat and tinnitus.    Eyes: Negative for pain and visual disturbance.   Respiratory: Negative for cough, shortness of breath and wheezing.    Cardiovascular: Negative for chest pain and palpitations.   Gastrointestinal: Negative for abdominal pain, diarrhea, nausea and vomiting.   Endocrine: Negative for cold intolerance, heat intolerance and polydipsia.   Genitourinary: Negative for difficulty urinating, dysuria and hematuria.   Musculoskeletal: Positive for joint swelling and myalgias. Negative for arthralgias.   Skin: Negative for rash and wound.   Allergic/Immunologic: Negative for environmental allergies.   Neurological: Negative for dizziness, syncope and numbness.   Hematological: Does not bruise/bleed easily.   Psychiatric/Behavioral: Negative for dysphoric mood and sleep disturbance. The patient is not nervous/anxious.          Past Medical History:   Diagnosis Date   • Cellulitis of foot    • Dementia    • Hyperlipidemia    • Hypertension    • Hypothyroidism    • Macular degeneration    • Prostate cancer    • Right shoulder pain      SCHEDULED FOR SX   • Seasonal allergies    • Type 2 diabetes mellitus    • Vertigo      Past Surgical History:   Procedure Laterality Date   • INCISION AND DRAINAGE LEG Right 1/11/2017    Procedure: INCISION AND DRAINAGE LOWER EXTREMITY;  Surgeon: Mikael Dwyer DPM;  Location:  [facility] OR;  Service:    • PROSTATE SURGERY     • SHOULDER SURGERY Left    • TOTAL SHOULDER ARTHROPLASTY W/ DISTAL CLAVICLE EXCISION Right 12/8/2017    Procedure: TOTAL SHOULDER REVERSE ARTHROPLASTY;  Surgeon: Goyo Bhardwaj MD;  Location:  [facility] OR;  Service:      Family History   Problem Relation Age of Onset   • Heart disease Father          Objective:  There were no vitals filed for this visit.  Last 3 weights    01/24/18  0951   Weight: 101 kg (222 lb)     Body mass index is 28.5 kg/(m^2).       Ortho Exam  AP lateral oblique view of his right shoulder to evaluate his surgery shows perfect position of the implant compared to previous x-rays there is no change.  He is alert and oriented ×3 today.  He can extend the shoulder about 160° he can abduct about 140°.  External rotation is limited to 20° and internal rotation to 10°.  His no motor deficit no sensory loss.  Deltoid function is +4 out of 5 biceps function is +4 out of 5.    Assessment:       1. Osteoarthritis of right glenohumeral joint    2. S/P reverse total shoulder arthroplasty, right          Plan:      reviewed the x-rays with the patient and his family.  He is making excellent progress as far as therapy and use of that upper extremity.  He's had complete resolution of all of his preoperative pain.  Continue his physical therapy  Program return to see me in 6 weeks no x-rays necessary.      Work Status:    MYRA query complete.    Orders:  Orders Placed This Encounter   Procedures   • XR Shoulder 2+ View Right       Medications:  No orders of the defined types were placed in this encounter.      Followup:  Return in about 6 weeks (around 3/7/2018).          Dragon  transcription disclaimer     Much of this encounter note is an electronic transcription/translation of spoken language to printed text. The electronic translation of spoken language may permit erroneous, or at times, nonsensical words or phrases to be inadvertently transcribed. Although I have reviewed the note for such errors, some may still exist.

## 2018-03-07 ENCOUNTER — OFFICE VISIT (OUTPATIENT)
Dept: ORTHOPEDIC SURGERY | Facility: CLINIC | Age: 83
End: 2018-03-07

## 2018-03-07 DIAGNOSIS — Z96.611 S/P REVERSE TOTAL SHOULDER ARTHROPLASTY, RIGHT: Primary | ICD-10-CM

## 2018-03-07 PROCEDURE — 99024 POSTOP FOLLOW-UP VISIT: CPT | Performed by: ORTHOPAEDIC SURGERY

## 2018-03-07 NOTE — PROGRESS NOTES
Subjective: Status post right total reverse shoulder     Patient ID: Joey Schmitt is a 83 y.o. male.    Chief Complaint:    History of Present Illness patient is 3 months out doing extremely well.  Having minimal discomfort with the shoulder with near complete resolution of all his preoperative pain.  States is able to perform all activities of daily living without discomfort       Social History     Occupational History   • Not on file.     Social History Main Topics   • Smoking status: Former Smoker     Packs/day: 1.00     Years: 15.00     Types: Cigarettes     Quit date: 1980   • Smokeless tobacco: Never Used      Comment: caffeine use: one cup of coffee daily.    • Alcohol use No   • Drug use: No   • Sexual activity: Defer      Review of Systems   Constitutional: Negative for chills, diaphoresis, fever and unexpected weight change.   HENT: Negative for hearing loss, nosebleeds, sore throat and tinnitus.    Eyes: Negative for pain and visual disturbance.   Respiratory: Negative for cough, shortness of breath and wheezing.    Cardiovascular: Negative for chest pain and palpitations.   Gastrointestinal: Negative for abdominal pain, diarrhea, nausea and vomiting.   Endocrine: Negative for cold intolerance, heat intolerance and polydipsia.   Genitourinary: Negative for difficulty urinating, dysuria and hematuria.   Musculoskeletal: Negative for arthralgias.   Skin: Negative for rash and wound.   Allergic/Immunologic: Negative for environmental allergies.   Neurological: Negative for dizziness, syncope and numbness.   Hematological: Does not bruise/bleed easily.   Psychiatric/Behavioral: Negative for dysphoric mood and sleep disturbance. The patient is not nervous/anxious.    All other systems reviewed and are negative.          Objective:     Ortho Exam  is alert and oriented ×3.  He has 170° of extension and approximate 170° of abduction.  External rotation is approximately 35°.  Internal rotation 25°.  No motor  deficit as far as radial, ulnar median nerve function and no sensory loss.  No swelling noted to the upper arm.    Assessment:       1. S/P reverse total shoulder arthroplasty, right          Plan:      patient is pleased with results of the surgery and is able to perform again all activities of daily living without discomfort.  Return to see me as needed but was instructed if he starts having any increasing discomfort he should return for follow otherwise when necessary.

## 2018-05-17 RX ORDER — MELOXICAM 7.5 MG/1
TABLET ORAL
Qty: 30 TABLET | Refills: 4 | Status: SHIPPED | OUTPATIENT
Start: 2018-05-17 | End: 2018-10-18 | Stop reason: SDUPTHER

## 2018-10-29 RX ORDER — MELOXICAM 7.5 MG/1
TABLET ORAL
Qty: 30 TABLET | Refills: 4 | Status: SHIPPED | OUTPATIENT
Start: 2018-10-29 | End: 2019-03-30 | Stop reason: SDUPTHER

## 2019-03-05 ENCOUNTER — HOSPITAL ENCOUNTER (OUTPATIENT)
Facility: HOSPITAL | Age: 84
Setting detail: OBSERVATION
Discharge: HOME | End: 2019-03-06
Attending: EMERGENCY MEDICINE | Admitting: INTERNAL MEDICINE

## 2019-03-05 DIAGNOSIS — K52.9 GASTROENTERITIS: Primary | ICD-10-CM

## 2019-03-05 DIAGNOSIS — E11.65 POORLY CONTROLLED DIABETES MELLITUS (HCC): ICD-10-CM

## 2019-03-05 DIAGNOSIS — N28.9 RENAL INSUFFICIENCY: ICD-10-CM

## 2019-03-05 LAB
ALBUMIN SERPL-MCNC: 4 G/DL (ref 3.5–5.2)
ALBUMIN/GLOB SERPL: 1.4 G/DL
ALP SERPL-CCNC: 53 U/L (ref 40–129)
ALT SERPL W P-5'-P-CCNC: 23 U/L (ref 5–41)
ANION GAP SERPL CALCULATED.3IONS-SCNC: 16.1 MMOL/L
AST SERPL-CCNC: 21 U/L (ref 5–40)
BASOPHILS # BLD AUTO: 0.04 10*3/MM3 (ref 0–0.2)
BASOPHILS NFR BLD AUTO: 0.7 % (ref 0–1.5)
BILIRUB SERPL-MCNC: 0.8 MG/DL (ref 0.2–1.2)
BILIRUB UR QL STRIP: ABNORMAL
BLASTS NFR BLD MANUAL: 0 % (ref 0–0)
BUN BLD-MCNC: 40 MG/DL (ref 8–23)
BUN/CREAT SERPL: 22.1 (ref 7–25)
CALCIUM SPEC-SCNC: 8.4 MG/DL (ref 8.8–10.5)
CHLORIDE SERPL-SCNC: 98 MMOL/L (ref 98–107)
CLARITY UR: CLEAR
CO2 SERPL-SCNC: 19.9 MMOL/L (ref 22–29)
COLOR UR: ABNORMAL
CREAT BLD-MCNC: 1.81 MG/DL (ref 0.76–1.27)
DEPRECATED RDW RBC AUTO: 45.7 FL (ref 37–54)
EOSINOPHIL # BLD AUTO: 0.01 10*3/MM3 (ref 0–0.4)
EOSINOPHIL NFR BLD AUTO: 0.2 % (ref 0.3–6.2)
ERYTHROCYTE [DISTWIDTH] IN BLOOD BY AUTOMATED COUNT: 14 % (ref 12.3–15.4)
GFR SERPL CREATININE-BSD FRML MDRD: 36 ML/MIN/1.73
GLOBULIN UR ELPH-MCNC: 2.9 GM/DL
GLUCOSE BLD-MCNC: 364 MG/DL (ref 65–99)
GLUCOSE BLDC GLUCOMTR-MCNC: 193 MG/DL (ref 70–130)
GLUCOSE UR STRIP-MCNC: ABNORMAL MG/DL
HCT VFR BLD AUTO: 45.3 % (ref 37.5–51)
HGB BLD-MCNC: 14.6 G/DL (ref 13–17.7)
HGB UR QL STRIP.AUTO: NEGATIVE
IMM GRANULOCYTES # BLD AUTO: 0.02 10*3/MM3 (ref 0–0.05)
IMM GRANULOCYTES NFR BLD AUTO: 0.3 % (ref 0–0.5)
KETONES UR QL STRIP: ABNORMAL
LEUKOCYTE ESTERASE UR QL STRIP.AUTO: NEGATIVE
LYMPHOCYTES # BLD AUTO: 0.28 10*3/MM3 (ref 0.7–3.1)
LYMPHOCYTES # BLD MANUAL: 0.42 10*3/MM3 (ref 0.7–3.1)
LYMPHOCYTES NFR BLD AUTO: 4.7 % (ref 19.6–45.3)
LYMPHOCYTES NFR BLD MANUAL: 4 % (ref 5–12)
LYMPHOCYTES NFR BLD MANUAL: 7 % (ref 19.6–45.3)
MCH RBC QN AUTO: 29 PG (ref 26.6–33)
MCHC RBC AUTO-ENTMCNC: 32.2 G/DL (ref 31.5–35.7)
MCV RBC AUTO: 89.9 FL (ref 79–97)
METAMYELOCYTES NFR BLD MANUAL: 1 % (ref 0–0)
MONOCYTES # BLD AUTO: 0.24 10*3/MM3 (ref 0.1–0.9)
MONOCYTES # BLD AUTO: 0.71 10*3/MM3 (ref 0.1–0.9)
MONOCYTES NFR BLD AUTO: 11.9 % (ref 5–12)
MYELOCYTES NFR BLD MANUAL: 0 % (ref 0–0)
NEUTROPHILS # BLD AUTO: 4.89 10*3/MM3 (ref 1.4–7)
NEUTROPHILS # BLD AUTO: 5.24 10*3/MM3 (ref 1.4–7)
NEUTROPHILS NFR BLD AUTO: 82.2 % (ref 42.7–76)
NEUTROPHILS NFR BLD MANUAL: 77 % (ref 42.7–76)
NEUTS BAND NFR BLD MANUAL: 11 % (ref 0–5)
NITRITE UR QL STRIP: NEGATIVE
NRBC BLD AUTO-RTO: 0 /100 WBC (ref 0–0)
OTHER CELLS %: 0 % (ref 0–0)
PH UR STRIP.AUTO: <=5 [PH] (ref 4.5–8)
PLASMA CELL PREC NFR BLD MANUAL: 0 % (ref 0–0)
PLAT MORPH BLD: NORMAL
PLATELET # BLD AUTO: 183 10*3/MM3 (ref 140–450)
PMV BLD AUTO: 10.8 FL (ref 6–12)
POTASSIUM BLD-SCNC: 4.3 MMOL/L (ref 3.5–5.2)
PROLYMPHOCYTES NFR BLD MANUAL: 0 % (ref 0–0)
PROMYELOCYTES NFR BLD MANUAL: 0 % (ref 0–0)
PROT SERPL-MCNC: 6.9 G/DL (ref 6–8.5)
PROT UR QL STRIP: ABNORMAL
RBC # BLD AUTO: 5.04 10*6/MM3 (ref 4.14–5.8)
RBC MORPH BLD: NORMAL
SODIUM BLD-SCNC: 134 MMOL/L (ref 136–145)
SP GR UR STRIP: 1.03 (ref 1–1.03)
TSH SERPL DL<=0.05 MIU/L-ACNC: 0.83 MIU/ML (ref 0.27–4.2)
UROBILINOGEN UR QL STRIP: ABNORMAL
VARIANT LYMPHS NFR BLD MANUAL: 0 % (ref 0–5)
WBC MORPH BLD: NORMAL
WBC NRBC COR # BLD: 5.95 10*3/MM3 (ref 3.4–10.8)

## 2019-03-05 PROCEDURE — 99284 EMERGENCY DEPT VISIT MOD MDM: CPT | Performed by: EMERGENCY MEDICINE

## 2019-03-05 PROCEDURE — 96361 HYDRATE IV INFUSION ADD-ON: CPT

## 2019-03-05 PROCEDURE — 99219 PR INITIAL OBSERVATION CARE/DAY 50 MINUTES: CPT | Performed by: INTERNAL MEDICINE

## 2019-03-05 PROCEDURE — 25010000002 ONDANSETRON PER 1 MG: Performed by: EMERGENCY MEDICINE

## 2019-03-05 PROCEDURE — 81003 URINALYSIS AUTO W/O SCOPE: CPT | Performed by: EMERGENCY MEDICINE

## 2019-03-05 PROCEDURE — 80053 COMPREHEN METABOLIC PANEL: CPT | Performed by: EMERGENCY MEDICINE

## 2019-03-05 PROCEDURE — G0378 HOSPITAL OBSERVATION PER HR: HCPCS

## 2019-03-05 PROCEDURE — 85007 BL SMEAR W/DIFF WBC COUNT: CPT | Performed by: EMERGENCY MEDICINE

## 2019-03-05 PROCEDURE — 85025 COMPLETE CBC W/AUTO DIFF WBC: CPT | Performed by: EMERGENCY MEDICINE

## 2019-03-05 PROCEDURE — 82962 GLUCOSE BLOOD TEST: CPT

## 2019-03-05 PROCEDURE — 99284 EMERGENCY DEPT VISIT MOD MDM: CPT

## 2019-03-05 PROCEDURE — 96375 TX/PRO/DX INJ NEW DRUG ADDON: CPT

## 2019-03-05 PROCEDURE — 84443 ASSAY THYROID STIM HORMONE: CPT | Performed by: EMERGENCY MEDICINE

## 2019-03-05 PROCEDURE — 63710000001 INSULIN REGULAR HUMAN PER 5 UNITS: Performed by: EMERGENCY MEDICINE

## 2019-03-05 PROCEDURE — 99283 EMERGENCY DEPT VISIT LOW MDM: CPT

## 2019-03-05 RX ORDER — SODIUM CHLORIDE 9 MG/ML
40 INJECTION, SOLUTION INTRAVENOUS AS NEEDED
Status: DISCONTINUED | OUTPATIENT
Start: 2019-03-05 | End: 2019-03-06 | Stop reason: HOSPADM

## 2019-03-05 RX ORDER — DEXTROSE MONOHYDRATE 25 G/50ML
25 INJECTION, SOLUTION INTRAVENOUS
Status: DISCONTINUED | OUTPATIENT
Start: 2019-03-05 | End: 2019-03-06 | Stop reason: HOSPADM

## 2019-03-05 RX ORDER — ONDANSETRON 4 MG/1
4 TABLET, FILM COATED ORAL EVERY 6 HOURS PRN
Status: DISCONTINUED | OUTPATIENT
Start: 2019-03-05 | End: 2019-03-06 | Stop reason: HOSPADM

## 2019-03-05 RX ORDER — SODIUM CHLORIDE 9 MG/ML
75 INJECTION, SOLUTION INTRAVENOUS CONTINUOUS
Status: DISCONTINUED | OUTPATIENT
Start: 2019-03-06 | End: 2019-03-06 | Stop reason: HOSPADM

## 2019-03-05 RX ORDER — NICOTINE POLACRILEX 4 MG
15 LOZENGE BUCCAL
Status: DISCONTINUED | OUTPATIENT
Start: 2019-03-05 | End: 2019-03-06 | Stop reason: HOSPADM

## 2019-03-05 RX ORDER — ACETAMINOPHEN 325 MG/1
650 TABLET ORAL EVERY 4 HOURS PRN
Status: DISCONTINUED | OUTPATIENT
Start: 2019-03-05 | End: 2019-03-06 | Stop reason: HOSPADM

## 2019-03-05 RX ORDER — ONDANSETRON 2 MG/ML
4 INJECTION INTRAMUSCULAR; INTRAVENOUS ONCE
Status: COMPLETED | OUTPATIENT
Start: 2019-03-05 | End: 2019-03-05

## 2019-03-05 RX ORDER — ONDANSETRON 2 MG/ML
4 INJECTION INTRAMUSCULAR; INTRAVENOUS EVERY 6 HOURS PRN
Status: DISCONTINUED | OUTPATIENT
Start: 2019-03-05 | End: 2019-03-06 | Stop reason: HOSPADM

## 2019-03-05 RX ORDER — SODIUM CHLORIDE 0.9 % (FLUSH) 0.9 %
3 SYRINGE (ML) INJECTION EVERY 12 HOURS SCHEDULED
Status: DISCONTINUED | OUTPATIENT
Start: 2019-03-05 | End: 2019-03-06 | Stop reason: HOSPADM

## 2019-03-05 RX ORDER — SODIUM CHLORIDE 9 MG/ML
125 INJECTION, SOLUTION INTRAVENOUS CONTINUOUS
Status: ACTIVE | OUTPATIENT
Start: 2019-03-05 | End: 2019-03-06

## 2019-03-05 RX ORDER — ASPIRIN 81 MG/1
81 TABLET, CHEWABLE ORAL DAILY
COMMUNITY
End: 2020-12-10

## 2019-03-05 RX ORDER — ONDANSETRON 4 MG/1
4 TABLET, ORALLY DISINTEGRATING ORAL EVERY 6 HOURS PRN
Status: DISCONTINUED | OUTPATIENT
Start: 2019-03-05 | End: 2019-03-06 | Stop reason: HOSPADM

## 2019-03-05 RX ORDER — LEVOTHYROXINE SODIUM 0.1 MG/1
100 TABLET ORAL EVERY MORNING
Status: DISCONTINUED | OUTPATIENT
Start: 2019-03-06 | End: 2019-03-06 | Stop reason: HOSPADM

## 2019-03-05 RX ORDER — SODIUM CHLORIDE 0.9 % (FLUSH) 0.9 %
1-10 SYRINGE (ML) INJECTION AS NEEDED
Status: DISCONTINUED | OUTPATIENT
Start: 2019-03-05 | End: 2019-03-06 | Stop reason: HOSPADM

## 2019-03-05 RX ORDER — CHOLECALCIFEROL (VITAMIN D3) 125 MCG
5 CAPSULE ORAL NIGHTLY PRN
Status: DISCONTINUED | OUTPATIENT
Start: 2019-03-05 | End: 2019-03-06 | Stop reason: HOSPADM

## 2019-03-05 RX ORDER — ONDANSETRON 4 MG/1
4 TABLET, FILM COATED ORAL EVERY 8 HOURS PRN
COMMUNITY
End: 2019-11-24

## 2019-03-05 RX ADMIN — HUMAN INSULIN 8 UNITS: 100 INJECTION, SOLUTION SUBCUTANEOUS at 17:57

## 2019-03-05 RX ADMIN — ONDANSETRON 4 MG: 2 INJECTION, SOLUTION INTRAMUSCULAR; INTRAVENOUS at 16:32

## 2019-03-05 RX ADMIN — Medication 5 MG: at 22:39

## 2019-03-05 RX ADMIN — SODIUM CHLORIDE 1000 ML: 9 INJECTION, SOLUTION INTRAVENOUS at 16:32

## 2019-03-05 NOTE — ED NOTES
Chief Complaint   Patient presents with   • Vomiting   84 year old male presents to ED with C/O nausea and vomiting since last night.  Pt family reports BS of [phone] at home.  Pt denies nausea at this time.      Davina Rhodes, STEVE  03/05/19 8245

## 2019-03-05 NOTE — ED PROVIDER NOTES
Subjective   History of Present Illness  History of Present Illness    Chief complaint: Vomiting and diarrhea    Location: Home    Quality/Severity: Multiple episodes of nonbloody, nonbilious emesis    Timing/Onset/Duration: Symptoms began at 10 PM last night    Modifying Factors: Food and drink seem to make it worse, not eating and drinking make it better    Associated Symptoms: No headache.  No fever chills or cough.  No sore throat earache or nasal congestion.  No chest pain or shortness of breath.  Patient has had intermittent abdominal cramping.  Patient has had nonbloody diarrhea.  No burning on urination.    Narrative: This 84 white male presents with vomiting and diarrhea since 10 PM last night.  Patient has not been on antibiotics recently.    PCP:  Bulmaro      Review of Systems   Constitutional: Negative for chills and fever.   HENT: Negative for ear pain and sore throat.    Eyes: Negative for discharge and redness.   Respiratory: Negative for cough, chest tightness and shortness of breath.    Cardiovascular: Negative for chest pain.   Gastrointestinal: Positive for diarrhea, nausea and vomiting. Negative for abdominal pain, blood in stool and constipation.   Genitourinary: Negative for dysuria.   Musculoskeletal: Negative for back pain.   Skin: Negative for rash.   Neurological: Positive for weakness. Negative for numbness and headaches.   Hematological: Negative for adenopathy.   Psychiatric/Behavioral: Negative.  Negative for agitation and confusion.        Medication List      CONTINUE taking these medications    ONE TOUCH ULTRA 2 w/Device kit        ASK your doctor about these medications    acetaminophen 500 MG tablet  Commonly known as:  TYLENOL  Take 2 tablets by mouth Every 6 (Six) Hours As Needed for Mild Pain ,   Moderate Pain  or Headache.     diphenhydrAMINE 25 mg capsule  Commonly known as:  BENADRYL  May take 1-2 caps nightly for sleep     docusate sodium 100 MG capsule  Take 100 mg by  mouth 2 (Two) Times a Day.     famotidine 20 MG tablet  Commonly known as:  PEPCID  Take 1 tablet by mouth Daily.     glipiZIDE 10 MG tablet  Commonly known as:  GLUCOTROL     guaifenesin-dextromethorphan  MG tablet sustained-release 12 hour   tablet  Take 1 tablet by mouth 2 (Two) Times a Day As Needed (cough).     insulin aspart 100 UNIT/ML injection  Commonly known as:  novoLOG  Inject 0-9 Units under the skin 4 (Four) Times a Day Before Meals & at   Bedtime.     levothyroxine 100 MCG tablet  Commonly known as:  SYNTHROID, LEVOTHROID     lisinopril 10 MG tablet  Commonly known as:  PRINIVIL,ZESTRIL  Take 1 tablet by mouth Daily.     melatonin 5 MG tablet tablet  Take 1 tablet by mouth Every Night.     meloxicam 7.5 MG tablet  Commonly known as:  MOBIC  TAKE 1 TABLET BY MOUTH EVERY DAY     metFORMIN 1000 MG tablet  Commonly known as:  GLUCOPHAGE     metoprolol tartrate 25 MG tablet  Commonly known as:  LOPRESSOR  Take 1 tablet (25 mg total) by mouth 2 (Two) Times a Day     polyethylene glycol packet  Commonly known as:  MIRALAX  Take 17 g by mouth Daily.          Past Medical History:   Diagnosis Date   • Cellulitis of foot    • Dementia    • Hyperlipidemia    • Hypertension    • Hypothyroidism    • Macular degeneration    • Prostate cancer (CMS/HCC)    • Right shoulder pain     SCHEDULED FOR SX   • Seasonal allergies    • Type 2 diabetes mellitus (CMS/HCC)    • Vertigo        No Known Allergies    Past Surgical History:   Procedure Laterality Date   • INCISION AND DRAINAGE LEG Right 1/11/2017    Procedure: INCISION AND DRAINAGE LOWER EXTREMITY;  Surgeon: Mikael Dwyer DPM;  Location: [facility];  Service:    • PROSTATE SURGERY     • SHOULDER SURGERY Left    • TOTAL SHOULDER ARTHROPLASTY W/ DISTAL CLAVICLE EXCISION Right 12/8/2017    Procedure: TOTAL SHOULDER REVERSE ARTHROPLASTY;  Surgeon: Goyo Bhardwaj MD;  Location: [facility] OR;  Service:        Family History   Problem Relation Age of Onset   • Heart  disease Father        Social History     Socioeconomic History   • Marital status:      Spouse name: Not on file   • Number of children: Not on file   • Years of education: Not on file   • Highest education level: Not on file   Tobacco Use   • Smoking status: Former Smoker     Packs/day: 1.00     Years: 15.00     Pack years: 15.00     Types: Cigarettes     Last attempt to quit: 1980     Years since quittin.2   • Smokeless tobacco: Never Used   • Tobacco comment: caffeine use: one cup of coffee daily.    Substance and Sexual Activity   • Alcohol use: No   • Drug use: No   • Sexual activity: Defer           Objective   Physical Exam   Constitutional: He is oriented to person, place, and time. He appears well-developed and well-nourished. No distress.   ED Triage Vitals (19 1602)  Temp: 97.8 °F (36.6 °C)  Heart Rate: 99  Resp: 22  BP: 100/65  SpO2: 96 %  Temp src: Oral  Heart Rate Source: Monitor  Patient Position: Sitting  BP Location: Left arm  FiO2 (%): n/a    The patient's vitals were reviewed by me.  Unless otherwise noted they are within normal limits.  Patient has borderline low blood pressure of 100/65.  Patient is mildly tachypneic with respiratory rate 22.     HENT:   Head: Normocephalic and atraumatic.   Right Ear: External ear normal.   Left Ear: External ear normal.   Nose: Nose normal.   Mouth/Throat: Oropharynx is clear and moist. No oropharyngeal exudate.   Eyes: Conjunctivae and EOM are normal. Pupils are equal, round, and reactive to light. Right eye exhibits no discharge. Left eye exhibits no discharge. No scleral icterus.   Neck: Normal range of motion. Neck supple. No JVD present. No tracheal deviation present. No thyromegaly present.   Cardiovascular: Normal rate, regular rhythm, normal heart sounds and intact distal pulses. Exam reveals no gallop and no friction rub.   No murmur heard.  Pulmonary/Chest: Effort normal and breath sounds normal. No stridor. No respiratory distress.  He has no wheezes. He has no rales. He exhibits no tenderness.   Abdominal: Soft. Bowel sounds are normal. He exhibits no distension and no mass. There is no tenderness. There is no rebound and no guarding. No hernia.   Musculoskeletal: Normal range of motion. He exhibits no edema or deformity.   Lymphadenopathy:     He has no cervical adenopathy.   Neurological: He is alert and oriented to person, place, and time. No cranial nerve deficit or sensory deficit. He exhibits abnormal muscle tone (Generalized weakness). Coordination normal.   Skin: Skin is warm and dry. No rash noted. He is not diaphoretic. No erythema. No pallor.   Psychiatric: His behavior is normal.   Nursing note and vitals reviewed.      Procedures           ED Course  ED Course as of Mar 05 1827   Tue Mar 05, 2019   1752 There is no old creatinine for comparison.  [RC]   1753 The patient's glucose is 364.  The BUN is 40.  The creatinine is 1.81.  The sodium is 134.  The CO2 is 19.  Calcium is 8.4.  GFR is 36.  The neutrophil count is 77.  The urinalysis is pending.  The laboratory values are otherwise unremarkable.  [RC]   1817 The laboratory values were reviewed by me.  The urine ketones 50 mg/dL.  [RC]      ED Course User Index  [RC] Donell Hernadez MD      6:27 PM, 03/05/19:  The patient was reevaluated.  He feels better.  His vital signs were reviewed and are stable.  Abdominal exam: Soft nontender no masses positive bowel sounds.    6:27 PM, 03/05/19:  The patient's diagnosis of gastroenteritis, dehydration, orally controlled diabetes, and renal insufficiency was discussed with him.  The plan will be to admit patient for hydration and observation with repeat BUN and creatinine tomorrow.    6:28 PM, 03/05/19:  I spoke with Dr. Quinn, on-call for the hospitalist, she will admit the patient for observation.                [facility]    No orders to display     Labs Reviewed - No data to display  No results found.    Final diagnoses:    Gastroenteritis   Renal insufficiency   Poorly controlled diabetes mellitus (CMS/[facility])         ED Medications:  Medications - No data to display    New Medications:     Medication List      CONTINUE taking these medications    ONE TOUCH ULTRA 2 w/Device kit        ASK your doctor about these medications    acetaminophen 500 MG tablet  Commonly known as:  TYLENOL  Take 2 tablets by mouth Every 6 (Six) Hours As Needed for Mild Pain ,   Moderate Pain  or Headache.     diphenhydrAMINE 25 mg capsule  Commonly known as:  BENADRYL  May take 1-2 caps nightly for sleep     docusate sodium 100 MG capsule  Take 100 mg by mouth 2 (Two) Times a Day.     famotidine 20 MG tablet  Commonly known as:  PEPCID  Take 1 tablet by mouth Daily.     glipiZIDE 10 MG tablet  Commonly known as:  GLUCOTROL     guaifenesin-dextromethorphan  MG tablet sustained-release 12 hour   tablet  Take 1 tablet by mouth 2 (Two) Times a Day As Needed (cough).     insulin aspart 100 UNIT/ML injection  Commonly known as:  novoLOG  Inject 0-9 Units under the skin 4 (Four) Times a Day Before Meals & at   Bedtime.     levothyroxine 100 MCG tablet  Commonly known as:  SYNTHROID, LEVOTHROID     lisinopril 10 MG tablet  Commonly known as:  PRINIVIL,ZESTRIL  Take 1 tablet by mouth Daily.     melatonin 5 MG tablet tablet  Take 1 tablet by mouth Every Night.     meloxicam 7.5 MG tablet  Commonly known as:  MOBIC  TAKE 1 TABLET BY MOUTH EVERY DAY     metFORMIN 1000 MG tablet  Commonly known as:  GLUCOPHAGE     metoprolol tartrate 25 MG tablet  Commonly known as:  LOPRESSOR  Take 1 tablet (25 mg total) by mouth 2 (Two) Times a Day     polyethylene glycol packet  Commonly known as:  MIRALAX  Take 17 g by mouth Daily.          Stopped Medications:     Medication List      CONTINUE taking these medications    ONE TOUCH ULTRA 2 w/Device kit        ASK your doctor about these medications    acetaminophen 500 MG tablet  Commonly known as:  TYLENOL  Take 2 tablets by  mouth Every 6 (Six) Hours As Needed for Mild Pain ,   Moderate Pain  or Headache.     diphenhydrAMINE 25 mg capsule  Commonly known as:  BENADRYL  May take 1-2 caps nightly for sleep     docusate sodium 100 MG capsule  Take 100 mg by mouth 2 (Two) Times a Day.     famotidine 20 MG tablet  Commonly known as:  PEPCID  Take 1 tablet by mouth Daily.     glipiZIDE 10 MG tablet  Commonly known as:  GLUCOTROL     guaifenesin-dextromethorphan  MG tablet sustained-release 12 hour   tablet  Take 1 tablet by mouth 2 (Two) Times a Day As Needed (cough).     insulin aspart 100 UNIT/ML injection  Commonly known as:  novoLOG  Inject 0-9 Units under the skin 4 (Four) Times a Day Before Meals & at   Bedtime.     levothyroxine 100 MCG tablet  Commonly known as:  SYNTHROID, LEVOTHROID     lisinopril 10 MG tablet  Commonly known as:  PRINIVIL,ZESTRIL  Take 1 tablet by mouth Daily.     melatonin 5 MG tablet tablet  Take 1 tablet by mouth Every Night.     meloxicam 7.5 MG tablet  Commonly known as:  MOBIC  TAKE 1 TABLET BY MOUTH EVERY DAY     metFORMIN 1000 MG tablet  Commonly known as:  GLUCOPHAGE     metoprolol tartrate 25 MG tablet  Commonly known as:  LOPRESSOR  Take 1 tablet (25 mg total) by mouth 2 (Two) Times a Day     polyethylene glycol packet  Commonly known as:  MIRALAX  Take 17 g by mouth Daily.            Final diagnoses:   Gastroenteritis   Renal insufficiency   Poorly controlled diabetes mellitus (CMS/[facility])            Donell Hernadez MD  03/05/19 4718

## 2019-03-06 ENCOUNTER — APPOINTMENT (OUTPATIENT)
Dept: GENERAL RADIOLOGY | Facility: HOSPITAL | Age: 84
End: 2019-03-06

## 2019-03-06 VITALS
SYSTOLIC BLOOD PRESSURE: 132 MMHG | OXYGEN SATURATION: 95 % | TEMPERATURE: 97.6 F | RESPIRATION RATE: 18 BRPM | DIASTOLIC BLOOD PRESSURE: 75 MMHG | HEART RATE: 63 BPM | WEIGHT: 220.56 LBS | HEIGHT: 74 IN | BODY MASS INDEX: 28.3 KG/M2

## 2019-03-06 LAB
ADV 40+41 DNA STL QL NAA+NON-PROBE: NOT DETECTED
ALBUMIN SERPL-MCNC: 3.3 G/DL (ref 3.5–5.2)
ALBUMIN/GLOB SERPL: 1.4 G/DL
ALP SERPL-CCNC: 40 U/L (ref 40–129)
ALT SERPL W P-5'-P-CCNC: 17 U/L (ref 5–41)
ANION GAP SERPL CALCULATED.3IONS-SCNC: 11.7 MMOL/L
AST SERPL-CCNC: 21 U/L (ref 5–40)
ASTRO TYP 1-8 RNA STL QL NAA+NON-PROBE: NOT DETECTED
BASOPHILS # BLD AUTO: 0.03 10*3/MM3 (ref 0–0.2)
BASOPHILS NFR BLD AUTO: 0.9 % (ref 0–1.5)
BILIRUB SERPL-MCNC: 0.6 MG/DL (ref 0.2–1.2)
BUN BLD-MCNC: 43 MG/DL (ref 8–23)
BUN/CREAT SERPL: 24.6 (ref 7–25)
C CAYETANENSIS DNA STL QL NAA+NON-PROBE: NOT DETECTED
CALCIUM SPEC-SCNC: 7.6 MG/DL (ref 8.8–10.5)
CAMPY SP DNA.DIARRHEA STL QL NAA+PROBE: NOT DETECTED
CHLORIDE SERPL-SCNC: 104 MMOL/L (ref 98–107)
CO2 SERPL-SCNC: 23.3 MMOL/L (ref 22–29)
CREAT BLD-MCNC: 1.75 MG/DL (ref 0.76–1.27)
CRYPTOSP STL CULT: NOT DETECTED
DEPRECATED RDW RBC AUTO: 45.7 FL (ref 37–54)
E COLI DNA SPEC QL NAA+PROBE: NOT DETECTED
E HISTOLYT AG STL-ACNC: NOT DETECTED
EAEC PAA PLAS AGGR+AATA ST NAA+NON-PRB: NOT DETECTED
EC STX1 + STX2 GENES STL NAA+PROBE: NOT DETECTED
EOSINOPHIL # BLD AUTO: 0.11 10*3/MM3 (ref 0–0.4)
EOSINOPHIL NFR BLD AUTO: 3.3 % (ref 0.3–6.2)
EPEC EAE GENE STL QL NAA+NON-PROBE: NOT DETECTED
ERYTHROCYTE [DISTWIDTH] IN BLOOD BY AUTOMATED COUNT: 14.1 % (ref 12.3–15.4)
ETEC LTA+ST1A+ST1B TOX ST NAA+NON-PROBE: NOT DETECTED
G LAMBLIA DNA SPEC QL NAA+PROBE: NOT DETECTED
GFR SERPL CREATININE-BSD FRML MDRD: 37 ML/MIN/1.73
GLOBULIN UR ELPH-MCNC: 2.3 GM/DL
GLUCOSE BLD-MCNC: 135 MG/DL (ref 65–99)
GLUCOSE BLDC GLUCOMTR-MCNC: 107 MG/DL (ref 70–130)
GLUCOSE BLDC GLUCOMTR-MCNC: 122 MG/DL (ref 70–130)
HCT VFR BLD AUTO: 38.2 % (ref 37.5–51)
HGB BLD-MCNC: 12.4 G/DL (ref 13–17.7)
IMM GRANULOCYTES # BLD AUTO: 0.02 10*3/MM3 (ref 0–0.05)
IMM GRANULOCYTES NFR BLD AUTO: 0.6 % (ref 0–0.5)
LACTOFERRIN STL QL LA: POSITIVE
LYMPHOCYTES # BLD AUTO: 0.41 10*3/MM3 (ref 0.7–3.1)
LYMPHOCYTES NFR BLD AUTO: 12.3 % (ref 19.6–45.3)
MCH RBC QN AUTO: 28.9 PG (ref 26.6–33)
MCHC RBC AUTO-ENTMCNC: 32.5 G/DL (ref 31.5–35.7)
MCV RBC AUTO: 89 FL (ref 79–97)
MONOCYTES # BLD AUTO: 0.57 10*3/MM3 (ref 0.1–0.9)
MONOCYTES NFR BLD AUTO: 17.1 % (ref 5–12)
NEUTROPHILS # BLD AUTO: 2.2 10*3/MM3 (ref 1.4–7)
NEUTROPHILS NFR BLD AUTO: 65.8 % (ref 42.7–76)
NOROVIRUS GI+II RNA STL QL NAA+NON-PROBE: DETECTED
NRBC BLD AUTO-RTO: 0 /100 WBC (ref 0–0)
P SHIGELLOIDES DNA STL QL NAA+NON-PROBE: NOT DETECTED
PLATELET # BLD AUTO: 141 10*3/MM3 (ref 140–450)
PMV BLD AUTO: 10.4 FL (ref 6–12)
POTASSIUM BLD-SCNC: 3.7 MMOL/L (ref 3.5–5.2)
PROCALCITONIN SERPL-MCNC: 1.86 NG/ML (ref 0.1–0.25)
PROT SERPL-MCNC: 5.6 G/DL (ref 6–8.5)
RBC # BLD AUTO: 4.29 10*6/MM3 (ref 4.14–5.8)
RV RNA STL NAA+PROBE: NOT DETECTED
SALMONELLA DNA SPEC QL NAA+PROBE: NOT DETECTED
SAPO I+II+IV+V RNA STL QL NAA+NON-PROBE: NOT DETECTED
SHIGELLA SP+EIEC IPAH STL QL NAA+PROBE: NOT DETECTED
SODIUM BLD-SCNC: 139 MMOL/L (ref 136–145)
V CHOLERAE DNA SPEC QL NAA+PROBE: NOT DETECTED
VIBRIO DNA SPEC NAA+PROBE: NOT DETECTED
WBC NRBC COR # BLD: 3.34 10*3/MM3 (ref 3.4–10.8)
YERSINIA STL CULT: NOT DETECTED

## 2019-03-06 PROCEDURE — 99217 PR OBSERVATION CARE DISCHARGE MANAGEMENT: CPT | Performed by: INTERNAL MEDICINE

## 2019-03-06 PROCEDURE — 74018 RADEX ABDOMEN 1 VIEW: CPT

## 2019-03-06 PROCEDURE — 82962 GLUCOSE BLOOD TEST: CPT

## 2019-03-06 PROCEDURE — G0378 HOSPITAL OBSERVATION PER HR: HCPCS

## 2019-03-06 PROCEDURE — 87999 UNLISTED MICROBIOLOGY PX: CPT | Performed by: INTERNAL MEDICINE

## 2019-03-06 PROCEDURE — 84145 PROCALCITONIN (PCT): CPT | Performed by: INTERNAL MEDICINE

## 2019-03-06 PROCEDURE — 83631 LACTOFERRIN FECAL (QUANT): CPT | Performed by: INTERNAL MEDICINE

## 2019-03-06 PROCEDURE — 87040 BLOOD CULTURE FOR BACTERIA: CPT | Performed by: INTERNAL MEDICINE

## 2019-03-06 PROCEDURE — 25010000002 PIPERACILLIN-TAZOBACTAM: Performed by: INTERNAL MEDICINE

## 2019-03-06 PROCEDURE — 85025 COMPLETE CBC W/AUTO DIFF WBC: CPT | Performed by: INTERNAL MEDICINE

## 2019-03-06 PROCEDURE — 80053 COMPREHEN METABOLIC PANEL: CPT | Performed by: INTERNAL MEDICINE

## 2019-03-06 PROCEDURE — 96365 THER/PROPH/DIAG IV INF INIT: CPT

## 2019-03-06 RX ORDER — ONDANSETRON 4 MG/1
4 TABLET, FILM COATED ORAL EVERY 6 HOURS PRN
Qty: 30 TABLET | Refills: 0 | Status: SHIPPED | OUTPATIENT
Start: 2019-03-06 | End: 2019-11-24

## 2019-03-06 RX ORDER — PIPERACILLIN SODIUM, TAZOBACTAM SODIUM 3; .375 G/15ML; G/15ML
INJECTION, POWDER, LYOPHILIZED, FOR SOLUTION INTRAVENOUS
Status: DISCONTINUED
Start: 2019-03-06 | End: 2019-03-06 | Stop reason: HOSPADM

## 2019-03-06 RX ADMIN — PIPERACILLIN SODIUM,TAZOBACTAM SODIUM 3.38 G: 3; .375 INJECTION, POWDER, FOR SOLUTION INTRAVENOUS at 09:58

## 2019-03-06 RX ADMIN — PIPERACILLIN SODIUM,TAZOBACTAM SODIUM 3.38 G: 3; .375 INJECTION, POWDER, FOR SOLUTION INTRAVENOUS at 02:20

## 2019-03-06 RX ADMIN — Medication 3 ML: at 10:02

## 2019-03-06 RX ADMIN — LEVOTHYROXINE SODIUM 100 MCG: 100 TABLET ORAL at 07:31

## 2019-03-06 NOTE — PLAN OF CARE
Problem: Patient Care Overview  Goal: Plan of Care Review  Outcome: Outcome(s) achieved Date Met: 03/06/19 03/06/19 1032   Coping/Psychosocial   Plan of Care Reviewed With patient;spouse;daughter   Plan of Care Review   Progress improving     Goal: Individualization and Mutuality  Outcome: Outcome(s) achieved Date Met: 03/06/19    Goal: Discharge Needs Assessment  Outcome: Outcome(s) achieved Date Met: 03/06/19    Goal: Interprofessional Rounds/Family Conf  Outcome: Outcome(s) achieved Date Met: 03/06/19      Problem: Fall Risk (Adult)  Goal: Identify Related Risk Factors and Signs and Symptoms  Outcome: Outcome(s) achieved Date Met: 03/06/19    Goal: Absence of Fall  Outcome: Outcome(s) achieved Date Met: 03/06/19      Problem: Skin Injury Risk (Adult)  Goal: Identify Related Risk Factors and Signs and Symptoms  Outcome: Outcome(s) achieved Date Met: 03/06/19    Goal: Skin Health and Integrity  Outcome: Outcome(s) achieved Date Met: 03/06/19      Problem: Nausea/Vomiting (Adult)  Goal: Identify Related Risk Factors and Signs and Symptoms  Outcome: Outcome(s) achieved Date Met: 03/06/19    Goal: Symptom Relief  Outcome: Outcome(s) achieved Date Met: 03/06/19    Goal: Adequate Hydration  Outcome: Outcome(s) achieved Date Met: 03/06/19

## 2019-03-06 NOTE — PROGRESS NOTES
Pharmacy Consulted for Zosyn Dosing    Joey Schmitt is a 84 y.o. male who has been consulted for Zosyn dosing for gastroenteritis.    Relevant clinical data and objective history reviewed:  Lab Results   Component Value Date/Time    CREATININE 1.75 (H) 03/06/2019 02:14 AM    CREATININE 1.81 (H) 03/05/2019 04:32 PM    CREATININE 1.34 (H) 12/12/2017 04:05 AM    BUN 43 (H) 03/06/2019 02:14 AM    BUN 40 (H) 03/05/2019 04:32 PM    BUN 29 (H) 12/12/2017 04:05 AM     Estimated Creatinine Clearance: 40.4 mL/min (A) (by C-G formula based on SCr of 1.75 mg/dL (H)).    Lab Results   Component Value Date/Time    WBC 3.34 (L) 03/06/2019 02:14 AM    HGB 12.4 (L) 03/06/2019 02:14 AM    HCT 38.2 03/06/2019 02:14 AM    MCV 89.0 03/06/2019 02:14 AM     03/06/2019 02:14 AM     Temp Readings from Last 3 Encounters:   03/06/19 97.6 °F (36.4 °C) (Axillary)   12/14/17 98 °F (36.7 °C) (Oral)   10/14/17 97.8 °F (36.6 °C) (Tympanic)     Weight: 100 kg (220 lb 9 oz)    Assessment/Plan  The patient will be started on Zosyn 3.375 grams IV every 8 hours. Will continue to monitor and follow. Thank you.   Deshawn Lomeli RPH

## 2019-03-06 NOTE — DISCHARGE SUMMARY
Joey Schmitt  1935  [id number]    Hospitalists Discharge Summary    Date of Admission: 3/5/2019  Date of Discharge:  3/6/2019    History of Present Illness:Pt a 83 yo male admitted with one day history of n/v. He was admitted with dehydration    Hospital Course Summary/Primary Discharge Diagnoses:   Gastroenteritis  -resolved   -PCR panel + for Norovirus in line with above    Bandemia without leukocytosis but no other signs of sepsis.    Acute renal failure  -improving, records showed baseline cr around 1.4-1.5 and was 1.7 at time of d/c and improved    Dementia    He was admitted. He had no other episodes of n/v. His wbc count was still normal the next day. KUB had findings consitent with gastroenteritis. He was able to tolerate bland diet without further episodes of n/v. His Cr came down to around normal level for him. He was feeling better and felt stable to be d/c home.     Secondary Discharge Diagnoses:   Dyslipidemia  Hypertension  Hypothyroidism  Macular degeneration  History prostate cancer  DM 2 with hyperglycemia          PCP  Patient Care Team:  Romeo Chamebrlain MD as PCP - General (Family Medicine)  Romeo Chamberlain MD as PCP - Claims Attributed    Consults:   Consults     No orders found for last 30 day(s).          Operations and Procedures Performed:       Xr Abdomen Kub    Result Date: 3/6/2019  Narrative: ABDOMEN KUB 3/6/2019  HISTORY: Nausea, vomiting and diarrhea for the past 24 hours. Gastroenteritis.  FINDINGS: Single radiograph of the abdomen shows mild diffuse gaseous distention of the small bowel with a normal colonic gas pattern suggesting ileus or enteritis. No bowel obstruction or free air is seen. There are no abnormal abdominal calcifications. There is mild degenerative change in the visualized thoracolumbar spine.      Impression: Findings characteristic of gastroenteritis versus ileus. No evidence of bowel obstruction or free air.  This report was finalized on 3/6/2019 7:43  AM by Dr. Sukhjinder Mir MD.        Allergies:  has No Known Allergies.    Andrew  not reviewed    Discharge Medications:     Discharge Medications      Changes to Medications      Instructions Start Date   ondansetron 4 MG tablet  Commonly known as:  ZOFRAN  What changed:  Another medication with the same name was added. Make sure you understand how and when to take each.   4 mg, Oral, Every 8 Hours PRN      ondansetron 4 MG tablet  Commonly known as:  ZOFRAN  What changed:  You were already taking a medication with the same name, and this prescription was added. Make sure you understand how and when to take each.   4 mg, Oral, Every 6 Hours PRN         Continue These Medications      Instructions Start Date   acetaminophen 500 MG tablet  Commonly known as:  TYLENOL   1,000 mg, Oral, Every 6 Hours PRN      aspirin 81 MG chewable tablet   81 mg, Oral, Daily, Per pt. Takes 1/2 pill nightly       docusate sodium 100 MG capsule   100 mg, Oral, 2 Times Daily      glipiZIDE 10 MG tablet  Commonly known as:  GLUCOTROL   10 mg, Oral, 2 Times Daily Before Meals      levothyroxine 100 MCG tablet  Commonly known as:  SYNTHROID, LEVOTHROID   100 mcg, Oral, Every Morning      lisinopril 10 MG tablet  Commonly known as:  PRINIVIL,ZESTRIL   10 mg, Oral, Every 24 Hours Scheduled      meloxicam 7.5 MG tablet  Commonly known as:  MOBIC   TAKE 1 TABLET BY MOUTH EVERY DAY      metoprolol tartrate 25 MG tablet  Commonly known as:  LOPRESSOR   25 mg, Oral, 2 Times Daily      ONE TOUCH ULTRA 2 w/Device kit   USE AS DIRECTED      OZEMPIC SC   0.5 mg, Subcutaneous, Weekly, Pt. Takes on Friday       TOZAL PO   1 capsule, Oral, 2 Times Daily             Last Lab Results:   Lab Results (most recent)     Procedure Component Value Units Date/Time    Stool Culture - Stool, Per Rectum [[id number]] Collected:  03/06/19 0756    Specimen:  Stool from Per Rectum Updated:  03/06/19 0757    Gastrointestinal Panel, PCR - Stool, Per Rectum [[id number]]  Collected:  03/06/19 0756    Specimen:  Stool from Per Rectum Updated:  03/06/19 0756    Fecal Lactoferrin - Stool, Per Rectum [[id number]] Collected:  03/06/19 0755    Specimen:  Stool from Per Rectum Updated:  03/06/19 0755    POC Glucose Once [[id number]]  (Normal) Collected:  03/06/19 0727    Specimen:  Blood Updated:  03/06/19 0739     Glucose 107 mg/dL     Comprehensive Metabolic Panel [[id number]]  (Abnormal) Collected:  03/06/19 0214    Specimen:  Blood Updated:  03/06/19 0242     Glucose 135 mg/dL      BUN 43 mg/dL      Creatinine 1.75 mg/dL      Sodium 139 mmol/L      Potassium 3.7 mmol/L      Chloride 104 mmol/L      CO2 23.3 mmol/L      Calcium 7.6 mg/dL      Total Protein 5.6 g/dL      Albumin 3.30 g/dL      ALT (SGPT) 17 U/L      AST (SGOT) 21 U/L      Alkaline Phosphatase 40 U/L      Total Bilirubin 0.6 mg/dL      eGFR Non African Amer 37 mL/min/1.73      Globulin 2.3 gm/dL      A/G Ratio 1.4 g/dL      BUN/Creatinine Ratio 24.6     Anion Gap 11.7 mmol/L     Narrative:       The MDRD GFR formula is only valid for adults with stable renal function between ages 18 and 70.    CBC & Differential [[id number]] Collected:  03/06/19 0214    Specimen:  Blood Updated:  03/06/19 0230    Narrative:       The following orders were created for panel order CBC & Differential.  Procedure                               Abnormality         Status                     ---------                               -----------         ------                     CBC Auto Differential[[id number]]        Abnormal            Final result                 Please view results for these tests on the individual orders.    CBC Auto Differential [[id number]]  (Abnormal) Collected:  03/06/19 0214    Specimen:  Blood Updated:  03/06/19 0230     WBC 3.34 10*3/mm3      RBC 4.29 10*6/mm3      Hemoglobin 12.4 g/dL      Hematocrit 38.2 %      MCV 89.0 fL      MCH 28.9 pg      MCHC 32.5 g/dL      RDW 14.1 %      RDW-SD 45.7 fl      MPV 10.4 fL       Platelets 141 10*3/mm3      Neutrophil % 65.8 %      Lymphocyte % 12.3 %      Monocyte % 17.1 %      Eosinophil % 3.3 %      Basophil % 0.9 %      Immature Grans % 0.6 %      Neutrophils, Absolute 2.20 10*3/mm3      Lymphocytes, Absolute 0.41 10*3/mm3      Monocytes, Absolute 0.57 10*3/mm3      Eosinophils, Absolute 0.11 10*3/mm3      Basophils, Absolute 0.03 10*3/mm3      Immature Grans, Absolute 0.02 10*3/mm3      nRBC 0.0 /100 WBC     Blood Culture - Blood, Hand, Right [[id number]] Collected:  03/06/19 0215    Specimen:  Blood from Hand, Right Updated:  03/06/19 0219    Blood Culture - Blood, Arm, Right [[id number]] Collected:  03/06/19 0205    Specimen:  Blood from Arm, Right Updated:  03/06/19 0219    Procalcitonin [[id number]]  (Abnormal) Collected:  03/06/19 0005    Specimen:  Blood Updated:  03/06/19 0124     Procalcitonin 1.86 ng/mL     Narrative:       As a Marker for Sepsis (Non-Neonates):   1. <0.5 ng/mL represents a low risk of severe sepsis and/or septic shock.  2. >2 ng/mL represents a high risk of severe sepsis and/or septic shock.    As a Marker for Lower Respiratory Tract Infections that require antibiotic therapy:    PCT on Admission     Antibiotic Therapy       6-12 Hrs later  > 0.5                Strongly Recommended             >0.25 - <0.5         Recommended  0.1 - 0.25           Discouraged              Remeasure/reassess PCT  <0.1                 Strongly Discouraged     Remeasure/reassess PCT                     PCT values of < 0.5 ng/mL do not exclude an infection, because localized infections (without systemic signs) may be associated with such low concentrations, or a systemic infection in its initial stages (< 6 hours). Furthermore, increased PCT can occur without infection. PCT concentrations between 0.5 and 2.0 ng/mL should be interpreted taking into account the patient's history. It is recommended to retest PCT within 6-24 hours if any concentrations < 2 ng/mL are obtained.    POC  Glucose Once [[id number]]  (Normal) Collected:  03/06/19 0102    Specimen:  Blood Updated:  03/06/19 0108     Glucose 122 mg/dL     Urinalysis With Microscopic If Indicated (No Culture) - Urine, Catheter [[id number]]  (Abnormal) Collected:  03/05/19 1731    Specimen:  Urine, Catheter Updated:  03/05/19 1804     Color, UA Dark Yellow     Appearance, UA Clear     pH, UA <=5.0     Specific [place], UA 1.026     Comment: Result obtained by Refractometer        Glucose,  mg/dL (Trace)     Ketones, UA 15 mg/dL (1+)     Bilirubin, UA Small (1+)     Blood, UA Negative     Protein, UA Trace     Leuk Esterase, UA Negative     Nitrite, UA Negative     Urobilinogen, UA 1.0 E.U./dL    Narrative:       Urine microscopic not indicated.    CBC & Differential [[id number]] Collected:  03/05/19 1632    Specimen:  Blood Updated:  03/05/19 1729    Narrative:       The following orders were created for panel order CBC & Differential.  Procedure                               Abnormality         Status                     ---------                               -----------         ------                     Manual Differential[[id number]]          Abnormal            Final result               Scan Slide[[id number]]                                                                  CBC Auto Differential[[id number]]        Abnormal            Final result                 Please view results for these tests on the individual orders.    Manual Differential [[id number]]  (Abnormal) Collected:  03/05/19 1632    Specimen:  Blood Updated:  03/05/19 1729     Neutrophil % 77.0 %      Lymphocyte % 7.0 %      Monocyte % 4.0 %      Bands %  11.0 %      Metamyelocyte % 1.0 %      Myelocyte % 0.0 %      Promyelocyte % 0.0 %      Atypical Lymphocyte % 0.0 %      Blasts % 0.0 %      Plasma Cells % 0.0 %      Prolymphocyte % 0.0 %      Other Cells % 0.0 %      Neutrophils Absolute 5.24 10*3/mm3      Lymphocytes Absolute 0.42 10*3/mm3      Monocytes Absolute  0.24 10*3/mm3      RBC Morphology Normal     WBC Morphology Normal     Platelet Morphology Normal    TSH [[id number]]  (Normal) Collected:  03/05/19 1632    Specimen:  Blood Updated:  03/05/19 1709     TSH 0.826 mIU/mL     Comprehensive Metabolic Panel [[id number]]  (Abnormal) Collected:  03/05/19 1632    Specimen:  Blood Updated:  03/05/19 1659     Glucose 364 mg/dL      BUN 40 mg/dL      Creatinine 1.81 mg/dL      Sodium 134 mmol/L      Potassium 4.3 mmol/L      Chloride 98 mmol/L      CO2 19.9 mmol/L      Calcium 8.4 mg/dL      Total Protein 6.9 g/dL      Albumin 4.00 g/dL      ALT (SGPT) 23 U/L      AST (SGOT) 21 U/L      Alkaline Phosphatase 53 U/L      Total Bilirubin 0.8 mg/dL      eGFR Non African Amer 36 mL/min/1.73      Globulin 2.9 gm/dL      A/G Ratio 1.4 g/dL      BUN/Creatinine Ratio 22.1     Anion Gap 16.1 mmol/L     Narrative:       The MDRD GFR formula is only valid for adults with stable renal function between ages 18 and 70.    CBC Auto Differential [[id number]]  (Abnormal) Collected:  03/05/19 1632    Specimen:  Blood Updated:  03/05/19 1655     WBC 5.95 10*3/mm3      RBC 5.04 10*6/mm3      Hemoglobin 14.6 g/dL      Hematocrit 45.3 %      MCV 89.9 fL      MCH 29.0 pg      MCHC 32.2 g/dL      RDW 14.0 %      RDW-SD 45.7 fl      MPV 10.8 fL      Platelets 183 10*3/mm3      Neutrophil % 82.2 %      Lymphocyte % 4.7 %      Monocyte % 11.9 %      Eosinophil % 0.2 %      Basophil % 0.7 %      Immature Grans % 0.3 %      Neutrophils, Absolute 4.89 10*3/mm3      Lymphocytes, Absolute 0.28 10*3/mm3      Monocytes, Absolute 0.71 10*3/mm3      Eosinophils, Absolute 0.01 10*3/mm3      Basophils, Absolute 0.04 10*3/mm3      Immature Grans, Absolute 0.02 10*3/mm3      nRBC 0.0 /100 WBC         Imaging Results (most recent)     Procedure Component Value Units Date/Time    XR Abdomen KUB [[id number]] Collected:  03/06/19 0742     Updated:  03/06/19 0745    Narrative:       ABDOMEN KUB 3/6/2019     HISTORY:   Nausea,  vomiting and diarrhea for the past 24 hours. Gastroenteritis.     FINDINGS:  Single radiograph of the abdomen shows mild diffuse gaseous distention  of the small bowel with a normal colonic gas pattern suggesting ileus or  enteritis. No bowel obstruction or free air is seen. There are no  abnormal abdominal calcifications. There is mild degenerative change in  the visualized thoracolumbar spine.       Impression:       Findings characteristic of gastroenteritis versus ileus. No  evidence of bowel obstruction or free air.     This report was finalized on 3/6/2019 7:43 AM by Dr. Sukhjinder Mir MD.             PROCEDURES      Condition on Discharge:  Stable     Physical Exam at Discharge  Vital Signs  Temp:  [97.6 °F (36.4 °C)-98.3 °F (36.8 °C)] 97.6 °F (36.4 °C)  Heart Rate:  [] 63  Resp:  [18-22] 18  BP: (100-132)/(54-78) 132/75    Physical Exam:  Physical Exam   Constitutional: Patient appears well-developed and well-nourished and in no acute distress   HEENT:   Head: Normocephalic and atraumatic.   Eyes:  Pupils are equal, round, and reactive to light. EOM are intact. Sclera are anicteric and non-injected.  Mouth and Throat: Patient has moist mucous membranes. Oropharynx is clear of any erythema or exudate.     Neck: Neck supple. No JVD present. No thyromegaly present. No lymphadenopathy present.  Cardiovascular: Regular rate, regular rhythm, S1 normal and S2 normal.  Exam reveals no gallop and no friction rub.  No murmur heard.  Pulmonary/Chest: Lungs are clear to auscultation bilaterally. No respiratory distress. No wheezes. No rhonchi. No rales.   Abdominal: Soft. Bowel sounds are normal. No distension and no mass. There is no hepatosplenomegaly. There is no tenderness.   Musculoskeletal: Normal Muscle tone  Extremities: No edema. Pulses are palpable in all 4 extremities.  Neurological: Patient is alert and oriented to person, place, and time. Cranial nerves II-XII are grossly intact with no focal  deficits.  Skin: Skin is warm. No rash noted. Nails show no clubbing.  No cyanosis or erythema.    Discharge Disposition  Home with family      Discharge Diet:      Dietary Orders (From admission, onward)    Start     Ordered    03/06/19 0836  Diet Regular; GI Soft/Dewey  Diet Effective Now     Question Answer Comment   Diet Texture / Consistency Regular    Common Modifiers GI Soft/Dewey        03/06/19 0835          Activity at Discharge:  As tolerated      Follow-up Appointments  No future appointments.  Additional Instructions for the Follow-ups that You Need to Schedule     Discharge Follow-up with PCP   As directed       Currently Documented PCP:    Romeo Chamberlain MD    PCP Phone Number:    [phone]     Follow Up Details:  one week               Test Results Pending at Discharge   Order Current Status    Blood Culture - Blood, Arm, Right In process    Blood Culture - Blood, Hand, Right In process    Fecal Lactoferrin - Stool, Per Rectum In process    Gastrointestinal Panel, PCR - Stool, Per Rectum In process    Stool Culture - Stool, Per Rectum In process           Jose Joseph DO  03/06/19  9:52 AM

## 2019-03-06 NOTE — DISCHARGE INSTR - APPOINTMENTS
Set up 1 week follow up appointment with Maisha at Dr. Chamberlain's office for March 15th at 1:00.

## 2019-03-06 NOTE — H&P
[facility] HOSPITALIST     Romeo Chamberlain MD    CHIEF COMPLAINT: Vomiting and diarrhea    HISTORY OF PRESENT ILLNESS:  84-year-old male with history of dementia hyperlipidemia hypertension hypothyroidism presenting with nausea vomiting and diarrhea for about 12-24 hours with no similar symptoms in the family without abdominal pain or chest pain or shortness of breath or fever or chills.  His daughter at the bedside and tried to explain some symptoms but she does not live with him.  He also is unable to provide any history given his dementia.  Records reviewed and showed the patient had nonbloody nonbilious vomiting starting at 10 PM last night.  Evolution in the ER showed no evidence of leukocytosis.  He had negative urinalysis.    His creatinine was slightly elevated above baseline.  He had bandemia 11% but normal white count.  He was afebrile without tachycardia.  It is unknown if he had recent antibiotics or not.  Daughter reported that his blood sugar was quite elevated above 400 at home.      Past Medical History:   Diagnosis Date   • Cellulitis of foot    • Dementia    • Hyperlipidemia    • Hypertension    • Hypothyroidism    • Macular degeneration    • Prostate cancer (CMS/HCC)    • Right shoulder pain     SCHEDULED FOR SX   • Seasonal allergies    • Type 2 diabetes mellitus (CMS/HCC)    • Vertigo      Past Surgical History:   Procedure Laterality Date   • INCISION AND DRAINAGE LEG Right 1/11/2017    Procedure: INCISION AND DRAINAGE LOWER EXTREMITY;  Surgeon: Mikael Dwyer DPM;  Location: [facility] OR;  Service:    • PROSTATE SURGERY     • SHOULDER SURGERY Left    • TOTAL SHOULDER ARTHROPLASTY W/ DISTAL CLAVICLE EXCISION Right 12/8/2017    Procedure: TOTAL SHOULDER REVERSE ARTHROPLASTY;  Surgeon: Goyo Bhardwaj MD;  Location: [facility] OR;  Service:      Family History   Problem Relation Age of Onset   • Heart disease Father      Social History     Tobacco Use   • Smoking status: Former  Smoker     Packs/day: 1.00     Years: 15.00     Pack years: 15.00     Types: Cigarettes     Last attempt to quit: 1980     Years since quittin.2   • Smokeless tobacco: Never Used   • Tobacco comment: caffeine use: one cup of coffee daily.    Substance Use Topics   • Alcohol use: No   • Drug use: No     Medications Prior to Admission   Medication Sig Dispense Refill Last Dose   • acetaminophen (TYLENOL) 500 MG tablet Take 2 tablets by mouth Every 6 (Six) Hours As Needed for Mild Pain , Moderate Pain  or Headache.   Unknown   • aspirin 81 MG chewable tablet Chew 81 mg Daily. Per pt. Takes 1/2 pill nightly      • Dietary Management Product (TOZAL PO) Take 1 capsule by mouth 2 (Two) Times a Day.      • glipiZIDE (GLUCOTROL) 10 MG tablet Take 10 mg by mouth 2 (two) times a day before meals   3/5/2019 at Unknown time   • levothyroxine (SYNTHROID, LEVOTHROID) 100 MCG tablet Take 100 mcg by mouth Every Morning.   3/5/2019 at Unknown time   • lisinopril (PRINIVIL,ZESTRIL) 10 MG tablet Take 1 tablet by mouth Daily.   3/5/2019 at Unknown time   • meloxicam (MOBIC) 7.5 MG tablet TAKE 1 TABLET BY MOUTH EVERY DAY 30 tablet 4 3/5/2019 at Unknown time   • metoprolol tartrate (LOPRESSOR) 25 MG tablet Take 1 tablet (25 mg total) by mouth 2 (Two) Times a Day 60 tablet 1 3/5/2019 at Unknown time   • ondansetron (ZOFRAN) 4 MG tablet Take 4 mg by mouth Every 8 (Eight) Hours As Needed for Nausea or Vomiting.      • Semaglutide (OZEMPIC SC) Inject 0.5 mg under the skin into the appropriate area as directed 1 (One) Time Per Week. Pt. Takes on Friday      • Blood Glucose Monitoring Suppl (ONE TOUCH ULTRA 2) W/DEVICE kit USE AS DIRECTED  0 Unknown   • docusate sodium 100 MG capsule Take 100 mg by mouth 2 (Two) Times a Day.   Unknown     Allergies:  Patient has no known allergies.      There is no immunization history on file for this patient.        REVIEW OF SYSTEMS:  Please see the above history of present illness for pertinent  "positives and negatives.  The remainder of the patient's systems have been reviewed and are negative.     Vital Signs  Temp:  [97.8 °F (36.6 °C)-98.1 °F (36.7 °C)] 98.1 °F (36.7 °C)  Heart Rate:  [68-99] 86  Resp:  [18-22] 18  BP: (100-122)/(54-78) 102/54    Flowsheet Rows      First Filed Value   Admission Height  187 cm (73.62\") Documented at 03/05/2019 1602   Admission Weight  107 kg (235 lb) Documented at 03/05/2019 1602           Physical Exam:  Physical Exam   Constitutional: Patient appears well-developed and well-nourished and in no acute distress     HEENT:   Head: Normocephalic and atraumatic.   Eyes:  Pupils are equal, round, and reactive to light. EOM are intact. Sclera are anicteric and non-injected.  Mouth and Throat: Patient has moist mucous membranes. Oropharynx is clear of any erythema or exudate.     Neck: Neck supple. No JVD present. No thyromegaly present. No lymphadenopathy present.  Cardiovascular: Regular rate, regular rhythm, S1 normal and S2 normal.  Exam reveals no gallop and no friction rub.  No murmur heard.  Pulmonary/Chest: Lungs are clear to auscultation bilaterally. No respiratory distress. No wheezes. No rhonchi. No rales.   Abdominal: Soft. Bowel sounds are normal. No distension and no mass. There is no hepatosplenomegaly. There is no tenderness.   Musculoskeletal: Normal Muscle tone  Extremities: No edema. Pulses are palpable in all 4 extremities.  Neurological: Patient is alert and oriented to person, place, and time. Cranial nerves II-XII are grossly intact with no focal deficits.  Skin: Skin is warm. No rash noted. Nails show no clubbing.  No cyanosis or erythema.    Emotional Behavior:    Patient is asleep at this time.  Family at the bedside.     Results Review:    I reviewed the patient's new clinical results.  Lab Results (most recent)     Procedure Component Value Units Date/Time    POC Glucose Once [[id number]]  (Abnormal) Collected:  03/05/19 1900    Specimen:  Blood " Updated:  03/05/19 1907     Glucose 193 mg/dL     Urinalysis With Microscopic If Indicated (No Culture) - Urine, Catheter [[id number]]  (Abnormal) Collected:  03/05/19 1731    Specimen:  Urine, Catheter Updated:  03/05/19 1804     Color, UA Dark Yellow     Appearance, UA Clear     pH, UA <=5.0     Specific Gibson, UA 1.026     Comment: Result obtained by Refractometer        Glucose,  mg/dL (Trace)     Ketones, UA 15 mg/dL (1+)     Bilirubin, UA Small (1+)     Blood, UA Negative     Protein, UA Trace     Leuk Esterase, UA Negative     Nitrite, UA Negative     Urobilinogen, UA 1.0 E.U./dL    Narrative:       Urine microscopic not indicated.    CBC & Differential [[id number]] Collected:  03/05/19 1632    Specimen:  Blood Updated:  03/05/19 1729    Narrative:       The following orders were created for panel order CBC & Differential.  Procedure                               Abnormality         Status                     ---------                               -----------         ------                     Manual Differential[[id number]]          Abnormal            Final result               Scan Slide[[id number]]                                                                  CBC Auto Differential[[id number]]        Abnormal            Final result                 Please view results for these tests on the individual orders.    Manual Differential [[id number]]  (Abnormal) Collected:  03/05/19 1632    Specimen:  Blood Updated:  03/05/19 1729     Neutrophil % 77.0 %      Lymphocyte % 7.0 %      Monocyte % 4.0 %      Bands %  11.0 %      Metamyelocyte % 1.0 %      Myelocyte % 0.0 %      Promyelocyte % 0.0 %      Atypical Lymphocyte % 0.0 %      Blasts % 0.0 %      Plasma Cells % 0.0 %      Prolymphocyte % 0.0 %      Other Cells % 0.0 %      Neutrophils Absolute 5.24 10*3/mm3      Lymphocytes Absolute 0.42 10*3/mm3      Monocytes Absolute 0.24 10*3/mm3      RBC Morphology Normal     WBC Morphology Normal     Platelet  Morphology Normal    TSH [[id number]]  (Normal) Collected:  03/05/19 1632    Specimen:  Blood Updated:  03/05/19 1709     TSH 0.826 mIU/mL     Comprehensive Metabolic Panel [[id number]]  (Abnormal) Collected:  03/05/19 1632    Specimen:  Blood Updated:  03/05/19 1659     Glucose 364 mg/dL      BUN 40 mg/dL      Creatinine 1.81 mg/dL      Sodium 134 mmol/L      Potassium 4.3 mmol/L      Chloride 98 mmol/L      CO2 19.9 mmol/L      Calcium 8.4 mg/dL      Total Protein 6.9 g/dL      Albumin 4.00 g/dL      ALT (SGPT) 23 U/L      AST (SGOT) 21 U/L      Alkaline Phosphatase 53 U/L      Total Bilirubin 0.8 mg/dL      eGFR Non African Amer 36 mL/min/1.73      Globulin 2.9 gm/dL      A/G Ratio 1.4 g/dL      BUN/Creatinine Ratio 22.1     Anion Gap 16.1 mmol/L     Narrative:       The MDRD GFR formula is only valid for adults with stable renal function between ages 18 and 70.    CBC Auto Differential [[id number]]  (Abnormal) Collected:  03/05/19 1632    Specimen:  Blood Updated:  03/05/19 1655     WBC 5.95 10*3/mm3      RBC 5.04 10*6/mm3      Hemoglobin 14.6 g/dL      Hematocrit 45.3 %      MCV 89.9 fL      MCH 29.0 pg      MCHC 32.2 g/dL      RDW 14.0 %      RDW-SD 45.7 fl      MPV 10.8 fL      Platelets 183 10*3/mm3      Neutrophil % 82.2 %      Lymphocyte % 4.7 %      Monocyte % 11.9 %      Eosinophil % 0.2 %      Basophil % 0.7 %      Immature Grans % 0.3 %      Neutrophils, Absolute 4.89 10*3/mm3      Lymphocytes, Absolute 0.28 10*3/mm3      Monocytes, Absolute 0.71 10*3/mm3      Eosinophils, Absolute 0.01 10*3/mm3      Basophils, Absolute 0.04 10*3/mm3      Immature Grans, Absolute 0.02 10*3/mm3      nRBC 0.0 /100 WBC           Imaging Results (most recent)     None        reviewed    ECG/EMG Results (most recent)     None        reviewed    Assessment/Plan     Gastroenteritis  Bandemia without leukocytosis but no other signs of sepsis.  Acute renal failure  Dementia  Dyslipidemia  Hypertension  Hypothyroidism  Macular  degeneration  History prostate cancer  DM 2 with hyperglycemia      Plan  IV hydration  Check pro calcitonin  Check stool studies  Glycemic control  Correction insulin  DVT prophylaxis with SCD  KUB  If not improving check CT abdomen pelvis  Daughter is unaware of his CODE STATUS  I discussed the patients findings and my recommendations with patient and daughter    Harris Duffy MD  03/05/19  10:52 PM    Time:35 min

## 2019-03-07 ENCOUNTER — READMISSION MANAGEMENT (OUTPATIENT)
Dept: CALL CENTER | Facility: HOSPITAL | Age: 84
End: 2019-03-07

## 2019-03-07 NOTE — OUTREACH NOTE
Prep Survey      Responses   Facility patient discharged from?  [facility]   Is LACE score < 7 ?  Yes   Is patient eligible?  Yes   Discharge diagnosis  Gastroenteritis, dehydration, HTN, Norovirus   Does the patient have one of the following disease processes/diagnoses(primary or secondary)?  Other   Does the patient have Home health ordered?  No   Is there a DME ordered?  No   Prep survey completed?  Yes          Virginie Hernandez RN

## 2019-03-08 ENCOUNTER — READMISSION MANAGEMENT (OUTPATIENT)
Dept: CALL CENTER | Facility: HOSPITAL | Age: 84
End: 2019-03-08

## 2019-03-08 NOTE — OUTREACH NOTE
LAG < 7 Survey      Responses   Facility patient discharged from?  [facility]   Does the patient have one of the following disease processes/diagnoses(primary or secondary)?  Other   Is there a successful TCM telephone encounter documented?  No   BHLAG <7 Attempt successful?  Yes   Call start time  0937   Call end time  0947   Discharge diagnosis  Gastroenteritis, dehydration, HTN, Norovirus   Is patient permission given to speak with other caregiver?  Yes   List who call center can speak with  Betsy Schmitt, spouse   Person spoke with today (if not patient) and relationship  spouse, Betsy   Chriss reviewed with patient/caregiver?  Yes   Is the patient having any side effects they believe may be caused by any medication additions or changes?  No   Does the patient have all medications ordered at discharge?  Yes   Is the patient taking all medications as directed (includes completed medication regime)?  Yes   Does the patient have a primary care provider?   Yes   Does the patient have an appointment with their PCP within 7 days of discharge?  Greater than 7 days   Comments regarding PCP  Romeo Chamberlain MD PCP , March 15th at 1:00.   What is preventing the patient from scheduling follow up appointments within 7 days of discharge?  Earlier appointment not available   Nursing Interventions  Verified appointment date/time/provider   Has the patient kept scheduled appointments due by today?  Yes   Has home health visited the patient within 72 hours of discharge?  N/A   Psychosocial issues?  No   Did the patient receive a copy of their discharge instructions?  Yes   Nursing interventions  Reviewed instructions with patient   What is the patient's perception of their health status since discharge?  Same   Is the patient/caregiver able to teach back signs and symptoms related to disease process for when to call PCP?  Yes   Is the patient/caregiver able to teach back signs and symptoms related to disease process for when to  call 911?  Yes   Is the patient/caregiver able to teach back the hierarchy of who to call/visit for symptoms/problems? PCP, Specialist, Home health nurse, Urgent Care, ED, 911  Yes   Graduated  Yes   Wrap up additional comments  Wife states appetite still down. Wife reports patient eating small amounts with continued c/o intermittent nausea, but drinking fluids well. F/U appt scheduled next week with PCP. Wife to seek patient treatmment with worsening symptoms.           Sandra Leslie RN

## 2019-03-11 LAB
BACTERIA SPEC AEROBE CULT: NORMAL
BACTERIA SPEC AEROBE CULT: NORMAL
GLUCOSE BLDC GLUCOMTR-MCNC: 351 MG/DL (ref 70–130)

## 2019-03-18 ENCOUNTER — TRANSCRIBE ORDERS (OUTPATIENT)
Dept: ADMINISTRATIVE | Facility: HOSPITAL | Age: 84
End: 2019-03-18

## 2019-03-18 DIAGNOSIS — I73.9 INTERMITTENT CLAUDICATION (HCC): Primary | ICD-10-CM

## 2019-03-25 ENCOUNTER — HOSPITAL ENCOUNTER (OUTPATIENT)
Dept: ULTRASOUND IMAGING | Facility: HOSPITAL | Age: 84
Discharge: HOME | End: 2019-03-25
Admitting: INTERNAL MEDICINE

## 2019-03-25 DIAGNOSIS — I73.9 INTERMITTENT CLAUDICATION (HCC): ICD-10-CM

## 2019-03-25 PROCEDURE — 93923 UPR/LXTR ART STDY 3+ LVLS: CPT

## 2019-04-01 RX ORDER — MELOXICAM 7.5 MG/1
TABLET ORAL
Qty: 30 TABLET | Refills: 4 | Status: SHIPPED | OUTPATIENT
Start: 2019-04-01 | End: 2019-09-02 | Stop reason: SDUPTHER

## 2019-07-09 NOTE — OP NOTE
INCISION AND DRAINAGE LOWER EXTREMITY  Procedure Note    Patient Name:  Joey Schmitt    Surgery Date:  1/11/2017    Pre-op Diagnosis:   Abscess of right foot [L02.611]    Post-op Diagnosis:     Post-Op Diagnosis Codes:     * Abscess of right foot [L02.611]    Procedure/CPT® Codes:  VT EXPLOR METATARSO-PHALANG JT [26851]    Procedure(s):  INCISION AND DRAINAGE RIGHT FOREFOOT WITH 1ST MTPJ ARTHROTOMY    Surgeon(s):  Mikael Dwyer DPM    Anesthesia: MAC + ankle block using 18cc 1:1 mix of 0.25% Marcaine and 1% Lidocaine plain. An additional 12cc of 0.25% Marcaine was necessary intra-op.    Staff:   Circulator: Mani Hall RN  Scrub Person: Chelsea Blakely  Orientee: Jocelyn Duran RN    Estimated Blood Loss: 15 mL    Specimens:                  ID Type Source Tests Collected by Time Destination   1 :  Wound Foot, Right ANAEROBIC CULTURE, WOUND CULTURE Mikael Dwyer DPM 1/11/2017 1306          Drains:    None       Findings: 3.2x1.7cm bullous lesion at medial R 1st MTPJ with underlying fluctuance and superficial abscess with a small quantity of pus which was collected for C&S. 1st MTPJ aspiration is negative. Arthrotomy of the 1st MTPJ is negative for purulence or foreign material.    Complications: None    Operative Description:  The patient is brought to the operating room and placed in a supine position on the OR table. He is secured with a safety strap. He is sedated by the anesthesia team. An ankle block is then performed. An ankle tourniquet is applied over stockinette padding. The foot is then prepped and draped in the standard manner.    Attention is directed to the medial aspect of the right first MTPJ where the blister is identified. The bulla is penetrated with a scalpel blade and a culture is obtained of the serous fluid within. The bulla is then de-roofed to reveal underlying erythematous, macerated skin with palpable fluctuance beneath. A medial incision is created measuring 4 cm in length.  Tadalafil refilled "Dissection down through subcutaneous tissue plane yielded a small amount of purulence  which is submitted for culture and sensitivity purposes using the same swab as the bulla culture. Any non-viable appearing subcutaneous tissue is excised. Care is taken to insure neurovascular structures encountered during dissection remained intact. No further expressible purulence is identified.     An 18 gauge needle is used to aspirate the 1st MTPJ dorsally. No fluid is aspirated. A linear incision is then created over the MTPJ to insure no purulence is present within the joint. Jacques is identified. The wound is then irrigated with 1 L of sterile saline. The joint capsule is reapproximated with 3-0 Vicryl in a simple interrupted manner. The wound is then packed open with 1/4\" Nu-gauze moistened with 1/8 strength dakins solution. A dry gauze dressing is then secured. An Ace bandage is placed over the gauze dressing for compression. The tourniquet is deflated and removed after dressing application having never been inflated.    The patient is transferred to hospital a hospital stretcher and taken to the recovery room in satisfactory condition with stable vital signs. After an uneventful recovery he is transferred to the Medr unit for continued medical management of his foot condition. He tolerated the procedure and anesthesia well.    Mikael Dwyer DPM     Date: 1/11/2017  Time: 2:16 PM  "

## 2019-09-03 RX ORDER — MELOXICAM 7.5 MG/1
TABLET ORAL
Qty: 30 TABLET | Refills: 4 | Status: SHIPPED | OUTPATIENT
Start: 2019-09-03 | End: 2019-11-24

## 2019-11-24 ENCOUNTER — APPOINTMENT (OUTPATIENT)
Dept: CT IMAGING | Facility: HOSPITAL | Age: 84
End: 2019-11-24

## 2019-11-24 ENCOUNTER — HOSPITAL ENCOUNTER (EMERGENCY)
Facility: HOSPITAL | Age: 84
Discharge: HOME | End: 2019-11-24
Attending: EMERGENCY MEDICINE | Admitting: EMERGENCY MEDICINE

## 2019-11-24 VITALS
TEMPERATURE: 97.8 F | HEART RATE: 86 BPM | DIASTOLIC BLOOD PRESSURE: 86 MMHG | RESPIRATION RATE: 18 BRPM | SYSTOLIC BLOOD PRESSURE: 145 MMHG | BODY MASS INDEX: 27.4 KG/M2 | WEIGHT: 225 LBS | OXYGEN SATURATION: 98 % | HEIGHT: 76 IN

## 2019-11-24 DIAGNOSIS — N28.9 ACUTE ON CHRONIC RENAL INSUFFICIENCY: ICD-10-CM

## 2019-11-24 DIAGNOSIS — N20.1 URETEROLITHIASIS: Primary | ICD-10-CM

## 2019-11-24 DIAGNOSIS — E11.65 POORLY CONTROLLED TYPE 2 DIABETES MELLITUS (HCC): ICD-10-CM

## 2019-11-24 DIAGNOSIS — N18.9 ACUTE ON CHRONIC RENAL INSUFFICIENCY: ICD-10-CM

## 2019-11-24 LAB
ALBUMIN SERPL-MCNC: 4 G/DL (ref 3.5–5.2)
ALBUMIN/GLOB SERPL: 1.3 G/DL
ALP SERPL-CCNC: 80 U/L (ref 39–117)
ALT SERPL W P-5'-P-CCNC: 15 U/L (ref 1–41)
ANION GAP SERPL CALCULATED.3IONS-SCNC: 15.7 MMOL/L (ref 5–15)
AST SERPL-CCNC: 16 U/L (ref 1–40)
BACTERIA UR QL AUTO: ABNORMAL /HPF
BASOPHILS # BLD AUTO: 0.03 10*3/MM3 (ref 0–0.2)
BASOPHILS NFR BLD AUTO: 0.4 % (ref 0–1.5)
BILIRUB SERPL-MCNC: 0.8 MG/DL (ref 0.2–1.2)
BILIRUB UR QL STRIP: NEGATIVE
BUN BLD-MCNC: 41 MG/DL (ref 8–23)
BUN/CREAT SERPL: 15.2 (ref 7–25)
CALCIUM SPEC-SCNC: 9.2 MG/DL (ref 8.6–10.5)
CHLORIDE SERPL-SCNC: 102 MMOL/L (ref 98–107)
CLARITY UR: CLEAR
CO2 SERPL-SCNC: 22.3 MMOL/L (ref 22–29)
COLOR UR: YELLOW
CREAT BLD-MCNC: 2.69 MG/DL (ref 0.76–1.27)
DEPRECATED RDW RBC AUTO: 43.2 FL (ref 37–54)
EOSINOPHIL # BLD AUTO: 0.24 10*3/MM3 (ref 0–0.4)
EOSINOPHIL NFR BLD AUTO: 2.9 % (ref 0.3–6.2)
ERYTHROCYTE [DISTWIDTH] IN BLOOD BY AUTOMATED COUNT: 13 % (ref 12.3–15.4)
GFR SERPL CREATININE-BSD FRML MDRD: 23 ML/MIN/1.73
GLOBULIN UR ELPH-MCNC: 3.2 GM/DL
GLUCOSE BLD-MCNC: 297 MG/DL (ref 65–99)
GLUCOSE UR STRIP-MCNC: ABNORMAL MG/DL
HCT VFR BLD AUTO: 40.6 % (ref 37.5–51)
HGB BLD-MCNC: 13.2 G/DL (ref 13–17.7)
HGB UR QL STRIP.AUTO: ABNORMAL
HYALINE CASTS UR QL AUTO: ABNORMAL /LPF
IMM GRANULOCYTES # BLD AUTO: 0.04 10*3/MM3 (ref 0–0.05)
IMM GRANULOCYTES NFR BLD AUTO: 0.5 % (ref 0–0.5)
KETONES UR QL STRIP: NEGATIVE
LEUKOCYTE ESTERASE UR QL STRIP.AUTO: NEGATIVE
LYMPHOCYTES # BLD AUTO: 0.92 10*3/MM3 (ref 0.7–3.1)
LYMPHOCYTES NFR BLD AUTO: 11.1 % (ref 19.6–45.3)
MCH RBC QN AUTO: 29.3 PG (ref 26.6–33)
MCHC RBC AUTO-ENTMCNC: 32.5 G/DL (ref 31.5–35.7)
MCV RBC AUTO: 90 FL (ref 79–97)
MONOCYTES # BLD AUTO: 1.08 10*3/MM3 (ref 0.1–0.9)
MONOCYTES NFR BLD AUTO: 13.1 % (ref 5–12)
NEUTROPHILS # BLD AUTO: 5.96 10*3/MM3 (ref 1.7–7)
NEUTROPHILS NFR BLD AUTO: 72 % (ref 42.7–76)
NITRITE UR QL STRIP: NEGATIVE
PH UR STRIP.AUTO: 5.5 [PH] (ref 4.5–8)
PLATELET # BLD AUTO: 181 10*3/MM3 (ref 140–450)
PMV BLD AUTO: 10.4 FL (ref 6–12)
POTASSIUM BLD-SCNC: 4.4 MMOL/L (ref 3.5–5.2)
PROT SERPL-MCNC: 7.2 G/DL (ref 6–8.5)
PROT UR QL STRIP: NEGATIVE
RBC # BLD AUTO: 4.51 10*6/MM3 (ref 4.14–5.8)
RBC # UR: ABNORMAL /HPF
REF LAB TEST METHOD: ABNORMAL
SODIUM BLD-SCNC: 140 MMOL/L (ref 136–145)
SP GR UR STRIP: 1.02 (ref 1–1.03)
SQUAMOUS #/AREA URNS HPF: ABNORMAL /HPF
UROBILINOGEN UR QL STRIP: ABNORMAL
WBC NRBC COR # BLD: 8.27 10*3/MM3 (ref 3.4–10.8)
WBC UR QL AUTO: ABNORMAL /HPF

## 2019-11-24 PROCEDURE — 81001 URINALYSIS AUTO W/SCOPE: CPT | Performed by: PHYSICIAN ASSISTANT

## 2019-11-24 PROCEDURE — 74176 CT ABD & PELVIS W/O CONTRAST: CPT

## 2019-11-24 PROCEDURE — 99283 EMERGENCY DEPT VISIT LOW MDM: CPT

## 2019-11-24 PROCEDURE — 85025 COMPLETE CBC W/AUTO DIFF WBC: CPT | Performed by: PHYSICIAN ASSISTANT

## 2019-11-24 PROCEDURE — 80053 COMPREHEN METABOLIC PANEL: CPT | Performed by: PHYSICIAN ASSISTANT

## 2019-11-24 PROCEDURE — 99284 EMERGENCY DEPT VISIT MOD MDM: CPT | Performed by: PHYSICIAN ASSISTANT

## 2019-11-24 RX ORDER — ACETAMINOPHEN 500 MG
1000 TABLET ORAL ONCE
Status: COMPLETED | OUTPATIENT
Start: 2019-11-24 | End: 2019-11-24

## 2019-11-24 RX ORDER — ONDANSETRON 4 MG/1
4 TABLET, ORALLY DISINTEGRATING ORAL EVERY 6 HOURS PRN
Qty: 20 TABLET | Refills: 0 | Status: SHIPPED | OUTPATIENT
Start: 2019-11-24

## 2019-11-24 RX ORDER — FENTANYL CITRATE 50 UG/ML
25 INJECTION, SOLUTION INTRAMUSCULAR; INTRAVENOUS ONCE
Status: DISCONTINUED | OUTPATIENT
Start: 2019-11-24 | End: 2019-11-24

## 2019-11-24 RX ORDER — HYDROCODONE BITARTRATE AND ACETAMINOPHEN 5; 325 MG/1; MG/1
1 TABLET ORAL EVERY 4 HOURS PRN
Qty: 18 TABLET | Refills: 0 | Status: SHIPPED | OUTPATIENT
Start: 2019-11-24 | End: 2019-11-27

## 2019-11-24 RX ORDER — TAMSULOSIN HYDROCHLORIDE 0.4 MG/1
1 CAPSULE ORAL DAILY
Qty: 30 CAPSULE | Refills: 0 | Status: SHIPPED | OUTPATIENT
Start: 2019-11-24 | End: 2020-12-10

## 2019-11-24 RX ADMIN — ACETAMINOPHEN 1000 MG: 500 TABLET, FILM COATED ORAL at 17:40

## 2019-11-24 NOTE — ED PROVIDER NOTES
EMERGENCY DEPARTMENT ENCOUNTER      Room Number: 5/05    History is provided by the patient, his daughter and wife, no translation services needed    HPI:    Chief complaint: Flank pain    Location: Left flank, radiating to suprapubic region    Quality/Severity: 5/10, sharp     Timing/Duration: 3 days    Modifying Factors: Nothing seems to make it better or worse.  Patient states he cannot find a position of comfort.    Associated Symptoms: Positive for flank pain, and suprapubic pain.  Patient denies any dysuria, frequency, urgency, hematuria, fever, chills, vomiting, diarrhea, black or bloody stools.    Narrative: Pt is a 84 y.o. male who presents complaining of left flank pain radiating to suprapubic region of his abdomen for the past 3 days.  Patient denies any injury to his back, he denies any history of kidney stones, he states he has never felt a similar pain prior to this.  He denies any GI symptoms.  He denies any difficulty with urination pain with urination.  He has a PMH significant for Type 2 DM, HTN, HLD, dementia, prostate cancer, and hypothyroidism.      PMD: Romeo Chamberlain MD    REVIEW OF SYSTEMS  Review of Systems   Constitutional: Negative for chills and fever.   Respiratory: Negative for cough and shortness of breath.    Cardiovascular: Negative for chest pain and palpitations.   Gastrointestinal: Positive for abdominal pain. Negative for diarrhea, nausea and vomiting.   Genitourinary: Positive for flank pain. Negative for dysuria, frequency, hematuria and urgency.   Musculoskeletal: Negative for back pain and myalgias.   Skin: Negative for rash and wound.   Neurological: Negative for dizziness and syncope.   Psychiatric/Behavioral: Negative for confusion. The patient is not nervous/anxious.        PAST MEDICAL HISTORY  Active Ambulatory Problems     Diagnosis Date Noted   • Osteoarthritis of right glenohumeral joint 12/10/2017   • Gastroenteritis 03/05/2019     Resolved Ambulatory Problems      Diagnosis Date Noted   • Cellulitis of foot, right 2017   • Cellulitis of right foot 2017   • Osteoarthritis of right glenohumeral joint 2017   • Tendinopathy of right rotator cuff 2017   • Glenohumeral arthritis, right 2017     Past Medical History:   Diagnosis Date   • Cellulitis of foot    • Dementia (CMS/[facility])    • Hyperlipidemia    • Hypertension    • Hypothyroidism    • Macular degeneration    • Prostate cancer (CMS/[facility])    • Right shoulder pain    • Seasonal allergies    • Type 2 diabetes mellitus (CMS/[facility])    • Vertigo        PAST SURGICAL HISTORY  Past Surgical History:   Procedure Laterality Date   • INCISION AND DRAINAGE LEG Right 2017    Procedure: INCISION AND DRAINAGE LOWER EXTREMITY;  Surgeon: Mikael Dwyer DPM;  Location: [facility];  Service:    • PROSTATE SURGERY     • SHOULDER SURGERY Left    • TOTAL SHOULDER ARTHROPLASTY W/ DISTAL CLAVICLE EXCISION Right 2017    Procedure: TOTAL SHOULDER REVERSE ARTHROPLASTY;  Surgeon: Goyo Bhardwaj MD;  Location: [facility];  Service:        FAMILY HISTORY  Family History   Problem Relation Age of Onset   • Heart disease Father        SOCIAL HISTORY  Social History     Socioeconomic History   • Marital status:      Spouse name: Not on file   • Number of children: Not on file   • Years of education: Not on file   • Highest education level: Not on file   Tobacco Use   • Smoking status: Former Smoker     Packs/day: 1.00     Years: 15.00     Pack years: 15.00     Types: Cigarettes     Last attempt to quit: 1980     Years since quittin.9   • Smokeless tobacco: Never Used   • Tobacco comment: caffeine use: one cup of coffee daily.    Substance and Sexual Activity   • Alcohol use: No   • Drug use: No   • Sexual activity: Defer       ALLERGIES  Patient has no known allergies.    No current facility-administered medications for this encounter.     Current Outpatient Medications:   •  aspirin 81 MG chewable tablet,  Chew 81 mg Daily. Per pt. Takes 1/2 pill nightly, Disp: , Rfl:   •  glipiZIDE (GLUCOTROL) 10 MG tablet, Take 10 mg by mouth 2 (two) times a day before meals, Disp: , Rfl:   •  levothyroxine (SYNTHROID, LEVOTHROID) 100 MCG tablet, Take 100 mcg by mouth Every Morning., Disp: , Rfl:   •  lisinopril (PRINIVIL,ZESTRIL) 10 MG tablet, Take 1 tablet by mouth Daily., Disp: , Rfl:   •  Semaglutide (OZEMPIC SC), Inject 0.5 mg under the skin into the appropriate area as directed 1 (One) Time Per Week. Pt. Takes on Friday, Disp: , Rfl:   •  acetaminophen (TYLENOL) 500 MG tablet, Take 2 tablets by mouth Every 6 (Six) Hours As Needed for Mild Pain , Moderate Pain  or Headache., Disp: , Rfl:   •  Blood Glucose Monitoring Suppl (ONE TOUCH ULTRA 2) W/DEVICE kit, USE AS DIRECTED, Disp: , Rfl: 0  •  Dietary Management Product (TOZAL PO), Take 1 capsule by mouth 2 (Two) Times a Day., Disp: , Rfl:   •  HYDROcodone-acetaminophen (NORCO) 5-325 MG per tablet, Take 1 tablet by mouth Every 4 (Four) Hours As Needed for Severe Pain  for up to 3 days., Disp: 18 tablet, Rfl: 0  •  ondansetron ODT (ZOFRAN-ODT) 4 MG disintegrating tablet, Take 1 tablet by mouth Every 6 (Six) Hours As Needed for Nausea or Vomiting., Disp: 20 tablet, Rfl: 0  •  tamsulosin (FLOMAX) 0.4 MG capsule 24 hr capsule, Take 1 capsule by mouth Daily., Disp: 30 capsule, Rfl: 0    PHYSICAL EXAM  ED Triage Vitals [11/24/19 1515]   Temp Heart Rate Resp BP SpO2   97.8 °F (36.6 °C) 86 18 145/86 98 %      Temp src Heart Rate Source Patient Position BP Location FiO2 (%)   Oral Monitor Lying Right arm --       Physical Exam   Constitutional: He is oriented to person, place, and time and well-developed, well-nourished, and in no distress.   HENT:   Head: Normocephalic and atraumatic.   Mouth/Throat: Oropharynx is clear and moist.   Eyes: Conjunctivae are normal. Pupils are equal, round, and reactive to light.   Cardiovascular: Normal rate, regular rhythm, normal heart sounds and intact  distal pulses.   Pulmonary/Chest: Effort normal and breath sounds normal.   Abdominal: Soft. Bowel sounds are normal. There is tenderness in the suprapubic area and left lower quadrant. There is CVA tenderness (Left). There is no guarding.   Musculoskeletal: Normal range of motion. He exhibits no edema.   Neurological: He is alert and oriented to person, place, and time. GCS score is 15.   Skin: Skin is warm and dry.   Psychiatric: Mood, memory, affect and judgment normal.           LAB RESULTS  Results for orders placed or performed during the hospital encounter of 11/24/19   Comprehensive Metabolic Panel   Result Value Ref Range    Glucose 297 (H) 65 - 99 mg/dL    BUN 41 (H) 8 - 23 mg/dL    Creatinine 2.69 (H) 0.76 - 1.27 mg/dL    Sodium 140 136 - 145 mmol/L    Potassium 4.4 3.5 - 5.2 mmol/L    Chloride 102 98 - 107 mmol/L    CO2 22.3 22.0 - 29.0 mmol/L    Calcium 9.2 8.6 - 10.5 mg/dL    Total Protein 7.2 6.0 - 8.5 g/dL    Albumin 4.00 3.50 - 5.20 g/dL    ALT (SGPT) 15 1 - 41 U/L    AST (SGOT) 16 1 - 40 U/L    Alkaline Phosphatase 80 39 - 117 U/L    Total Bilirubin 0.8 0.2 - 1.2 mg/dL    eGFR Non African Amer 23 (L) >60 mL/min/1.73    Globulin 3.2 gm/dL    A/G Ratio 1.3 g/dL    BUN/Creatinine Ratio 15.2 7.0 - 25.0    Anion Gap 15.7 (H) 5.0 - 15.0 mmol/L   Urinalysis With Microscopic If Indicated (No Culture) - Urine, Clean Catch   Result Value Ref Range    Color, UA Yellow Yellow, Straw    Appearance, UA Clear Clear    pH, UA 5.5 4.5 - 8.0    Specific Gravity, UA 1.020 1.003 - 1.030    Glucose,  mg/dL (2+) (A) Negative    Ketones, UA Negative Negative    Bilirubin, UA Negative Negative    Blood, UA Small (1+) (A) Negative    Protein, UA Negative Negative    Leuk Esterase, UA Negative Negative    Nitrite, UA Negative Negative    Urobilinogen, UA 2.0 E.U./dL (A) 0.2 - 1.0 E.U./dL   CBC Auto Differential   Result Value Ref Range    WBC 8.27 3.40 - 10.80 10*3/mm3    RBC 4.51 4.14 - 5.80 10*6/mm3    Hemoglobin  13.2 13.0 - 17.7 g/dL    Hematocrit 40.6 37.5 - 51.0 %    MCV 90.0 79.0 - 97.0 fL    MCH 29.3 26.6 - 33.0 pg    MCHC 32.5 31.5 - 35.7 g/dL    RDW 13.0 12.3 - 15.4 %    RDW-SD 43.2 37.0 - 54.0 fl    MPV 10.4 6.0 - 12.0 fL    Platelets 181 140 - 450 10*3/mm3    Neutrophil % 72.0 42.7 - 76.0 %    Lymphocyte % 11.1 (L) 19.6 - 45.3 %    Monocyte % 13.1 (H) 5.0 - 12.0 %    Eosinophil % 2.9 0.3 - 6.2 %    Basophil % 0.4 0.0 - 1.5 %    Immature Grans % 0.5 0.0 - 0.5 %    Neutrophils, Absolute 5.96 1.70 - 7.00 10*3/mm3    Lymphocytes, Absolute 0.92 0.70 - 3.10 10*3/mm3    Monocytes, Absolute 1.08 (H) 0.10 - 0.90 10*3/mm3    Eosinophils, Absolute 0.24 0.00 - 0.40 10*3/mm3    Basophils, Absolute 0.03 0.00 - 0.20 10*3/mm3    Immature Grans, Absolute 0.04 0.00 - 0.05 10*3/mm3   Urinalysis, Microscopic Only - Urine, Clean Catch   Result Value Ref Range    RBC, UA 6-12 (A) None Seen /HPF    WBC, UA 0-2 (A) None Seen /HPF    Bacteria, UA None Seen None Seen /HPF    Squamous Epithelial Cells, UA None Seen None Seen, 0-2 /HPF    Hyaline Casts, UA None Seen None Seen /LPF    Methodology Manual Light Microscopy          I ordered the above labs and reviewed the results    RADIOLOGY  Ct Abdomen Pelvis Without Contrast    Result Date: 11/24/2019  Narrative: CT Abdomen Pelvis WO 11/24/2019 INDICATION: Left lower quadrant abdominal pain for 3 days. Acute renal insufficiency today with increase creatinine of 2.69 and elevated BUN of 41. TECHNIQUE: CT of the abdomen and pelvis without IV contrast. Coronal and sagittal reconstructions were obtained.  Radiation dose reduction techniques included automated exposure control or exposure modulation based on body size. Count of known CT and cardiac nuc med studies performed in previous 12 months: 0. COMPARISON: None available. FINDINGS: Abdomen: There is moderate left hydronephrosis and hydroureter due to 2 adjacent stones in the distal left ureter measuring 2 mm and 3 mm respectively. Both are  located just above the left ureterovesical junction. There is inflammatory stranding in the left perinephric and periureteral fat. There is a 2 mm nonobstructing stone in the lower pole of the left kidney. Both kidneys appear mildly atrophic. The liver, spleen, pancreas, gallbladder and biliary tree and adrenal glands are normal. Pelvis: There is colonic diverticulosis without evidence of diverticulitis. The gut is otherwise normal. There is no significant adenopathy and there is no free fluid in the abdomen or pelvis. The prostate is surgically absent. Left inguinal hernia containing only fat.     Impression: 1. Moderate left hydronephrosis and hydroureter due to 2 adjacent stones in the distal left ureter measuring 2 mm and 3 mm respectively. Both are located just above the left ureterovesical junction. 2. Diverticulosis. 3. Left inguinal hernia containing only fat. 4. Surgical absence of the prostate. Signer Name: Sukhjinder Mir MD  Signed: 11/24/2019 5:31 PM  Workstation Name: [email or web address]  [facility]      I ordered the above radiologic testing and reviewed the results    PROCEDURES  Procedures      PROGRESS AND CONSULTS  ED Course as of Nov 24 1848   Sun Nov 24, 2019   1733 Patient requesting pain medication, 25 mcg of fentanyl ordered, patient declined any narcotic medication, Tylenol ordered instead.  [KS]   1751 CONSULT  Time 1751  Discussed case with Dr Ez Spence, urology.  Reviewed history, exam, results and treatments.  Discussed patient's elevation in his creatinine, as well as CT findings.  Discussed concerns and plan of care. Dr Spence recommends patient follow-up outpatient with urology in the next couple of days.  He recommends we treat patient with Flomax, and pain medications.    [KS]   1817 [facility] #[id number] reviewed, it is blank.  [KS]   1818 I reviewed lab and imaging results with patient and his family members.  I informed them that I discussed his case with the urologist  on-call.  Dr. Spence recommends patient follow-up with urology in the next couple of days, I reiterated this with the patient and his family.  I discussed that we will prescribe him Norco, Flomax, and Zofran for home.  And that he should return to the ED with any worsening signs or symptoms.  Patient and his family are agreeable with this plan, and verbalized understanding.  [KS]      ED Course User Index  [KS] Melita Navarro, IRON           MEDICAL DECISION MAKING  Results were reviewed/discussed with the patient and they were also made aware of online access. Pt also made aware that some labs, such as cultures, will not be resulted during ER visit and follow up with PMD is necessary.     MDM       My differential diagnosis for abdominal pain includes but is not limited to:  Gastritis, gastroenteritis, peptic ulcer disease, GERD, esophageal perforation, acute appendicitis, mesenteric adenitis, Meckel’s diverticulum, epiploic appendagitis, diverticulitis, colon cancer, ulcerative colitis, Crohn’s disease, intussusception, small bowel obstruction, adhesions, ischemic bowel, perforated viscus, ileus, obstipation, biliary colic, cholecystitis, cholelithiasis, Manolo-Parrish Rashaun, hepatitis, pancreatitis, common bile duct obstruction, cholangitis, bile leak, splenic trauma, splenic rupture, splenic infarction, splenic abscess, abdominal abscess, ascites, spontaneous bacterial peritonitis, hernia, UTI, cystitis, prostatitis,ureterolithiasis, urinary obstruction, AAA, myocardial infarction, pneumonia, cancer, porphyria, DKA, medications, sickle cell, viral syndrome, zoster      DIAGNOSIS  Final diagnoses:   Ureterolithiasis   Acute on chronic renal insufficiency   Poorly controlled type 2 diabetes mellitus (CMS/HCC)       Latest Documented Vital Signs:  As of 6:48 PM  BP- 145/86 HR- 86 Temp- 97.8 °F (36.6 °C) (Oral) O2 sat- 98%    DISPOSITION  Patient discharged home in care of his family members with instructions to  follow-up with urology in the next 2 days.    Discussed pertinent labs and imaging findings with the patient/family.  Patient/Family voiced understanding of need to follow-up for recheck, further testing as needed.  Return to the emergency Department warnings were given.         Medication List      New Prescriptions    HYDROcodone-acetaminophen 5-325 MG per tablet  Commonly known as:  NORCO  Take 1 tablet by mouth Every 4 (Four) Hours As Needed for Severe Pain  for   up to 3 days.     ondansetron ODT 4 MG disintegrating tablet  Commonly known as:  ZOFRAN-ODT  Take 1 tablet by mouth Every 6 (Six) Hours As Needed for Nausea or   Vomiting.     tamsulosin 0.4 MG capsule 24 hr capsule  Commonly known as:  FLOMAX  Take 1 capsule by mouth Daily.              Follow-up Information     Jacobo Davidson MD. Call in 1 day.    Specialty:  Urology  Why:  To schedule follow-up appointment to be seen in the next few days  Contact information:  [address]  [place] [id number] [phone]                     Dictated utilizing Dragon dictation       Melita Navarro PA-C  11/24/19 7273

## 2019-11-24 NOTE — ED NOTES
Called urology for ANNALISE Lomeli and left a message with answering service.     Virginie Mendosa, PCT  11/24/19 4446

## 2019-12-05 ENCOUNTER — TRANSCRIBE ORDERS (OUTPATIENT)
Dept: ADMINISTRATIVE | Facility: HOSPITAL | Age: 84
End: 2019-12-05

## 2019-12-05 DIAGNOSIS — N20.0 KIDNEY STONE: Primary | ICD-10-CM

## 2019-12-06 ENCOUNTER — HOSPITAL ENCOUNTER (OUTPATIENT)
Dept: CT IMAGING | Facility: HOSPITAL | Age: 84
Discharge: HOME | End: 2019-12-06
Admitting: UROLOGY

## 2019-12-06 DIAGNOSIS — N20.0 KIDNEY STONE: ICD-10-CM

## 2019-12-06 PROCEDURE — 74176 CT ABD & PELVIS W/O CONTRAST: CPT

## 2020-12-10 ENCOUNTER — HOSPITAL ENCOUNTER (EMERGENCY)
Facility: HOSPITAL | Age: 85
Discharge: HOME | End: 2020-12-10
Attending: EMERGENCY MEDICINE | Admitting: EMERGENCY MEDICINE

## 2020-12-10 ENCOUNTER — APPOINTMENT (OUTPATIENT)
Dept: CT IMAGING | Facility: HOSPITAL | Age: 85
End: 2020-12-10

## 2020-12-10 VITALS
HEART RATE: 76 BPM | OXYGEN SATURATION: 95 % | BODY MASS INDEX: 33.14 KG/M2 | HEIGHT: 72 IN | TEMPERATURE: 98 F | DIASTOLIC BLOOD PRESSURE: 84 MMHG | RESPIRATION RATE: 18 BRPM | WEIGHT: 244.7 LBS | SYSTOLIC BLOOD PRESSURE: 134 MMHG

## 2020-12-10 DIAGNOSIS — N18.9 CHRONIC KIDNEY DISEASE, UNSPECIFIED CKD STAGE: ICD-10-CM

## 2020-12-10 DIAGNOSIS — N30.91 HEMATURIA DUE TO CYSTITIS: Primary | ICD-10-CM

## 2020-12-10 DIAGNOSIS — R73.9 HYPERGLYCEMIA: ICD-10-CM

## 2020-12-10 DIAGNOSIS — N30.90 CYSTITIS: ICD-10-CM

## 2020-12-10 LAB
ALBUMIN SERPL-MCNC: 3.3 G/DL (ref 3.5–5.2)
ALBUMIN/GLOB SERPL: 1.2 G/DL
ALP SERPL-CCNC: 66 U/L (ref 39–117)
ALT SERPL W P-5'-P-CCNC: 16 U/L (ref 1–41)
ANION GAP SERPL CALCULATED.3IONS-SCNC: 12.2 MMOL/L (ref 5–15)
AST SERPL-CCNC: 29 U/L (ref 1–40)
BACTERIA UR QL AUTO: ABNORMAL /HPF
BASOPHILS # BLD AUTO: 0.03 10*3/MM3 (ref 0–0.2)
BASOPHILS NFR BLD AUTO: 0.4 % (ref 0–1.5)
BILIRUB SERPL-MCNC: 0.7 MG/DL (ref 0–1.2)
BILIRUB UR QL STRIP: NEGATIVE
BUN SERPL-MCNC: 46 MG/DL (ref 8–23)
BUN/CREAT SERPL: 20.6 (ref 7–25)
CALCIUM SPEC-SCNC: 8.1 MG/DL (ref 8.6–10.5)
CHLORIDE SERPL-SCNC: 98 MMOL/L (ref 98–107)
CLARITY UR: ABNORMAL
CO2 SERPL-SCNC: 21.8 MMOL/L (ref 22–29)
COLOR UR: YELLOW
CREAT SERPL-MCNC: 2.23 MG/DL (ref 0.76–1.27)
DEPRECATED RDW RBC AUTO: 46.5 FL (ref 37–54)
EOSINOPHIL # BLD AUTO: 0.04 10*3/MM3 (ref 0–0.4)
EOSINOPHIL NFR BLD AUTO: 0.5 % (ref 0.3–6.2)
ERYTHROCYTE [DISTWIDTH] IN BLOOD BY AUTOMATED COUNT: 13.7 % (ref 12.3–15.4)
GFR SERPL CREATININE-BSD FRML MDRD: 28 ML/MIN/1.73
GLOBULIN UR ELPH-MCNC: 2.8 GM/DL
GLUCOSE SERPL-MCNC: 235 MG/DL (ref 65–99)
GLUCOSE UR STRIP-MCNC: NEGATIVE MG/DL
HCT VFR BLD AUTO: 34.3 % (ref 37.5–51)
HGB BLD-MCNC: 11.1 G/DL (ref 13–17.7)
HGB UR QL STRIP.AUTO: ABNORMAL
HYALINE CASTS UR QL AUTO: ABNORMAL /LPF
IMM GRANULOCYTES # BLD AUTO: 0.06 10*3/MM3 (ref 0–0.05)
IMM GRANULOCYTES NFR BLD AUTO: 0.8 % (ref 0–0.5)
KETONES UR QL STRIP: NEGATIVE
LEUKOCYTE ESTERASE UR QL STRIP.AUTO: ABNORMAL
LYMPHOCYTES # BLD AUTO: 0.6 10*3/MM3 (ref 0.7–3.1)
LYMPHOCYTES NFR BLD AUTO: 7.5 % (ref 19.6–45.3)
MCH RBC QN AUTO: 29.7 PG (ref 26.6–33)
MCHC RBC AUTO-ENTMCNC: 32.4 G/DL (ref 31.5–35.7)
MCV RBC AUTO: 91.7 FL (ref 79–97)
MONOCYTES # BLD AUTO: 1.06 10*3/MM3 (ref 0.1–0.9)
MONOCYTES NFR BLD AUTO: 13.3 % (ref 5–12)
NEUTROPHILS NFR BLD AUTO: 6.17 10*3/MM3 (ref 1.7–7)
NEUTROPHILS NFR BLD AUTO: 77.5 % (ref 42.7–76)
NITRITE UR QL STRIP: NEGATIVE
NRBC BLD AUTO-RTO: 0 /100 WBC (ref 0–0.2)
PH UR STRIP.AUTO: 5.5 [PH] (ref 4.5–8)
PLATELET # BLD AUTO: 162 10*3/MM3 (ref 140–450)
PMV BLD AUTO: 10.9 FL (ref 6–12)
POTASSIUM SERPL-SCNC: 4.4 MMOL/L (ref 3.5–5.2)
PROT SERPL-MCNC: 6.1 G/DL (ref 6–8.5)
PROT UR QL STRIP: ABNORMAL
RBC # BLD AUTO: 3.74 10*6/MM3 (ref 4.14–5.8)
RBC # UR: ABNORMAL /HPF
REF LAB TEST METHOD: ABNORMAL
SODIUM SERPL-SCNC: 132 MMOL/L (ref 136–145)
SP GR UR STRIP: 1.02 (ref 1–1.03)
SQUAMOUS #/AREA URNS HPF: ABNORMAL /HPF
UROBILINOGEN UR QL STRIP: ABNORMAL
WBC # BLD AUTO: 7.96 10*3/MM3 (ref 3.4–10.8)
WBC UR QL AUTO: ABNORMAL /HPF

## 2020-12-10 PROCEDURE — 85025 COMPLETE CBC W/AUTO DIFF WBC: CPT | Performed by: EMERGENCY MEDICINE

## 2020-12-10 PROCEDURE — 81001 URINALYSIS AUTO W/SCOPE: CPT | Performed by: EMERGENCY MEDICINE

## 2020-12-10 PROCEDURE — 25010000002 CEFTRIAXONE SODIUM-DEXTROSE 2-2.22 GM-%(50ML) RECONSTITUTED SOLUTION: Performed by: EMERGENCY MEDICINE

## 2020-12-10 PROCEDURE — 99283 EMERGENCY DEPT VISIT LOW MDM: CPT | Performed by: EMERGENCY MEDICINE

## 2020-12-10 PROCEDURE — 74176 CT ABD & PELVIS W/O CONTRAST: CPT

## 2020-12-10 PROCEDURE — 80053 COMPREHEN METABOLIC PANEL: CPT | Performed by: EMERGENCY MEDICINE

## 2020-12-10 PROCEDURE — 87086 URINE CULTURE/COLONY COUNT: CPT | Performed by: EMERGENCY MEDICINE

## 2020-12-10 PROCEDURE — 96365 THER/PROPH/DIAG IV INF INIT: CPT

## 2020-12-10 PROCEDURE — 99283 EMERGENCY DEPT VISIT LOW MDM: CPT

## 2020-12-10 RX ORDER — ATORVASTATIN CALCIUM 10 MG/1
10 TABLET, FILM COATED ORAL DAILY
COMMUNITY

## 2020-12-10 RX ORDER — CEFDINIR 300 MG/1
300 CAPSULE ORAL 2 TIMES DAILY
Qty: 20 CAPSULE | Refills: 0 | Status: SHIPPED | OUTPATIENT
Start: 2020-12-10 | End: 2021-04-16

## 2020-12-10 RX ORDER — DONEPEZIL HYDROCHLORIDE 10 MG/1
10 TABLET, FILM COATED ORAL NIGHTLY
COMMUNITY

## 2020-12-10 RX ORDER — PIOGLITAZONEHYDROCHLORIDE 30 MG/1
30 TABLET ORAL DAILY
COMMUNITY

## 2020-12-10 RX ORDER — CEFTRIAXONE 2 G/50ML
2 INJECTION, SOLUTION INTRAVENOUS ONCE
Status: COMPLETED | OUTPATIENT
Start: 2020-12-10 | End: 2020-12-10

## 2020-12-10 RX ORDER — LISINOPRIL AND HYDROCHLOROTHIAZIDE 25; 20 MG/1; MG/1
1 TABLET ORAL DAILY
COMMUNITY

## 2020-12-10 RX ADMIN — CEFTRIAXONE 2 G: 2 INJECTION, SOLUTION INTRAVENOUS at 14:38

## 2020-12-10 NOTE — ED PROVIDER NOTES
Subjective     Weakness - Generalized  Severity:  Moderate  Onset quality:  Gradual  Timing:  Constant  Progression:  Worsening  Chronicity:  New  Context comment:  Ems reports weakness unable to walk wo help, recent dx uti on abx x 2 doses  Relieved by:  Nothing  Worsened by:  Nothing  Ineffective treatments:  None tried  Associated symptoms: difficulty walking and fever    Associated symptoms: no abdominal pain, no cough, no diarrhea, no loss of consciousness, no shortness of breath, no stroke symptoms and no vomiting        Review of Systems   Constitutional: Positive for fever.   Respiratory: Negative for cough and shortness of breath.    Gastrointestinal: Negative for abdominal pain, diarrhea and vomiting.   Neurological: Negative for loss of consciousness.   All other systems reviewed and are negative.      Past Medical History:   Diagnosis Date   • Cellulitis of foot    • Dementia (CMS/HCC)    • Hyperlipidemia    • Hypertension    • Hypothyroidism    • Macular degeneration    • Prostate cancer (CMS/HCC)    • Right shoulder pain     SCHEDULED FOR SX   • Seasonal allergies    • Type 2 diabetes mellitus (CMS/HCC)    • Vertigo        No Known Allergies    Past Surgical History:   Procedure Laterality Date   • INCISION AND DRAINAGE LEG Right 1/11/2017    Procedure: INCISION AND DRAINAGE LOWER EXTREMITY;  Surgeon: Mikael Dwyer DPM;  Location: [facility];  Service:    • PROSTATE SURGERY     • SHOULDER SURGERY Left    • TOTAL SHOULDER ARTHROPLASTY W/ DISTAL CLAVICLE EXCISION Right 12/8/2017    Procedure: TOTAL SHOULDER REVERSE ARTHROPLASTY;  Surgeon: Goyo Bhardwaj MD;  Location: [facility] OR;  Service:        Family History   Problem Relation Age of Onset   • Heart disease Father        Social History     Socioeconomic History   • Marital status:      Spouse name: Not on file   • Number of children: Not on file   • Years of education: Not on file   • Highest education level: Not on file   Tobacco Use   •  Smoking status: Former Smoker     Packs/day: 1.00     Years: 15.00     Pack years: 15.00     Types: Cigarettes     Quit date: 1980     Years since quittin.9   • Smokeless tobacco: Never Used   • Tobacco comment: caffeine use: one cup of coffee daily.    Substance and Sexual Activity   • Alcohol use: No   • Drug use: No   • Sexual activity: Defer           Objective   Physical Exam  Vitals signs and nursing note reviewed.   Constitutional:       General: He is not in acute distress.     Appearance: Normal appearance. He is not ill-appearing.   HENT:      Head: Normocephalic and atraumatic.      Nose: Nose normal. No rhinorrhea.   Eyes:      Extraocular Movements: Extraocular movements intact.      Conjunctiva/sclera: Conjunctivae normal.      Pupils: Pupils are equal, round, and reactive to light.   Neck:      Musculoskeletal: Normal range of motion and neck supple. No muscular tenderness.   Cardiovascular:      Rate and Rhythm: Normal rate and regular rhythm.      Pulses: Normal pulses.      Heart sounds: Normal heart sounds. No murmur.   Pulmonary:      Effort: Pulmonary effort is normal.      Breath sounds: Normal breath sounds. No wheezing or rhonchi.   Abdominal:      General: Abdomen is flat. There is no distension.      Tenderness: There is no abdominal tenderness. There is no guarding.   Musculoskeletal: Normal range of motion.         General: No swelling, tenderness, deformity or signs of injury.   Skin:     General: Skin is warm and dry.      Findings: No erythema or rash.   Neurological:      General: No focal deficit present.      Mental Status: He is alert and oriented to person, place, and time.      Cranial Nerves: No cranial nerve deficit.      Coordination: Coordination normal.      Comments: Generally weak used walker for EMS   Psychiatric:         Mood and Affect: Mood normal.         Thought Content: Thought content normal.         Procedures           ED Course  ED Course as of Dec 10 1535    Thu Dec 10, 2020   1530 Reeval, appears well, vss, daughter at bedside ok with plan torx and f/u pcp    [BC]      ED Course User Index  [BC] Jameson Rose MD                                           [facility]    Final diagnoses:   Hematuria due to cystitis   Cystitis   Chronic kidney disease, unspecified CKD stage   Hyperglycemia            Jameson Rose MD  12/10/20 0758

## 2020-12-10 NOTE — ED NOTES
PT AMBULATED WITH A WALKER. PT EDUCATED ON USE OF THE WALKER AND REMINDED TO KEEP THE WALKER CLOSE TO HIS BODY WHEN WALKING.     Clementina Perkins RN  12/10/20 5110

## 2020-12-11 LAB — BACTERIA SPEC AEROBE CULT: NO GROWTH

## 2021-04-16 ENCOUNTER — APPOINTMENT (OUTPATIENT)
Dept: GENERAL RADIOLOGY | Facility: HOSPITAL | Age: 86
End: 2021-04-16

## 2021-04-16 ENCOUNTER — HOSPITAL ENCOUNTER (EMERGENCY)
Facility: HOSPITAL | Age: 86
Discharge: HOME | End: 2021-04-16
Attending: EMERGENCY MEDICINE | Admitting: EMERGENCY MEDICINE

## 2021-04-16 VITALS
RESPIRATION RATE: 22 BRPM | HEART RATE: 63 BPM | TEMPERATURE: 97.8 F | OXYGEN SATURATION: 96 % | SYSTOLIC BLOOD PRESSURE: 132 MMHG | DIASTOLIC BLOOD PRESSURE: 79 MMHG | HEIGHT: 74 IN | BODY MASS INDEX: 33.11 KG/M2 | WEIGHT: 258 LBS

## 2021-04-16 DIAGNOSIS — J06.9 UPPER RESPIRATORY TRACT INFECTION, UNSPECIFIED TYPE: Primary | ICD-10-CM

## 2021-04-16 LAB
ALBUMIN SERPL-MCNC: 3.5 G/DL (ref 3.5–5.2)
ALBUMIN/GLOB SERPL: 1.2 G/DL
ALP SERPL-CCNC: 87 U/L (ref 39–117)
ALT SERPL W P-5'-P-CCNC: 19 U/L (ref 1–41)
ANION GAP SERPL CALCULATED.3IONS-SCNC: 8.2 MMOL/L (ref 5–15)
AST SERPL-CCNC: 23 U/L (ref 1–40)
BASOPHILS # BLD AUTO: 0.04 10*3/MM3 (ref 0–0.2)
BASOPHILS NFR BLD AUTO: 1 % (ref 0–1.5)
BILIRUB SERPL-MCNC: 0.3 MG/DL (ref 0–1.2)
BILIRUB UR QL STRIP: NEGATIVE
BUN SERPL-MCNC: 34 MG/DL (ref 8–23)
BUN/CREAT SERPL: 19.2 (ref 7–25)
CALCIUM SPEC-SCNC: 9 MG/DL (ref 8.6–10.5)
CHLORIDE SERPL-SCNC: 105 MMOL/L (ref 98–107)
CLARITY UR: CLEAR
CO2 SERPL-SCNC: 22.8 MMOL/L (ref 22–29)
COLOR UR: YELLOW
CREAT SERPL-MCNC: 1.77 MG/DL (ref 0.76–1.27)
D DIMER PPP FEU-MCNC: 0.43 MCGFEU/ML (ref 0–0.46)
D-LACTATE SERPL-SCNC: 2 MMOL/L (ref 0.5–2)
DEPRECATED RDW RBC AUTO: 49.5 FL (ref 37–54)
EOSINOPHIL # BLD AUTO: 0.29 10*3/MM3 (ref 0–0.4)
EOSINOPHIL NFR BLD AUTO: 7 % (ref 0.3–6.2)
ERYTHROCYTE [DISTWIDTH] IN BLOOD BY AUTOMATED COUNT: 14.7 % (ref 12.3–15.4)
FLUAV RNA RESP QL NAA+PROBE: NOT DETECTED
FLUBV RNA RESP QL NAA+PROBE: NOT DETECTED
GFR SERPL CREATININE-BSD FRML MDRD: 37 ML/MIN/1.73
GLOBULIN UR ELPH-MCNC: 2.9 GM/DL
GLUCOSE SERPL-MCNC: 259 MG/DL (ref 65–99)
GLUCOSE UR STRIP-MCNC: NEGATIVE MG/DL
HCT VFR BLD AUTO: 38.8 % (ref 37.5–51)
HGB BLD-MCNC: 12.3 G/DL (ref 13–17.7)
HGB UR QL STRIP.AUTO: NEGATIVE
IMM GRANULOCYTES # BLD AUTO: 0.02 10*3/MM3 (ref 0–0.05)
IMM GRANULOCYTES NFR BLD AUTO: 0.5 % (ref 0–0.5)
KETONES UR QL STRIP: NEGATIVE
LEUKOCYTE ESTERASE UR QL STRIP.AUTO: NEGATIVE
LYMPHOCYTES # BLD AUTO: 0.8 10*3/MM3 (ref 0.7–3.1)
LYMPHOCYTES NFR BLD AUTO: 19.2 % (ref 19.6–45.3)
MCH RBC QN AUTO: 28.9 PG (ref 26.6–33)
MCHC RBC AUTO-ENTMCNC: 31.7 G/DL (ref 31.5–35.7)
MCV RBC AUTO: 91.1 FL (ref 79–97)
MONOCYTES # BLD AUTO: 0.46 10*3/MM3 (ref 0.1–0.9)
MONOCYTES NFR BLD AUTO: 11.1 % (ref 5–12)
NEUTROPHILS NFR BLD AUTO: 2.55 10*3/MM3 (ref 1.7–7)
NEUTROPHILS NFR BLD AUTO: 61.2 % (ref 42.7–76)
NITRITE UR QL STRIP: NEGATIVE
NRBC BLD AUTO-RTO: 0 /100 WBC (ref 0–0.2)
NT-PROBNP SERPL-MCNC: 285.3 PG/ML (ref 0–1800)
PH UR STRIP.AUTO: <=5 [PH] (ref 4.5–8)
PLATELET # BLD AUTO: 145 10*3/MM3 (ref 140–450)
PMV BLD AUTO: 10.1 FL (ref 6–12)
POTASSIUM SERPL-SCNC: 4.2 MMOL/L (ref 3.5–5.2)
PROCALCITONIN SERPL-MCNC: 0.08 NG/ML (ref 0–0.25)
PROT SERPL-MCNC: 6.4 G/DL (ref 6–8.5)
PROT UR QL STRIP: NEGATIVE
RBC # BLD AUTO: 4.26 10*6/MM3 (ref 4.14–5.8)
SARS-COV-2 RNA RESP QL NAA+PROBE: NOT DETECTED
SODIUM SERPL-SCNC: 136 MMOL/L (ref 136–145)
SP GR UR STRIP: 1.03 (ref 1–1.03)
TROPONIN T SERPL-MCNC: 0.02 NG/ML (ref 0–0.03)
UROBILINOGEN UR QL STRIP: NORMAL
WBC # BLD AUTO: 4.16 10*3/MM3 (ref 3.4–10.8)

## 2021-04-16 PROCEDURE — 81003 URINALYSIS AUTO W/O SCOPE: CPT | Performed by: PHYSICIAN ASSISTANT

## 2021-04-16 PROCEDURE — 99284 EMERGENCY DEPT VISIT MOD MDM: CPT

## 2021-04-16 PROCEDURE — 71045 X-RAY EXAM CHEST 1 VIEW: CPT

## 2021-04-16 PROCEDURE — 84484 ASSAY OF TROPONIN QUANT: CPT | Performed by: PHYSICIAN ASSISTANT

## 2021-04-16 PROCEDURE — 85379 FIBRIN DEGRADATION QUANT: CPT | Performed by: PHYSICIAN ASSISTANT

## 2021-04-16 PROCEDURE — 83605 ASSAY OF LACTIC ACID: CPT | Performed by: PHYSICIAN ASSISTANT

## 2021-04-16 PROCEDURE — 87636 SARSCOV2 & INF A&B AMP PRB: CPT | Performed by: PHYSICIAN ASSISTANT

## 2021-04-16 PROCEDURE — 83880 ASSAY OF NATRIURETIC PEPTIDE: CPT | Performed by: PHYSICIAN ASSISTANT

## 2021-04-16 PROCEDURE — 80053 COMPREHEN METABOLIC PANEL: CPT | Performed by: PHYSICIAN ASSISTANT

## 2021-04-16 PROCEDURE — 85025 COMPLETE CBC W/AUTO DIFF WBC: CPT | Performed by: PHYSICIAN ASSISTANT

## 2021-04-16 PROCEDURE — 84145 PROCALCITONIN (PCT): CPT | Performed by: PHYSICIAN ASSISTANT

## 2021-04-16 PROCEDURE — 87040 BLOOD CULTURE FOR BACTERIA: CPT | Performed by: PHYSICIAN ASSISTANT

## 2021-04-16 RX ORDER — SODIUM CHLORIDE 0.9 % (FLUSH) 0.9 %
10 SYRINGE (ML) INJECTION AS NEEDED
Status: DISCONTINUED | OUTPATIENT
Start: 2021-04-16 | End: 2021-04-16 | Stop reason: HOSPADM

## 2021-04-16 NOTE — ED NOTES
20G PIV to LAC removed for dc. Tip in tact. Pt tolerated well     Shannan Escamilla, RN  04/16/21 0210

## 2021-04-16 NOTE — ED PROVIDER NOTES
EMERGENCY DEPARTMENT ENCOUNTER      Room Number: 01/01    History is provided by the patient, no translation services needed    HPI:    Chief complaint: Cough    Location:     Quality/Severity: Moderate    Timing/Duration: 6 days    Modifying Factors: History of taking amoxicillin for bronchitis    Associated Symptoms: Altered mental status    Narrative: Pt is a 86 y.o. male who presents complaining of planing of a cough that is productive x6 days.  Patient denies any fever or chills.  Daughter states that patient has dementia and has seen his PCP and was given amoxicillin for bronchitis.  Daughter states that PCP significant illness did not feel better that patient would need to be seen by the ER.  Patient is complaining of some shortness of breath.  Patient states he is eating adequately.      PMD: Romeo Chamberlain MD    REVIEW OF SYSTEMS  Review of Systems   Constitutional: Negative for chills and fever.   Eyes: Negative for pain and visual disturbance.   Respiratory: Positive for cough.    Cardiovascular: Negative for chest pain and palpitations.   Gastrointestinal: Negative for abdominal pain, nausea and vomiting.   Genitourinary: Negative for difficulty urinating, dysuria and flank pain.   Musculoskeletal: Negative for gait problem and myalgias.   Skin: Negative for rash and wound.   Neurological: Negative for dizziness, syncope, numbness and headaches.   Psychiatric/Behavioral: Negative for confusion and suicidal ideas. The patient is not nervous/anxious.          PAST MEDICAL HISTORY  Active Ambulatory Problems     Diagnosis Date Noted   • Osteoarthritis of right glenohumeral joint 12/10/2017   • Gastroenteritis 03/05/2019     Resolved Ambulatory Problems     Diagnosis Date Noted   • Cellulitis of foot, right 01/07/2017   • Cellulitis of right foot 01/08/2017   • Osteoarthritis of right glenohumeral joint 11/09/2017   • Tendinopathy of right rotator cuff 11/09/2017   • Glenohumeral arthritis, right  2017     Past Medical History:   Diagnosis Date   • Cellulitis of foot    • Dementia (CMS/HCC)    • Hyperlipidemia    • Hypertension    • Hypothyroidism    • Macular degeneration    • Prostate cancer (CMS/HCC)    • Right shoulder pain    • Seasonal allergies    • Type 2 diabetes mellitus (CMS/HCC)    • Vertigo        PAST SURGICAL HISTORY  Past Surgical History:   Procedure Laterality Date   • INCISION AND DRAINAGE LEG Right 2017    Procedure: INCISION AND DRAINAGE LOWER EXTREMITY;  Surgeon: Mikael Dwyer DPM;  Location: [facility];  Service:    • PROSTATE SURGERY     • SHOULDER SURGERY Left    • TOTAL SHOULDER ARTHROPLASTY W/ DISTAL CLAVICLE EXCISION Right 2017    Procedure: TOTAL SHOULDER REVERSE ARTHROPLASTY;  Surgeon: Goyo Bhardwaj MD;  Location: [facility];  Service:        FAMILY HISTORY  Family History   Problem Relation Age of Onset   • Heart disease Father        SOCIAL HISTORY  Social History     Socioeconomic History   • Marital status:      Spouse name: Not on file   • Number of children: Not on file   • Years of education: Not on file   • Highest education level: Not on file   Tobacco Use   • Smoking status: Former Smoker     Packs/day: 1.00     Years: 15.00     Pack years: 15.00     Types: Cigarettes     Quit date:      Years since quittin.3   • Smokeless tobacco: Never Used   • Tobacco comment: caffeine use: one cup of coffee daily.    Substance and Sexual Activity   • Alcohol use: No   • Drug use: No   • Sexual activity: Defer       ALLERGIES  Patient has no known allergies.      Current Facility-Administered Medications:   •  [COMPLETED] Insert peripheral IV, , , Once **AND** sodium chloride 0.9 % flush 10 mL, 10 mL, Intravenous, PRN, Liliya Khan PA-C    Current Outpatient Medications:   •  acetaminophen (TYLENOL) 500 MG tablet, Take 2 tablets by mouth Every 6 (Six) Hours As Needed for Mild Pain , Moderate Pain  or Headache., Disp: , Rfl:   •  atorvastatin  (LIPITOR) 10 MG tablet, Take 10 mg by mouth Daily., Disp: , Rfl:   •  Blood Glucose Monitoring Suppl (ONE TOUCH ULTRA 2) W/DEVICE kit, USE AS DIRECTED, Disp: , Rfl: 0  •  donepezil (ARICEPT) 10 MG tablet, Take 10 mg by mouth Every Night., Disp: , Rfl:   •  glipiZIDE (GLUCOTROL) 10 MG tablet, Take 10 mg by mouth 2 (two) times a day before meals, Disp: , Rfl:   •  LEVOTHYROXINE SODIUM PO, Take 180 mg by mouth., Disp: , Rfl:   •  lisinopril-hydrochlorothiazide (PRINZIDE,ZESTORETIC) 20-25 MG per tablet, Take 1 tablet by mouth Daily., Disp: , Rfl:   •  ondansetron ODT (ZOFRAN-ODT) 4 MG disintegrating tablet, Take 1 tablet by mouth Every 6 (Six) Hours As Needed for Nausea or Vomiting., Disp: 20 tablet, Rfl: 0  •  pioglitazone (ACTOS) 30 MG tablet, Take 30 mg by mouth Daily., Disp: , Rfl:   •  Semaglutide (OZEMPIC SC), Inject 0.5 mg under the skin into the appropriate area as directed 1 (One) Time Per Week. Pt. Takes on Friday, Disp: , Rfl:     PHYSICAL EXAM  ED Triage Vitals   Temp Pulse Resp BP SpO2   -- -- -- -- --      Temp src Heart Rate Source Patient Position BP Location FiO2 (%)   -- -- -- -- --       Physical Exam  Vitals and nursing note reviewed.   HENT:      Head: Normocephalic and atraumatic.   Eyes:      Conjunctiva/sclera: Conjunctivae normal.   Cardiovascular:      Rate and Rhythm: Normal rate and regular rhythm.      Heart sounds: Normal heart sounds.   Pulmonary:      Effort: Pulmonary effort is normal. No respiratory distress.      Breath sounds: Examination of the right-lower field reveals wheezing. Examination of the left-lower field reveals wheezing. Wheezing present.   Abdominal:      General: Bowel sounds are normal. There is no distension.      Palpations: Abdomen is soft.      Tenderness: There is no abdominal tenderness.   Musculoskeletal:         General: Normal range of motion.      Cervical back: Normal range of motion and neck supple.   Skin:     General: Skin is warm and dry.   Neurological:       Mental Status: He is alert and oriented to person, place, and time.   Psychiatric:         Mood and Affect: Mood and affect normal.           LAB RESULTS  Lab Results (last 24 hours)     Procedure Component Value Units Date/Time    CBC & Differential [[id number]]  (Abnormal) Collected: 04/16/21 1303    Specimen: Blood Updated: 04/16/21 1314    Narrative:      The following orders were created for panel order CBC & Differential.  Procedure                               Abnormality         Status                     ---------                               -----------         ------                     CBC Auto Differential[[id number]]        Abnormal            Final result                 Please view results for these tests on the individual orders.    Comprehensive Metabolic Panel [[id number]]  (Abnormal) Collected: 04/16/21 1303    Specimen: Blood Updated: 04/16/21 1341     Glucose 259 mg/dL      BUN 34 mg/dL      Creatinine 1.77 mg/dL      Sodium 136 mmol/L      Potassium 4.2 mmol/L      Chloride 105 mmol/L      CO2 22.8 mmol/L      Calcium 9.0 mg/dL      Total Protein 6.4 g/dL      Albumin 3.50 g/dL      ALT (SGPT) 19 U/L      AST (SGOT) 23 U/L      Alkaline Phosphatase 87 U/L      Total Bilirubin 0.3 mg/dL      eGFR Non African Amer 37 mL/min/1.73      Globulin 2.9 gm/dL      A/G Ratio 1.2 g/dL      BUN/Creatinine Ratio 19.2     Anion Gap 8.2 mmol/L     Narrative:      GFR Normal >60  Chronic Kidney Disease <60  Kidney Failure <15      D-dimer, Quantitative [[id number]]  (Normal) Collected: 04/16/21 1303    Specimen: Blood Updated: 04/16/21 1332     D-Dimer, Quantitative 0.43 MCGFEU/mL     Narrative:      Can be elevated in, but is not diagnostic for deep vein thrombosis (DVT) or pulmonary embolis (PE).  It is also elevated in other medical conditions.  Clinical correlation is required.  The negative cut-off value for the D-Dimer is 0.50 mcg FEU/mL for DVT and PE.      Troponin [[id number]]  (Normal) Collected:  04/16/21 1303    Specimen: Blood Updated: 04/16/21 1338     Troponin T 0.017 ng/mL     Narrative:      Troponin T Reference Range:  <= 0.03 ng/mL-   Negative for AMI  >0.03 ng/mL-     Abnormal for myocardial necrosis.  Clinicians would have to utilize clinical acumen, EKG, Troponin and serial changes to determine if it is an Acute Myocardial Infarction or myocardial injury due to an underlying chronic condition.       Results may be falsely decreased if patient taking Biotin.      Lactic Acid, Plasma [[id number]]  (Normal) Collected: 04/16/21 1303    Specimen: Blood Updated: 04/16/21 1331     Lactate 2.0 mmol/L     Procalcitonin [[id number]]  (Normal) Collected: 04/16/21 1303    Specimen: Blood Updated: 04/16/21 1345     Procalcitonin 0.08 ng/mL     Narrative:      Results may be falsely decreased if patient taking Biotin.     Blood Culture - Blood, Arm, Right [[id number]] Collected: 04/16/21 1303    Specimen: Blood from Arm, Right Updated: 04/16/21 1308    CBC Auto Differential [[id number]]  (Abnormal) Collected: 04/16/21 1303    Specimen: Blood Updated: 04/16/21 1314     WBC 4.16 10*3/mm3      RBC 4.26 10*6/mm3      Hemoglobin 12.3 g/dL      Hematocrit 38.8 %      MCV 91.1 fL      MCH 28.9 pg      MCHC 31.7 g/dL      RDW 14.7 %      RDW-SD 49.5 fl      MPV 10.1 fL      Platelets 145 10*3/mm3      Neutrophil % 61.2 %      Lymphocyte % 19.2 %      Monocyte % 11.1 %      Eosinophil % 7.0 %      Basophil % 1.0 %      Immature Grans % 0.5 %      Neutrophils, Absolute 2.55 10*3/mm3      Lymphocytes, Absolute 0.80 10*3/mm3      Monocytes, Absolute 0.46 10*3/mm3      Eosinophils, Absolute 0.29 10*3/mm3      Basophils, Absolute 0.04 10*3/mm3      Immature Grans, Absolute 0.02 10*3/mm3      nRBC 0.0 /100 WBC     Blood Culture - Blood, Arm, Left [[id number]] Collected: 04/16/21 1304    Specimen: Blood from Arm, Left Updated: 04/16/21 1308    BNP [[id number]]  (Normal) Collected: 04/16/21 1304    Specimen: Blood Updated: 04/16/21  1347     proBNP 285.3 pg/mL     Narrative:      Among patients with dyspnea, NT-proBNP is highly sensitive for the detection of acute congestive heart failure. In addition NT-proBNP of <300 pg/ml effectively rules out acute congestive heart failure with 99% negative predictive value.    Results may be falsely decreased if patient taking Biotin.      COVID-19 and FLU A/B PCR - Swab, Nasopharynx [[id number]]  (Normal) Collected: 04/16/21 1304    Specimen: Swab from Nasopharynx Updated: 04/16/21 1333     COVID19 Not Detected     Influenza A PCR Not Detected     Influenza B PCR Not Detected    Narrative:      Fact sheet for providers: https://www.fda.gov/media/805995/download    Fact sheet for patients: https://www.fda.gov/media/667262/download    Test performed by PCR.    Urinalysis With Microscopic If Indicated (No Culture) - Urine, Clean Catch [[id number]]  (Normal) Collected: 04/16/21 1432    Specimen: Urine, Clean Catch Updated: 04/16/21 1443     Color, UA Yellow     Appearance, UA Clear     pH, UA <=5.0     Specific [place], UA 1.026     Comment: Result obtained by Refractometer        Glucose, UA Negative     Ketones, UA Negative     Bilirubin, UA Negative     Blood, UA Negative     Protein, UA Negative     Leuk Esterase, UA Negative     Nitrite, UA Negative     Urobilinogen, UA 0.2 E.U./dL    Narrative:      Urine microscopic not indicated.            I ordered the above labs and reviewed the results    RADIOLOGY  XR Chest 1 View    Result Date: 4/16/2021  CR Chest 1 Vw INDICATION: Shortness of breath COMPARISON:  Chest x-ray from 12/11/2017 FINDINGS: Single portable AP view(s) of the chest.  Exam is somewhat limited by low lung volumes. There is mild atelectasis at the lung bases. Chronic changes appear unchanged. No pneumothorax or pleural effusion.     No definite acute cardiopulmonary findings. Findings appear unchanged from prior. Signer Name: Keyona Ovalle MD  Signed: 4/16/2021 1:19 PM  Workstation Name:  [id number]  [facility]      I ordered the above radiologic testing and reviewed the results    PROCEDURES  Procedures      PROGRESS AND CONSULTS  ED Course as of Apr 16 1500 Fri Apr 16, 2021   1354 Glucose(!): 259 [GT]   1458 86-year-old male presents to the ED with complaints of a cough and shortness of breath with  increasing confusion x6 days.  Daughter states that patient was seen by his PCP and given amoxicillin for bronchitis.  Daughter states that patient has been eating less.  After history and physical exam patient is noted to be satting around 96% on room air.  Patient is not tachypneic.  Patient has no fever.  Patient's laboratory studies show a normal white count at 4.16.  Patient's lactate is 2.0.  Patient troponin was 0.017.  COVID-19 and influenza were not detected.  Patient's glucose was slightly elevated at 259.  Patient's BUN was 34 and creatinine was 1.77.  Patient's urinalysis showed no signs of infection.  Patient's chest x-ray showed no signs of acute disease process.  Discussed with patient and his daughter that I felt this is most likely due to an upper respiratory infection and dehydration.  Patient will be discharged home to follow-up with his PCP.  Instructed daughter that the patient symptoms worsen that he should return to the ED immediately.  Daughter expressed verbal understanding of plan of care.    [GT]      ED Course User Index  [GT] Liliya Khan PA-C           MEDICAL DECISION MAKING    MDM       DIAGNOSIS  Final diagnoses:   Upper respiratory tract infection, unspecified type       Latest Documented Vital Signs:  As of 15:00 EDT  BP- 143/85 HR- 76 Temp- 97.8 °F (36.6 °C) (Oral) O2 sat- 96%    DISPOSITION  Discharged home      Discussed pertinent findings with the patient/family.  Patient/Family voiced understanding of need to follow-up for recheck and further testing as needed.  Return to the Emergency Department warnings were given.          Medication List      No changes were made to your prescriptions during this visit.             Follow-up Information     Romeo Chamberlain MD. Call in 1 day.    Specialty: Family Medicine  Why: To schedule a follow up appointment  Contact information:  [address] [place] [address] [phone]                     Dictated utilizing Dragon dictation     Liliya Khan PA-C  04/16/21 1507

## 2021-04-21 LAB
BACTERIA SPEC AEROBE CULT: NORMAL
BACTERIA SPEC AEROBE CULT: NORMAL

## 2022-04-20 ENCOUNTER — HOSPITAL ENCOUNTER (EMERGENCY)
Facility: HOSPITAL | Age: 87
Discharge: HOME | End: 2022-04-20
Attending: EMERGENCY MEDICINE | Admitting: EMERGENCY MEDICINE

## 2022-04-20 ENCOUNTER — APPOINTMENT (OUTPATIENT)
Dept: GENERAL RADIOLOGY | Facility: HOSPITAL | Age: 87
End: 2022-04-20

## 2022-04-20 VITALS
TEMPERATURE: 98.4 F | WEIGHT: 241.6 LBS | OXYGEN SATURATION: 98 % | SYSTOLIC BLOOD PRESSURE: 149 MMHG | HEART RATE: 70 BPM | DIASTOLIC BLOOD PRESSURE: 82 MMHG | RESPIRATION RATE: 18 BRPM | BODY MASS INDEX: 31.01 KG/M2 | HEIGHT: 74 IN

## 2022-04-20 DIAGNOSIS — U07.1 COVID: Primary | ICD-10-CM

## 2022-04-20 LAB
ANION GAP SERPL CALCULATED.3IONS-SCNC: 11.2 MMOL/L (ref 5–15)
BASOPHILS # BLD AUTO: 0.04 10*3/MM3 (ref 0–0.2)
BASOPHILS NFR BLD AUTO: 0.9 % (ref 0–1.5)
BUN SERPL-MCNC: 40 MG/DL (ref 8–23)
BUN/CREAT SERPL: 20.3 (ref 7–25)
CALCIUM SPEC-SCNC: 8.8 MG/DL (ref 8.6–10.5)
CHLORIDE SERPL-SCNC: 101 MMOL/L (ref 98–107)
CO2 SERPL-SCNC: 23.8 MMOL/L (ref 22–29)
CREAT SERPL-MCNC: 1.97 MG/DL (ref 0.76–1.27)
DEPRECATED RDW RBC AUTO: 50.4 FL (ref 37–54)
EGFRCR SERPLBLD CKD-EPI 2021: 32.5 ML/MIN/1.73
EOSINOPHIL # BLD AUTO: 0.07 10*3/MM3 (ref 0–0.4)
EOSINOPHIL NFR BLD AUTO: 1.6 % (ref 0.3–6.2)
ERYTHROCYTE [DISTWIDTH] IN BLOOD BY AUTOMATED COUNT: 14.5 % (ref 12.3–15.4)
GLUCOSE SERPL-MCNC: 194 MG/DL (ref 65–99)
HCT VFR BLD AUTO: 38.7 % (ref 37.5–51)
HGB BLD-MCNC: 12.4 G/DL (ref 13–17.7)
IMM GRANULOCYTES # BLD AUTO: 0.04 10*3/MM3 (ref 0–0.05)
IMM GRANULOCYTES NFR BLD AUTO: 0.9 % (ref 0–0.5)
LYMPHOCYTES # BLD AUTO: 0.58 10*3/MM3 (ref 0.7–3.1)
LYMPHOCYTES NFR BLD AUTO: 13 % (ref 19.6–45.3)
MCH RBC QN AUTO: 30.5 PG (ref 26.6–33)
MCHC RBC AUTO-ENTMCNC: 32 G/DL (ref 31.5–35.7)
MCV RBC AUTO: 95.1 FL (ref 79–97)
MONOCYTES # BLD AUTO: 0.72 10*3/MM3 (ref 0.1–0.9)
MONOCYTES NFR BLD AUTO: 16.1 % (ref 5–12)
NEUTROPHILS NFR BLD AUTO: 3.01 10*3/MM3 (ref 1.7–7)
NEUTROPHILS NFR BLD AUTO: 67.5 % (ref 42.7–76)
NRBC BLD AUTO-RTO: 0 /100 WBC (ref 0–0.2)
PLATELET # BLD AUTO: 158 10*3/MM3 (ref 140–450)
PMV BLD AUTO: 10.6 FL (ref 6–12)
POTASSIUM SERPL-SCNC: 4.3 MMOL/L (ref 3.5–5.2)
RBC # BLD AUTO: 4.07 10*6/MM3 (ref 4.14–5.8)
SODIUM SERPL-SCNC: 136 MMOL/L (ref 136–145)
WBC NRBC COR # BLD: 4.46 10*3/MM3 (ref 3.4–10.8)

## 2022-04-20 PROCEDURE — 71045 X-RAY EXAM CHEST 1 VIEW: CPT

## 2022-04-20 PROCEDURE — 80048 BASIC METABOLIC PNL TOTAL CA: CPT | Performed by: EMERGENCY MEDICINE

## 2022-04-20 PROCEDURE — 99283 EMERGENCY DEPT VISIT LOW MDM: CPT

## 2022-04-20 PROCEDURE — 85025 COMPLETE CBC W/AUTO DIFF WBC: CPT | Performed by: EMERGENCY MEDICINE

## 2022-04-20 PROCEDURE — 99282 EMERGENCY DEPT VISIT SF MDM: CPT | Performed by: EMERGENCY MEDICINE

## 2022-04-20 RX ORDER — BENZONATATE 100 MG/1
100 CAPSULE ORAL ONCE
Status: DISCONTINUED | OUTPATIENT
Start: 2022-04-20 | End: 2022-04-21 | Stop reason: HOSPADM

## 2022-04-20 RX ORDER — BENZONATATE 100 MG/1
100 CAPSULE ORAL 3 TIMES DAILY PRN
Qty: 15 CAPSULE | Refills: 0 | Status: SHIPPED | OUTPATIENT
Start: 2022-04-20

## 2022-04-20 RX ORDER — SODIUM CHLORIDE 0.9 % (FLUSH) 0.9 %
10 SYRINGE (ML) INJECTION AS NEEDED
Status: DISCONTINUED | OUTPATIENT
Start: 2022-04-20 | End: 2022-04-21 | Stop reason: HOSPADM

## 2022-04-20 RX ADMIN — SODIUM CHLORIDE, POTASSIUM CHLORIDE, SODIUM LACTATE AND CALCIUM CHLORIDE 500 ML: 600; 310; 30; 20 INJECTION, SOLUTION INTRAVENOUS at 21:30

## 2022-04-21 NOTE — DISCHARGE INSTRUCTIONS
Please take medication prescribed to you for cough as directed.  Please call your primary care physician to set up a telemedicine follow-up appointment.  Please return the emergency room if you have severe shortness of breath or for any other concerns.

## 2022-04-21 NOTE — ED PROVIDER NOTES
Subjective   History of Present Illness  87-year-old male with history of cough for 1 to 2 weeks brought to the emergency room due to having positive COVID test today.  He says he does not feel short of breath says he feels generally sort of tired but denies any abdominal pain denies any nausea or vomiting.  Describes his cough is productive of clear sputum and bothersome because it keeps him up at night sometimes.  Discussed with his daughter via telephone she voices concerns that he did not get much sleep because of the cough as well as he does not seem to have much appetite today.  However she confirms she is not have any vomiting or any complaints.  She is also concerned because he seems sort of weak needing help getting up and down.  Denies any focal weaknesses.  Review of Systems   Constitutional: Negative for chills and fever.   Respiratory: Positive for cough. Negative for shortness of breath.    Cardiovascular: Negative for chest pain and leg swelling.   Gastrointestinal: Negative for abdominal pain, nausea and vomiting.       Past Medical History:   Diagnosis Date   • Cellulitis of foot    • Chronic kidney disease    • Dementia (HCC)    • Hyperlipidemia    • Hypertension    • Hypothyroidism    • Macular degeneration    • Prostate cancer (HCC)    • Renal disorder    • Right shoulder pain     SCHEDULED FOR SX   • Seasonal allergies    • Type 2 diabetes mellitus (HCC)    • Vertigo        No Known Allergies    Past Surgical History:   Procedure Laterality Date   • INCISION AND DRAINAGE LEG Right 1/11/2017    Procedure: INCISION AND DRAINAGE LOWER EXTREMITY;  Surgeon: Mikael Dwyer DPM;  Location: [facility];  Service:    • PROSTATE SURGERY     • SHOULDER SURGERY Left    • TOTAL SHOULDER ARTHROPLASTY W/ DISTAL CLAVICLE EXCISION Right 12/8/2017    Procedure: TOTAL SHOULDER REVERSE ARTHROPLASTY;  Surgeon: Goyo Bhardwaj MD;  Location: [facility];  Service:        Family History   Problem Relation Age of Onset   •  Heart disease Father        Social History     Socioeconomic History   • Marital status:    Tobacco Use   • Smoking status: Former Smoker     Packs/day: 1.00     Years: 15.00     Pack years: 15.00     Types: Cigarettes     Quit date:      Years since quittin.3   • Smokeless tobacco: Never Used   • Tobacco comment: caffeine use: one cup of coffee daily.    Substance and Sexual Activity   • Alcohol use: No   • Drug use: No   • Sexual activity: Defer           Objective    ED Triage Vitals [22]   Temp Heart Rate Resp BP SpO2   98.4 °F (36.9 °C) 72 18 150/79 99 %      Temp src Heart Rate Source Patient Position BP Location FiO2 (%)   Oral Monitor Lying Right arm --       Physical Exam  INITIAL VITAL SIGNS: Reviewed by me.  Pulse ox normal  GENERAL: Alert and interactive. No acute distress.  Occasional cough during interview  HEAD: Head is normocephalic.  EYES: EOMI. PERRL. No scleral icterus. No conjunctival injection.  ENT: Moist mucous membranes.  Runny nose clear mucus  NECK: Supple. Full range of motion.  RESPIRATORY: No tachypnea. Clear breath sounds bilaterally. No wheezing. No rales. No rhonchi.  CV: Regular rate and rhythm. No murmurs. No rubs or gallops.  ABDOMEN: Soft, nondistended, nontender  BACK:  No obvious deformity.  EXTREMITIES: No deformity. No clubbing or cyanosis. No edema.   SKIN: Warm and dry. No diaphoresis. No obvious rashes.   NEUROLOGIC: Alert. Face is symmetric. Speech is normal. Moves all extremities equally with good strength, able to sit up without any difficulty. Motor and sensory distally intact.     Procedures           ED Course  ED Course as of 22 Labs are near his baseline, no acidosis or increased anion gap.  Suspect all of his symptoms are from his COVID and he be safely discharged home. [RO]      ED Course User Index  [RO] Compa Rios MD MDM  Very well-appearing  87-year-old male with a positive COVID test at home after having a cough for a number of days.  He denies any shortness of breath or chest pain no nausea no vomiting no abdominal pain.  His daughter voices concern that he seemed pretty weak today and did not eat much, discussed with her that the feeling weak and not eating much as consistent with COVID, on my exam he is not notably weak he is able to sit from lying back without any help he is got good strength and moving all extremities.  His vital signs are normal he has clear lung sounds I think that we will shoe to 1 view chest and in light of the fact that he has diabetes check some basic labs to make sure there is no significant metabolic abnormalities but barring anything requiring admission on his metabolic panel I anticipate discharging him home.  His symptoms have been going on longer than what would put him in the window for any type of monoclonal antibody treatment.  Final diagnoses:   COVID       ED Disposition  ED Disposition     ED Disposition   Discharge    Condition   Good    Comment   --             Roemo Chamberlain MD  [address] [facility] [address] [phone]    Call   Schedule telemedicine appointment as needed.         Medication List      New Prescriptions    benzonatate 100 MG capsule  Commonly known as: TESSALON  Take 1 capsule by mouth 3 (Three) Times a Day As Needed for Cough.           Where to Get Your Medications      These medications were sent to [facility]/pharmacy #[id number] - [place], [place] - [address] - [phone]  - [phone]   [address] [address]    Phone: [phone]   · benzonatate 100 MG capsule          Compa Rios MD  04/20/22 8454

## 2022-04-21 NOTE — ED NOTES
Spoke w/ daughter regarding her concerns regarding fathers weakness. Will have Dr. Rios call patient and discuss with her

## 2022-05-16 ENCOUNTER — HOSPITAL ENCOUNTER (EMERGENCY)
Facility: HOSPITAL | Age: 87
Discharge: HOME | End: 2022-05-16
Attending: EMERGENCY MEDICINE | Admitting: EMERGENCY MEDICINE

## 2022-05-16 ENCOUNTER — APPOINTMENT (OUTPATIENT)
Dept: GENERAL RADIOLOGY | Facility: HOSPITAL | Age: 87
End: 2022-05-16

## 2022-05-16 VITALS
OXYGEN SATURATION: 97 % | HEART RATE: 76 BPM | HEIGHT: 74 IN | SYSTOLIC BLOOD PRESSURE: 123 MMHG | TEMPERATURE: 97.6 F | RESPIRATION RATE: 22 BRPM | BODY MASS INDEX: 31.79 KG/M2 | DIASTOLIC BLOOD PRESSURE: 78 MMHG | WEIGHT: 247.7 LBS

## 2022-05-16 DIAGNOSIS — N18.9 CHRONIC KIDNEY DISEASE, UNSPECIFIED CKD STAGE: Primary | ICD-10-CM

## 2022-05-16 DIAGNOSIS — D64.9 MILD ANEMIA: ICD-10-CM

## 2022-05-16 LAB
ALBUMIN SERPL-MCNC: 3.3 G/DL (ref 3.5–5.2)
ALBUMIN/GLOB SERPL: 1.4 G/DL
ALP SERPL-CCNC: 74 U/L (ref 39–117)
ALT SERPL W P-5'-P-CCNC: 20 U/L (ref 1–41)
ANION GAP SERPL CALCULATED.3IONS-SCNC: 11.9 MMOL/L (ref 5–15)
AST SERPL-CCNC: 18 U/L (ref 1–40)
BASOPHILS # BLD AUTO: 0.04 10*3/MM3 (ref 0–0.2)
BASOPHILS NFR BLD AUTO: 0.8 % (ref 0–1.5)
BILIRUB SERPL-MCNC: 0.5 MG/DL (ref 0–1.2)
BILIRUB UR QL STRIP: NEGATIVE
BUN SERPL-MCNC: 35 MG/DL (ref 8–23)
BUN/CREAT SERPL: 18.7 (ref 7–25)
CALCIUM SPEC-SCNC: 8.5 MG/DL (ref 8.6–10.5)
CHLORIDE SERPL-SCNC: 106 MMOL/L (ref 98–107)
CLARITY UR: CLEAR
CO2 SERPL-SCNC: 22.1 MMOL/L (ref 22–29)
COLOR UR: YELLOW
CREAT SERPL-MCNC: 1.87 MG/DL (ref 0.76–1.27)
DEPRECATED RDW RBC AUTO: 51.8 FL (ref 37–54)
EGFRCR SERPLBLD CKD-EPI 2021: 34.6 ML/MIN/1.73
EOSINOPHIL # BLD AUTO: 0.27 10*3/MM3 (ref 0–0.4)
EOSINOPHIL NFR BLD AUTO: 5.2 % (ref 0.3–6.2)
ERYTHROCYTE [DISTWIDTH] IN BLOOD BY AUTOMATED COUNT: 14.8 % (ref 12.3–15.4)
GLOBULIN UR ELPH-MCNC: 2.3 GM/DL
GLUCOSE SERPL-MCNC: 212 MG/DL (ref 65–99)
GLUCOSE UR STRIP-MCNC: NEGATIVE MG/DL
HCT VFR BLD AUTO: 36.6 % (ref 37.5–51)
HGB BLD-MCNC: 11.6 G/DL (ref 13–17.7)
HGB UR QL STRIP.AUTO: NEGATIVE
IMM GRANULOCYTES # BLD AUTO: 0.03 10*3/MM3 (ref 0–0.05)
IMM GRANULOCYTES NFR BLD AUTO: 0.6 % (ref 0–0.5)
KETONES UR QL STRIP: ABNORMAL
LEUKOCYTE ESTERASE UR QL STRIP.AUTO: NEGATIVE
LYMPHOCYTES # BLD AUTO: 0.78 10*3/MM3 (ref 0.7–3.1)
LYMPHOCYTES NFR BLD AUTO: 14.9 % (ref 19.6–45.3)
MCH RBC QN AUTO: 29.8 PG (ref 26.6–33)
MCHC RBC AUTO-ENTMCNC: 31.7 G/DL (ref 31.5–35.7)
MCV RBC AUTO: 94.1 FL (ref 79–97)
MONOCYTES # BLD AUTO: 0.53 10*3/MM3 (ref 0.1–0.9)
MONOCYTES NFR BLD AUTO: 10.1 % (ref 5–12)
NEUTROPHILS NFR BLD AUTO: 3.59 10*3/MM3 (ref 1.7–7)
NEUTROPHILS NFR BLD AUTO: 68.4 % (ref 42.7–76)
NITRITE UR QL STRIP: NEGATIVE
NRBC BLD AUTO-RTO: 0 /100 WBC (ref 0–0.2)
NT-PROBNP SERPL-MCNC: 462.7 PG/ML (ref 0–1800)
PH UR STRIP.AUTO: 5.5 [PH] (ref 4.5–8)
PLATELET # BLD AUTO: 137 10*3/MM3 (ref 140–450)
PMV BLD AUTO: 10.7 FL (ref 6–12)
POTASSIUM SERPL-SCNC: 4.6 MMOL/L (ref 3.5–5.2)
PROT SERPL-MCNC: 5.6 G/DL (ref 6–8.5)
PROT UR QL STRIP: NEGATIVE
QT INTERVAL: 460 MS
RBC # BLD AUTO: 3.89 10*6/MM3 (ref 4.14–5.8)
SODIUM SERPL-SCNC: 140 MMOL/L (ref 136–145)
SP GR UR STRIP: 1.02 (ref 1–1.03)
TROPONIN T SERPL-MCNC: 0.03 NG/ML (ref 0–0.03)
UROBILINOGEN UR QL STRIP: ABNORMAL
WBC NRBC COR # BLD: 5.24 10*3/MM3 (ref 3.4–10.8)

## 2022-05-16 PROCEDURE — 80053 COMPREHEN METABOLIC PANEL: CPT | Performed by: EMERGENCY MEDICINE

## 2022-05-16 PROCEDURE — 81003 URINALYSIS AUTO W/O SCOPE: CPT | Performed by: EMERGENCY MEDICINE

## 2022-05-16 PROCEDURE — 71045 X-RAY EXAM CHEST 1 VIEW: CPT

## 2022-05-16 PROCEDURE — 99284 EMERGENCY DEPT VISIT MOD MDM: CPT | Performed by: EMERGENCY MEDICINE

## 2022-05-16 PROCEDURE — 85025 COMPLETE CBC W/AUTO DIFF WBC: CPT | Performed by: EMERGENCY MEDICINE

## 2022-05-16 PROCEDURE — 99284 EMERGENCY DEPT VISIT MOD MDM: CPT

## 2022-05-16 PROCEDURE — 93005 ELECTROCARDIOGRAM TRACING: CPT | Performed by: EMERGENCY MEDICINE

## 2022-05-16 PROCEDURE — 84484 ASSAY OF TROPONIN QUANT: CPT | Performed by: EMERGENCY MEDICINE

## 2022-05-16 PROCEDURE — 83880 ASSAY OF NATRIURETIC PEPTIDE: CPT | Performed by: EMERGENCY MEDICINE

## 2022-05-16 PROCEDURE — 93010 ELECTROCARDIOGRAM REPORT: CPT | Performed by: INTERNAL MEDICINE

## 2022-05-16 RX ORDER — SODIUM CHLORIDE 0.9 % (FLUSH) 0.9 %
10 SYRINGE (ML) INJECTION AS NEEDED
Status: DISCONTINUED | OUTPATIENT
Start: 2022-05-16 | End: 2022-05-16 | Stop reason: HOSPADM

## 2022-05-16 NOTE — DISCHARGE INSTRUCTIONS
Continue current medications.  Recommend a daily vitamin with iron.  Follow-up with your PCP as above.  Return to ED for worsening symptoms, medical emergencies.

## 2022-05-16 NOTE — ED PROVIDER NOTES
Subjective   Tee Schmitt is an 87-year-old white male who presents secondary to low oxygen sats, hypotension, swelling in the right hand and confusion.  Patient does not have a specific complaint.  His daughter is at bedside.  She reports patient's oxygen saturations in the 70s on 2 different finger monitors at home.  She tested these monitors on her self and her saturations were normal.  Patient blood pressure has also been low yesterday and today.  She is examples of systolics in the 1 teens with diastolics in the 50s.  Daughter also feels patient has been more confused the last couple of days.  No known injury to the right upper extremity.  Patient's hand has been more swollen yesterday and today than normal.  Thus they elected to bring the patient to the ED today for evaluation.      History provided by:  Patient (Daughter is the main historian)  History limited by:  Dementia and mental status change      Review of Systems   Unable to perform ROS: Dementia (Some information provided by daughter.  Some obtained from medical records)   Constitutional: Negative for fever.   HENT: Negative for rhinorrhea.    Eyes: Positive for visual disturbance (Macular degeneration).   Respiratory: Negative for cough.    Cardiovascular: Negative for chest pain.   Gastrointestinal: Negative for abdominal pain.   Genitourinary: Negative for dysuria.   Musculoskeletal: Positive for arthralgias (Right shoulder pain).   Skin: Negative for rash.   Neurological: Positive for dizziness (History of vertigo). Negative for syncope.   Psychiatric/Behavioral: Positive for confusion.   All other systems reviewed and are negative.      Past Medical History:   Diagnosis Date   • Cellulitis of foot    • Chronic kidney disease    • Dementia (HCC)    • Hyperlipidemia    • Hypertension    • Hypothyroidism    • Macular degeneration    • Prostate cancer (HCC)    • Renal disorder    • Right shoulder pain     SCHEDULED FOR SX   • Seasonal allergies     • Type 2 diabetes mellitus (HCC)    • Vertigo        No Known Allergies    Past Surgical History:   Procedure Laterality Date   • INCISION AND DRAINAGE LEG Right 2017    Procedure: INCISION AND DRAINAGE LOWER EXTREMITY;  Surgeon: Mikael Dwyer DPM;  Location:  [facility] OR;  Service:    • PROSTATE SURGERY     • SHOULDER SURGERY Left    • TOTAL SHOULDER ARTHROPLASTY W/ DISTAL CLAVICLE EXCISION Right 2017    Procedure: TOTAL SHOULDER REVERSE ARTHROPLASTY;  Surgeon: Goyo Bhardwaj MD;  Location: [facility] OR;  Service:        Family History   Problem Relation Age of Onset   • Heart disease Father        Social History     Socioeconomic History   • Marital status:    Tobacco Use   • Smoking status: Former Smoker     Packs/day: 1.00     Years: 15.00     Pack years: 15.00     Types: Cigarettes     Quit date:      Years since quittin.4   • Smokeless tobacco: Never Used   • Tobacco comment: caffeine use: one cup of coffee daily.    Substance and Sexual Activity   • Alcohol use: No   • Drug use: No   • Sexual activity: Defer           Objective   Physical Exam  Vitals and nursing note reviewed.   Constitutional:       General: He is not in acute distress.     Appearance: Normal appearance. He is well-developed. He is not ill-appearing, toxic-appearing or diaphoretic.      Comments: 87-year-old white male laying in bed.  Patient appears in fair health for age.  He is a bit overweight.  Vital signs notable for respiratory rate of 22.  Otherwise unremarkable.  Heart rate 78, /72, oxygen saturation 97% on room air.  Patient friendly and cooperative.  Daughter is at bedside.   HENT:      Head: Normocephalic and atraumatic.      Right Ear: Tympanic membrane, ear canal and external ear normal.      Left Ear: Tympanic membrane, ear canal and external ear normal.      Nose: Nose normal.      Mouth/Throat:      Mouth: Mucous membranes are moist.      Pharynx: Oropharynx is clear.   Eyes:      General: Lids  are normal.      Extraocular Movements: Extraocular movements intact.      Conjunctiva/sclera: Conjunctivae normal.      Pupils: Pupils are equal, round, and reactive to light.     Cardiovascular:      Rate and Rhythm: Normal rate and regular rhythm.      Heart sounds: Murmur heard.    Systolic murmur is present with a grade of 3/6.    No friction rub. No gallop.   Pulmonary:      Effort: Pulmonary effort is normal. No respiratory distress.      Breath sounds: Normal breath sounds. No stridor. No wheezing, rhonchi or rales.   Abdominal:      General: There is no distension.      Palpations: Abdomen is soft. There is no mass.      Tenderness: There is no abdominal tenderness. There is no guarding or rebound.      Hernia: No hernia is present.   Musculoskeletal:         General: Normal range of motion.      Cervical back: Normal range of motion and neck supple.   Skin:     General: Skin is warm and dry.      Findings: No erythema or rash.   Neurological:      General: No focal deficit present.      Mental Status: He is alert and oriented to person, place, and time.      Cranial Nerves: No cranial nerve deficit.      Deep Tendon Reflexes: Reflexes are normal and symmetric.   Psychiatric:         Mood and Affect: Mood normal.         Behavior: Behavior normal.         Procedures     EKG 12-lead  Date 5/16/2022  Time 11: 51  Normal sinus rhythm  Normal rate  Prolonged HI interval  Leftward axis  Left bundle branch block present  Associated repolarization abnormalities  Nonspecific T wave changes  Abnormal EKG    Compared to EKG dated 12/8/2017.  Bigeminy is now resolved.  T waves are now present V4 through V6.  R wave is not seen on EKG 2017.      ED Course  ED Course as of 05/16/22 1538   Mon May 16, 2022   1247 Hemoglobin(!): 11.6 [SS]   1247 Hematocrit(!): 36.6 [SS]   1247 BUN(!): 35 [SS]   1247 Creatinine(!): 1.87 [SS]   1247 Glucose(!): 212 [SS]   1248 CBC shows anemia with hemoglobin 11.6 hematocrit 36.6.  This  appears to be at patient's baseline.  CMP shows blood sugar of 222, BUN 35 creatinine 1.87.  At this also appears to be at patient's baseline.  UA was unremarkable.  Troponin normal.  proBNP unremarkable.  Awaiting chest x-ray. [SS]   1443 Patient's orthostatics were unremarkable.  I suspect the home monitors that were being utilized were not functioning properly.  Patient has had sats in the upper 90s on room air throughout his ER stay.  Blood pressure and heart rate have been unremarkable including orthostatics.  I do not find any sign of acute disease process.  Discussed at length with patient and his daughter all results, diagnoses.  Will DC home. [SS]   1525 Patient's room air sat now 95%. [SS]      ED Course User Index  [SS] Kulwinder Calloway MD      Labs Reviewed   COMPREHENSIVE METABOLIC PANEL - Abnormal; Notable for the following components:       Result Value    Glucose 212 (*)     BUN 35 (*)     Creatinine 1.87 (*)     Calcium 8.5 (*)     Total Protein 5.6 (*)     Albumin 3.30 (*)     eGFR 34.6 (*)     All other components within normal limits    Narrative:     GFR Normal >60  Chronic Kidney Disease <60  Kidney Failure <15     URINALYSIS W/ MICROSCOPIC IF INDICATED (NO CULTURE) - Abnormal; Notable for the following components:    Ketones, UA Trace (*)     All other components within normal limits    Narrative:     Urine microscopic not indicated.   CBC WITH AUTO DIFFERENTIAL - Abnormal; Notable for the following components:    RBC 3.89 (*)     Hemoglobin 11.6 (*)     Hematocrit 36.6 (*)     Platelets 137 (*)     Lymphocyte % 14.9 (*)     Immature Grans % 0.6 (*)     All other components within normal limits   BNP (IN-HOUSE) - Normal    Narrative:     Among patients with dyspnea, NT-proBNP is highly sensitive for the detection of acute congestive heart failure. In addition NT-proBNP of <300 pg/ml effectively rules out acute congestive heart failure with 99% negative predictive value.    Results may be  falsely decreased if patient taking Biotin.     TROPONIN (IN-HOUSE) - Normal    Narrative:     Troponin T Reference Range:  <= 0.03 ng/mL-   Negative for AMI  >0.03 ng/mL-     Abnormal for myocardial necrosis.  Clinicians would have to utilize clinical acumen, EKG, Troponin and serial changes to determine if it is an Acute Myocardial Infarction or myocardial injury due to an underlying chronic condition.       Results may be falsely decreased if patient taking Biotin.     CBC AND DIFFERENTIAL    Narrative:     The following orders were created for panel order CBC & Differential.  Procedure                               Abnormality         Status                     ---------                               -----------         ------                     CBC Auto Differential[[id number]]        Abnormal            Final result                 Please view results for these tests on the individual orders.     XR Chest 1 View    Result Date: 5/16/2022  Narrative: CHEST X-RAY, 05/16/2022     HISTORY: 87-year-old male in the ED with low oxygen saturation today.  TECHNIQUE: AP portable chest x-ray.  COMPARISON: *  Chest x-ray, 4/20/2022 and 4/16/2021.  FINDINGS: Chronically low lung volumes with scarring or atelectasis in the lung bases, unchanged. No visible acute pulmonary infiltrate or pleural effusion. Heart size and pulmonary vascularity are within normal limits. Stable tortuous thoracic aorta. Right shoulder arthroplasty.      Impression: 1. No active disease. 2. Chronically low lung volumes with scarring or atelectasis in the lung bases.  This report was finalized on 5/16/2022 12:48 PM by Dr. Milind Andrade MD.      XR Chest 1 View    Result Date: 4/20/2022  Narrative: CR Chest 1 Vw INDICATION: 10 day history of cough COMPARISON:  4/16/2021 FINDINGS: Portable AP view(s) of the chest.  The heart and mediastinal contours are normal. The lungs are free of infiltrate. No pneumothorax or pleural effusion.     Impression:  No fairly acute cardiopulmonary findings. No significant change from 4/16/2021. Signer Name: Mani Sheikh MD  Signed: 4/20/2022 9:25 PM  Workstation Name: [id number]  [facility]         My differential diagnosis for altered mental status includes but is not limited to:  Hypoglycemia, hyperglycemia, DKA, overdose, ethanol intoxication, thiamine deficiency, niacin deficiency, hypothymia, hyperviscosity, Gregorio’s disease, hyponatremia, hypernatremia, liver failure, kidney failure, hyper or hypothyroid, no insufficiency, hypoxia, hypercarbia, carbon monoxide poisoning, postanoxic encephalopathy, ischemic stroke, intracranial bleed, subarachnoid hemorrhage, brain tumor, closed head injury, epidural hematoma, epidural hematoma, seizure activity, postictal state, syncopal episode, disseminated encephalomyelitis, central pontine myelinolysis, post cardiac arrest, bacterial meningitis, viral meningitis, fungal meningitis, encephalitis, brain abscess, subdural empyema, hysteria, catatonic state, malingering, hypertensive encephalopathy, vasculitis, TTP, DIC                                        MDM    Final diagnoses:   Chronic kidney disease, unspecified CKD stage   Mild anemia       ED Disposition  ED Disposition     ED Disposition   Discharge    Condition   Stable    Comment   --             Romeo Chamberlain MD  [address] [facility] [address]  [phone]    Schedule an appointment as soon as possible for a visit in 1 week  for continued or worsened symptoms, Sooner if needed         Medication List      No changes were made to your prescriptions during this visit.          Kulwinder Calloway MD  05/16/22 4573

## 2022-11-25 NOTE — THERAPY TREATMENT NOTE
Acute Care - Physical Therapy Treatment Note   Zulma Fraire     Patient Name: Joey Schmitt  : 1935  MRN: [id number]  Today's Date: 2017  Onset of Illness/Injury or Date of Surgery Date: 17  Date of Referral to PT: 17  Referring Physician: Gita    Admit Date: 2017    Visit Dx:    ICD-10-CM ICD-9-CM   1. Type 2 diabetes mellitus with stage 3 chronic kidney disease, without long-term current use of insulin E11.22 250.40    N18.3 585.3   2. Osteoarthritis of right glenohumeral joint M19.011 715.91   3. Tendinopathy of right rotator cuff M67.911 727.9   4. History of cancer Z85.9 V10.90   5. Cognitive communication deficit R41.841 799.52     Patient Active Problem List   Diagnosis   • Osteoarthritis of right glenohumeral joint               Adult Rehabilitation Note       17 1350 17 1015 17 0945    Rehab Assessment/Intervention    Discipline  occupational therapist  -JJ occupational therapist  -SD    Document Type  therapy note (daily note)  -JJ therapy note (daily note)  -SD    Subjective Information  agree to therapy;no complaints  -JJ agree to therapy;no complaints  -SD    Patient Effort, Rehab Treatment   fair  -SD    Symptoms Noted During/After Treatment   dizziness   pt c/o dizziness duirng mobility  -SD    Precautions/Limitations fall precautions  -JW  non-weight bearing status;fall precautions;shoulder precautions  -SD    Precautions/Limitations, Vision   other (see comments)   macular degeneration  -SD    Specific Treatment Considerations  pts spouse, daughter and nurse educated on ub dressing and brace management. fsmily educated on benefits of wearing button down shirts and dressing affected L UE first when assisting pt with dressing and keeping L shoulder immobilized. family and RN state understanding  -YOVANA     Recorded by [JW] Alexandra Hollingsworth, PT [JJ] Katina Sullivan, HEATHR [SD] Bunny Tai, OTR    Pain Assessment    Pain Assessment No/denies pain  -NIGEL   No/denies pain  -SD    Recorded by [JW] Alexandra Hollingsworth, PT  [SD] Bunny Tai, OTR    Cognitive Assessment/Intervention    Current Cognitive/Communication Assessment impaired  -JW  impaired  -SD    Follows Commands/Answers Questions able to follow single-step instructions;75% of the time;needs cueing;needs increased time;needs repetition  -JW  able to follow single-step instructions;50% of the time;needs cueing;needs repetition  -SD    Personal Safety Interventions   gait belt;fall prevention program maintained;nonskid shoes/slippers when out of bed;supervised activity  -SD    Recorded by [JW] Alexandra Hollingsworth, PT  [SD] Bunny Tai, OTR    Cognitive Assessment Intervention    Cognitive Assessment/Treatment Comment   Pt was able to follow directions more consistently today during transfer/mobility activity and with UE HEP.    -SD    Recorded by   [SD] Bunny Tai OTR    Bed Mobility, Assessment/Treatment    Bed Mobility, Assistive Device   bed rails;head of bed elevated  -SD    Bed Mob, Supine to Sit, [place]   moderate assist (50% patient effort);2 person assist required  -SD    Bed Mobility, Comment deferred-up in chair  -JW      Recorded by [JW] Alexandra Hlolingsworth, PT  [SD] Bunny Tai, OTR    Transfer Assessment/Treatment    Transfers, Sit-Stand [place] minimum assist (75% patient effort);2 person assist required;verbal cues required  -  moderate assist (50% patient effort);maximum assist (25% patient effort);verbal cues required   mod A from EOB (elevated), max A from recliner  -SD    Transfers, Stand-Sit [place] minimum assist (75% patient effort);2 person assist required;verbal cues required  -  maximum assist (25% patient effort);2 person assist required;verbal cues required  -SD    Transfers, Sit-Stand-Sit, Assist Device quad cane  -JW  quad cane  -SD    Transfer, Comment pt with initial posterior lean upon standing, able to correct with verbal cues  -JW  Pt with posterior  lean when intially coming to a standing position.    -SD    Recorded by [JW] Alexandra Hollingsworth, PT  [SD] Bunny Tai, HEATHR    Gait Assessment/Treatment    Gait, [place] Level minimum assist (75% patient effort);moderate assist (50% patient effort);1 person + 1 person to manage equipment;verbal cues required  -      Gait, Assistive Device other (see comments)   hand held assistance  -      Gait, Distance (Feet) 165  -      Gait, Gait Pattern Analysis swing-through gait  -      Gait, Gait Deviations [place] decreased;forward flexed posture;narrow base;step length decreased  -      Gait, Safety Issues balance decreased during turns;step length decreased;loses balance backward  -      Gait, Comment pt requires min assist during gait in straight line, increases to mod assist during direction changes/turns.  Patient attempted gait with quad cane, however significant difficulty with sequencing device during gait.  Overall gait mechanics and balance improved with hand held assistance.  patient with continued difficulty with increased step length bilaterally.  -JW      Recorded by [JW] Alexandra Hollingsworth PT      Upper Body Dressing Assessment/Training    UB Dressing Assess/Train, Comment  pt max assist x 2 for ub dressing and brace management  -     Recorded by  [JJ] DAMARI Germain     Therapy Exercises    Right Upper Extremity   AROM:;10 reps   arom of non-involved joints (elbow to hand)  -SD    Left Upper Extremity   AROM:   education with benefits of rom for LUE to maintain rom/stren  -SD    Recorded by   [SD] Bunny Tai, HEATHR    Positioning and Restraints    Pre-Treatment Position sitting in chair/recliner  - in bed  - in bed  -SD    Post Treatment Position chair  - bed  - chair  -SD    In Chair reclined;call light within reach;encouraged to call for assist;with family/caregiver  - sitting;call light within reach;encouraged to call for assist;with family/caregiver;with [facility] [place]  -  reclined;call light within reach;encouraged to call for assist;with family/caregiver;with nsg;with brace   pillow positioned under RUE for support in chair  -SD    Recorded by [JW] Alexandra Hollingsworth, PT [JJ] Katina Sullivan, OTR [SD] Bunny Tai, OTR      12/12/17 0917 12/11/17 0920       Rehab Assessment/Intervention    Discipline physical therapy assistant  -KM physical therapist  -     Document Type therapy note (daily note)  -KM therapy note (daily note)  -JW     Subjective Information agree to therapy;no complaints  -KM agree to therapy;complains of;fatigue   requires cues to maintain alertness  -JW     Patient Effort, Rehab Treatment fair  -KM fair  -JW     Symptoms Noted During/After Treatment dizziness   reports history of vertigo  -KM      Symptoms Noted Comment  pt with significant confusion throughout treatment  -JW     Precautions/Limitations non-weight bearing status;fall precautions   precautions due to recent surgery  -KM non-weight bearing status   right UE in abd brace  -JW     Precautions/Limitations, Vision other (see comments)   macular degeneration  -KM      Specific Treatment Considerations pt has posterior lean with standing- increases with turns  -KM      Recorded by [KM] Dorys Botello, PTA [JW] Alexandra Hollingsworth, PT     Pain Assessment    Pain Assessment No/denies pain  -KM No/denies pain  -JW     Recorded by [KM] Dorys Botello PTA [JW] Alexandra Hollingsworth, PT     Cognitive Assessment/Intervention    Current Cognitive/Communication Assessment impaired  -KM impaired  -JW     Orientation Status  oriented to;person   unable to accurately report current year/month  -JW     Follows Commands/Answers Questions  able to follow single-step instructions;25% of the time;needs cueing;needs increased time;needs repetition  -JW     Personal Safety  severe impairment;impulsive;decreased insight to deficits;unaware of functional deficits;decreased awareness, need for assist;decreased awareness, need for  safety  -JW     Personal Safety Interventions gait belt;nonskid shoes/slippers when out of bed  -KM gait belt;nonskid shoes/slippers when out of bed  -     Recorded by [KM] Dorys Botello PTA [JW] Alexandra Hollingsworth, PT     Bed Mobility, Assessment/Treatment    Bed Mobility, Assistive Device bed rails;head of bed elevated  -KM bed rails;head of bed elevated  -     Bed Mob, Supine to Sit, [place] moderate assist (50% patient effort);2 person assist required  -KM moderate assist (50% patient effort);2 person assist required;verbal cues required   significantly increased time required to complete  -     Bed Mobility, Comment pt able to sit on the side of the bed with assist of one.  Cues to allow feet to rest on floor and at times assist due to pt leaning backwards  -KM pt requires increased time to complete task with multiple cues given for 1 step directions  -JW     Recorded by [KM] Dorys Botello PTA [JW] Alexandra Hollingsworth, PT     Transfer Assessment/Treatment    Transfers, Sit-Stand [place] moderate assist (50% patient effort);maximum assist (25% patient effort);2 person assist required   required max assist of two to stand from recliner  -KM moderate assist (50% patient effort);2 person assist required;verbal cues required   significant posterior lean upon immediate standing  -     Transfers, Stand-Sit [place] maximum assist (25% patient effort);2 person assist required  -KM maximum assist (25% patient effort);2 person assist required;verbal cues required  -     Transfers, Sit-Stand-Sit, Assist Device quad cane  -KM quad cane  -     Transfer, Safety Issues --   OT assisting with RLE to maintain NWB status  -KM      Transfer, Comment pt with posterior lean with standing- if standing next to bed/chair- will use bed/chair to lean against.  Cues for hand placement  -KM pt with difficulty initiating transfers.  patient requires max verbal cues for safety and sequencing.  pt requires approximately  2.5 minutes to successfully perform stand to sit transfer with significant tactile and verbal cues required.  -JW     Recorded by [KM] Dorys Botello PTA [JW] Alexandra Hollingsworth, PT     Gait Assessment/Treatment    Gait, [place] Level moderate assist (50% patient effort);maximum assist (25% patient effort);2 person assist required  -KM moderate assist (50% patient effort);2 person assist required;verbal cues required  -     Gait, Assistive Device quad cane  -KM other (see comments)   multiple devices attempted during session  -     Gait, Distance (Feet) 8  -KM 20  -JW     Gait, Gait Deviations  [place] decreased;forward flexed posture;narrow base;step length decreased;decreased heel strike  -     Gait, Safety Issues  sequencing ability decreased;step length decreased;weight-shifting ability decreased  -     Gait, Comment Pt required increased assist with turns and backing with ambulation- has posterior lean and one loss of balance with ambulation.  Pt fatigued also from standing in order for staff to change brief..  -KM pt with significant posterior lean in standing, requires assistance from therapist to correct.  patient trialed quad cane, single point cane and hemiwalker, however pt with difficulty sequencing with device despite max verbal cues and assistance from therapist for device placement.  pt requires verbal cues for proper weight shifting to advance LE during swing phase.  Patient with difficulty obtaining proper hand placement on device.    -JW     Recorded by [KM] Dorys Botello PTA [JW] Alexandra Hollingsworth, PT     Positioning and Restraints    Pre-Treatment Position in bed  -KM in bed  -JW     Post Treatment Position chair  -KM chair  -JW     In Chair notified nsg;reclined;call light within reach;encouraged to call for assist;with OT;RUE elevated;legs elevated  -KM notified nsg;call light within reach;encouraged to call for assist;with family/caregiver   nurse pracitioner in room to discuss progress  with family  -JW     Recorded by [KM] Dorys Botello, PTA [JW] Alexandra Hollingsworth, PT       User Key  (r) = Recorded By, (t) = Taken By, (c) = Cosigned By    Initials Name Effective Dates    KM Dorys Botello, PTA 08/11/15 -     SD Bunny Tai, OTR 06/22/16 -     JJ Katina Gonzaleston, OTR 06/22/16 -     JW Alexandra Hollingsworth, PT 12/01/15 -                 IP PT Goals       12/13/17 1134 12/10/17 1028       Bed Mobility PT STG    Bed Mobility PT STG, Date Established  12/10/17  -JW     Bed Mobility PT STG, Time to Achieve  3 days  -JW     Bed Mobility PT STG, Activity Type  all bed mobility  -JW     Bed Mobility PT STG, [place] Level  supervision required  -JW     Bed Mobility PT STG, Date Goal Reviewed 12/13/17  -      Bed Mobility PT STG, Outcome goal not met  -JW      Bed Mobility PT STG, Reason Goal Not Met discharged from facility  -JW      Transfer Training PT STG    Transfer Training PT STG, Date Established  12/10/17  -     Transfer Training PT STG, Time to Achieve  3 days  -JW     Transfer Training PT STG, [place] Level  contact guard assist  -JW     Transfer Training PT STG, Assist Device  --   with appropriate device  -JW     Transfer Training PT STG, Date Goal Reviewed 12/13/17  -      Transfer Training PT STG, Outcome goal not met  -JW      Transfer Training PT STG, Reason Goal Not Met discharged from facility  -JW      Gait Training PT STG    Gait Training Goal PT STG, Date Established  12/10/17  -JW     Gait Training Goal PT STG, Time to Achieve  3 days  -JW     Gait Training Goal PT STG, [place] Level  contact guard assist  -JW     Gait Training Goal PT STG, Assist Device  --   with appropriate device  -JW     Gait Training Goal PT STG, Distance to Achieve  100  -JW     Gait Training Goal PT STG, Date Goal Reviewed 12/13/17  -JW      Gait Training Goal PT STG, Outcome goal not met  -JW      Gait Training Goal PT STG, Reason Goal Not Met discharged from facility  -JW      Patient  Education PT STG    Patient Education PT STG, Date Established  12/10/17  -JW     Patient Education PT STG, Time to Achieve  3 days  -JW     Patient Education PT STG, Education Type  precaution per surgeon  -JW     Patient Education PT STG, Education Understanding  demonstrate adequately;verbalize understanding  -JW     Patient Education PT STG, Date Goal Reviewed 12/13/17  -JW      Patient Education PT STG Outcome goal not met  -JW      Patient Education PT STG, Reason Goal Not Met discharged from facility  -        User Key  (r) = Recorded By, (t) = Taken By, (c) = Cosigned By    Initials Name Provider Type    NIGEL Hollingsworth, PT Physical Therapist          Physical Therapy Education     Title: PT OT SLP Therapies (Active)     Topic: Physical Therapy (Active)     Point: Mobility training (Done)    Learning Progress Summary    Learner Readiness Method Response Comment Documented by Status   Patient Acceptance E VU,NR   12/13/17 1415 Done    Acceptance E,D VU discussed positioning with pt and family and discussed transfers/ambulation with pt, family and nursing.  Pt reports issues with veritgo prior to surgery.  12/12/17 1120 Done    Acceptance E VU   12/11/17 1019 Done    Acceptance E NR   12/10/17 1026 Active               Point: Precautions (Done)    Learning Progress Summary    Learner Readiness Method Response Comment Documented by Status   Patient Acceptance E VU,NR   12/13/17 1415 Done    Acceptance E,D VU discussed positioning with pt and family and discussed transfers/ambulation with pt, family and nursing.  Pt reports issues with veritgo prior to surgery.  12/12/17 1120 Done    Acceptance E VU   12/11/17 1019 Done    Acceptance E NR   12/10/17 1026 Active                      User Key     Initials Effective Dates Name Provider Type Discipline     08/11/15 -  Dorys Botello, PTA Physical Therapy Assistant PT     12/01/15 -  Alexandra Hollingsworth, PT Physical Therapist PT                     PT Recommendation and Plan  Anticipated Equipment Needs At Discharge: other (see comments) (will continue to assess pts willingness/ability to use DME)  Anticipated Discharge Disposition: skilled nursing facility (with potential to stay for extended time period until right UE weight bearing restrictions lifted)  Planned Therapy Interventions: balance training, bed mobility training, gait training, home exercise program, transfer training, patient/family education  PT Frequency: daily (6x/week)  Plan of Care Review  Plan Of Care Reviewed With: patient  Outcome Summary/Follow up Plan: PT: Patient requires min assist x2 for sit to stand transfers from chair.  Patient requires min-mod assist for gait x165 feet with hand held assistance.  Patient attempted gait initially with quad cane however gait mechanics much improved with hand held assistance.  Patient with improved posture and gait mechanics during session today compared to previous session.  Per , pt to discharge to [facility] tomorrow.          Outcome Measures       12/13/17 1350 12/13/17 1015 12/12/17 1000    How much help from another person do you currently need...    Turning from your back to your side while in flat bed without using bedrails? 2  -JW      Moving from lying on back to sitting on the side of a flat bed without bedrails? 2  -JW      Moving to and from a bed to a chair (including a wheelchair)? 2  -JW      Standing up from a chair using your arms (e.g., wheelchair, bedside chair)? 2  -JW      Climbing 3-5 steps with a railing? 1  -JW      To walk in hospital room? 2  -JW      AM-PAC 6 Clicks Score 11  -JW      How much help from another is currently needed...    Putting on and taking off regular lower body clothing?  1  -JJ 1  -SD    Bathing (including washing, rinsing, and drying)  2  -JJ 2  -SD    Toileting (which includes using toilet bed pan or urinal)  1  -JJ 1  -SD    Putting on and taking off regular upper body  clothing  2  -JJ 2  -SD    Taking care of personal grooming (such as brushing teeth)  2  -JJ 2  -SD    Eating meals  3  -JJ 3  -SD    Score  11  -JJ 11  -SD    Functional Assessment    Outcome Measure Options AM-PAC 6 Clicks Basic Mobility (PT)  -JW  AM-PAC 6 Clicks Daily Activity (OT)  -SD      12/12/17 0925 12/11/17 0920 12/11/17 0840    How much help from another person do you currently need...    Turning from your back to your side while in flat bed without using bedrails? 2  -KM 2  -JW     Moving from lying on back to sitting on the side of a flat bed without bedrails? 1  -KM 1  -JW     Moving to and from a bed to a chair (including a wheelchair)? 1  -KM 1  -JW     Standing up from a chair using your arms (e.g., wheelchair, bedside chair)? 1  -KM 1  -JW     Climbing 3-5 steps with a railing? 1  -KM 1  -JW     To walk in hospital room? 1  -KM 1  -JW     AM-PAC 6 Clicks Score 7  -KM 7  -JW     How much help from another is currently needed...    Putting on and taking off regular lower body clothing?   1  -SD    Bathing (including washing, rinsing, and drying)   2  -SD    Toileting (which includes using toilet bed pan or urinal)   1  -SD    Putting on and taking off regular upper body clothing   1  -SD    Taking care of personal grooming (such as brushing teeth)   2  -SD    Eating meals   3  -SD    Score   10  -SD    Functional Assessment    Outcome Measure Options AM-PAC 6 Clicks Basic Mobility (PT)  -KM -PAC 6 Clicks Basic Mobility (PT)  -[facility]-PAC 6 Clicks Daily Activity (OT)  -SD      User Key  (r) = Recorded By, (t) = Taken By, (c) = Cosigned By    Initials Name Provider Type     Dorys Botello, PTA Physical Therapy Assistant    CLARA Tai, OTR Occupational Therapist    YOVANA Sullivan, OTR Occupational Therapist    NIGEL Hollingsworth, PT Physical Therapist           Time Calculation:         PT Charges       12/13/17 1432          Time Calculation    Start Time 1350  -JW      Stop Time  1405  -NIGEL      Time Calculation (min) 15 min  -NIGEL      PT Received On 12/13/17  -NIGEL      PT - Next Appointment 12/14/17  -NIGEL        User Key  (r) = Recorded By, (t) = Taken By, (c) = Cosigned By    Initials Name Provider Type    NIGEL Hollingsworth PT Physical Therapist          Therapy Charges for Today     Code Description Service Date Service Provider Modifiers Qty    62281815088 HC PT THER PROC EA 15 MIN 12/13/2017 Alexandra Hollingsworth, PT GP 1    27726646895 HC PT THER SUPP EA 15 MIN 12/13/2017 Alexandra Hollingsworth, PT GP 1          PT G-Codes  PT Professional Judgement Used?: Yes  Outcome Measure Options: AM-PAC 6 Clicks Basic Mobility (PT)  Functional Limitation: Mobility: Walking and moving around  Mobility: Walking and Moving Around Current Status (): At least 40 percent but less than 60 percent impaired, limited or restricted  Mobility: Walking and Moving Around Goal Status (): At least 20 percent but less than 40 percent impaired, limited or restricted    Alexandra Hollingsworth PT  12/13/2017          Walk in Private Auto

## 2022-12-02 ENCOUNTER — APPOINTMENT (OUTPATIENT)
Dept: GENERAL RADIOLOGY | Facility: HOSPITAL | Age: 87
End: 2022-12-02

## 2022-12-02 ENCOUNTER — HOSPITAL ENCOUNTER (EMERGENCY)
Facility: HOSPITAL | Age: 87
Discharge: HOME | End: 2022-12-02
Attending: EMERGENCY MEDICINE | Admitting: EMERGENCY MEDICINE

## 2022-12-02 VITALS
RESPIRATION RATE: 16 BRPM | SYSTOLIC BLOOD PRESSURE: 172 MMHG | WEIGHT: 245 LBS | HEART RATE: 75 BPM | BODY MASS INDEX: 31.44 KG/M2 | HEIGHT: 74 IN | DIASTOLIC BLOOD PRESSURE: 96 MMHG | OXYGEN SATURATION: 100 % | TEMPERATURE: 97.7 F

## 2022-12-02 DIAGNOSIS — R73.9 HYPERGLYCEMIA: ICD-10-CM

## 2022-12-02 DIAGNOSIS — R07.9 CHEST PAIN, UNSPECIFIED TYPE: Primary | ICD-10-CM

## 2022-12-02 DIAGNOSIS — N18.9 CHRONIC RENAL IMPAIRMENT, UNSPECIFIED CKD STAGE: ICD-10-CM

## 2022-12-02 LAB
ALBUMIN SERPL-MCNC: 3.8 G/DL (ref 3.5–5.2)
ALBUMIN/GLOB SERPL: 1.5 G/DL
ALP SERPL-CCNC: 99 U/L (ref 39–117)
ALT SERPL W P-5'-P-CCNC: 17 U/L (ref 1–41)
ANION GAP SERPL CALCULATED.3IONS-SCNC: 9 MMOL/L (ref 5–15)
APTT PPP: 28.7 SECONDS (ref 24.3–38.1)
AST SERPL-CCNC: 20 U/L (ref 1–40)
BASOPHILS # BLD AUTO: 0.04 10*3/MM3 (ref 0–0.2)
BASOPHILS NFR BLD AUTO: 0.7 % (ref 0–1.5)
BILIRUB SERPL-MCNC: 0.5 MG/DL (ref 0–1.2)
BUN SERPL-MCNC: 31 MG/DL (ref 8–23)
BUN/CREAT SERPL: 19.3 (ref 7–25)
CALCIUM SPEC-SCNC: 8.8 MG/DL (ref 8.6–10.5)
CHLORIDE SERPL-SCNC: 99 MMOL/L (ref 98–107)
CO2 SERPL-SCNC: 25 MMOL/L (ref 22–29)
CREAT SERPL-MCNC: 1.61 MG/DL (ref 0.76–1.27)
DEPRECATED RDW RBC AUTO: 49.5 FL (ref 37–54)
EGFRCR SERPLBLD CKD-EPI 2021: 41.4 ML/MIN/1.73
EOSINOPHIL # BLD AUTO: 0.48 10*3/MM3 (ref 0–0.4)
EOSINOPHIL NFR BLD AUTO: 8.2 % (ref 0.3–6.2)
ERYTHROCYTE [DISTWIDTH] IN BLOOD BY AUTOMATED COUNT: 14.3 % (ref 12.3–15.4)
FLUAV RNA RESP QL NAA+PROBE: NOT DETECTED
FLUBV RNA RESP QL NAA+PROBE: NOT DETECTED
GLOBULIN UR ELPH-MCNC: 2.6 GM/DL
GLUCOSE SERPL-MCNC: 214 MG/DL (ref 65–99)
HCT VFR BLD AUTO: 40 % (ref 37.5–51)
HGB BLD-MCNC: 12.3 G/DL (ref 13–17.7)
IMM GRANULOCYTES # BLD AUTO: 0.02 10*3/MM3 (ref 0–0.05)
IMM GRANULOCYTES NFR BLD AUTO: 0.3 % (ref 0–0.5)
INR PPP: 1.07 (ref 0.9–1.1)
LYMPHOCYTES # BLD AUTO: 1.35 10*3/MM3 (ref 0.7–3.1)
LYMPHOCYTES NFR BLD AUTO: 23.2 % (ref 19.6–45.3)
MAGNESIUM SERPL-MCNC: 2.1 MG/DL (ref 1.6–2.4)
MCH RBC QN AUTO: 28.9 PG (ref 26.6–33)
MCHC RBC AUTO-ENTMCNC: 30.8 G/DL (ref 31.5–35.7)
MCV RBC AUTO: 93.9 FL (ref 79–97)
MONOCYTES # BLD AUTO: 0.68 10*3/MM3 (ref 0.1–0.9)
MONOCYTES NFR BLD AUTO: 11.7 % (ref 5–12)
NEUTROPHILS NFR BLD AUTO: 3.25 10*3/MM3 (ref 1.7–7)
NEUTROPHILS NFR BLD AUTO: 55.9 % (ref 42.7–76)
PLATELET # BLD AUTO: 179 10*3/MM3 (ref 140–450)
PMV BLD AUTO: 10.4 FL (ref 6–12)
POTASSIUM SERPL-SCNC: 4.2 MMOL/L (ref 3.5–5.2)
PROT SERPL-MCNC: 6.4 G/DL (ref 6–8.5)
PROTHROMBIN TIME: 14 SECONDS (ref 12.1–15)
QT INTERVAL: 461 MS
RBC # BLD AUTO: 4.26 10*6/MM3 (ref 4.14–5.8)
SARS-COV-2 RNA RESP QL NAA+PROBE: NOT DETECTED
SODIUM SERPL-SCNC: 133 MMOL/L (ref 136–145)
TROPONIN T SERPL-MCNC: 0.02 NG/ML (ref 0–0.03)
TROPONIN T SERPL-MCNC: 0.03 NG/ML (ref 0–0.03)
WBC NRBC COR # BLD: 5.82 10*3/MM3 (ref 3.4–10.8)

## 2022-12-02 PROCEDURE — 85610 PROTHROMBIN TIME: CPT | Performed by: EMERGENCY MEDICINE

## 2022-12-02 PROCEDURE — 85025 COMPLETE CBC W/AUTO DIFF WBC: CPT | Performed by: EMERGENCY MEDICINE

## 2022-12-02 PROCEDURE — 99284 EMERGENCY DEPT VISIT MOD MDM: CPT

## 2022-12-02 PROCEDURE — 93010 ELECTROCARDIOGRAM REPORT: CPT | Performed by: INTERNAL MEDICINE

## 2022-12-02 PROCEDURE — 36415 COLL VENOUS BLD VENIPUNCTURE: CPT

## 2022-12-02 PROCEDURE — 93005 ELECTROCARDIOGRAM TRACING: CPT | Performed by: EMERGENCY MEDICINE

## 2022-12-02 PROCEDURE — 93005 ELECTROCARDIOGRAM TRACING: CPT

## 2022-12-02 PROCEDURE — 84484 ASSAY OF TROPONIN QUANT: CPT | Performed by: EMERGENCY MEDICINE

## 2022-12-02 PROCEDURE — 83735 ASSAY OF MAGNESIUM: CPT | Performed by: EMERGENCY MEDICINE

## 2022-12-02 PROCEDURE — 87636 SARSCOV2 & INF A&B AMP PRB: CPT | Performed by: EMERGENCY MEDICINE

## 2022-12-02 PROCEDURE — 85730 THROMBOPLASTIN TIME PARTIAL: CPT | Performed by: EMERGENCY MEDICINE

## 2022-12-02 PROCEDURE — 80053 COMPREHEN METABOLIC PANEL: CPT | Performed by: EMERGENCY MEDICINE

## 2022-12-02 PROCEDURE — 71045 X-RAY EXAM CHEST 1 VIEW: CPT

## 2022-12-02 RX ORDER — ASPIRIN 81 MG/1
324 TABLET, CHEWABLE ORAL ONCE
Status: COMPLETED | OUTPATIENT
Start: 2022-12-02 | End: 2022-12-02

## 2022-12-02 RX ADMIN — ASPIRIN 243 MG: 81 TABLET, CHEWABLE ORAL at 10:05

## 2022-12-02 NOTE — ED PROVIDER NOTES
Subjective   History of Present Illness  87-year-old male past medical history significant for insulin-dependent diabetes chronic renal disease hypertension for which she has been taken off of his blood pressure medicine secondary to low readings who presents emergency room with complaint of 1 episode of midsternal chest pain described as sharp lasting for seconds to minutes that occurred yesterday as well as one episode that lasted for seconds to minutes midsternal sharp chest pain that occurred this morning.  Patient cannot remember too many details of the episode but states that he was sitting in his chair this morning when it happened and was not improved or worsened by anything that he can remember.  Patient states he is never had this happen to him before.  Patient did take his medication as prescribed as he states his wife gave him his medicines this morning.  Patient is having no chest pain at present.  Patient reports no other complaints.        Review of Systems   Constitutional: Negative for activity change, appetite change, chills, diaphoresis, fatigue and fever.   HENT: Negative for congestion, sinus pressure, sneezing and sore throat.    Eyes: Negative for photophobia and visual disturbance.   Respiratory: Negative for cough and shortness of breath.    Cardiovascular: Positive for chest pain. Negative for palpitations and leg swelling.   Gastrointestinal: Negative for abdominal distention, abdominal pain, diarrhea, nausea and vomiting.   Genitourinary: Negative for dysuria and flank pain.   Musculoskeletal: Negative for arthralgias, back pain and myalgias.   Skin: Negative for rash.   Neurological: Negative for dizziness, weakness and headaches.   Psychiatric/Behavioral: Negative for behavioral problems and confusion.       Past Medical History:   Diagnosis Date   • Cellulitis of foot    • Chronic kidney disease    • Dementia (HCC)    • Hyperlipidemia    • Hypertension    • Hypothyroidism    • Macular  degeneration    • Prostate cancer (HCC)    • Renal disorder    • Right shoulder pain     SCHEDULED FOR SX   • Seasonal allergies    • Type 2 diabetes mellitus (HCC)    • Vertigo        No Known Allergies    Past Surgical History:   Procedure Laterality Date   • INCISION AND DRAINAGE LEG Right 2017    Procedure: INCISION AND DRAINAGE LOWER EXTREMITY;  Surgeon: Mikael Dwyer DPM;  Location: [facility];  Service:    • PROSTATE SURGERY     • SHOULDER SURGERY Left    • TOTAL SHOULDER ARTHROPLASTY W/ DISTAL CLAVICLE EXCISION Right 2017    Procedure: TOTAL SHOULDER REVERSE ARTHROPLASTY;  Surgeon: Goyo Bhardwaj MD;  Location: [facility] OR;  Service:        Family History   Problem Relation Age of Onset   • Heart disease Father        Social History     Socioeconomic History   • Marital status:    Tobacco Use   • Smoking status: Former     Packs/day: 1.00     Years: 15.00     Pack years: 15.00     Types: Cigarettes     Quit date:      Years since quittin.9   • Smokeless tobacco: Never   • Tobacco comments:     caffeine use: one cup of coffee daily.    Substance and Sexual Activity   • Alcohol use: No   • Drug use: No   • Sexual activity: Defer           Objective   Physical Exam  Constitutional:       General: He is not in acute distress.     Appearance: Normal appearance. He is not toxic-appearing or diaphoretic.   HENT:      Head: Normocephalic and atraumatic.      Mouth/Throat:      Mouth: Mucous membranes are moist.   Eyes:      Conjunctiva/sclera: Conjunctivae normal.   Cardiovascular:      Rate and Rhythm: Normal rate and regular rhythm.      Pulses: Normal pulses.   Pulmonary:      Effort: Pulmonary effort is normal. No respiratory distress.      Breath sounds: Normal breath sounds. No wheezing or rales.   Abdominal:      General: Abdomen is flat. There is no distension.      Tenderness: There is no abdominal tenderness.   Musculoskeletal:         General: No swelling or signs of injury.  Normal range of motion.      Cervical back: Normal range of motion and neck supple.   Skin:     General: Skin is warm and dry.      Findings: No rash.   Neurological:      General: No focal deficit present.      Mental Status: He is alert and oriented to person, place, and time.   Psychiatric:         Mood and Affect: Mood normal.         Behavior: Behavior normal.         Procedures           ED Course  ED Course as of 12/02/22 1248   Fri Dec 02, 2022   1243 Comprehensive Metabolic Panel(!) []   1243 CBC & Differential(!) []   1243 Troponin []   1243 COVID PRE-OP / PRE-PROCEDURE SCREENING ORDER (NO ISOLATION) - Swab, Nasopharynx []   1243 Magnesium []   1243 aPTT []   1243 Protime-INR []   1243 Pt has not any episodes of CP since his arrival.  I discussed lab and radiological findings with him and his family and specifically normal cardiac enzymes as well as his hyperglycemia and renal function being stable when compared to previous lab draws. []   1245 I discussed with patient and patient's family inpatient versus outpatient cardiac work-up and they would prefer outpatient cardiac follow-up and can return to the emergency room if symptoms return or increase in the meantime.  I feel that this is appropriate given patient's presentation and ER work-up. []      ED Course User Index  [] Diogo Fleming MD                                           MDM  Number of Diagnoses or Management Options     Amount and/or Complexity of Data Reviewed  Clinical lab tests: ordered and reviewed  Tests in the radiology section of CPT®: ordered and reviewed  Tests in the medicine section of CPT®: ordered and reviewed    Risk of Complications, Morbidity, and/or Mortality  Presenting problems: moderate  Diagnostic procedures: moderate  Management options: moderate        Final diagnoses:   Chest pain, unspecified type   Hyperglycemia   Chronic renal impairment, unspecified CKD stage       ED Disposition  ED  Disposition     ED Disposition   Discharge    Condition   Stable    Comment   --             Christin Stein MD  [address]  [facility] [id number] [phone]          [facility] Emergency Department  [address] [address] [phone]  Go to   As needed         Medication List      No changes were made to your prescriptions during this visit.          Diogo Fleming MD  12/02/22 1245

## 2022-12-02 NOTE — ED NOTES
Pt reports two episodes of sharp CP that lasted a few minutes. Pt stated that he had an episode yesterday and this morning. Pt denies cardiac hx. Pt also denies pain at this time. Per pts daughter pt stated that he had pain in the upper part of the right side of his chest. Pt denies SOB, N/V/D.

## 2023-09-18 ENCOUNTER — HOSPITAL ENCOUNTER (EMERGENCY)
Facility: HOSPITAL | Age: 88
Discharge: HOME | End: 2023-09-18
Attending: EMERGENCY MEDICINE | Admitting: EMERGENCY MEDICINE
Payer: MEDICARE

## 2023-09-18 ENCOUNTER — APPOINTMENT (OUTPATIENT)
Dept: CT IMAGING | Facility: HOSPITAL | Age: 88
End: 2023-09-18
Payer: MEDICARE

## 2023-09-18 ENCOUNTER — APPOINTMENT (OUTPATIENT)
Dept: GENERAL RADIOLOGY | Facility: HOSPITAL | Age: 88
End: 2023-09-18
Payer: MEDICARE

## 2023-09-18 VITALS
WEIGHT: 246 LBS | OXYGEN SATURATION: 94 % | RESPIRATION RATE: 17 BRPM | SYSTOLIC BLOOD PRESSURE: 175 MMHG | TEMPERATURE: 98 F | HEART RATE: 57 BPM | DIASTOLIC BLOOD PRESSURE: 88 MMHG | BODY MASS INDEX: 31.57 KG/M2 | HEIGHT: 74 IN

## 2023-09-18 DIAGNOSIS — R05.1 ACUTE COUGH: ICD-10-CM

## 2023-09-18 DIAGNOSIS — R40.0 SOMNOLENCE: ICD-10-CM

## 2023-09-18 DIAGNOSIS — U07.1 COVID-19: Primary | ICD-10-CM

## 2023-09-18 LAB
ALBUMIN SERPL-MCNC: 4.1 G/DL (ref 3.5–5.2)
ALBUMIN/GLOB SERPL: 1.6 G/DL
ALP SERPL-CCNC: 99 U/L (ref 39–117)
ALT SERPL W P-5'-P-CCNC: 18 U/L (ref 1–41)
AMPHET+METHAMPHET UR QL: NEGATIVE
AMPHETAMINES UR QL: NEGATIVE
ANION GAP SERPL CALCULATED.3IONS-SCNC: 14.1 MMOL/L (ref 5–15)
AST SERPL-CCNC: 19 U/L (ref 1–40)
BACTERIA UR QL AUTO: ABNORMAL /HPF
BARBITURATES UR QL SCN: NEGATIVE
BASOPHILS # BLD AUTO: 0.05 10*3/MM3 (ref 0–0.2)
BASOPHILS NFR BLD AUTO: 1 % (ref 0–1.5)
BENZODIAZ UR QL SCN: NEGATIVE
BILIRUB SERPL-MCNC: 0.5 MG/DL (ref 0–1.2)
BILIRUB UR QL STRIP: NEGATIVE
BUN SERPL-MCNC: 36 MG/DL (ref 8–23)
BUN/CREAT SERPL: 21.8 (ref 7–25)
BUPRENORPHINE SERPL-MCNC: NEGATIVE NG/ML
CALCIUM SPEC-SCNC: 8.8 MG/DL (ref 8.6–10.5)
CANNABINOIDS SERPL QL: NEGATIVE
CHLORIDE SERPL-SCNC: 100 MMOL/L (ref 98–107)
CLARITY UR: CLEAR
CO2 SERPL-SCNC: 22.9 MMOL/L (ref 22–29)
COCAINE UR QL: NEGATIVE
COLOR UR: YELLOW
CREAT SERPL-MCNC: 1.65 MG/DL (ref 0.76–1.27)
DEPRECATED RDW RBC AUTO: 51 FL (ref 37–54)
EGFRCR SERPLBLD CKD-EPI 2021: 39.9 ML/MIN/1.73
EOSINOPHIL # BLD AUTO: 0.27 10*3/MM3 (ref 0–0.4)
EOSINOPHIL NFR BLD AUTO: 5.3 % (ref 0.3–6.2)
ERYTHROCYTE [DISTWIDTH] IN BLOOD BY AUTOMATED COUNT: 14.7 % (ref 12.3–15.4)
FLUAV RNA RESP QL NAA+PROBE: NOT DETECTED
FLUBV RNA RESP QL NAA+PROBE: NOT DETECTED
GEN 5 2HR TROPONIN T REFLEX: 58 NG/L
GLOBULIN UR ELPH-MCNC: 2.6 GM/DL
GLUCOSE SERPL-MCNC: 208 MG/DL (ref 65–99)
GLUCOSE UR STRIP-MCNC: NEGATIVE MG/DL
HCT VFR BLD AUTO: 39.6 % (ref 37.5–51)
HGB BLD-MCNC: 12.5 G/DL (ref 13–17.7)
HGB UR QL STRIP.AUTO: NEGATIVE
HYALINE CASTS UR QL AUTO: ABNORMAL /LPF
IMM GRANULOCYTES # BLD AUTO: 0.04 10*3/MM3 (ref 0–0.05)
IMM GRANULOCYTES NFR BLD AUTO: 0.8 % (ref 0–0.5)
KETONES UR QL STRIP: NEGATIVE
LEUKOCYTE ESTERASE UR QL STRIP.AUTO: NEGATIVE
LYMPHOCYTES # BLD AUTO: 0.84 10*3/MM3 (ref 0.7–3.1)
LYMPHOCYTES NFR BLD AUTO: 16.6 % (ref 19.6–45.3)
MCH RBC QN AUTO: 29.9 PG (ref 26.6–33)
MCHC RBC AUTO-ENTMCNC: 31.6 G/DL (ref 31.5–35.7)
MCV RBC AUTO: 94.7 FL (ref 79–97)
METHADONE UR QL SCN: NEGATIVE
MONOCYTES # BLD AUTO: 0.48 10*3/MM3 (ref 0.1–0.9)
MONOCYTES NFR BLD AUTO: 9.5 % (ref 5–12)
NEUTROPHILS NFR BLD AUTO: 3.38 10*3/MM3 (ref 1.7–7)
NEUTROPHILS NFR BLD AUTO: 66.8 % (ref 42.7–76)
NITRITE UR QL STRIP: NEGATIVE
NRBC BLD AUTO-RTO: 0 /100 WBC (ref 0–0.2)
OPIATES UR QL: NEGATIVE
OXYCODONE UR QL SCN: NEGATIVE
PCP UR QL SCN: NEGATIVE
PH UR STRIP.AUTO: 6 [PH] (ref 4.5–8)
PLATELET # BLD AUTO: 179 10*3/MM3 (ref 140–450)
PMV BLD AUTO: 10.1 FL (ref 6–12)
POTASSIUM SERPL-SCNC: 4.6 MMOL/L (ref 3.5–5.2)
PROPOXYPH UR QL: NEGATIVE
PROT SERPL-MCNC: 6.7 G/DL (ref 6–8.5)
PROT UR QL STRIP: ABNORMAL
QT INTERVAL: 499 MS
QTC INTERVAL: 513 MS
RBC # BLD AUTO: 4.18 10*6/MM3 (ref 4.14–5.8)
RBC # UR STRIP: ABNORMAL /HPF
REF LAB TEST METHOD: ABNORMAL
SARS-COV-2 RNA RESP QL NAA+PROBE: DETECTED
SODIUM SERPL-SCNC: 137 MMOL/L (ref 136–145)
SP GR UR STRIP: 1.02 (ref 1–1.03)
SQUAMOUS #/AREA URNS HPF: ABNORMAL /HPF
T4 FREE SERPL-MCNC: 1.58 NG/DL (ref 0.93–1.7)
TRICYCLICS UR QL SCN: NEGATIVE
TROPONIN T DELTA: -4 NG/L
TROPONIN T SERPL HS-MCNC: 62 NG/L
TSH SERPL DL<=0.05 MIU/L-ACNC: 3.21 UIU/ML (ref 0.27–4.2)
UROBILINOGEN UR QL STRIP: ABNORMAL
WBC # UR STRIP: ABNORMAL /HPF
WBC NRBC COR # BLD: 5.06 10*3/MM3 (ref 3.4–10.8)

## 2023-09-18 PROCEDURE — 85025 COMPLETE CBC W/AUTO DIFF WBC: CPT | Performed by: PHYSICIAN ASSISTANT

## 2023-09-18 PROCEDURE — 71045 X-RAY EXAM CHEST 1 VIEW: CPT

## 2023-09-18 PROCEDURE — 87636 SARSCOV2 & INF A&B AMP PRB: CPT | Performed by: PHYSICIAN ASSISTANT

## 2023-09-18 PROCEDURE — 81001 URINALYSIS AUTO W/SCOPE: CPT | Performed by: PHYSICIAN ASSISTANT

## 2023-09-18 PROCEDURE — 36415 COLL VENOUS BLD VENIPUNCTURE: CPT

## 2023-09-18 PROCEDURE — 84484 ASSAY OF TROPONIN QUANT: CPT | Performed by: PHYSICIAN ASSISTANT

## 2023-09-18 PROCEDURE — 93005 ELECTROCARDIOGRAM TRACING: CPT | Performed by: PHYSICIAN ASSISTANT

## 2023-09-18 PROCEDURE — 84443 ASSAY THYROID STIM HORMONE: CPT | Performed by: PHYSICIAN ASSISTANT

## 2023-09-18 PROCEDURE — 84439 ASSAY OF FREE THYROXINE: CPT | Performed by: PHYSICIAN ASSISTANT

## 2023-09-18 PROCEDURE — 80306 DRUG TEST PRSMV INSTRMNT: CPT | Performed by: PHYSICIAN ASSISTANT

## 2023-09-18 PROCEDURE — 80053 COMPREHEN METABOLIC PANEL: CPT | Performed by: PHYSICIAN ASSISTANT

## 2023-09-18 PROCEDURE — 70450 CT HEAD/BRAIN W/O DYE: CPT

## 2023-09-18 PROCEDURE — 99284 EMERGENCY DEPT VISIT MOD MDM: CPT

## 2023-09-18 RX ORDER — SODIUM CHLORIDE 0.9 % (FLUSH) 0.9 %
10 SYRINGE (ML) INJECTION AS NEEDED
Status: DISCONTINUED | OUTPATIENT
Start: 2023-09-18 | End: 2023-09-18 | Stop reason: HOSPADM

## 2023-09-18 RX ORDER — PREDNISONE 20 MG/1
40 TABLET ORAL DAILY
Qty: 10 TABLET | Refills: 0 | Status: SHIPPED | OUTPATIENT
Start: 2023-09-18 | End: 2023-09-23

## 2023-09-18 NOTE — DISCHARGE INSTRUCTIONS
Take steroids as directed.  Follow-up with your primary care doctor.  Return here with any worsening symptoms.

## 2023-09-18 NOTE — ED PROVIDER NOTES
EMERGENCY DEPARTMENT ENCOUNTER      Room Number: 05/05    History is provided by the patient, no translation services needed    HPI:    Chief complaint: weakness and increased fatigue     Narrative: Pt is a 88 y.o. male with PMH of CKD, dementia, HLD, HTN who presents complaining of increased weakness and fatigue.  Wife reports that today patient was very somnolent, reports she had difficulties waking him up from a nap and that when he did wake up he had no idea who she was.  Reports that he does have a history of dementia but states that it has never been this severe before.  Reports that he was started on antibiotic about 4 days ago by his primary care doctor and states that he has not been improving as she expected him to.  No fevers.  She does endorse that he has had a cough and congestion but no chest pain or shortness of breath.  No one else at home has been ill.  Denies any recent sick exposures.      PMD: Romeo Chamberlain MD    REVIEW OF SYSTEMS  Review of Systems  Complete review of systems performed otherwise negative except pertinent positives per HPI.    PAST MEDICAL HISTORY  Active Ambulatory Problems     Diagnosis Date Noted    Osteoarthritis of right glenohumeral joint 12/10/2017    Gastroenteritis 03/05/2019     Resolved Ambulatory Problems     Diagnosis Date Noted    Cellulitis of foot, right 01/07/2017    Cellulitis of right foot 01/08/2017    Osteoarthritis of right glenohumeral joint 11/09/2017    Tendinopathy of right rotator cuff 11/09/2017    Glenohumeral arthritis, right 12/08/2017     Past Medical History:   Diagnosis Date    Cellulitis of foot     Chronic kidney disease     Dementia     Hyperlipidemia     Hypertension     Hypothyroidism     Macular degeneration     Prostate cancer     Renal disorder     Right shoulder pain     Seasonal allergies     Type 2 diabetes mellitus     Vertigo        PAST SURGICAL HISTORY  Past Surgical History:   Procedure Laterality Date    INCISION AND  DRAINAGE LEG Right 2017    Procedure: INCISION AND DRAINAGE LOWER EXTREMITY;  Surgeon: Mikael Dwyer DPM;  Location:  [facility];  Service:     PROSTATE SURGERY      SHOULDER SURGERY Left     TOTAL SHOULDER ARTHROPLASTY W/ DISTAL CLAVICLE EXCISION Right 2017    Procedure: TOTAL SHOULDER REVERSE ARTHROPLASTY;  Surgeon: Goyo Bhardwaj MD;  Location:  [facility];  Service:        FAMILY HISTORY  Family History   Problem Relation Age of Onset    Heart disease Father        SOCIAL HISTORY  Social History     Socioeconomic History    Marital status:    Tobacco Use    Smoking status: Former     Packs/day: 1.00     Years: 15.00     Pack years: 15.00     Types: Cigarettes     Quit date:      Years since quittin.7    Smokeless tobacco: Never    Tobacco comments:     caffeine use: one cup of coffee daily.    Substance and Sexual Activity    Alcohol use: No    Drug use: No    Sexual activity: Defer       ALLERGIES  Patient has no known allergies.      Current Facility-Administered Medications:     [COMPLETED] Insert Peripheral IV, , , Once **AND** sodium chloride 0.9 % flush 10 mL, 10 mL, Intravenous, PRN, Nakia Feliciano PA-C    Current Outpatient Medications:     acetaminophen (TYLENOL) 500 MG tablet, Take 2 tablets by mouth Every 6 (Six) Hours As Needed for Mild Pain , Moderate Pain  or Headache., Disp: , Rfl:     atorvastatin (LIPITOR) 10 MG tablet, Take 10 mg by mouth Daily., Disp: , Rfl:     benzonatate (TESSALON) 100 MG capsule, Take 1 capsule by mouth 3 (Three) Times a Day As Needed for Cough., Disp: 15 capsule, Rfl: 0    Blood Glucose Monitoring Suppl (ONE TOUCH ULTRA 2) W/DEVICE kit, USE AS DIRECTED, Disp: , Rfl: 0    donepezil (ARICEPT) 10 MG tablet, Take 10 mg by mouth Every Night., Disp: , Rfl:     glipiZIDE (GLUCOTROL) 10 MG tablet, Take 10 mg by mouth 2 (two) times a day before meals, Disp: , Rfl:     LEVOTHYROXINE SODIUM PO, Take 180 mg by mouth., Disp: , Rfl:      lisinopril-hydrochlorothiazide (PRINZIDE,ZESTORETIC) 20-25 MG per tablet, Take 1 tablet by mouth Daily., Disp: , Rfl:     ondansetron ODT (ZOFRAN-ODT) 4 MG disintegrating tablet, Take 1 tablet by mouth Every 6 (Six) Hours As Needed for Nausea or Vomiting., Disp: 20 tablet, Rfl: 0    pioglitazone (ACTOS) 30 MG tablet, Take 30 mg by mouth Daily., Disp: , Rfl:     predniSONE (DELTASONE) 20 MG tablet, Take 2 tablets by mouth Daily for 5 days., Disp: 10 tablet, Rfl: 0    Semaglutide (OZEMPIC SC), Inject 0.5 mg under the skin into the appropriate area as directed 1 (One) Time Per Week. Pt. Takes on Friday, Disp: , Rfl:     PHYSICAL EXAM  ED Triage Vitals [09/18/23 1444]   Temp Heart Rate Resp BP SpO2   98 °F (36.7 °C) 64 18 157/80 98 %      Temp src Heart Rate Source Patient Position BP Location FiO2 (%)   Oral Monitor Lying Right arm --       Physical Exam  Vitals and nursing note reviewed.   Constitutional:       Appearance: Normal appearance. He is normal weight. He is not ill-appearing or toxic-appearing.      Comments: Oriented to person and place, not time. Thinks it is 1988   HENT:      Head: Normocephalic and atraumatic.      Nose: Nose normal.      Mouth/Throat:      Mouth: Mucous membranes are moist.   Eyes:      Extraocular Movements: Extraocular movements intact.      Conjunctiva/sclera: Conjunctivae normal.      Pupils: Pupils are equal, round, and reactive to light.   Cardiovascular:      Rate and Rhythm: Normal rate and regular rhythm.   Pulmonary:      Effort: Pulmonary effort is normal.   Abdominal:      General: Abdomen is flat.      Palpations: Abdomen is soft.   Musculoskeletal:         General: Normal range of motion.      Cervical back: Normal range of motion.   Skin:     General: Skin is warm.      Capillary Refill: Capillary refill takes less than 2 seconds.      Coloration: Skin is not pale.   Neurological:      General: No focal deficit present.      Mental Status: He is alert.      Cranial  Nerves: No dysarthria or facial asymmetry.         LAB RESULTS  Lab Results (last 24 hours)       Procedure Component Value Units Date/Time    CBC & Differential [[id number]]  (Abnormal) Collected: 09/18/23 1524    Specimen: Blood Updated: 09/18/23 1532    Narrative:      The following orders were created for panel order CBC & Differential.  Procedure                               Abnormality         Status                     ---------                               -----------         ------                     CBC Auto Differential[[id number]]        Abnormal            Final result                 Please view results for these tests on the individual orders.    Comprehensive Metabolic Panel [[id number]]  (Abnormal) Collected: 09/18/23 1524    Specimen: Blood Updated: 09/18/23 1553     Glucose 208 mg/dL      BUN 36 mg/dL      Creatinine 1.65 mg/dL      Sodium 137 mmol/L      Potassium 4.6 mmol/L      Chloride 100 mmol/L      CO2 22.9 mmol/L      Calcium 8.8 mg/dL      Total Protein 6.7 g/dL      Albumin 4.1 g/dL      ALT (SGPT) 18 U/L      AST (SGOT) 19 U/L      Alkaline Phosphatase 99 U/L      Total Bilirubin 0.5 mg/dL      Globulin 2.6 gm/dL      A/G Ratio 1.6 g/dL      BUN/Creatinine Ratio 21.8     Anion Gap 14.1 mmol/L      eGFR 39.9 mL/min/1.73     Narrative:      GFR Normal >60  Chronic Kidney Disease <60  Kidney Failure <15    The GFR formula is only valid for adults with stable renal function between ages 18 and 70.    High Sensitivity Troponin T [[id number]]  (Abnormal) Collected: 09/18/23 1524    Specimen: Blood Updated: 09/18/23 1601     HS Troponin T 62 ng/L     Narrative:      High Sensitive Troponin T Reference Range:  <10.0 ng/L- Negative Female for AMI  <15.0 ng/L- Negative Male for AMI  >=10 - Abnormal Female indicating possible myocardial injury.  >=15 - Abnormal Male indicating possible myocardial injury.   Clinicians would have to utilize clinical acumen, EKG, Troponin, and serial changes to  determine if it is an Acute Myocardial Infarction or myocardial injury due to an underlying chronic condition.         CBC Auto Differential [[id number]]  (Abnormal) Collected: 09/18/23 1524    Specimen: Blood Updated: 09/18/23 1532     WBC 5.06 10*3/mm3      RBC 4.18 10*6/mm3      Hemoglobin 12.5 g/dL      Hematocrit 39.6 %      MCV 94.7 fL      MCH 29.9 pg      MCHC 31.6 g/dL      RDW 14.7 %      RDW-SD 51.0 fl      MPV 10.1 fL      Platelets 179 10*3/mm3      Neutrophil % 66.8 %      Lymphocyte % 16.6 %      Monocyte % 9.5 %      Eosinophil % 5.3 %      Basophil % 1.0 %      Immature Grans % 0.8 %      Neutrophils, Absolute 3.38 10*3/mm3      Lymphocytes, Absolute 0.84 10*3/mm3      Monocytes, Absolute 0.48 10*3/mm3      Eosinophils, Absolute 0.27 10*3/mm3      Basophils, Absolute 0.05 10*3/mm3      Immature Grans, Absolute 0.04 10*3/mm3      nRBC 0.0 /100 WBC     TSH [[id number]]  (Normal) Collected: 09/18/23 1524    Specimen: Blood Updated: 09/18/23 1706     TSH 3.210 uIU/mL     T4, Free [[id number]]  (Normal) Collected: 09/18/23 1524    Specimen: Blood Updated: 09/18/23 1706     Free T4 1.58 ng/dL     Narrative:      Results may be falsely increased if patient taking Biotin.      Urinalysis With Culture If Indicated - Urine, Clean Catch [[id number]]  (Abnormal) Collected: 09/18/23 1536    Specimen: Urine, Clean Catch Updated: 09/18/23 1544     Color, UA Yellow     Appearance, UA Clear     pH, UA 6.0     Specific Gravity, UA 1.025     Glucose, UA Negative     Ketones, UA Negative     Bilirubin, UA Negative     Blood, UA Negative     Protein, UA 30 mg/dL (1+)     Leuk Esterase, UA Negative     Nitrite, UA Negative     Urobilinogen, UA 1.0 E.U./dL    Narrative:      In absence of clinical symptoms, the presence of pyuria, bacteria, and/or nitrites on the urinalysis result does not correlate with infection.    Urinalysis, Microscopic Only - Urine, Clean Catch [[id number]]  (Abnormal) Collected: 09/18/23 1530     Specimen: Urine, Clean Catch Updated: 09/18/23 1605     RBC, UA 0-2 /HPF      WBC, UA 0-2 /HPF      Comment: Urine culture not indicated.        Bacteria, UA None Seen /HPF      Squamous Epithelial Cells, UA None Seen /HPF      Hyaline Casts, UA None Seen /LPF      Methodology Manual Light Microscopy    Urine Drug Screen - Urine, Clean Catch [[id number]]  (Normal) Collected: 09/18/23 1536    Specimen: Urine, Clean Catch Updated: 09/18/23 1702     THC, Screen, Urine Negative     Phencyclidine (PCP), Urine Negative     Cocaine Screen, Urine Negative     Methamphetamine, Ur Negative     Opiate Screen Negative     Amphetamine Screen, Urine Negative     Benzodiazepine Screen, Urine Negative     Tricyclic Antidepressants Screen Negative     Methadone Screen, Urine Negative     Barbiturates Screen, Urine Negative     Oxycodone Screen, Urine Negative     Propoxyphene Screen Negative     Buprenorphine, Screen, Urine Negative    Narrative:      Urine drug screen results are to be used for medical purposes only.  They are not to be used for legal purposes such as employment testing.  Negative results do not necessarily mean the complete absence of a subtance, but rather that the result is less than the cutoff for that substance.  Positive results are unconfirmed and considered Preliminary Positive.  [facility] does not automatically confirm Postitive Unconfirmed results.  The physician may request (order) an Unconfirmed Positive result to be sent out for confirmation.      Negative Thresholds for Drugs Screened:    THC screen, urine                          50 ng/ml  Phenycyclidine (PCP), urine                25 ng/ml  Cocaine screen, urine                     150 ng/ml  Methamphetamine, urine                    500 ng/ml  Opiate screen, urine                      100 ng/ml  Amphetamine screen, urine                 500 ng/ml  Benzodiazepine screen, urine              150 ng/ml  Tricyclic Antidepressants screen,  urine   300 ng/ml  Methadone screen, urine                   200 ng/ml  Barbiturates screen, urine                200 ng/ml  Oxycodone screen, urine                   100 ng/ml  Propoxyphene screen, urine                300 ng/ml  Buprenorphine screen, urine                10 ng/ml    COVID PRE-OP / PRE-PROCEDURE SCREENING ORDER (NO ISOLATION) - Swab, Nasopharynx [[id number]]  (Abnormal) Collected: 09/18/23 1647    Specimen: Swab from Nasopharynx Updated: 09/18/23 1718    Narrative:      The following orders were created for panel order COVID PRE-OP / PRE-PROCEDURE SCREENING ORDER (NO ISOLATION) - Swab, Nasopharynx.  Procedure                               Abnormality         Status                     ---------                               -----------         ------                     COVID-19 and FLU A/B PCR...[[id number]]  Abnormal            Final result                 Please view results for these tests on the individual orders.    COVID-19 and FLU A/B PCR - Swab, Nasopharynx [[id number]]  (Abnormal) Collected: 09/18/23 1647    Specimen: Swab from Nasopharynx Updated: 09/18/23 1718     COVID19 Detected     Influenza A PCR Not Detected     Influenza B PCR Not Detected    Narrative:      Fact sheet for providers: https://www.fda.gov/media/626014/download    Fact sheet for patients: https://www.fda.gov/media/328524/download    Test performed by PCR.  Influenza A and Influenza B negative results should be considered presumptive in samples that have a positive SARS-CoV-2 result.    Competitive inhibition studies showed that SARS-CoV-2 virus, when present at concentrations above 3.6E+04 copies/mL, can inhibit the detection and amplification of influenza A and influenza B virus RNA if present at or below 1.8E+02 copies/mL or 4.9E+02 copies/mL, respectively, and may lead to false negative influenza virus results. If co-infection with influenza A or influenza B virus is suspected in samples with a positive SARS-CoV-2  result, the sample should be re-tested with another FDA cleared, approved, or authorized influenza test, if influenza virus detection would change clinical management.    High Sensitivity Troponin T 2Hr [[id number]]  (Abnormal) Collected: 09/18/23 1721    Specimen: Blood Updated: 09/18/23 1802     HS Troponin T 58 ng/L      Troponin T Delta -4 ng/L     Narrative:      High Sensitive Troponin T Reference Range:  <10.0 ng/L- Negative Female for AMI  <15.0 ng/L- Negative Male for AMI  >=10 - Abnormal Female indicating possible myocardial injury.  >=15 - Abnormal Male indicating possible myocardial injury.   Clinicians would have to utilize clinical acumen, EKG, Troponin, and serial changes to determine if it is an Acute Myocardial Infarction or myocardial injury due to an underlying chronic condition.                   I ordered the above labs and reviewed the results    RADIOLOGY  CT Head Without Contrast    Result Date: 9/18/2023  CT HEAD WITHOUT CONTRAST 9/18/2023  HISTORY: Increasing confusion and lethargy. Dementia. No history of trauma.  TECHNIQUE:  Noncontrast CT head was performed. Sagittal and coronal reformats performed. No comparisons. Radiation dose reduction techniques included automated exposure control or exposure modulation based on body size. Radiation audit for CT and nuclear cardiology exams in the last 12 months: 0.  FINDINGS:  There is generalized cerebral atrophy. Sulci and ventricles are otherwise unremarkable. No midline shift. No evidence of acute intracranial hemorrhage. There is no mass, mass effect or edema to suggest acute infarct and no extra-axial fluid collection is present. Nonspecific but probable chronic ischemic change in the periventricular and deep white matter. Chronic appearing infarct in encephalomalacic change in the lateral posterior right cerebellar hemisphere. Globes intact and bones intact. Sinuses clear.      1. No clearly acute intracranial process. No evidence of acute  intracranial hemorrhage. 2. Atrophy and chronic ischemic changes.  This report was finalized on 9/18/2023 3:56 PM by Dr. Placido Jay MD.      XR Chest 1 View    Result Date: 9/18/2023  CHEST X-RAY, 9/18/2023  HISTORY: 88-year-old male in the ED complaining of 2-week history of cough.  TECHNIQUE: AP portable chest x-ray.  COMPARISON: *  Chest x-ray, 12/2/2022.  FINDINGS: Chronically low lung volumes with mild basilar scarring. No visible pulmonary edema, focal infiltrate or pleural effusion.  Heart size and pulmonary vascularity are within normal limits. Stable tortuous thoracic aorta. Right shoulder arthroplasty.      No active disease. No change since 12/2/2022.  This report was finalized on 9/18/2023 4:08 PM by Dr. Milind Andrade MD.       I ordered the above radiologic testing and reviewed the results    PROCEDURES  Procedures      PROGRESS AND CONSULTS  ED Course as of 09/18/23 1810   Mon Sep 18, 2023   1653 EKG reveals sinus rhythm.  Ventricular rate of 63.  No acute ST segment elevation or depression concerning for ischemia on my interpretation.  No significant changes from previous EKG. [KM]   1716 Spoke to patient's wife and daughter at bedside. We discussed their concerns of UTI and or pneumonia. No evidence of either on todays work up. Therefore suspect he is recovering from recent UTI and likely having progression of his baseline dementia. Therefore we mutually agreed he would benefit from home health PT and OT. I will order this for him via CM.  [MN]   1720 Patient also positive for Covid thus explaining persistent cough and lethargy. Discussed with DARINEL Hamilton in ED will provide prednisone for assistance with this.    [MN]      ED Course User Index  [KM] Nakia Feliciano PA-C  [MN] Jude Loera, DO           MEDICAL DECISION MAKING    MDM     Amount and/or Complexity of Data Reviewed  Clinical lab tests: reviewed  Tests in the radiology section of CPT®: reviewed  Tests in the medicine  section of CPT®: reviewed  Decide to obtain previous medical records or to obtain history from someone other than the patient: yes    88-year-old male seen and evaluated for increased weakness, fatigue.  On arrival patient's vitals are 157/80, heart rate of 64, 98% and 98 °F.  On exam patient is oriented to self and place but not the time.  He does appear to fall back asleep quite frequently with discussion.  Wife reports this is not his baseline therefore appropriate labs and imaging will be performed.   Chest x-ray performed and is unremarkable.  CT head performed and shows no acute abnormalities.  CBC reveals no leukocytosis his hemoglobin is 12  CMP: Creatinine is at patient's baseline no other significant abnormalities.  Urinalysis is negative for infection it appears that the Augmentin he is currently taking has cleared the urinalysis.  UDS is negative.  First troponin is 62, this will be trended over 2 hours as it is likely falsely elevated from patient's renal function.  2-hour troponin is 58.  Coronavirus and flu swab revealed that patient is positive for COVID-19 which highly explains why he has increased fatigue, he is slightly altered and not at his baseline.  I did discuss this case with the hospitalist.  We are in agreement that patient can be discharged home and is a good candidate for home health which can be arranged through care coordinators tomorrow.  Dr. Loera expressed that discussion with the family and family was also on board.  Patient was therefore discharged after his thorough work-up today for his altered mentation which ultimately was most likely secondary to a viral illness.    DIAGNOSIS  Final diagnoses:   COVID-19   Somnolence   Acute cough       Latest Documented Vital Signs:  As of 18:10 EDT  BP- 179/86 HR- 58 Temp- 98 °F (36.7 °C) (Oral) O2 sat- 95%    DISPOSITION    Discussed pertinent findings with the patient/family.  Patient/Family voiced understanding of need to follow-up  for recheck and further testing as needed.  Return to the Emergency Department warnings were given.         Medication List        New Prescriptions      predniSONE 20 MG tablet  Commonly known as: DELTASONE  Take 2 tablets by mouth Daily for 5 days.               Where to Get Your Medications        These medications were sent to [facility]/pharmacy #[id number] - [place], [place] - [address] - [phone]  - [phone]   [address] [place] [address]      Phone: [phone]   predniSONE 20 MG tablet              Follow-up Information       Romeo Chamberlain MD. Call in 2 days.    Specialty: Family Medicine  Why: To schedule follow up appointment  Contact information:  [address]  [facility] [address] [phone]                               Dictated utilizing Dragon dictation     Nakia Feliciano PA-C  09/18/23 5205

## 2023-11-24 ENCOUNTER — HOSPITAL ENCOUNTER (EMERGENCY)
Facility: HOSPITAL | Age: 88
Discharge: HOME | End: 2023-11-24
Attending: STUDENT IN AN ORGANIZED HEALTH CARE EDUCATION/TRAINING PROGRAM
Payer: MEDICARE

## 2023-11-24 ENCOUNTER — APPOINTMENT (OUTPATIENT)
Dept: GENERAL RADIOLOGY | Facility: HOSPITAL | Age: 88
End: 2023-11-24
Payer: MEDICARE

## 2023-11-24 VITALS
TEMPERATURE: 97.8 F | SYSTOLIC BLOOD PRESSURE: 168 MMHG | HEIGHT: 74 IN | HEART RATE: 73 BPM | DIASTOLIC BLOOD PRESSURE: 79 MMHG | WEIGHT: 245 LBS | RESPIRATION RATE: 20 BRPM | OXYGEN SATURATION: 99 % | BODY MASS INDEX: 31.44 KG/M2

## 2023-11-24 DIAGNOSIS — J06.9 UPPER RESPIRATORY TRACT INFECTION, UNSPECIFIED TYPE: ICD-10-CM

## 2023-11-24 DIAGNOSIS — J40 BRONCHITIS: Primary | ICD-10-CM

## 2023-11-24 LAB
ALBUMIN SERPL-MCNC: 3.5 G/DL (ref 3.5–5.2)
ALBUMIN/GLOB SERPL: 1.5 G/DL
ALP SERPL-CCNC: 82 U/L (ref 39–117)
ALT SERPL W P-5'-P-CCNC: 20 U/L (ref 1–41)
ANION GAP SERPL CALCULATED.3IONS-SCNC: 10.2 MMOL/L (ref 5–15)
AST SERPL-CCNC: 27 U/L (ref 1–40)
BACTERIA UR QL AUTO: ABNORMAL /HPF
BASOPHILS # BLD AUTO: 0.05 10*3/MM3 (ref 0–0.2)
BASOPHILS NFR BLD AUTO: 1 % (ref 0–1.5)
BILIRUB SERPL-MCNC: 0.3 MG/DL (ref 0–1.2)
BILIRUB UR QL STRIP: NEGATIVE
BUN SERPL-MCNC: 29 MG/DL (ref 8–23)
BUN/CREAT SERPL: 16.3 (ref 7–25)
CALCIUM SPEC-SCNC: 8.4 MG/DL (ref 8.6–10.5)
CHLORIDE SERPL-SCNC: 106 MMOL/L (ref 98–107)
CLARITY UR: CLEAR
CO2 SERPL-SCNC: 22.8 MMOL/L (ref 22–29)
COLOR UR: YELLOW
CREAT SERPL-MCNC: 1.78 MG/DL (ref 0.76–1.27)
DEPRECATED RDW RBC AUTO: 53.1 FL (ref 37–54)
EGFRCR SERPLBLD CKD-EPI 2021: 36.5 ML/MIN/1.73
EOSINOPHIL # BLD AUTO: 0.23 10*3/MM3 (ref 0–0.4)
EOSINOPHIL NFR BLD AUTO: 4.8 % (ref 0.3–6.2)
ERYTHROCYTE [DISTWIDTH] IN BLOOD BY AUTOMATED COUNT: 14.7 % (ref 12.3–15.4)
FLUAV RNA RESP QL NAA+PROBE: NOT DETECTED
FLUBV RNA RESP QL NAA+PROBE: NOT DETECTED
GEN 5 2HR TROPONIN T REFLEX: 75 NG/L
GLOBULIN UR ELPH-MCNC: 2.3 GM/DL
GLUCOSE SERPL-MCNC: 160 MG/DL (ref 65–99)
GLUCOSE UR STRIP-MCNC: NEGATIVE MG/DL
HCT VFR BLD AUTO: 39.4 % (ref 37.5–51)
HGB BLD-MCNC: 12 G/DL (ref 13–17.7)
HGB UR QL STRIP.AUTO: ABNORMAL
HOLD SPECIMEN: NORMAL
HYALINE CASTS UR QL AUTO: ABNORMAL /LPF
IMM GRANULOCYTES # BLD AUTO: 0.03 10*3/MM3 (ref 0–0.05)
IMM GRANULOCYTES NFR BLD AUTO: 0.6 % (ref 0–0.5)
KETONES UR QL STRIP: NEGATIVE
LEUKOCYTE ESTERASE UR QL STRIP.AUTO: NEGATIVE
LYMPHOCYTES # BLD AUTO: 1 10*3/MM3 (ref 0.7–3.1)
LYMPHOCYTES NFR BLD AUTO: 20.9 % (ref 19.6–45.3)
MCH RBC QN AUTO: 29.9 PG (ref 26.6–33)
MCHC RBC AUTO-ENTMCNC: 30.5 G/DL (ref 31.5–35.7)
MCV RBC AUTO: 98 FL (ref 79–97)
MONOCYTES # BLD AUTO: 0.76 10*3/MM3 (ref 0.1–0.9)
MONOCYTES NFR BLD AUTO: 15.9 % (ref 5–12)
NEUTROPHILS NFR BLD AUTO: 2.72 10*3/MM3 (ref 1.7–7)
NEUTROPHILS NFR BLD AUTO: 56.8 % (ref 42.7–76)
NITRITE UR QL STRIP: NEGATIVE
NRBC BLD AUTO-RTO: 0 /100 WBC (ref 0–0.2)
NT-PROBNP SERPL-MCNC: 727.2 PG/ML (ref 0–1800)
PH UR STRIP.AUTO: 6.5 [PH] (ref 4.5–8)
PLATELET # BLD AUTO: 64 10*3/MM3 (ref 140–450)
PMV BLD AUTO: 11.1 FL (ref 6–12)
POTASSIUM SERPL-SCNC: 4.9 MMOL/L (ref 3.5–5.2)
PROT SERPL-MCNC: 5.8 G/DL (ref 6–8.5)
PROT UR QL STRIP: ABNORMAL
RBC # BLD AUTO: 4.02 10*6/MM3 (ref 4.14–5.8)
RBC # UR STRIP: ABNORMAL /HPF
RBC MORPH BLD: NORMAL
REF LAB TEST METHOD: ABNORMAL
SARS-COV-2 RNA RESP QL NAA+PROBE: NOT DETECTED
SMALL PLATELETS BLD QL SMEAR: NORMAL
SODIUM SERPL-SCNC: 139 MMOL/L (ref 136–145)
SP GR UR STRIP: 1.02 (ref 1–1.03)
SQUAMOUS #/AREA URNS HPF: ABNORMAL /HPF
TROPONIN T DELTA: -4 NG/L
TROPONIN T SERPL HS-MCNC: 79 NG/L
UROBILINOGEN UR QL STRIP: ABNORMAL
WBC # UR STRIP: ABNORMAL /HPF
WBC MORPH BLD: NORMAL
WBC NRBC COR # BLD AUTO: 4.79 10*3/MM3 (ref 3.4–10.8)

## 2023-11-24 PROCEDURE — 99284 EMERGENCY DEPT VISIT MOD MDM: CPT

## 2023-11-24 PROCEDURE — 94640 AIRWAY INHALATION TREATMENT: CPT

## 2023-11-24 PROCEDURE — 81001 URINALYSIS AUTO W/SCOPE: CPT

## 2023-11-24 PROCEDURE — 80053 COMPREHEN METABOLIC PANEL: CPT

## 2023-11-24 PROCEDURE — 25010000002 DEXAMETHASONE PER 1 MG

## 2023-11-24 PROCEDURE — 96374 THER/PROPH/DIAG INJ IV PUSH: CPT

## 2023-11-24 PROCEDURE — 93010 ELECTROCARDIOGRAM REPORT: CPT | Performed by: INTERNAL MEDICINE

## 2023-11-24 PROCEDURE — 87636 SARSCOV2 & INF A&B AMP PRB: CPT

## 2023-11-24 PROCEDURE — 83880 ASSAY OF NATRIURETIC PEPTIDE: CPT

## 2023-11-24 PROCEDURE — 85025 COMPLETE CBC W/AUTO DIFF WBC: CPT

## 2023-11-24 PROCEDURE — 85007 BL SMEAR W/DIFF WBC COUNT: CPT

## 2023-11-24 PROCEDURE — 71046 X-RAY EXAM CHEST 2 VIEWS: CPT

## 2023-11-24 PROCEDURE — 93005 ELECTROCARDIOGRAM TRACING: CPT

## 2023-11-24 PROCEDURE — 84484 ASSAY OF TROPONIN QUANT: CPT

## 2023-11-24 PROCEDURE — 36415 COLL VENOUS BLD VENIPUNCTURE: CPT

## 2023-11-24 RX ORDER — DEXTROMETHORPHAN HYDROBROMIDE AND PROMETHAZINE HYDROCHLORIDE 15; 6.25 MG/5ML; MG/5ML
5 SYRUP ORAL 4 TIMES DAILY PRN
Qty: 118 ML | Refills: 0 | Status: SHIPPED | OUTPATIENT
Start: 2023-11-24

## 2023-11-24 RX ORDER — DEXAMETHASONE SODIUM PHOSPHATE 10 MG/ML
10 INJECTION INTRAMUSCULAR; INTRAVENOUS ONCE
Status: COMPLETED | OUTPATIENT
Start: 2023-11-24 | End: 2023-11-24

## 2023-11-24 RX ORDER — IPRATROPIUM BROMIDE AND ALBUTEROL SULFATE 2.5; .5 MG/3ML; MG/3ML
3 SOLUTION RESPIRATORY (INHALATION) ONCE
Status: COMPLETED | OUTPATIENT
Start: 2023-11-24 | End: 2023-11-24

## 2023-11-24 RX ORDER — SODIUM CHLORIDE 0.9 % (FLUSH) 0.9 %
10 SYRINGE (ML) INJECTION AS NEEDED
Status: DISCONTINUED | OUTPATIENT
Start: 2023-11-24 | End: 2023-11-24 | Stop reason: HOSPADM

## 2023-11-24 RX ORDER — METHYLPREDNISOLONE 4 MG/1
TABLET ORAL
Qty: 21 TABLET | Refills: 0 | Status: SHIPPED | OUTPATIENT
Start: 2023-11-24

## 2023-11-24 RX ADMIN — DEXAMETHASONE SODIUM PHOSPHATE 10 MG: 10 INJECTION INTRAMUSCULAR; INTRAVENOUS at 16:01

## 2023-11-24 RX ADMIN — IPRATROPIUM BROMIDE AND ALBUTEROL SULFATE 3 ML: .5; 3 SOLUTION RESPIRATORY (INHALATION) at 16:28

## 2023-11-24 NOTE — DISCHARGE INSTRUCTIONS
Recommend start steroid pack tomorrow and take as prescribed until completion.  Monitor blood sugar closely.  Go to ER for blood sugar over 300.  Use cough syrup as prescribed as needed.  May cause drowsiness.  Recommend for nighttime use and do not drive.  Use at home nebulizer every 4 hours as needed for wheezing and cough.  Return to emergency department for worsening symptoms or other medical emergencies.  Recommended follow-up with PCP.  Refer to the attached instructions for further information.

## 2023-11-24 NOTE — ED PROVIDER NOTES
Subjective   History of Present Illness  Patient is an 88-year-old male with history of DMII, HLD, edema who presents to the emergency department for cough x 2 days.  Patient woke up Wednesday morning with cough and congestion.  Saw primary care and was prescribed Augmentin for prevention of sinus infection.  Cough worsened last night and today.  Has been using Mucinex DM.  They have been monitoring his oxygen level at home, which has remained upper 90s.  Reports normal appetite.  Very remote history of smoking.  No COPD or asthma.  Daughter reports they have nebulizer at home.  Daughter is POA.  Reports there is mild dementia.        Review of Systems   Constitutional:  Negative for appetite change, chills, diaphoresis, fatigue and fever.   HENT:  Positive for congestion and rhinorrhea. Negative for ear discharge, ear pain, sinus pressure, sinus pain, sneezing, sore throat and trouble swallowing.    Respiratory:  Positive for cough and chest tightness. Negative for shortness of breath.    Cardiovascular:  Negative for chest pain and palpitations.   Gastrointestinal:  Negative for abdominal pain, constipation, diarrhea, nausea and vomiting.   Genitourinary:  Negative for difficulty urinating, dysuria and flank pain.   Musculoskeletal:  Negative for gait problem and myalgias.   Skin:  Negative for color change, pallor, rash and wound.   Neurological:  Negative for dizziness, syncope, numbness and headaches.   Psychiatric/Behavioral:  Negative for confusion.        Past Medical History:   Diagnosis Date    Cellulitis of foot     Chronic kidney disease     Dementia     Hyperlipidemia     Hypertension     Hypothyroidism     Macular degeneration     Prostate cancer     Renal disorder     Right shoulder pain     SCHEDULED FOR SX    Seasonal allergies     Type 2 diabetes mellitus     Vertigo        No Known Allergies    Past Surgical History:   Procedure Laterality Date    INCISION AND DRAINAGE LEG Right 1/11/2017     Procedure: INCISION AND DRAINAGE LOWER EXTREMITY;  Surgeon: Mikael Dwyer DPM;  Location: [facility] OR;  Service:     PROSTATE SURGERY      SHOULDER SURGERY Left     TOTAL SHOULDER ARTHROPLASTY W/ DISTAL CLAVICLE EXCISION Right 2017    Procedure: TOTAL SHOULDER REVERSE ARTHROPLASTY;  Surgeon: Goyo Bhardwaj MD;  Location:  [facility] OR;  Service:        Family History   Problem Relation Age of Onset    Heart disease Father        Social History     Socioeconomic History    Marital status:    Tobacco Use    Smoking status: Former     Packs/day: 1.00     Years: 15.00     Additional pack years: 0.00     Total pack years: 15.00     Types: Cigarettes     Quit date:      Years since quittin.9    Smokeless tobacco: Never    Tobacco comments:     caffeine use: one cup of coffee daily.    Substance and Sexual Activity    Alcohol use: No    Drug use: No    Sexual activity: Defer           Objective   Physical Exam  Constitutional:       General: He is not in acute distress.     Appearance: He is not ill-appearing, toxic-appearing or diaphoretic.   HENT:      Nose: Nose normal.      Mouth/Throat:      Mouth: Mucous membranes are moist.      Pharynx: Oropharynx is clear. No oropharyngeal exudate.   Eyes:      Extraocular Movements: Extraocular movements intact.      Conjunctiva/sclera: Conjunctivae normal.      Pupils: Pupils are equal, round, and reactive to light.   Cardiovascular:      Rate and Rhythm: Normal rate. Rhythm irregular.      Comments: PACs.  Pulmonary:      Effort: No tachypnea or respiratory distress.      Comments: Occasional congested wheezing coughing fits.  No wheezing to auscultation of lung fields initially.  Wheezing present after breathing treatment.  Abdominal:      General: Abdomen is flat. There is no distension.      Palpations: Abdomen is soft.      Tenderness: There is no abdominal tenderness. There is no guarding.   Musculoskeletal:      Comments: BLE edema at baseline per family.  Non pitting. No color change. Non tender.   Skin:     General: Skin is warm and dry.   Neurological:      General: No focal deficit present.      Mental Status: He is alert.      Comments: Patient answers questions appropriately.  Follows commands appropriately.   Psychiatric:         Mood and Affect: Mood normal.         Behavior: Behavior normal.         Procedures           ED Course  ED Course as of 11/24/23 1706   Fri Nov 24, 2023   1425 CBC reveals anemia at baseline.  Otherwise unremarkable. [AS]   1426 CMP with kidney function near baseline with creatinine 1.78 and GFR 36.5.    Most recent labs x 2-month ago revealed creatinine 1.65 and GFR 39.9. [AS]   1426 HS Troponin T(!!): 79  Patient not complaining of chest pain.  Most recent troponin 62.  This may represent a baseline rather than acute cardiac event.  Will check a delta 2-hour. [AS]   1449 Normal BNP. [AS]   1449 Negative COVID and flu. [AS]   1525 CXR:  1. Low volume image with bronchovascular crowding and probable perihilar  and lower lung zone atelectasis/scarring. Faint inflammatory/infectious  infiltrates felt to be less likely. [AS]   1526 CXR with atelectasis.  Pneumonia unlikely.  Patient on Augmentin. [AS]   1635 HS Troponin T(!!): 75 [AS]   1635 Troponin T Delta(!): -4 [AS]      ED Course User Index  [AS] Carissa Bell, IRON                                           Medical Decision Making  Patient is an 88-year-old male who presents to the emergency department for cough with known URI evaluated by PCP x 2 days ago taking Augmentin.  He declines feeling short of breath or having chest pain.   Patient is not tachycardic or tachypneic.  Vital signs are WNL.  O2 saturation is 99% throughout visit even during coughing fits.  No history of COPD or asthma.  CMP and CBC are at baseline.  There is an elevated troponin thought more likely baseline due to kidney function.  Delta troponin is negative.  Cardiac etiology or PE considered unlikely.   Patient has known URI.  Presentation is most consistent with bronchitis.  No wheezing initially to auscultation of lung sounds.  There is wheezing after his breathing treatment.  Patient to continue breathing treatments every 4 hours at home.  Has home nebulizer.  Decadron 10 mg IV given.  Medrol Dosepak prescribed for home to start tomorrow.  Phenergan DM cough syrup prescribed.  Discussed when to return to the emergency department for any worsening symptoms whatsoever.  Answered all questions.  Patient verbalized understanding and was agreeable to plan and discharge.    My differential diagnosis includes but is not limited to:  Asthma, COPD, pneumonia, pulmonary embolus, acute respiratory distress syndrome, pneumothorax, pleural effusion, pulmonary fibrosis, congestive heart failure, myocardial infarction, DKA, uremia, acidosis, sepsis, anemia, drug related, hyperventilation, CNS disease, URI, bronchitis, viral infection, bacterial infection    Amount and/or Complexity of Data Reviewed  Labs: ordered. Decision-making details documented in ED Course.  Radiology: ordered.  ECG/medicine tests: ordered.    Risk  Prescription drug management.        Final diagnoses:   Bronchitis   Upper respiratory tract infection, unspecified type       ED Disposition  ED Disposition       ED Disposition   Discharge    Condition   Stable    Comment   --               Romeo Chamberlain MD  [address] [facility] [address] [phone]    Schedule an appointment as soon as possible for a visit            Medication List        New Prescriptions      methylPREDNISolone 4 MG dose pack  Commonly known as: MEDROL  6 tabs PO x1 day, 5 tabs PO x1 day, and continue decrease of 1 tablet/day.  6 days total treatment.     promethazine-dextromethorphan 6.25-15 MG/5ML syrup  Commonly known as: PROMETHAZINE-DM  Take 5 mL by mouth 4 (Four) Times a Day As Needed for Cough. May cause drowsiness.  Do not drive.               Where to Get Your  Medications        These medications were sent to [facility] #[id number] - [place], [place] - [address] - [phone]  - [phone]   [address] [facility] [address]      Phone: [phone]   methylPREDNISolone 4 MG dose pack  promethazine-dextromethorphan 6.25-15 MG/5ML syrup            Carissa Bell PA-C  11/24/23 0234

## 2023-11-26 LAB
QT INTERVAL: 487 MS
QTC INTERVAL: 455 MS

## 2024-01-31 ENCOUNTER — OFFICE VISIT (OUTPATIENT)
Dept: ORTHOPEDIC SURGERY | Facility: CLINIC | Age: 89
End: 2024-01-31
Payer: MEDICARE

## 2024-01-31 ENCOUNTER — PREP FOR SURGERY (OUTPATIENT)
Dept: OTHER | Facility: HOSPITAL | Age: 89
End: 2024-01-31
Payer: MEDICARE

## 2024-01-31 ENCOUNTER — HOSPITAL ENCOUNTER (INPATIENT)
Facility: HOSPITAL | Age: 89
LOS: 3 days | Discharge: NURSING, REHAB OR LONG-TERM CARE | End: 2024-02-03
Attending: ORTHOPAEDIC SURGERY | Admitting: ORTHOPAEDIC SURGERY
Payer: MEDICARE

## 2024-01-31 VITALS — BODY MASS INDEX: 31.44 KG/M2 | WEIGHT: 245 LBS | HEIGHT: 74 IN

## 2024-01-31 DIAGNOSIS — S93.409A GRADE 1 ANKLE SPRAIN: ICD-10-CM

## 2024-01-31 DIAGNOSIS — M25.561 RIGHT KNEE PAIN, UNSPECIFIED CHRONICITY: Primary | ICD-10-CM

## 2024-01-31 DIAGNOSIS — S82.024A CLOSED NONDISPLACED LONGITUDINAL FRACTURE OF RIGHT PATELLA, INITIAL ENCOUNTER: ICD-10-CM

## 2024-01-31 DIAGNOSIS — S82.021A CLOSED DISPLACED LONGITUDINAL FRACTURE OF RIGHT PATELLA, INITIAL ENCOUNTER: ICD-10-CM

## 2024-01-31 DIAGNOSIS — S82.021A CLOSED DISPLACED LONGITUDINAL FRACTURE OF RIGHT PATELLA, INITIAL ENCOUNTER: Primary | ICD-10-CM

## 2024-01-31 DIAGNOSIS — Z74.09 IMMOBILITY: ICD-10-CM

## 2024-01-31 DIAGNOSIS — M79.673 PAIN OF FOOT, UNSPECIFIED LATERALITY: ICD-10-CM

## 2024-01-31 PROBLEM — S82.001A RIGHT PATELLA FRACTURE: Status: ACTIVE | Noted: 2024-01-31

## 2024-01-31 LAB — HBA1C MFR BLD: 7.5 % (ref 4.8–5.6)

## 2024-01-31 PROCEDURE — 99222 1ST HOSP IP/OBS MODERATE 55: CPT | Performed by: HOSPITALIST

## 2024-01-31 PROCEDURE — 83036 HEMOGLOBIN GLYCOSYLATED A1C: CPT | Performed by: HOSPITALIST

## 2024-01-31 PROCEDURE — 97161 PT EVAL LOW COMPLEX 20 MIN: CPT

## 2024-01-31 RX ORDER — SODIUM CHLORIDE 0.9 % (FLUSH) 0.9 %
10 SYRINGE (ML) INJECTION AS NEEDED
Status: CANCELLED | OUTPATIENT
Start: 2024-01-31

## 2024-01-31 RX ORDER — ASPIRIN 81 MG/1
TABLET ORAL
Status: ON HOLD | COMMUNITY
End: 2024-01-31 | Stop reason: SINTOL

## 2024-01-31 RX ORDER — LISINOPRIL 5 MG/1
5 TABLET ORAL DAILY
Status: DISCONTINUED | OUTPATIENT
Start: 2024-02-01 | End: 2024-02-03 | Stop reason: HOSPADM

## 2024-01-31 RX ORDER — SODIUM CHLORIDE 0.9 % (FLUSH) 0.9 %
10 SYRINGE (ML) INJECTION EVERY 12 HOURS SCHEDULED
Status: DISCONTINUED | OUTPATIENT
Start: 2024-01-31 | End: 2024-02-03 | Stop reason: HOSPADM

## 2024-01-31 RX ORDER — ACETAMINOPHEN 325 MG/1
650 TABLET ORAL EVERY 4 HOURS PRN
Status: DISCONTINUED | OUTPATIENT
Start: 2024-01-31 | End: 2024-02-03 | Stop reason: HOSPADM

## 2024-01-31 RX ORDER — UREA 200 MG/G
GEL TOPICAL
Status: ON HOLD | COMMUNITY
End: 2024-01-31 | Stop reason: SINTOL

## 2024-01-31 RX ORDER — PIOGLITAZONEHYDROCHLORIDE 30 MG/1
1 TABLET ORAL DAILY
Status: ON HOLD | COMMUNITY
End: 2024-01-31 | Stop reason: SINTOL

## 2024-01-31 RX ORDER — LISINOPRIL 5 MG/1
5 TABLET ORAL DAILY
Status: ON HOLD | COMMUNITY
End: 2024-01-31 | Stop reason: SINTOL

## 2024-01-31 RX ORDER — SODIUM CHLORIDE 9 MG/ML
40 INJECTION, SOLUTION INTRAVENOUS AS NEEDED
Status: CANCELLED | OUTPATIENT
Start: 2024-01-31

## 2024-01-31 RX ORDER — SODIUM CHLORIDE 0.9 % (FLUSH) 0.9 %
10 SYRINGE (ML) INJECTION AS NEEDED
Status: DISCONTINUED | OUTPATIENT
Start: 2024-01-31 | End: 2024-02-03 | Stop reason: HOSPADM

## 2024-01-31 RX ORDER — DEXTROSE MONOHYDRATE 25 G/50ML
10-50 INJECTION, SOLUTION INTRAVENOUS
Status: DISCONTINUED | OUTPATIENT
Start: 2024-01-31 | End: 2024-02-01

## 2024-01-31 RX ORDER — POLYETHYLENE GLYCOL 3350 17 G/17G
17 POWDER, FOR SOLUTION ORAL DAILY
Status: DISCONTINUED | OUTPATIENT
Start: 2024-01-31 | End: 2024-02-03 | Stop reason: HOSPADM

## 2024-01-31 RX ORDER — LEVOTHYROXINE SODIUM 175 UG/1
175 TABLET ORAL DAILY
Status: ON HOLD | COMMUNITY
End: 2024-01-31 | Stop reason: SINTOL

## 2024-01-31 RX ORDER — ERYTHROMYCIN 5 MG/G
OINTMENT OPHTHALMIC
Status: ON HOLD | COMMUNITY
Start: 2024-01-25 | End: 2024-01-31 | Stop reason: SINTOL

## 2024-01-31 RX ORDER — HYDROCODONE BITARTRATE AND ACETAMINOPHEN 5; 325 MG/1; MG/1
1 TABLET ORAL EVERY 4 HOURS PRN
Status: CANCELLED | OUTPATIENT
Start: 2024-01-31 | End: 2024-02-05

## 2024-01-31 RX ORDER — HYDROCODONE BITARTRATE AND ACETAMINOPHEN 5; 325 MG/1; MG/1
1 TABLET ORAL EVERY 4 HOURS PRN
Status: DISCONTINUED | OUTPATIENT
Start: 2024-01-31 | End: 2024-02-03 | Stop reason: HOSPADM

## 2024-01-31 RX ORDER — ONDANSETRON 4 MG/1
4 TABLET, ORALLY DISINTEGRATING ORAL EVERY 6 HOURS PRN
Status: CANCELLED | OUTPATIENT
Start: 2024-01-31

## 2024-01-31 RX ORDER — BENZONATATE 100 MG/1
CAPSULE ORAL
Status: ON HOLD | COMMUNITY
End: 2024-01-31 | Stop reason: SINTOL

## 2024-01-31 RX ORDER — ATORVASTATIN CALCIUM 10 MG/1
10 TABLET, FILM COATED ORAL DAILY
COMMUNITY

## 2024-01-31 RX ORDER — LEVOTHYROXINE SODIUM 112 UG/1
112 TABLET ORAL DAILY
Status: DISCONTINUED | OUTPATIENT
Start: 2024-02-01 | End: 2024-02-03 | Stop reason: HOSPADM

## 2024-01-31 RX ORDER — SODIUM CHLORIDE 0.9 % (FLUSH) 0.9 %
10 SYRINGE (ML) INJECTION EVERY 12 HOURS SCHEDULED
Status: CANCELLED | OUTPATIENT
Start: 2024-01-31

## 2024-01-31 RX ORDER — TRAZODONE HYDROCHLORIDE 50 MG/1
50 TABLET ORAL NIGHTLY
Status: ON HOLD | COMMUNITY
End: 2024-01-31 | Stop reason: SINTOL

## 2024-01-31 RX ORDER — ASPIRIN 81 MG/1
1 TABLET, CHEWABLE ORAL DAILY
Status: ON HOLD | COMMUNITY
End: 2024-01-31 | Stop reason: SINTOL

## 2024-01-31 RX ORDER — INSULIN GLARGINE 100 [IU]/ML
15 INJECTION, SOLUTION SUBCUTANEOUS DAILY
Status: ON HOLD | COMMUNITY
End: 2024-02-02 | Stop reason: SDUPTHER

## 2024-01-31 RX ORDER — ONDANSETRON 2 MG/ML
4 INJECTION INTRAMUSCULAR; INTRAVENOUS EVERY 6 HOURS PRN
Status: DISCONTINUED | OUTPATIENT
Start: 2024-01-31 | End: 2024-02-03 | Stop reason: HOSPADM

## 2024-01-31 RX ORDER — SODIUM CHLORIDE 9 MG/ML
40 INJECTION, SOLUTION INTRAVENOUS AS NEEDED
Status: DISCONTINUED | OUTPATIENT
Start: 2024-01-31 | End: 2024-02-03 | Stop reason: HOSPADM

## 2024-01-31 RX ORDER — SEMAGLUTIDE 2.68 MG/ML
2 INJECTION, SOLUTION SUBCUTANEOUS WEEKLY
Status: ON HOLD | COMMUNITY
Start: 2024-01-29 | End: 2024-02-02 | Stop reason: SDUPTHER

## 2024-01-31 RX ORDER — FAMOTIDINE 20 MG/1
40 TABLET, FILM COATED ORAL DAILY
Status: DISCONTINUED | OUTPATIENT
Start: 2024-01-31 | End: 2024-02-03 | Stop reason: HOSPADM

## 2024-01-31 RX ORDER — DONEPEZIL HYDROCHLORIDE 5 MG/1
10 TABLET, FILM COATED ORAL NIGHTLY
COMMUNITY

## 2024-01-31 RX ORDER — LEVOTHYROXINE SODIUM 0.1 MG/1
100 TABLET ORAL DAILY
Status: ON HOLD | COMMUNITY
End: 2024-01-31 | Stop reason: SINTOL

## 2024-01-31 RX ORDER — FLUOCINONIDE GEL 0.5 MG/G
GEL TOPICAL
Status: ON HOLD | COMMUNITY
End: 2024-01-31 | Stop reason: SINTOL

## 2024-01-31 RX ORDER — ATORVASTATIN CALCIUM 10 MG/1
1 TABLET, FILM COATED ORAL DAILY
Status: ON HOLD | COMMUNITY
End: 2024-01-31 | Stop reason: SINTOL

## 2024-01-31 RX ORDER — INSULIN ASPART 100 [IU]/ML
1-200 INJECTION, SOLUTION INTRAVENOUS; SUBCUTANEOUS
Status: DISCONTINUED | OUTPATIENT
Start: 2024-01-31 | End: 2024-01-31

## 2024-01-31 RX ORDER — ATORVASTATIN CALCIUM 10 MG/1
10 TABLET, FILM COATED ORAL DAILY
Status: DISCONTINUED | OUTPATIENT
Start: 2024-02-01 | End: 2024-02-03 | Stop reason: HOSPADM

## 2024-01-31 RX ORDER — LISINOPRIL 5 MG/1
5 TABLET ORAL DAILY
COMMUNITY

## 2024-01-31 RX ORDER — PIOGLITAZONEHYDROCHLORIDE 30 MG/1
30 TABLET ORAL DAILY
Status: DISCONTINUED | OUTPATIENT
Start: 2024-02-01 | End: 2024-01-31

## 2024-01-31 RX ORDER — POLYETHYLENE GLYCOL 3350 17 G/17G
17 POWDER, FOR SOLUTION ORAL DAILY
COMMUNITY

## 2024-01-31 RX ORDER — INSULIN ASPART 100 [IU]/ML
1-200 INJECTION, SOLUTION INTRAVENOUS; SUBCUTANEOUS AS NEEDED
Status: DISCONTINUED | OUTPATIENT
Start: 2024-01-31 | End: 2024-01-31

## 2024-01-31 RX ORDER — PEN NEEDLE, DIABETIC 29 G X1/2"
NEEDLE, DISPOSABLE MISCELLANEOUS
Status: ON HOLD | COMMUNITY
Start: 2024-01-22 | End: 2024-01-31 | Stop reason: SINTOL

## 2024-01-31 RX ORDER — IBUPROFEN 600 MG/1
1 TABLET ORAL
Status: DISCONTINUED | OUTPATIENT
Start: 2024-01-31 | End: 2024-01-31

## 2024-01-31 RX ORDER — ONDANSETRON 2 MG/ML
4 INJECTION INTRAMUSCULAR; INTRAVENOUS EVERY 6 HOURS PRN
Status: CANCELLED | OUTPATIENT
Start: 2024-01-31

## 2024-01-31 RX ORDER — LEVOTHYROXINE SODIUM 112 UG/1
112 TABLET ORAL DAILY
COMMUNITY

## 2024-01-31 RX ORDER — BLOOD SUGAR DIAGNOSTIC
STRIP MISCELLANEOUS
Status: ON HOLD | COMMUNITY
Start: 2023-12-12 | End: 2024-01-31 | Stop reason: SINTOL

## 2024-01-31 RX ORDER — LEVOTHYROXINE SODIUM 112 UG/1
1 TABLET ORAL DAILY
Status: ON HOLD | COMMUNITY
End: 2024-01-31 | Stop reason: SINTOL

## 2024-01-31 RX ORDER — ONDANSETRON 4 MG/1
4 TABLET, ORALLY DISINTEGRATING ORAL EVERY 6 HOURS PRN
Status: DISCONTINUED | OUTPATIENT
Start: 2024-01-31 | End: 2024-02-03 | Stop reason: HOSPADM

## 2024-01-31 RX ORDER — FAMOTIDINE 40 MG/1
40 TABLET, FILM COATED ORAL DAILY
Status: CANCELLED | OUTPATIENT
Start: 2024-01-31

## 2024-01-31 RX ORDER — DONEPEZIL HYDROCHLORIDE 5 MG/1
TABLET, FILM COATED ORAL
Status: ON HOLD | COMMUNITY
End: 2024-01-31 | Stop reason: SINTOL

## 2024-01-31 RX ORDER — NICOTINE POLACRILEX 4 MG
15 LOZENGE BUCCAL
Status: DISCONTINUED | OUTPATIENT
Start: 2024-01-31 | End: 2024-01-31

## 2024-01-31 RX ORDER — DONEPEZIL HYDROCHLORIDE 5 MG/1
10 TABLET, FILM COATED ORAL NIGHTLY
Status: DISCONTINUED | OUTPATIENT
Start: 2024-01-31 | End: 2024-02-03 | Stop reason: HOSPADM

## 2024-01-31 NOTE — THERAPY EVALUATION
Patient Name: Joey Schmitt  : 1935    MRN: [id number]                              Today's Date: 2024       Admit Date: 2024    Visit Dx:     ICD-10-CM ICD-9-CM   1. Closed displaced longitudinal fracture of right patella, initial encounter  S82.021A 822.0     Patient Active Problem List   Diagnosis    Osteoarthritis of right glenohumeral joint    Gastroenteritis    Right patella fracture     Past Medical History:   Diagnosis Date    Cellulitis of foot     Chronic kidney disease     Dementia     Hyperlipidemia     Hypertension     Hypothyroidism     Macular degeneration     Prostate cancer     Renal disorder     Right shoulder pain     SCHEDULED FOR SX    Seasonal allergies     Type 2 diabetes mellitus     Vertigo      Past Surgical History:   Procedure Laterality Date    INCISION AND DRAINAGE LEG Right 2017    Procedure: INCISION AND DRAINAGE LOWER EXTREMITY;  Surgeon: Mikael Dwyer DPM;  Location: [facility] OR;  Service:     PROSTATE SURGERY      SHOULDER SURGERY Left     TOTAL SHOULDER ARTHROPLASTY W/ DISTAL CLAVICLE EXCISION Right 2017    Procedure: TOTAL SHOULDER REVERSE ARTHROPLASTY;  Surgeon: Goyo Bhardwaj MD;  Location: [facility] OR;  Service:       General Information       Row Name 24 1515          Physical Therapy Time and Intention    Document Type evaluation  -BP     Mode of Treatment physical therapy  -BP       Row Name 24 1515          General Information    Patient Profile Reviewed yes  Pt admitted to hospital from ortho office due to worsening weakness since fall this past . Patient found to have a nondisplced patella fracture with grade one ankle sprain. Pt has dementia. Pt has now become too much for family to care for at home.  -BP     Prior Level of Function --  Per daughter, pts weakness has gradually worsened over the past month. Since fall daughters have been caring for him and transferring him into a w/c for all mobility. Requires assist for  ADL's at all times due to cognitive and visual deficits.  -BP     Existing Precautions/Restrictions fall  -BP     Barriers to Rehab cognitive status;visual deficit  macular degeneration  -BP       Row Name 01/31/24 1515          Living Environment    People in Home spouse  daughters have been assisting in care  -BP       Row Name 01/31/24 1515          Cognition    Orientation Status (Cognition) oriented to;person  Patient requires significant encouragement and reassurance from therapist and family to participate in evaluation.  -BP       Row Name 01/31/24 1515          Safety Issues, Functional Mobility    Safety Issues Affecting Function (Mobility) at risk behavior observed;ability to follow commands;positioning of assistive device;awareness of need for assistance;problem-solving;judgment;insight into deficits/self-awareness;sequencing abilities;safety precaution awareness  -BP     Comment, Safety Issues/Impairments (Mobility) Patient requires significant cues for safety as well as encouragement to participate.  -BP               User Key  (r) = Recorded By, (t) = Taken By, (c) = Cosigned By      Initials Name Provider Type    BP Pat Domingo, PT Physical Therapist                   Mobility       Row Name 01/31/24 1520          Bed Mobility    Bed Mobility supine-sit;sit-supine  -BP     Supine-Sit [place] (Bed Mobility) maximum assist (25% patient effort);2 person assist  -BP     Sit-Supine [place] (Bed Mobility) maximum assist (25% patient effort)  -BP     Assistive Device (Bed Mobility) bed rails;head of bed elevated  -BP     Comment, (Bed Mobility) Patient requires max encouragement to participate. Agreeable with daughter close by.  -BP       Row Name 01/31/24 1520          Transfers    Comment, (Transfers) Verbal cues for hand placement. Elevated bed required. Able to take two side steps however not safe to perform stand pivot transfer. Significant posterior lean in standing.  -BP       Row  Name 01/31/24 1520          Sit-Stand Transfer    Sit-Stand [place] (Transfers) maximum assist (25% patient effort);verbal cues  -BP     Assistive Device (Sit-Stand Transfers) walker, front-wheeled  -BP       Row Name 01/31/24 1520          Gait/Stairs (Locomotion)    Comment, (Gait/Stairs) side steps only. Significant posterior lean in standing  -BP               User Key  (r) = Recorded By, (t) = Taken By, (c) = Cosigned By      Initials Name Provider Type    Pat Triana PT Physical Therapist                   Obj/Interventions       Row Name 01/31/24 1522          Range of Motion Comprehensive    Comment, General Range of Motion Did not formerly assess. Patient not agreeable and requires significant encouragement and reassurance. Frequent redirection required.  -BP       Row Name 01/31/24 1522          Strength Comprehensive (MMT)    Comment, General Manual Muscle Testing (MMT) Assessment Not  tested due to cognition  -BP       Row Name 01/31/24 1522          Balance    Comment, Balance sitting balance-CGA/min A. Static standing balance-mod/max A with FWW with cues for upright posture. Patient with posterior lean in standing. With cues patient able to briefly correct and maintain standing with CGA  -BP               User Key  (r) = Recorded By, (t) = Taken By, (c) = Cosigned By      Initials Name Provider Type    Pat Triana PT Physical Therapist                   Goals/Plan       Row Name 01/31/24 1529          Bed Mobility Goal 1 (PT)    Activity/Assistive Device (Bed Mobility Goal 1, PT) bed mobility activities, all  -BP     [place] Level/Cues Needed (Bed Mobility Goal 1, PT) moderate assist (50-74% patient effort)  -BP     Time Frame (Bed Mobility Goal 1, PT) 5 days  -BP     Progress/Outcomes (Bed Mobility Goal 1, PT) new goal  -BP       Row Name 01/31/24 1529          Transfer Goal 1 (PT)    Activity/Assistive Device (Transfer Goal 1, PT) transfers, all;walker,  rolling;sit-to-stand/stand-to-sit;bed-to-chair/chair-to-bed  -BP     [place] Level/Cues Needed (Transfer Goal 1, PT) moderate assist (50-74% patient effort)  -BP     Time Frame (Transfer Goal 1, PT) 5 days  -BP     Progress/Outcome (Transfer Goal 1, PT) new goal  -BP       Row Name 01/31/24 1529          Gait Training Goal 1 (PT)    Activity/Assistive Device (Gait Training Goal 1, PT) gait (walking locomotion)  -BP     [place] Level (Gait Training Goal 1, PT) moderate assist (50-74% patient effort)  -BP     Distance (Gait Training Goal 1, PT) 10  -BP     Time Frame (Gait Training Goal 1, PT) 5 days  -BP     Progress/Outcome (Gait Training Goal 1, PT) new goal  -BP       Row Name 01/31/24 1529          Therapy Assessment/Plan (PT)    Planned Therapy Interventions (PT) balance training;bed mobility training;gait training;transfer training;patient/family education  -BP               User Key  (r) = Recorded By, (t) = Taken By, (c) = Cosigned By      Initials Name Provider Type    BP Pat Domingo, PT Physical Therapist                   Clinical Impression       Row Name 01/31/24 1524          Pain    Pre/Posttreatment Pain Comment Patient complains of some pain in R knee but does not rate.  -BP     Pain Intervention(s) Repositioned  -BP     Additional Documentation Pain Scale: Numbers Pre/Post-Treatment (Group)  -BP       Row Name 01/31/24 1525          Plan of Care Review    Plan of Care Reviewed With patient;daughter  -BP     Outcome Evaluation PT Evaluation Complete: patient performs supine to/from sit transfers with max A, sit to/from stand transfers with max A, and two side steps along EOB with max A with use of FWW. Patient with posterior lean in static standing, able to correct briefly. Patient is oriented to person only. He requires frequent redirection as well as encouragement to participate. Patient would benefit from physical therapy to maximize functional independence and strength. Cognitive  status is a significant barrier to rehab at this time. Recommend SNF/ECF at discharge.  -BP       Row Name 01/31/24 1524          Therapy Assessment/Plan (PT)    Rehab Potential (PT) fair, will monitor progress closely  -BP     Criteria for Skilled Interventions Met (PT) yes;meets criteria  -BP     Therapy Frequency (PT) daily  -BP     Predicted Duration of Therapy Intervention (PT) 5 days  -BP       Row Name 01/31/24 1524          Positioning and Restraints    Pre-Treatment Position in bed  -BP     Post Treatment Position bed  -BP     In Bed notified nsg;supine;call light within reach;encouraged to call for assist;patient within staff view  -BP               User Key  (r) = Recorded By, (t) = Taken By, (c) = Cosigned By      Initials Name Provider Type    Pat Triana, PT Physical Therapist                   Outcome Measures       Row Name 01/31/24 1529 01/31/24 1324       How much help from another person do you currently need...    Turning from your back to your side while in flat bed without using bedrails? 1  -BP 2  -AS    Moving from lying on back to sitting on the side of a flat bed without bedrails? 1  -BP 2  -AS    Moving to and from a bed to a chair (including a wheelchair)? 1  -BP 2  -AS    Standing up from a chair using your arms (e.g., wheelchair, bedside chair)? 1  -BP 2  -AS    Climbing 3-5 steps with a railing? 1  -BP 2  -AS    To walk in hospital room? 1  -BP 2  -AS    AM-PAC 6 Clicks Score (PT) 6  -BP 12  -AS    Highest Level of Mobility Goal 2 --> Bed activities/dependent transfer  -BP 4 --> Transfer to chair/commode  -AS              User Key  (r) = Recorded By, (t) = Taken By, (c) = Cosigned By      Initials Name Provider Type    AS Blanca Bell RN Registered Nurse    Pat Triana, PT Physical Therapist                                 Physical Therapy Education       Title: PT OT SLP Therapies (In Progress)       Topic: Physical Therapy (In Progress)       Point: Mobility  training (In Progress)       Learning Progress Summary             Patient Acceptance, E,TB, VU by BP at 1/31/2024 1530    Nonacceptance, E, NR by BP at 1/31/2024 1530                         Point: Home exercise program (In Progress)       Learning Progress Summary             Patient Nonacceptance, E, NR by BP at 1/31/2024 1530                                         User Key       Initials Effective Dates Name Provider Type Discipline     06/16/21 -  Pat Domingo, PT Physical Therapist PT                  PT Recommendation and Plan  Planned Therapy Interventions (PT): balance training, bed mobility training, gait training, transfer training, patient/family education  Plan of Care Reviewed With: patient, daughter  Outcome Evaluation: PT Evaluation Complete: patient performs supine to/from sit transfers with max A, sit to/from stand transfers with max A, and two side steps along EOB with max A with use of FWW. Patient with posterior lean in static standing, able to correct briefly. Patient is oriented to person only. He requires frequent redirection as well as encouragement to participate. Patient would benefit from physical therapy to maximize functional independence and strength. Cognitive status is a significant barrier to rehab at this time. Recommend SNF/ECF at discharge.     Time Calculation:   PT Evaluation Complexity  History, PT Evaluation Complexity: 3 or more personal factors and/or comorbidities  Examination of Body Systems (PT Eval Complexity): total of 3 or more elements  Clinical Presentation (PT Evaluation Complexity): stable  Clinical Decision Making (PT Evaluation Complexity): low complexity  Overall Complexity (PT Evaluation Complexity): low complexity     PT Charges       Row Name 01/31/24 1530             Time Calculation    Start Time 1420  -BP      Stop Time 1443  -BP      Time Calculation (min) 23 min  -BP      PT Received On 01/31/24  -BP      PT - Next Appointment 02/01/24  -BP                 User Key  (r) = Recorded By, (t) = Taken By, (c) = Cosigned By      Initials Name Provider Type    BP Pat Domingo, PT Physical Therapist                  Therapy Charges for Today       Code Description Service Date Service Provider Modifiers Qty    49704220402 HC PT EVAL LOW COMPLEXITY 2 1/31/2024 Pat Domingo, PT GP 1            PT G-Codes  AM-PAC 6 Clicks Score (PT): 6  PT Discharge Summary  Anticipated Discharge Disposition (PT): skilled nursing facility, extended care facility    Pat Domingo, PT  1/31/2024

## 2024-01-31 NOTE — PROGRESS NOTES
Subjective: Right ankle pain, right knee pain, immobility     Patient ID: Joey Schmitt is a 88 y.o. male.    Chief Complaint:    History of Present Illness 88-year-old male known to me although has not been seen for over 60 years presents today with his daughter for evaluation of his right lower extremity.  Patient seen with daughters as the patient does have some early dementia although is able to participate with the interview.  He apparently fell  coming out of [employer] tripped over a curb landing on his right knee developed immediate pain and discomfort.  According to the daughters initially was able to bear weight but by Monday was unable to bear weight because of knee and ankle pain.  He did sustain a superficial abrasion over the knee as result of the fall.  He is seen today complaining of severe pain in the knee mild to moderate pain in the ankle.  Describes a throbbing stabbing discomfort in his knee with inability to bear weight.  Patient is 63 and weighs 245 pounds and the daughter stated they can assist with him to stand for dressing bathing and other daily activities because of the pain in the right lower extremity.  Has been taking Tylenol for the discomfort.       Social History     Occupational History    Not on file   Tobacco Use    Smoking status: Former     Packs/day: 1.00     Years: 15.00     Additional pack years: 0.00     Total pack years: 15.00     Types: Cigarettes     Quit date:      Years since quittin.1     Passive exposure: Past    Smokeless tobacco: Never    Tobacco comments:     caffeine use: one cup of coffee daily.    Vaping Use    Vaping Use: Never used   Substance and Sexual Activity    Alcohol use: No    Drug use: No    Sexual activity: Defer      Review of Systems   Musculoskeletal:  Positive for arthralgias and myalgias.         Past Medical History:   Diagnosis Date    Cellulitis of foot     Chronic kidney disease     Dementia     Hyperlipidemia      Hypertension     Hypothyroidism     Macular degeneration     Prostate cancer     Renal disorder     Right shoulder pain     SCHEDULED FOR SX    Seasonal allergies     Type 2 diabetes mellitus     Vertigo      Past Surgical History:   Procedure Laterality Date    INCISION AND DRAINAGE LEG Right 1/11/2017    Procedure: INCISION AND DRAINAGE LOWER EXTREMITY;  Surgeon: Mikael Dwyer DPM;  Location:  [facility] OR;  Service:     PROSTATE SURGERY      SHOULDER SURGERY Left     TOTAL SHOULDER ARTHROPLASTY W/ DISTAL CLAVICLE EXCISION Right 12/8/2017    Procedure: TOTAL SHOULDER REVERSE ARTHROPLASTY;  Surgeon: Goyo Bhardwaj MD;  Location: [facility];  Service:      Family History   Problem Relation Age of Onset    Heart disease Father          Objective:  Vitals:         01/31/24  1013   Weight: 111 kg (245 lb)     Body mass index is 31.46 kg/m².        Ortho Exam   AP and lateral oblique view of the ankle does not show any acute changes.  There is some marginal arthritic changes noted.  AP lateral sunrise view of the knee does show compartmental changes but the sunrise view also shows what appears to be a nondisplaced longitudinal fracture involving the lateral aspect of the patella.  He is alert.  Head is normocephalic and sclerae clear.  The right knee does show superficial abrasion over the patella but he does have a moderate effusion noted.  There is tenderness to palpation over the patella itself but also complains of collateral ligament tenderness to the knee.  He can fully extend the elbow is painful can flex to about 95 degrees.  There is no AP instability on exam nor is there any varus or valgus instability at 10 to 30 degrees but he does complain of some discomfort.  His calf is nontender and nonswollen.  He does have some swelling to the ankle but cannot pinpoint any tenderness but there is some soreness over the lateral ligament complex.  He has good distal pulses skin is cool to touch.    Assessment:        1.  Right knee pain, unspecified chronicity    2. Pain of foot, unspecified laterality    3. Closed nondisplaced longitudinal fracture of right patella, initial encounter    4. Grade 1 ankle sprain    5. Immobility           Plan: Reviewed the x-rays of the knee and the ankle with the daughters.  I believe with regard to the ankle at worst he has a grade 1 ankle sprain.  With regard to the knee he does have a moderate effusion and I do believe he has a nondisplaced fracture involving the lateral facet of the patella.  After sterile prep and through the lateral portal I aspirated 45 cc of blood.  The biggest issue as discussed with the daughters is his immobility and their inability to care for him because of his size is early dementia visit and ability to cooperate.  Discussed with Dr. King the patient's dilemma and with the hospital administration and we will admit the patient for pain control and physical therapy.  Daughters inquired about sending to nursing although he told me he had to go to the hospital for least 3 days first.  If he is unable to go home and then do that.  He may be a candidate for nursing home placement.  I did place him in a knee immobilizer here in the office and applied a sterile dressing to his abrasion over the knee.  They are in agreement.  Answered all questions.    - Large Joint Arthrocentesis: R knee on 1/31/2024 11:15 AM  Indications: pain  Details: 18 G needle, superolateral approach  Aspirate: 40 mL bloody  Outcome: tolerated well, no immediate complications  Procedure, treatment alternatives, risks and benefits explained, specific risks discussed. Consent was given by the patient. Immediately prior to procedure a time out was called to verify the correct patient, procedure, equipment, support staff and site/side marked as required. Patient was prepped and draped in the usual sterile fashion.                   Work Status:    MYRA query complete.    Orders:  Orders Placed This  Encounter   Procedures    - Large Joint Arthrocentesis: R knee    XR Knee 3 View Right    XR Foot 3+ View Right       Medications:  No orders of the defined types were placed in this encounter.      Followup:  Return if symptoms worsen or fail to improve.          Dictated utilizing Dragon dictation    Eye Protection Verbiage: Before proceeding with the stage, a plastic scleral shield was inserted. The globe was anesthetized with a few drops of 1% lidocaine with 1:100,000 epinephrine. Then, an appropriate sized scleral shield was chosen and coated with lacrilube ointment. The shield was gently inserted and left in place for the duration of each stage. After the stage was completed, the shield was gently removed.

## 2024-01-31 NOTE — PLAN OF CARE
Goal Outcome Evaluation:  Plan of Care Reviewed With: patient, daughter           Outcome Evaluation: PT Evaluation Complete: patient performs supine to/from sit transfers with max A, sit to/from stand transfers with max A, and two side steps along EOB with max A with use of FWW. Patient with posterior lean in static standing, able to correct briefly. Patient is oriented to person only. He requires frequent redirection as well as encouragement to participate. Patient would benefit from physical therapy to maximize functional independence and strength. Cognitive status is a significant barrier to rehab at this time. Recommend SNF/ECF at discharge.      Anticipated Discharge Disposition (PT): skilled nursing facility, extended care facility

## 2024-01-31 NOTE — PLAN OF CARE
Goal Outcome Evaluation:  Plan of Care Reviewed With: patient, daughter        Progress: no change  Outcome Evaluation: Patient a direct admit from orthopedic office today for difficulty walking & pain control. Daughters at bedside & state he fell on sunday & trouble ambulating since. Wife having trouble taking care of patient at this time. PT to see the patient & make recommendations. Hospitalist to see regarding diabetes. Daughters state since dementia he can be inappropriate at times & not to pay attention to him. Family would like for patient to go to rehab until he can walk better.

## 2024-02-01 LAB
GLUCOSE BLDC GLUCOMTR-MCNC: 107 MG/DL (ref 70–130)
GLUCOSE BLDC GLUCOMTR-MCNC: 204 MG/DL (ref 70–130)
GLUCOSE BLDC GLUCOMTR-MCNC: 213 MG/DL (ref 70–130)

## 2024-02-01 PROCEDURE — 82948 REAGENT STRIP/BLOOD GLUCOSE: CPT

## 2024-02-01 PROCEDURE — 27520 TREAT KNEECAP FRACTURE: CPT | Performed by: ORTHOPAEDIC SURGERY

## 2024-02-01 PROCEDURE — 99231 SBSQ HOSP IP/OBS SF/LOW 25: CPT | Performed by: HOSPITALIST

## 2024-02-01 PROCEDURE — 63710000001 INSULIN ASPART PER 5 UNITS: Performed by: HOSPITALIST

## 2024-02-01 PROCEDURE — 94799 UNLISTED PULMONARY SVC/PX: CPT

## 2024-02-01 PROCEDURE — 99223 1ST HOSP IP/OBS HIGH 75: CPT | Performed by: ORTHOPAEDIC SURGERY

## 2024-02-01 PROCEDURE — 97110 THERAPEUTIC EXERCISES: CPT

## 2024-02-01 RX ORDER — INSULIN ASPART 100 [IU]/ML
1-200 INJECTION, SOLUTION INTRAVENOUS; SUBCUTANEOUS
Status: DISCONTINUED | OUTPATIENT
Start: 2024-02-01 | End: 2024-02-01

## 2024-02-01 RX ORDER — IBUPROFEN 600 MG/1
1 TABLET ORAL
Status: DISCONTINUED | OUTPATIENT
Start: 2024-02-01 | End: 2024-02-01

## 2024-02-01 RX ORDER — PIOGLITAZONEHYDROCHLORIDE 30 MG/1
30 TABLET ORAL DAILY
Status: DISCONTINUED | OUTPATIENT
Start: 2024-02-02 | End: 2024-02-03 | Stop reason: HOSPADM

## 2024-02-01 RX ORDER — NICOTINE POLACRILEX 4 MG
15 LOZENGE BUCCAL
Status: DISCONTINUED | OUTPATIENT
Start: 2024-02-01 | End: 2024-02-01

## 2024-02-01 RX ORDER — DEXTROSE MONOHYDRATE 25 G/50ML
10-50 INJECTION, SOLUTION INTRAVENOUS
Status: DISCONTINUED | OUTPATIENT
Start: 2024-02-01 | End: 2024-02-01

## 2024-02-01 RX ORDER — INSULIN ASPART 100 [IU]/ML
1-200 INJECTION, SOLUTION INTRAVENOUS; SUBCUTANEOUS AS NEEDED
Status: DISCONTINUED | OUTPATIENT
Start: 2024-02-01 | End: 2024-02-01

## 2024-02-01 RX ADMIN — LISINOPRIL 5 MG: 5 TABLET ORAL at 09:08

## 2024-02-01 RX ADMIN — ACETAMINOPHEN 650 MG: 325 TABLET ORAL at 09:44

## 2024-02-01 RX ADMIN — INSULIN ASPART 10 UNITS: 100 INJECTION, SOLUTION INTRAVENOUS; SUBCUTANEOUS at 17:31

## 2024-02-01 RX ADMIN — DONEPEZIL HYDROCHLORIDE 10 MG: 5 TABLET, FILM COATED ORAL at 20:24

## 2024-02-01 RX ADMIN — ATORVASTATIN CALCIUM 10 MG: 10 TABLET, FILM COATED ORAL at 09:08

## 2024-02-01 RX ADMIN — FAMOTIDINE 40 MG: 20 TABLET ORAL at 09:08

## 2024-02-01 RX ADMIN — POLYETHYLENE GLYCOL 3350 17 G: 17 POWDER, FOR SOLUTION ORAL at 09:08

## 2024-02-01 RX ADMIN — LEVOTHYROXINE SODIUM 112 MCG: 0.11 TABLET ORAL at 09:08

## 2024-02-01 NOTE — H&P
Orthopedic H&P      Patient: Joey Schmitt    Date of Admission: 1/31/2024 11:35 AM    YOB: 1935    Medical Record Number: [id number]    Attending Physician: Maikel King MD  Consulting Physician: Hospitalist      Chief Complaints: Right patella fracture [S82.001A]      History of Present Illness: 88 y.o. male admitted to [facility] to services of Maikel King MD with Right patella fracture [S82.001A].  Patient was initially seen by my partner Dr. Bhardwaj yesterday who noted acute onset of right lower extremity pain after falling coming out of [employer] on Sunday and landed on his right knee.  He was initially able to bear weight but 24 hours later was unable to put weight down on his right lower extremity secondary to pain.  Given his significant pain aspiration was completed yesterday with blood aspirated from the knee of approximately 45 cc as well as a nondisplaced fracture of the lateral facet of the patella noted.  Given his inability to ambulate even with immobilization of the knee as well as inability of basic functional activities to be provided for him at home given his size, we admitted patient for uncontrolled pain as well as mobilization and assessment for possible rehab placement.    No Known Allergies    Medications:   Home Medications:  Medications Prior to Admission   Medication Sig Dispense Refill Last Dose    atorvastatin (LIPITOR) 10 MG tablet Take 1 tablet by mouth Daily.   1/31/2024    levothyroxine (SYNTHROID, LEVOTHROID) 112 MCG tablet Take 1 tablet by mouth Daily.   1/31/2024    lisinopril (PRINIVIL,ZESTRIL) 5 MG tablet Take 1 tablet by mouth Daily.   1/31/2024    pioglitazone (ACTOS) 30 MG tablet Take 1 tablet by mouth Daily.   1/31/2024    donepezil (ARICEPT) 5 MG tablet Take 2 tablets by mouth Every Night.       Lantus SoloStar 100 UNIT/ML injection pen Inject 15 Units under the skin into the appropriate area as directed Daily.       Ozempic, 2 MG/DOSE,  8 MG/3ML solution pen-injector Inject 2 mg under the skin into the appropriate area as directed 1 (One) Time Per Week.       polyethylene glycol (MiraLax) 17 GM/SCOOP powder Take 17 g by mouth Daily.          Current Medications:  Scheduled Meds:atorvastatin, 10 mg, Oral, Daily  donepezil, 10 mg, Oral, Nightly  famotidine, 40 mg, Oral, Daily  levothyroxine, 112 mcg, Oral, Daily  lisinopril, 5 mg, Oral, Daily  polyethylene glycol, 17 g, Oral, Daily  sodium chloride, 10 mL, Intravenous, Q12H      Continuous Infusions:   PRN Meds:.  acetaminophen    dextrose    HYDROcodone-acetaminophen    ondansetron ODT **OR** ondansetron    sodium chloride    sodium chloride       Past Medical History:   Diagnosis Date    Cellulitis of foot     Chronic kidney disease     Dementia     Hyperlipidemia     Hypertension     Hypothyroidism     Macular degeneration     Prostate cancer     Renal disorder     Right shoulder pain     SCHEDULED FOR SX    Seasonal allergies     Type 2 diabetes mellitus     Vertigo         Past Surgical History:   Procedure Laterality Date    INCISION AND DRAINAGE LEG Right 2017    Procedure: INCISION AND DRAINAGE LOWER EXTREMITY;  Surgeon: Mikael Dwyer DPM;  Location:  [facility] OR;  Service:     PROSTATE SURGERY      SHOULDER SURGERY Left     TOTAL SHOULDER ARTHROPLASTY W/ DISTAL CLAVICLE EXCISION Right 2017    Procedure: TOTAL SHOULDER REVERSE ARTHROPLASTY;  Surgeon: Goyo Bhardwaj MD;  Location: [facility] OR;  Service:         Social History     Occupational History    Not on file   Tobacco Use    Smoking status: Former     Packs/day: 1.00     Years: 15.00     Additional pack years: 0.00     Total pack years: 15.00     Types: Cigarettes     Quit date:      Years since quittin.1     Passive exposure: Past    Smokeless tobacco: Never    Tobacco comments:     caffeine use: one cup of coffee daily.    Vaping Use    Vaping Use: Never used   Substance and Sexual Activity    Alcohol use: No    Drug  use: No    Sexual activity: Defer      Social History     Social History Narrative    Not on file        Family History   Problem Relation Age of Onset    Heart disease Father          Review of Systems:   HEENT: Patient denies any headaches, vision changes, change in hearing, or tinnitus, Patient denies any rhinorrhea,epistaxis, sinus pain, mouth or dental problems, sore throat or hoarseness, or dysphagia  Pulmonary: Patient denies any cough, congestion, SOA, or wheezing  Cardiovascular: Patient denies any chest pain, dyspnea, palpitations, weakness, intolerance of exercise, varicosities, swelling of extremities, known murmur  Gastrointestinal:  Patient denies nausea, vomiting, diarrhea, constipation, loss  of appetite, change in appetite, dysphagia, gas, heartburn, melena, change in bowel habits, use of laxatives or other drugs to alter the function of the gastrointestinal tract.  Genital/Urinary: Patient denies dysuria, change in color of urine, change in frequency of urination, pain with urgency, incontinence, retention, or nocturia.  Musculoskeletal: Patient denies increased warmth; redness; or swelling of joints; limitation of function; deformity; crepitation: notes pain in right knee as documented above in HPI.  Denies pain in low back or neck.    Neurological: Patient denies dizziness, tremor, ataxia, difficulty in speaking, change in speech, paresthesia, loss of sensation, seizures, syncope, changes in memory.  Endocrine system: Patient denies tremors, palpitations, intolerance of heat or cold, polyuria, polydipsia, polyphagia, diaphoresis, exophthalmos, or goiter.  Psychological: Patient denies thoughts/plans of harming self or other; depression, insomnia, night terrors, beau, memory loss, disorientation.  Skin: Patient denies any bruising, rashes, discoloration, pruritus, wounds, ulcers, decubiti, changes in the hair or nails  Hematopoietic: Patient denies history of spontaneous or excessive bleeding,  "epistaxis, hematuria, melena, fatigue, enlarged or tender lymph nodes, pallor, history of anemia.    Physical Exam: 88 y.o. male  Vitals:    01/31/24 1513 01/31/24 1924 01/31/24 2142 02/01/24 0605   BP: 137/65      BP Location: Right arm      Patient Position: Sitting      Pulse: 70      Resp: 16      Temp: 97.7 °F (36.5 °C) Comment: Pt refused stating \" Not needed\" and did not allow this staff to touch his arm with a cuff or finger probe. Comment: PT crossed arms and refused stating \" Don't need to do no vitals\".  Still refused to allow b/p cuff, finger probe nor thermometer to touch him. Comment: Pt refused and stated he just needed to get a tractor and had no need to get vitals.   TempSrc: Oral      SpO2: 96%      Weight: 111 kg (245 lb)      Height: 188 cm (74\")        General Appearance:    Alert, cooperative, in no acute distress                      Head:    Normocephalic, without obvious abnormality, atraumatic   Eyes:            Lids and lashes normal, conjunctivae and sclerae normal, no   icterus, no pallor, corneas clear, PERRLA   Ears:    Ears appear intact with no abnormalities noted   Throat:   No oral lesions, no thrush, oral mucosa moist   Neck:   No adenopathy, supple, trachea midline, no thyromegaly, no   carotid bruit, no JVD   Back:     No kyphosis present, no scoliosis present, no skin lesions,    erythema or scars, no tenderness to percussion or palpation,   range of motion normal   Lungs:     Clear to auscultation,respirations regular, even and                  unlabored    Heart:    Regular rhythm and normal rate, normal S1 and S2, no            murmur, no gallop, no rub, no click   Chest Wall:    No abnormalities observed   Abdomen:     Normal bowel sounds, no masses, no organomegaly, soft     nontender, nondistended, no guarding, no rebound                tenderness   Rectal:     Deferred   Extremities:   Tenderness noted at right anterior knee, immobilizer in place, mild residual effusion " noted.  Maximal tenderness over lateral patella facet.  Range of motion 10 to 90 degrees, stable to varus and valgus stress at 10 and 30 degrees, grade 1A Lachman.  Patient is able to flex and extend toes right foot as well as right ankle with 4 out of 5 strength.  No hip pain on logroll or Stinchfield exam.  Moves all remaining extremities well, no edema, no cyanosis, no redness   Pulses:   Pulses palpable and equal bilaterally   Skin:   No bleeding, bruising or rash   Lymph nodes:   No palpable adenopathy   Neurologic:   Cranial nerves 2 - 12 grossly intact, sensation intact, DTR       present and equal bilaterally           Diagnostic Tests:  Admission on 01/31/2024   Component Date Value Ref Range Status    Hemoglobin A1C 01/31/2024 7.50 (H)  4.80 - 5.60 % Final    Glucose 02/01/2024 107  70 - 130 mg/dL Final     XR Knee 3 View Right    Result Date: 1/31/2024  Impression: Ordering physician's impression is located in the Encounter Note dated 01/31/24.     XR Foot 3+ View Right    Result Date: 1/31/2024  Impression: Ordering physician's impression is located in the Encounter Note dated 01/31/24.      Assessment:    Right patella fracture           Plan:  The patient voiced understanding of the risks, benefits, and alternative forms of treatment that were discussed and the patient consents to proceed with closed treatment of right patella fracture with immobilizer.  Ambulate with physical therapy to increase mobility and assess for potential need for rehab placement.  Hospitalist consulted for medical management.       Maikel King MD      Dictated utilizing Dragon dictation

## 2024-02-01 NOTE — PROGRESS NOTES
"Hospitalist Team      Patient Care Team:  Romeo Chamberlain MD as PCP - General (Family Medicine)      Chief Complaint:  Follow-up HTN and Diabetes.    Subjective    Events noted overnight.  This afternoon, he is pleasant, but oriented only to self.  They have been able to check his blood glucose.  He complains of right knee pain.  Daughter at bedside.    Objective    Vital Signs  Temp:  [97.1 °F (36.2 °C)] 97.1 °F (36.2 °C)  Heart Rate:  [55] 55  Resp:  [16] 16  BP: (123)/(64) 123/64  Oxygen Therapy  SpO2: 95 %  Pulse Oximetry Type: Intermittent  Device (Oxygen Therapy): room air}    Flowsheet Rows      Flowsheet Row First Filed Value   Admission Height 188 cm (74\") Documented at 01/31/2024 1513   Admission Weight 111 kg (245 lb) Documented at 01/31/2024 1513          Physical Exam:    General: Appears stated age in no acute distress.  Lungs: Breath sounds are clear.  Respirations are non-labored.  CV: Regular rate and rhythm.  No murmurs appreciated.  Abdomen: Obese, soft, and non-tender.  MSK: No clubbing, cyanosis, or edema.  Knee brace in place.  Neuro: CN II-XII grossly intact.  Psych: Oriented only to self.    Results Review:     I reviewed the patient's new clinical results.    Lab Results (last 24 hours)       Procedure Component Value Units Date/Time    POC Glucose Once [[id number]]  (Abnormal) Collected: 02/01/24 1717    Specimen: Blood Updated: 02/01/24 1724     Glucose 213 mg/dL     POC Glucose Once [[id number]]  (Abnormal) Collected: 02/01/24 1250    Specimen: Blood Updated: 02/01/24 1256     Glucose 204 mg/dL     POC Glucose Once [[id number]]  (Normal) Collected: 02/01/24 0838    Specimen: Blood Updated: 02/01/24 0844     Glucose 107 mg/dL             Imaging Results (Last 24 Hours)       ** No results found for the last 24 hours. **              Medication Review:   I have reviewed the patient's current medication list    Current Facility-Administered Medications:     acetaminophen (TYLENOL) tablet 650 " mg, 650 mg, Oral, Q4H PRN, Maikel King MD, 650 mg at 02/01/24 0944    atorvastatin (LIPITOR) tablet 10 mg, 10 mg, Oral, Daily, Karen Rajan APRN, 10 mg at 02/01/24 0908    dextrose (D50W) (25 g/50 mL) IV injection 10-50 mL, 10-50 mL, Intravenous, Q15 Min PRN, Pete Back MD    dextrose (GLUTOSE) oral gel 15 g, 15 g, Oral, Q15 Min PRN, Pete Back MD    donepezil (ARICEPT) tablet 10 mg, 10 mg, Oral, Nightly, Karen Rajan APRN    famotidine (PEPCID) tablet 40 mg, 40 mg, Oral, Daily, Maikel King MD, 40 mg at 02/01/24 0908    glucagon (GLUCAGEN) injection 1 mg, 1 mg, Intramuscular, Q15 Min PRN, Pete Back MD    HYDROcodone-acetaminophen (NORCO) 5-325 MG per tablet 1 tablet, 1 tablet, Oral, Q4H PRN, Maikel King MD    insulin aspart (novoLOG) injection 1-200 Units, 1-200 Units, Subcutaneous, 4x Daily With Meals & Nightly, Pete Back MD    insulin aspart (novoLOG) injection 1-200 Units, 1-200 Units, Subcutaneous, PRN, Pete Back MD    insulin detemir (LEVEMIR) injection 1-200 Units, 1-200 Units, Subcutaneous, Nightly - Glucommander, Pete Back MD    levothyroxine (SYNTHROID, LEVOTHROID) tablet 112 mcg, 112 mcg, Oral, Daily, Karen Rajan APRN, 112 mcg at 02/01/24 0908    lisinopril (PRINIVIL,ZESTRIL) tablet 5 mg, 5 mg, Oral, Daily, Karen Rajan APRN, 5 mg at 02/01/24 0908    ondansetron ODT (ZOFRAN-ODT) disintegrating tablet 4 mg, 4 mg, Oral, Q6H PRN **OR** ondansetron (ZOFRAN) injection 4 mg, 4 mg, Intravenous, Q6H PRN, Maikel King MD    polyethylene glycol (MIRALAX) packet 17 g, 17 g, Oral, Daily, Karen Rajan APRN, 17 g at 02/01/24 0908    sodium chloride 0.9 % flush 10 mL, 10 mL, Intravenous, Q12H, Maikel King MD    sodium chloride 0.9 % flush 10 mL, 10 mL, Intravenous, PRN, Maikel King MD    sodium chloride 0.9 % infusion 40 mL, 40 mL, Intravenous, PRN, Maikel King MD      Impressions/Recommendations:    Diabetes  Mellitus, Type 2 in Obese: Bedsides increased.  His PO intake appears adequate so I'll start him back on Actos.  Staff having difficulty administering insulin so will hold off on this. And monitor.  Essential Hypertension: At goal on exam.  No change to current regimen.    Plan for disposition: As per Ortho.    Pete Back MD  02/01/24  17:25 EST

## 2024-02-01 NOTE — PROGRESS NOTES
Contacted by staff regarding patient refusing all medications and glucose checks as well as insulin.  They also note he can be combative.  Glucommander discontinued.  A1c noted at 7.5% and typically on oral medications.

## 2024-02-01 NOTE — CASE MANAGEMENT/SOCIAL WORK
Continued Stay Note  MERCEDEZ Reyes     Patient Name: Joey Schmitt  MRN: [id number]  Today's Date: 2/1/2024    Admit Date: 1/31/2024        Discharge Plan       Row Name 02/01/24 0926       Plan                                        SNF for STR    Plan Comments 0925 am, Referral made to Danika at [facility] and she states she will review and let me know if they have a bed and can accept. CM will follow.                   Discharge Codes    No documentation.                 Expected Discharge Date and Time       Expected Discharge Date Expected Discharge Time    Feb 3, 2024               Angela Flores RN

## 2024-02-01 NOTE — CASE MANAGEMENT/SOCIAL WORK
Discharge Planning Assessment  MERCEDEZ Reyes     Patient Name: Joey Schmitt  MRN: [id number]  Today's Date: 2/1/2024    Admit Date: 1/31/2024    Plan: [facility], can accept on Saturday   Discharge Needs Assessment       Row Name 02/01/24 1212       Living Environment    People in Home spouse    Name(s) of People in Home Betsy, wife    Current Living Arrangements home    Duration at Residence 4 Y    Potentially Unsafe Housing Conditions none    In the past 12 months has the electric, gas, oil, or water company threatened to shut off services in your home? No    Primary Care Provided by self;spouse/significant other    Provides Primary Care For no one    Caregiving Concerns family states that patient will not be able to help with his care at home due to his decreased mobility in LE's    Family Caregiver if Needed spouse;child(mauricio), adult    Family Caregiver Names Betsy wife and three daughters    Quality of Family Relationships helpful;involved;supportive    Able to Return to Prior Arrangements no  per family patient will need SNF for STR at discharge    Living Arrangement Comments Daughter states patient lives with his wife in a single story house with no steps to gain entry       Resource/Environmental Concerns    Resource/Environmental Concerns home accessibility    Home Accessibility Concerns stairs to enter home    Transportation Concerns none       Transportation Needs    In the past 12 months, has lack of transportation kept you from medical appointments or from getting medications? no    In the past 12 months, has lack of transportation kept you from meetings, work, or from getting things needed for daily living? No       Food Insecurity    Within the past 12 months, you worried that your food would run out before you got the money to buy more. Never true    Within the past 12 months, the food you bought just didn't last and you didn't have money to get more. Never true       Transition  Planning    Patient/Family Anticipates Transition to inpatient rehabilitation facility    Patient/Family Anticipated Services at Transition ;skilled nursing    Transportation Anticipated health plan transportation       Discharge Needs Assessment    Readmission Within the Last 30 Days no previous admission in last 30 days    Current Outpatient/Agency/Support Group --  none    Equipment Currently Used at Home none    Concerns to be Addressed adjustment to diagnosis/illness;discharge planning    Concerns Comments per family patient will need SNF for STR at discharge    Anticipated Changes Related to Illness inability to care for self    Equipment Needed After Discharge none    Outpatient/Agency/Support Group Needs skilled nursing facility    Discharge Facility/Level of Care Needs nursing facility, skilled    Provided Post Acute Provider List? Yes    Post Acute Provider List Inpatient Rehab    Provided Post Acute Provider Quality & Resource List? Yes    Post Acute Provider Quality and Resource List Inpatient Rehab    Delivered To Support Person    Support Person sparkle Lindsey    Patient's Choice of Community Agency(s) none    Current Discharge Risk dependent with mobility/activities of daily living    Discharge Coordination/Progress Per patient's daughter Angela patient will need SNF for STR at discharge.                   Discharge Plan       Row Name 02/01/24 1221       Plan    Plan [facility], can accept on Saturday    Patient/Family in Agreement with Plan yes    Plan Comments 0925am, into room and introduced self and role of CM. Discussed discharge disposition with patient and his daughter Rosalia with permission. Patient is currently sitting up in the bed an his daughter is feeding him. Daughter states patient has history of dementia and is agreeable to answer questions. Daughter confirms the info on face sheet is correct and he see's Dr. Romeo Chamberlain as PCP. She states he normally  uses [facility] pharmacy in [place] and they have no problem picking up or paying for his med's. She also states patient does not have a living will and declines information regarding one. Daughter states patient lives with his wife in a single story house with no steps to gain entry and she states patient uses a rollator for mobility. She states he can feed himself at home but his wife and she and  her other two daughters provide his care. She states he no longer drives and his wife or one of his daughters provide transportation as needed. She also states patient has a rollator, rolling walker, hospital bed, grab bar chula lift, built in shower bench and glucometer at home and she does not anticipate needing any other equipment at discharge. Daughter states patient has used home health in the past but is unsure which agency it was. Rosalia states that patient will need SNF for STR at discharge due to his injury and not being able to walker at home and her mother will not be able to provide the care he needs. She states patient has been to [facility] and [facility] in the past and their first choice is [facility]. CM did provide daughter with a Road to Recovery in case these facility could not accept. ELMIRA informed Rosalia that I would make a referral to [facility] and keep family updated and she verbalized understanding. ELMIRA spoke with Danika at [facility] regarding referral and after reviewing she states they can accept on Saturday. ELMIRA received a call from patient's daughter Xiomara Charlton and she states she is in patient's room and would like to speak to me about discharge plans. CM went to room and daughter wanted to speak in chance. ELMIRA updated Xiomara that I spoke with her sister Rosalia and had already made a referral to [facility] and they can accept on Saturday and she is agreeable thanked ELMIRA for assisting with SNF. She states he is taking her mother to a DM appointment today and will let her know. ELMIRA will follow.                   Continued Care and Services - Admitted Since 1/31/2024       Destination       Service Provider Request Status Selected Services Address Phone Fax Patient Preferred    [facility] Pending - Request Sent N/A [address] [facility] [address] [phone] --                  Expected Discharge Date and Time       Expected Discharge Date Expected Discharge Time    Feb 3, 2024            Demographic Summary       Row Name 02/01/24 1212       General Information    Admission Type inpatient    Arrived From home    Referral Source admission list    Reason for Consult discharge planning    Preferred Language English       Contact Information    Permission Granted to Share Info With                    Functional Status    No documentation.                  Psychosocial    No documentation.                  Abuse/Neglect    No documentation.                  Legal    No documentation.                  Substance Abuse    No documentation.                  Patient Forms    No documentation.                     Angela Flores RN

## 2024-02-01 NOTE — PLAN OF CARE
Goal Outcome Evaluation:           Progress: no change  Outcome Evaluation: patient vss. very minimal complaints of pain, controlled by tylenol. working with case management for potential placement.

## 2024-02-01 NOTE — PLAN OF CARE
Goal Outcome Evaluation:              Outcome Evaluation: PT: Patient remains confused within session with difficulty following commands.  Patient requires frequent redirection to task.  Patient requires max assist x2 for supine to sit, incresaed posterior lean in sitting.  Patient attempts sit to stand trials from elevated bed surface with max assist x2.  patient unable to obtain upright posture and frequently attempts to lift foot off the ground while standing.  Paitent dependent x3 to return to supine and scoot up in bed.  Continue to recommend SNF at discharge.      Anticipated Discharge Disposition (PT): skilled nursing facility

## 2024-02-01 NOTE — THERAPY TREATMENT NOTE
Acute Care - Physical Therapy Treatment Note   Zulma Fraire     Patient Name: Joey Schmitt  : 1935  MRN: [id number]  Today's Date: 2024      Visit Dx:     ICD-10-CM ICD-9-CM   1. Closed displaced longitudinal fracture of right patella, initial encounter  S82.021A 822.0     Patient Active Problem List   Diagnosis    Osteoarthritis of right glenohumeral joint    Gastroenteritis    Right patella fracture     Past Medical History:   Diagnosis Date    Cellulitis of foot     Chronic kidney disease     Dementia     Hyperlipidemia     Hypertension     Hypothyroidism     Macular degeneration     Prostate cancer     Renal disorder     Right shoulder pain     SCHEDULED FOR SX    Seasonal allergies     Type 2 diabetes mellitus     Vertigo      Past Surgical History:   Procedure Laterality Date    INCISION AND DRAINAGE LEG Right 2017    Procedure: INCISION AND DRAINAGE LOWER EXTREMITY;  Surgeon: Mikael Dwyer DPM;  Location: [facility];  Service:     PROSTATE SURGERY      SHOULDER SURGERY Left     TOTAL SHOULDER ARTHROPLASTY W/ DISTAL CLAVICLE EXCISION Right 2017    Procedure: TOTAL SHOULDER REVERSE ARTHROPLASTY;  Surgeon: Goyo Bhardwaj MD;  Location: [facility];  Service:      PT Assessment (last 12 hours)       PT Evaluation and Treatment       Row Name 24 0835          Physical Therapy Time and Intention    Document Type therapy note (daily note)  -JW     Mode of Treatment physical therapy  -JW     Patient Effort fair  -     Symptoms Noted During/After Treatment none  -       Row Name 24 0835          General Information    Patient Observations alert;agree to therapy  requires significant encouragement  -     Existing Precautions/Restrictions fall  -     Barriers to Rehab cognitive status;visual deficit  -       Row Name 24 0835          Pain    Pre/Posttreatment Pain Comment no c/o pain  -       Row Name 24 0835          Cognition    Personal Safety Interventions  gait belt;nonskid shoes/slippers when out of bed  -[facility] Name 02/01/24 0835          Bed Mobility    Bed Mobility supine-sit;sit-supine  -     Supine-Sit [place] (Bed Mobility) maximum assist (25% patient effort);2 person assist  -     Sit-Supine [place] (Bed Mobility) maximum assist (25% patient effort);2 person assist  -     Assistive Device (Bed Mobility) bed rails;head of bed elevated  -[facility] Name 02/01/24 0835          Transfers    Transfers sit-stand transfer;stand-sit transfer  -[facility] Name 02/01/24 0835          Sit-Stand Transfer    Sit-Stand [place] (Transfers) maximum assist (25% patient effort);2 person assist;verbal cues  -     Assistive Device (Sit-Stand Transfers) walker, front-wheeled  -[facility] Name 02/01/24 0835          Stand-Sit Transfer    Stand-Sit [place] (Transfers) moderate assist (50% patient effort);2 person assist;verbal cues  -     Assistive Device (Stand-Sit Transfers) walker, front-wheeled  -JW       Row Name 02/01/24 0835          Gait/Stairs (Locomotion)    Comment, (Gait/Stairs) pt with significant posterior lean in standing.  pt with difficulty maintaining foot contact with floor.  pt unsafe to attempt gait at this time  -[facility] Name 02/01/24 0835          Safety Issues, Functional Mobility    Safety Issues Affecting Function (Mobility) impulsivity;insight into deficits/self-awareness;judgment;problem-solving;positioning of assistive device;safety precaution awareness;sequencing abilities;awareness of need for assistance  -JW       Row Name 02/01/24 0835          Balance    Comment, Balance pt requires min/mod assist for sitting balance.  increased posterior lean in sitting and standing  -JW       Row Name 02/01/24 0835          Plan of Care Review    Outcome Evaluation PT: Patient remains confused within session with difficulty following commands.  Patient requires frequent redirection to task.  Patient requires max  assist x2 for supine to sit, incresaed posterior lean in sitting.  Patient attempts sit to stand trials from elevated bed surface with max assist x2.  patient unable to obtain upright posture and frequently attempts to lift foot off the ground while standing.  Paitent dependent x3 to return to supine and scoot up in bed.  Continue to recommend SNF at discharge.  -       Row Name 02/01/24 0835          Positioning and Restraints    Pre-Treatment Position in bed  -     Post Treatment Position bed  -JW     In Bed supine;call light within reach;encouraged to call for assist;exit alarm on;notified nsg  -       Row Name 02/01/24 0835          Progress Summary (PT)    Progress Toward Functional Goals (PT) progress toward functional goals is fair  -               User Key  (r) = Recorded By, (t) = Taken By, (c) = Cosigned By      Initials Name Provider Type    Alexandra Rodriguez, PT Physical Therapist                    Physical Therapy Education       Title: PT OT SLP Therapies (In Progress)       Topic: Physical Therapy (In Progress)       Point: Mobility training (In Progress)       Learning Progress Summary             Patient Acceptance, E,TB, NR,NL by  at 2/1/2024 1304    Acceptance, E,TB, VU by  at 1/31/2024 1530    Nonacceptance, E, NR by  at 1/31/2024 1530                         Point: Home exercise program (In Progress)       Learning Progress Summary             Patient Nonacceptance, E, NR by  at 1/31/2024 1530                                         User Key       Initials Effective Dates Name Provider Type Discipline     06/16/21 -  Pat Domingo, PT Physical Therapist PT     06/16/21 -  Alexandra Hollingsworth PT Physical Therapist PT                  PT Recommendation and Plan  Anticipated Discharge Disposition (PT): skilled nursing facility  Progress Summary (PT)  Progress Toward Functional Goals (PT): progress toward functional goals is fair  Outcome Evaluation: PT: Patient remains  confused within session with difficulty following commands.  Patient requires frequent redirection to task.  Patient requires max assist x2 for supine to sit, incresaed posterior lean in sitting.  Patient attempts sit to stand trials from elevated bed surface with max assist x2.  patient unable to obtain upright posture and frequently attempts to lift foot off the ground while standing.  Paitent dependent x3 to return to supine and scoot up in bed.  Continue to recommend SNF at discharge.   Outcome Measures       Row Name 02/01/24 0835             How much help from another person do you currently need...    Turning from your back to your side while in flat bed without using bedrails? 1  -JW      Moving from lying on back to sitting on the side of a flat bed without bedrails? 1  -JW      Moving to and from a bed to a chair (including a wheelchair)? 1  -JW      Standing up from a chair using your arms (e.g., wheelchair, bedside chair)? 1  -JW      Climbing 3-5 steps with a railing? 1  -JW      To walk in hospital room? 1  -JW      AM-PAC 6 Clicks Score (PT) 6  -JW      Highest Level of Mobility Goal 2 --> Bed activities/dependent transfer  -         Functional Assessment    Outcome Measure Options AM-PAC 6 Clicks Basic Mobility (PT)  -                User Key  (r) = Recorded By, (t) = Taken By, (c) = Cosigned By      Initials Name Provider Type    Alexandra Rodriguez, PT Physical Therapist                     Time Calculation:    PT Charges       Row Name 02/01/24 1306             Time Calculation    Start Time 0835  -      Stop Time 0900  -      Time Calculation (min) 25 min  -JW      PT Received On 02/01/24  -JW      PT - Next Appointment 02/02/24  -         Timed Charges    30297 - PT Therapeutic Exercise Minutes 25  -JW         Total Minutes    Timed Charges Total Minutes 25  -JW       Total Minutes 25  -JW                User Key  (r) = Recorded By, (t) = Taken By, (c) = Cosigned By      Initials Name  Provider Type     Alexandra Hollingsworth, PT Physical Therapist                  Therapy Charges for Today       Code Description Service Date Service Provider Modifiers Qty    18304373503 HC PT THER PROC EA 15 MIN 2/1/2024 Alexandra Hollingsworth, PT GP 2            PT G-Codes  Outcome Measure Options: AM-PAC 6 Clicks Basic Mobility (PT)  AM-PAC 6 Clicks Score (PT): 6    Alexandra Hollingsworth, PT  2/1/2024

## 2024-02-01 NOTE — CONSULTS
[facility]     Romeo Chamberlain MD    Reason for Consult:  Medical management of Diabetes, Hypertension, and Hypothyroidism.    HISTORY OF PRESENT ILLNESS:    Mr. Schmitt is an 87 y/o  male who presents today after suffering a fall at [employer] and suffering an injury to his right knee.  He has a history of dementia, and no family is at the bedside to offer history.  The HPI is taken from review of the chart.  He experienced pain, but was able to bear some weight.  However, as the week progressed, he was not able to bear weight due to pain in his knee and ankle.  He was seen by Dr. Bhardwaj today in clinic and felt to have an ankle sprain and a non-displaced fracture of the right patella.    He is resting comfortably tonight.  He does not complain of pain.  It is unknown to me what he blood glucose levels are at home.      Past Medical History:   Diagnosis Date    Cellulitis of foot     Chronic kidney disease     Dementia     Hyperlipidemia     Hypertension     Hypothyroidism     Macular degeneration     Prostate cancer     Renal disorder     Right shoulder pain     SCHEDULED FOR SX    Seasonal allergies     Type 2 diabetes mellitus     Vertigo      Past Surgical History:   Procedure Laterality Date    INCISION AND DRAINAGE LEG Right 1/11/2017    Procedure: INCISION AND DRAINAGE LOWER EXTREMITY;  Surgeon: Mikael Dwyer DPM;  Location: [facility] OR;  Service:     PROSTATE SURGERY      SHOULDER SURGERY Left     TOTAL SHOULDER ARTHROPLASTY W/ DISTAL CLAVICLE EXCISION Right 12/8/2017    Procedure: TOTAL SHOULDER REVERSE ARTHROPLASTY;  Surgeon: Goyo Bhardwaj MD;  Location: [facility] OR;  Service:      Family History   Problem Relation Age of Onset    Heart disease Father      Social History     Tobacco Use    Smoking status: Former     Packs/day: 1.00     Years: 15.00     Additional pack years: 0.00     Total pack years: 15.00     Types: Cigarettes     Quit date: 1980     Years since  "quittin.1     Passive exposure: Past    Smokeless tobacco: Never    Tobacco comments:     caffeine use: one cup of coffee daily.    Vaping Use    Vaping Use: Never used   Substance Use Topics    Alcohol use: No    Drug use: No     Medications Prior to Admission   Medication Sig Dispense Refill Last Dose    atorvastatin (LIPITOR) 10 MG tablet Take 1 tablet by mouth Daily.   2024    levothyroxine (SYNTHROID, LEVOTHROID) 112 MCG tablet Take 1 tablet by mouth Daily.   2024    lisinopril (PRINIVIL,ZESTRIL) 5 MG tablet Take 1 tablet by mouth Daily.   2024    pioglitazone (ACTOS) 30 MG tablet Take 1 tablet by mouth Daily.   2024    donepezil (ARICEPT) 5 MG tablet Take 2 tablets by mouth Every Night.       Lantus SoloStar 100 UNIT/ML injection pen Inject 15 Units under the skin into the appropriate area as directed Daily.       Ozempic, 2 MG/DOSE, 8 MG/3ML solution pen-injector Inject 2 mg under the skin into the appropriate area as directed 1 (One) Time Per Week.       polyethylene glycol (MiraLax) 17 GM/SCOOP powder Take 17 g by mouth Daily.        Allergies:  Patient has no known allergies.    REVIEW OF SYSTEMS:  Please see the above history of present illness for pertinent positives and negatives.  The remainder of the patient's systems have been reviewed and are negative to the best of my ability given review of chart due to patient's mentation.    Vital Signs  Temp:  [97.7 °F (36.5 °C)-98.3 °F (36.8 °C)] 97.7 °F (36.5 °C)  Heart Rate:  [67-70] 70  Resp:  [16] 16  BP: (116-137)/(65) 137/65  Oxygen Therapy  SpO2: 96 %}  Body mass index is 31.46 kg/m².  Flowsheet Rows      Flowsheet Row First Filed Value   Admission Height 188 cm (74\") Documented at 2024   Admission Weight 111 kg (245 lb) Documented at 2024               Physical Exam:  Physical Exam   Constitutional: Patient appears well developed and well nourished and in no acute distress.  HEENT:   Head: Normocephalic " and atraumatic.   Eyes:  Pupils are equal, round, and reactive to light. EOM are intact. Sclerae are anicteric and noninjected.  Cardiovascular: Regular rate, regular rhythm, S1 normal and S2 normal.  Exam reveals no gallop and no friction rub.  No murmur heard.  Radial pulses are 2+ and symmetric.  Pulmonary/Chest: Lungs are clear to auscultation bilaterally. No respiratory distress. No wheezes. No rhonchi. No rales.   Abdominal: Obese. Soft. Bowel sounds are normal. There is no tenderness.   Musculoskeletal: Normal muscle tone  Extremities: No edema. Pulses are palpable in all 4 extremities.  Neurological: Cranial nerves II-XII are grossly intact with no focal deficits.    Emotional Behavior:    Judgment and Insight: Unknown   Mental Status:  Alertness  Unknown   Memory:  Unknown   Mood and Affect:         Depression  Unknown               Anxiety  Unknown    Debilities:   Physical Weakness  Unknown   Handicaps  Unknown   Disabilities  Unknown   Agitation  Unknown     Results Review:    I reviewed the patient's new clinical results.  Lab Results (most recent)       Procedure Component Value Units Date/Time    Hemoglobin A1c [[id number]]  (Abnormal) Collected: 01/31/24 1519    Specimen: Blood Updated: 01/31/24 1534     Hemoglobin A1C 7.50 %     Narrative:      Hemoglobin A1C Ranges:    Increased Risk for Diabetes  5.7% to 6.4%  Diabetes                     >= 6.5%  Diabetic Goal                < 7.0%            Imaging Results (Most Recent)       None            ECG/EMG Results (most recent)       None              Impressions/Recommendations:    Diabetes Mellitus, Type 2 in Obese: A1c 7.5%.  I'm going to hold his Actos.  Follow accuchecks and start Glucommander.  Essential Hypertension: BP near goal on exam, but trend is good.  Continune to monitor on current regimen.  Hypothyroidism: Continue home replacement dose.    Pete Back MD  01/31/24  19:43 EST

## 2024-02-01 NOTE — PLAN OF CARE
Goal Outcome Evaluation:  Plan of Care Reviewed With: patient        Progress: no change  Outcome Evaluation: Patient remains confused, but has not been rude to staff. No complaints of pain during this shift. Refused all medications and blood glucose check. No shortness of breath or difficulty breathing. VSS

## 2024-02-01 NOTE — DISCHARGE PLACEMENT REQUEST
"Joey Barnett \"SYMONE\" (88 y.o. Male)       Date of Birth   1935    Social Security Number       Address   [address] [place] [address]    Home Phone   [phone]    MRN   [id number]       Cheondoism   None    Marital Status                               Admission Date   1/31/24    Admission Type   Elective    Admitting Provider   Maikel King MD    Attending Provider   Maikel King MD    Department, Room/Bed   [facility] SURG, 1420/1       Discharge Date       Discharge Disposition       Discharge Destination                                 Attending Provider: Maikel King MD    Allergies: No Known Allergies    Isolation: None   Infection: None   Code Status: Prior    Ht: 188 cm (74\")   Wt: 111 kg (245 lb)    Admission Cmt: None   Principal Problem: Right patella fracture [S82.001A]                   Active Insurance as of 1/31/2024       Primary Coverage       Payor Plan Insurance Group Employer/Plan Group    MEDICARE MEDICARE A & B        Payor Plan Address Payor Plan Phone Number Payor Plan Fax Number Effective Dates    [address] [phone]  2/1/2001 - None Entered    [facility] [id number]         Subscriber Name Subscriber Birth Date Member ID       JOEY BARNETT 1935 [id number]               Secondary Coverage       Payor Plan Insurance Group Employer/Plan Group    ANTH BLUE CROSS [facility] SUPP [id number]       Payor Plan Address Payor Plan Phone Number Payor Plan Fax Number Effective Dates    [address]   7/1/2011 - None Entered    [facility] [id number]         Subscriber Name Subscriber Birth Date Member ID       JOEY BARNETT 1935 [id number]                     Emergency Contacts        (Rel.) Home Phone Work Phone Mobile Phone    BarnettBetsy (Spouse) -- -- [phone]    Xiomara Subramanian (Daughter) [phone] -- --    Marcela Roberts (Daughter) -- -- [phone]                "

## 2024-02-02 LAB — GLUCOSE BLDC GLUCOMTR-MCNC: 169 MG/DL (ref 70–130)

## 2024-02-02 PROCEDURE — 99231 SBSQ HOSP IP/OBS SF/LOW 25: CPT | Performed by: INTERNAL MEDICINE

## 2024-02-02 PROCEDURE — 97110 THERAPEUTIC EXERCISES: CPT

## 2024-02-02 PROCEDURE — 99231 SBSQ HOSP IP/OBS SF/LOW 25: CPT | Performed by: NURSE PRACTITIONER

## 2024-02-02 PROCEDURE — 82948 REAGENT STRIP/BLOOD GLUCOSE: CPT

## 2024-02-02 RX ADMIN — LEVOTHYROXINE SODIUM 112 MCG: 0.11 TABLET ORAL at 08:27

## 2024-02-02 RX ADMIN — FAMOTIDINE 40 MG: 20 TABLET ORAL at 08:27

## 2024-02-02 RX ADMIN — LISINOPRIL 5 MG: 5 TABLET ORAL at 08:27

## 2024-02-02 RX ADMIN — POLYETHYLENE GLYCOL 3350 17 G: 17 POWDER, FOR SOLUTION ORAL at 08:27

## 2024-02-02 RX ADMIN — ATORVASTATIN CALCIUM 10 MG: 10 TABLET, FILM COATED ORAL at 08:27

## 2024-02-02 RX ADMIN — PIOGLITAZONE HYDROCHLORIDE 30 MG: 30 TABLET ORAL at 08:27

## 2024-02-02 RX ADMIN — ACETAMINOPHEN 650 MG: 325 TABLET ORAL at 12:21

## 2024-02-02 RX ADMIN — DONEPEZIL HYDROCHLORIDE 10 MG: 5 TABLET, FILM COATED ORAL at 21:04

## 2024-02-02 NOTE — PLAN OF CARE
Goal Outcome Evaluation:  Plan of Care Reviewed With: patient        Progress: no change  Outcome Evaluation: Medicated x1 with tylenol for c/o RLE discomfort; immobilizer on; RLE with 2+ edema, no numbness or tingling, 1+ pedal pulse; family at bedside all shift; plan [facility] tomorrow

## 2024-02-02 NOTE — PROGRESS NOTES
"SERVICE: [facility] HOSPITALIST    Follow-up: Management of hypertension and diabetes    SUBJECTIVE: Patient seen and examined at bedside. Patient reports no acute complaints.  Nursing staff reports patient is oriented x 1.  Family present at bedside.     OBJECTIVE:    Physical exam is largely unchanged from previous exam, except where documented below, examination is accurate as of 2/2/2024    Physical Exam:  General: Patient is awake and alert but oriented to self only.  Elderly male. No acute distress noted.   HENT: Head is atraumatic, normocephalic. Hearing is grossly intact. Nose is without obvious congestion and appears patent. Neck is supple and trachea is midline.   Eyes: Vision is grossly intact. Pupils appear equal and round.   Cardiovascular: Heart has regular rate and rhythm with no murmurs, rubs or gallops noted.   Respiratory: Lungs are clear to ausculation without wheezes, rhonchi or rales.   Abdominal/GI: Soft, nontender, bowel sounds present. No rebound or guarding present.   Extremities: No peripheral edema noted.   Musculoskeletal: Spontaneous movement of bilateral upper and lower extremities against gravity noted.   Skin: Warm and dry.   Psych: Mood and affect are appropriate. Cooperative with exam.   Neuro: No facial asymmetry noted. No focal deficits noted, hearing and vision are grossly intact.     /68 (BP Location: Right arm, Patient Position: Lying)   Pulse 82   Temp 98 °F (36.7 °C) (Oral)   Resp 22   Ht 188 cm (74\")   Wt 111 kg (245 lb)   SpO2 96%   BMI 31.46 kg/m²     MEDS/LABS REVIEWED AND ORDERED    atorvastatin, 10 mg, Oral, Daily  donepezil, 10 mg, Oral, Nightly  famotidine, 40 mg, Oral, Daily  levothyroxine, 112 mcg, Oral, Daily  lisinopril, 5 mg, Oral, Daily  pioglitazone, 30 mg, Oral, Daily  polyethylene glycol, 17 g, Oral, Daily  sodium chloride, 10 mL, Intravenous, Q12H          LAB/DIAGNOSTICS:    Lab Results (last 24 hours)       Procedure " Component Value Units Date/Time    POC Glucose Once [[id number]]  (Abnormal) Collected: 02/01/24 1717    Specimen: Blood Updated: 02/01/24 1724     Glucose 213 mg/dL     POC Glucose Once [[id number]]  (Abnormal) Collected: 02/01/24 1250    Specimen: Blood Updated: 02/01/24 1256     Glucose 204 mg/dL     POC Glucose Once [[id number]]  (Normal) Collected: 02/01/24 0838    Specimen: Blood Updated: 02/01/24 0844     Glucose 107 mg/dL           No orders to display     Results for orders placed during the hospital encounter of 12/04/17    Adult Transthoracic Echo Complete W/ Cont if Necessary Per Protocol    Interpretation Summary  · Normal left ventricular cavity size noted. Left ventricular wall thickness is consistent with mild concentric hypertrophy. Left ventricular diastolic dysfunction is noted (grade I) consistent with impaired relaxation. LVEF is difficult to assess due to LBBB and frequent ectopy, but appears to be low normal (~ 50%).    XR Knee 3 View Right    Result Date: 1/31/2024  Ordering physician's impression is located in the Encounter Note dated 01/31/24.     XR Foot 3+ View Right    Result Date: 1/31/2024  Ordering physician's impression is located in the Encounter Note dated 01/31/24.        ASSESSMENT/PLAN:  Please note portions of this assessment/plan may have been copied and pasted, but I have personally seen this patient and reviewed each line of this assessment and plan for accuracy and made updates to reflect my necessary changes on 2/2/2024    Diabetes mellitus type 2 in obese male  -Glucose not initiated due to difficulty administering, so we will continue to hold for now  -Blood glucose mildly elevated, continue Actos and monitor for effect.   -Would rather patient be mildly hyperglycemic than hypoglycemic.     Essential hypertension-blood pressure remains at goal, no changes needed.           PLAN FOR DISPOSITION: As per Gem Loera DO  02/02/24  07:10 EST    At [facility]  "Health, we believe that sharing information builds trust and better relationships. You are receiving this note because you recently visited [facility]. It is possible you will see health information before a provider has talked with you about it. This kind of information can be easy to misunderstand. To help you fully understand what it means for your health, we urge you to discuss this note with your provider.    \"Dictated utilizing Dragon dictation\"    "

## 2024-02-02 NOTE — CASE MANAGEMENT/SOCIAL WORK
Continued Stay Note  MERCEDEZ Reyes     Patient Name: Joey Schmitt  MRN: [id number]  Today's Date: 2/2/2024    Admit Date: 1/31/2024    Plan: [facility], Saturday   Discharge Plan       Row Name 02/02/24 1042       Plan    Plan [facility], Saturday    Patient/Family in Agreement with Plan yes    Support Person ELMIRA called and spoke with Danika at [facility] and she states they can accept tomorrow. CM followed up with patient and his daughter Xiomara in room today and updated them that [facility] can accept tomorrow and Xiomara verbalized understanding and is agreeable. She had no other questions or concerns regarding discharge plans. CM will follow.                   Discharge Codes    No documentation.                 Expected Discharge Date and Time       Expected Discharge Date Expected Discharge Time    Feb 3, 2024               Angela Flores RN

## 2024-02-02 NOTE — PROGRESS NOTES
DAILY PROGRESS NOTE  2  Patient Care Team:  Romeo Chamberlain MD as PCP - General (Family Medicine)DAILY PROGRESS NOTE  [facility]  LENGTH OF STAY 2 DAYS  Patient Care Team:  Romeo Chamberlain MD as PCP - General (Family Medicine)      Patient Identification:  Name: Joey Schmitt  Age: 88 y.o.  Sex: male  :  1935  MRN: [id number]         Primary Care Physician: Romeo Chamberlain MD    Chief Complaint: Right patella fracture     Subjective:  Interval History: Patient is a 88 y.o.male who presenteed with family for evaluation of Right knee after falling coming out of [employer] on  landing on the knee.  He was able to bear weight to the right lower extremity however 24 hours later was unable to put pressure down the right lower extremity due to pain.  He did present to clinic, aspiration completed of right knee nondisplaced fracture of the lateral facet noted on imaging.  Given the inability to ambulate, inability to complete activities of daily living, he was admitted for pain control and evaluation and mobilization with therapy for possible rehab placement.    Review of Systems:   HEENT: Patient denies any headaches, vision changes, change in hearing, or tinnitus, Patient denies any rhinorrhea,epistaxis, sinus pain, mouth or dental problems, sore throat or hoarseness, or dysphagia  Pulmonary: Patient denies any cough, congestion, SOA, or wheezing  Cardiovascular: Patient denies any chest pain, dyspnea, palpitations, weakness, intolerance of exercise, varicosities, swelling of extremities, known murmur  Gastrointestinal:  Patient denies nausea, vomiting, diarrhea, constipation, loss  of appetite, change in appetite, dysphagia, gas, heartburn, melena, change in bowel habits, use of laxatives or other drugs to alter the function of the gastrointestinal tract.  Genital/Urinary: Patient denies dysuria, change in color of urine, change in frequency of urination, pain with urgency,  "incontinence, retention, or nocturia.  Musculoskeletal: Patient denies increased warmth; redness; or swelling of joints; limitation of function; deformity; crepitation: notes pain in right knee as documented above in HPI.  Denies pain in low back or neck.    Neurological: Patient denies dizziness, tremor, ataxia, difficulty in speaking, change in speech, paresthesia, loss of sensation, seizures, syncope, changes in memory.  Endocrine system: Patient denies tremors, palpitations, intolerance of heat or cold, polyuria, polydipsia, polyphagia, diaphoresis, exophthalmos, or goiter.  Psychological: Patient denies thoughts/plans of harming self or other; depression, insomnia, night terrors, beau, memory loss, disorientation.  Skin: Patient denies any bruising, rashes, discoloration, pruritus, ulcers, decubiti, changes in the hair or nails; abrasions from fall at right knee  Hematopoietic: Patient denies history of spontaneous or excessive bleeding, epistaxis, hematuria, melena, fatigue, enlarged or tender lymph nodes, pallor, history of anemia.      Objective:    Scheduled Meds:atorvastatin, 10 mg, Oral, Daily  donepezil, 10 mg, Oral, Nightly  famotidine, 40 mg, Oral, Daily  levothyroxine, 112 mcg, Oral, Daily  lisinopril, 5 mg, Oral, Daily  pioglitazone, 30 mg, Oral, Daily  polyethylene glycol, 17 g, Oral, Daily  sodium chloride, 10 mL, Intravenous, Q12H      Continuous Infusions:     Vital signs in last 24 hours:  Temp:  [97.1 °F (36.2 °C)-98 °F (36.7 °C)] 98 °F (36.7 °C)  Heart Rate:  [55-82] 82  Resp:  [16-22] 22  BP: (123-145)/(64-69) 141/64    Intake/Output:    Intake/Output Summary (Last 24 hours) at 2/2/2024 0805  Last data filed at 2/1/2024 1736  Gross per 24 hour   Intake 480 ml   Output --   Net 480 ml       Exam:  /64 (BP Location: Right arm, Patient Position: Lying)   Pulse 82   Temp 98 °F (36.7 °C) (Oral)   Resp 22   Ht 188 cm (74\")   Wt 111 kg (245 lb)   SpO2 96%   BMI 31.46 kg/m² "     Constitutional: Alert, cooperative, no distress, AAOx3, resting comfortably  Head:    Normocephalic, without obvious abnormality, atraumatic  Eyes:    PERRLA, conjunctiva/corneas clear, no icterus, no conjunctival                                         pallor, EOM's intact, both eyes          ENT and Mouth:   Lips, tongue, gums normal; oral mucosa pink and moist  Neck:   Supple, symmetrical, trachea midline, no JVD  Respiratory:   Clear to auscultation bilaterally, respirations unlabored  Cardiovascular: Regular rate and rhythm, S1 and S2 normal, no murmur,  no  Rub or gallop.  Pulses normal.    Gastrointestinal:   BS present x 4 Soft, non-tender, bowel sounds active, no masses,  no hepatosplenomegaly  :   No hernia.  Normal exam for sex.        Musculoskeletal: Can complete straight leg raise right lower extremity  Mild  swelling right knee  Knee range of motion 10 to 90 degrees  Stable to varus valgus stress at 10 degrees and 30 degrees  1+ anterior Lachman  Flex/extend all digits right foot, ankle flexion intact with 4 out of 5 strength, 1+ dorsalis pedis pulse, immobilizer intact right lower extremity extremities normal, atraumatic, no cyanosis or edema.  No   arthropathy.  No deformity.  Gait normal  Skin: Abrasion anterior aspect of the knee and proximal tib-fib, Vaseline gauze dressing covered with Kerlix, skin is warm and dry,  no rashes, swelling or palpable lesions  Neurologic:   CNII-XII intact, motor strength grossly intact, sensation grossly intact to light touch, no focal reflexl deficits noted    Psychiatric:   Alert, oriented x 3, no delusions, psychosis,depression,anxiety   Heme/Lymph/Imun:   No bruises, petechiae.  Lymph nodes normal in size/configuration       Data Review:  Lab Results   Component Value Date    CALCIUM 8.4 (L) 11/24/2023       Lab Results   Component Value Date    TROPONINT 75 (C) 11/24/2023     CrCl cannot be calculated (Patient's most recent lab result is older than the  maximum 30 days allowed.).  WEIGHTS:     Wt Readings from Last 1 Encounters:   24 1513 111 kg (245 lb)         Assessment:    Right patella fracture      Plan:  Continue with knee immobilizer right lower extremity, continue working with physical therapy to progress ambulatory activity.  Disposition likely short-term rehab versus extended-care facility per physical therapy recommendations.  Hospitalist for medical management.      Karen Rajan, APRN  2024  08:05 EST    Patient Identification:  Name: Joey Schmitt  Age: 88 y.o.  Sex: male  :  1935  MRN: [id number]         Primary Care Physician: Romeo Chamberlain MD

## 2024-02-02 NOTE — THERAPY TREATMENT NOTE
Acute Care - Physical Therapy Treatment Note   Zulma Fraire     Patient Name: Joey Schmitt  : 1935  MRN: [id number]  Today's Date: 2024      Visit Dx:     ICD-10-CM ICD-9-CM   1. Closed displaced longitudinal fracture of right patella, initial encounter  S82.021A 822.0     Patient Active Problem List   Diagnosis    Osteoarthritis of right glenohumeral joint    Gastroenteritis    Right patella fracture     Past Medical History:   Diagnosis Date    Cellulitis of foot     Chronic kidney disease     Dementia     Hyperlipidemia     Hypertension     Hypothyroidism     Macular degeneration     Prostate cancer     Renal disorder     Right shoulder pain     SCHEDULED FOR SX    Seasonal allergies     Type 2 diabetes mellitus     Vertigo      Past Surgical History:   Procedure Laterality Date    INCISION AND DRAINAGE LEG Right 2017    Procedure: INCISION AND DRAINAGE LOWER EXTREMITY;  Surgeon: Mikael Dwyer DPM;  Location: [facility];  Service:     PROSTATE SURGERY      SHOULDER SURGERY Left     TOTAL SHOULDER ARTHROPLASTY W/ DISTAL CLAVICLE EXCISION Right 2017    Procedure: TOTAL SHOULDER REVERSE ARTHROPLASTY;  Surgeon: Goyo Bhardwaj MD;  Location: [facility] OR;  Service:      PT Assessment (last 12 hours)       PT Evaluation and Treatment       Row Name 24 0820          Physical Therapy Time and Intention    Subjective Information no complaints  -BP     Document Type therapy note (daily note)  -BP     Mode of Treatment physical therapy  -BP     Patient Effort fair  -BP     Comment Patient requires significant and repeated encouragement to participate with maximal effort.  -BP       Row Name 24 0820          General Information    Patient Profile Reviewed yes  -BP     Patient/Family/Caregiver Comments/Observations Patient supine in bed with HOB elevated. Patient agreeable to participate. RN present initially. Daughter presents during treatment.  -BP     Existing Precautions/Restrictions  "fall  cognitive status  -BP     Limitations/Impairments safety/cognitive  -BP     Risks Reviewed patient:;LOB;increased discomfort  -BP     Benefits Reviewed patient:;improve function;increase independence;increase strength  -BP     Barriers to Rehab cognitive status;visual deficit  -BP       Row Name 02/02/24 0820          Pain    Pre/Posttreatment Pain Comment Patient does not complain of pain at rest or with mobility  -BP       Row Name 02/02/24 0820          Cognition    Cognitive Status Patient confused throughout, requires redirection  -BP     Personal Safety Interventions gait belt;nonskid shoes/slippers when out of bed  -BP       Row Name 02/02/24 0820          Bed Mobility    Bed Mobility supine-sit  -BP     Supine-Sit [place] (Bed Mobility) maximum assist (25% patient effort)  x 3  -BP     Bed Mobility, Safety Issues cognitive deficits limit understanding;decreased use of legs for bridging/pushing  -BP     Assistive Device (Bed Mobility) head of bed elevated  -BP     Comment, (Bed Mobility) Draw sheet not under patient. Patient requires max cues to perform with maximal independence. Patient states \"just pull me up.\"  -BP       Row Name 02/02/24 0820          Transfers    Transfers sit-stand transfer;stand-sit transfer;stand pivot/stand step transfer  -BP     Comment, (Transfers) Patient performs sit to stand transfer from elevated bed surface x 2 with max A x 3. Requires max/dependent x 3 for stand pivot transfer bed to chair. chula placed in chair for nursing staff in order to utilize chula lift for transfer back to bed.  -BP       Row Name 02/02/24 0820          Sit-Stand Transfer    Sit-Stand [place] (Transfers) maximum assist (25% patient effort);verbal cues  x 3  -BP     Assistive Device (Sit-Stand Transfers) walker, front-wheeled  -BP       Row Name 02/02/24 0820          Stand-Sit Transfer    Stand-Sit [place] (Transfers) maximum assist (25% patient effort)  x 3  -BP     Assistive " Device (Stand-Sit Transfers) walker, front-wheeled  -BP       Row Name 02/02/24 0820          Stand Pivot/Stand Step Transfer    Stand Pivot/Stand Step [place] (Transfers) maximum assist (25% patient effort);dependent (less than 25% patient effort)  x 3  -BP     Assistive Device (Stand Pivot Stand Step Transfer) walker, front-wheeled  -BP       Row Name 02/02/24 0820          Gait/Stairs (Locomotion)    Comment, (Gait/Stairs) Patient able to take 3-4 side steps along EOB to the L with min A however requires max A for side stepping to the right.  -BP       Row Name 02/02/24 0820          Safety Issues, Functional Mobility    Safety Issues Affecting Function (Mobility) awareness of need for assistance;insight into deficits/self-awareness;positioning of assistive device;at risk behavior observed;ability to follow commands;problem-solving;sequencing abilities;judgment;impulsivity  -BP     Comment, Safety Issues/Impairments (Mobility) requires significant cues for safety throughout  -BP       Row Name 02/02/24 0820          Balance    Comment, Balance sitting balance-initially requires mod/max A for static sitting due to posterior lean. With significant cues he was able to progress to CGA/min A  -BP       Row Name 02/02/24 0820          Plan of Care Review    Plan of Care Reviewed With patient  -BP     Outcome Evaluation PT: Patient performs supine to sit transfer with max A x 3, sit to/from stand transfers x 2 from elevated bed surface with max A x 3, and stand pivot transfer bed to chair to the right with max/dependent x 3 with FWW. Patient able to take 3-4 side steps to the L with min A x 2 with FWW. Patient continues to require significant encouragement to perform mobility with maximal effort and independence. Continue to recommend SNF/ECF at discharge.  -BP       Row Name 02/02/24 0820          Positioning and Restraints    Pre-Treatment Position in bed  -BP     Post Treatment Position chair  -BP     In Chair  notified nsg;reclined;call light within reach;encouraged to call for assist;exit alarm on;with family/caregiver  with chula pad beneath patient  -BP       Row Name 02/02/24 0820          Progress Summary (PT)    Progress Toward Functional Goals (PT) unable to show any progress toward functional goals  -       Row Name 02/02/24 0820          Therapy Plan Review/Discharge Plan (PT)    Therapy Plan Review (PT) patient;risks/benefits reviewed;participants included  -               User Key  (r) = Recorded By, (t) = Taken By, (c) = Cosigned By      Initials Name Provider Type    BP Pat Domingo, PT Physical Therapist                    Physical Therapy Education       Title: PT OT SLP Therapies (In Progress)       Topic: Physical Therapy (In Progress)       Point: Mobility training (In Progress)       Learning Progress Summary             Patient Acceptance, E,TB, NR,NL by  at 2/2/2024 0947    Acceptance, E,TB, NR,NL by  at 2/1/2024 1304    Acceptance, E,TB, VU by  at 1/31/2024 1530    Nonacceptance, E, NR by  at 1/31/2024 1530                         Point: Home exercise program (In Progress)       Learning Progress Summary             Patient Nonacceptance, E, NR by  at 1/31/2024 1530                                         User Key       Initials Effective Dates Name Provider Type Discipline     06/16/21 -  Pat Domingo, PT Physical Therapist PT     06/16/21 -  Alexandra Hollingsworth PT Physical Therapist PT                  PT Recommendation and Plan  Anticipated Discharge Disposition (PT): skilled nursing facility  Planned Therapy Interventions (PT): balance training, bed mobility training, gait training, transfer training, patient/family education  Therapy Frequency (PT): daily  Progress Summary (PT)  Progress Toward Functional Goals (PT): unable to show any progress toward functional goals  Plan of Care Reviewed With: patient  Outcome Evaluation: PT: Patient performs supine to sit transfer  with max A x 3, sit to/from stand transfers x 2 from elevated bed surface with max A x 3, and stand pivot transfer bed to chair to the right with max/dependent x 3 with FWW. Patient able to take 3-4 side steps to the L with min A x 2 with FWW. Patient continues to require significant encouragement to perform mobility with maximal effort and independence. Continue to recommend SNF/ECF at discharge.   Outcome Measures       Row Name 02/02/24 0820 02/01/24 0835          How much help from another person do you currently need...    Turning from your back to your side while in flat bed without using bedrails? 1  -BP 1  -JW     Moving from lying on back to sitting on the side of a flat bed without bedrails? 1  -BP 1  -JW     Moving to and from a bed to a chair (including a wheelchair)? 1  -BP 1  -JW     Standing up from a chair using your arms (e.g., wheelchair, bedside chair)? 1  -BP 1  -JW     Climbing 3-5 steps with a railing? 1  -BP 1  -JW     To walk in hospital room? 1  -BP 1  -JW     AM-PAC 6 Clicks Score (PT) 6  -BP 6  -JW     Highest Level of Mobility Goal 2 --> Bed activities/dependent transfer  -BP 2 --> Bed activities/dependent transfer  -JW        Functional Assessment    Outcome Measure Options AM-PAC 6 Clicks Basic Mobility (PT)  -BP AM-PAC 6 Clicks Basic Mobility (PT)  -JW               User Key  (r) = Recorded By, (t) = Taken By, (c) = Cosigned By      Initials Name Provider Type    Pat Triana, PT Physical Therapist    Alexandra Rodriguez, PT Physical Therapist                     Time Calculation:    PT Charges       Row Name 02/02/24 0950             Time Calculation    Start Time 0820  -BP      Stop Time 0845  -BP      Time Calculation (min) 25 min  -BP      PT Received On 02/02/24  -BP      PT - Next Appointment 02/03/24  -BP                User Key  (r) = Recorded By, (t) = Taken By, (c) = Cosigned By      Initials Name Provider Type    Pat Triana, PT Physical Therapist                   Therapy Charges for Today       Code Description Service Date Service Provider Modifiers Qty    57365049748 HC PT THER PROC EA 15 MIN 2/2/2024 Pat Domingo, PT GP 2    30774095471 HC PT CARE PLAN EACH 15 MIN 2/2/2024 Pat Domingo, PT GP 2            PT G-Codes  Outcome Measure Options: AM-PAC 6 Clicks Basic Mobility (PT)  AM-PAC 6 Clicks Score (PT): 6    Pat Domingo PT  2/2/2024

## 2024-02-02 NOTE — PLAN OF CARE
Goal Outcome Evaluation:  Plan of Care Reviewed With: patient           Outcome Evaluation: PT: Patient performs supine to sit transfer with max A x 3, sit to/from stand transfers x 2 from elevated bed surface with max A x 3, and stand pivot transfer bed to chair to the right with max/dependent x 3 with FWW. Patient able to take 3-4 side steps to the L with min A x 2 with FWW. Patient continues to require significant encouragement to perform mobility with maximal effort and independence. Continue to recommend SNF/ECF at discharge.      Anticipated Discharge Disposition (PT): skilled nursing facility

## 2024-02-02 NOTE — PLAN OF CARE
Goal Outcome Evaluation:  Plan of Care Reviewed With: patient           Outcome Evaluation: VSS. Oriented to self  only. Denies pain. Slept most of shift. Knee immobilizer in place. CMS wnls.

## 2024-02-03 VITALS
RESPIRATION RATE: 20 BRPM | OXYGEN SATURATION: 97 % | SYSTOLIC BLOOD PRESSURE: 148 MMHG | HEIGHT: 74 IN | BODY MASS INDEX: 31.44 KG/M2 | HEART RATE: 64 BPM | WEIGHT: 245 LBS | DIASTOLIC BLOOD PRESSURE: 85 MMHG | TEMPERATURE: 97.6 F

## 2024-02-03 LAB — GLUCOSE BLDC GLUCOMTR-MCNC: 107 MG/DL (ref 70–130)

## 2024-02-03 PROCEDURE — 82948 REAGENT STRIP/BLOOD GLUCOSE: CPT

## 2024-02-03 PROCEDURE — 97530 THERAPEUTIC ACTIVITIES: CPT

## 2024-02-03 RX ORDER — ASPIRIN 81 MG/1
81 TABLET ORAL 2 TIMES DAILY
Qty: 60 TABLET | Refills: 0 | Status: SHIPPED | OUTPATIENT
Start: 2024-02-03

## 2024-02-03 RX ORDER — HYDROCODONE BITARTRATE AND ACETAMINOPHEN 5; 325 MG/1; MG/1
1 TABLET ORAL EVERY 4 HOURS PRN
Start: 2024-02-03 | End: 2024-02-06

## 2024-02-03 RX ORDER — INSULIN GLARGINE 100 [IU]/ML
15 INJECTION, SOLUTION SUBCUTANEOUS DAILY
Start: 2024-02-03

## 2024-02-03 RX ORDER — ASPIRIN 81 MG/1
81 TABLET ORAL 2 TIMES DAILY
Status: DISCONTINUED | OUTPATIENT
Start: 2024-02-03 | End: 2024-02-03 | Stop reason: HOSPADM

## 2024-02-03 RX ORDER — SEMAGLUTIDE 2.68 MG/ML
2 INJECTION, SOLUTION SUBCUTANEOUS WEEKLY
Start: 2024-02-03

## 2024-02-03 RX ADMIN — FAMOTIDINE 40 MG: 20 TABLET ORAL at 08:26

## 2024-02-03 RX ADMIN — LISINOPRIL 5 MG: 5 TABLET ORAL at 08:24

## 2024-02-03 RX ADMIN — LEVOTHYROXINE SODIUM 112 MCG: 0.11 TABLET ORAL at 08:27

## 2024-02-03 RX ADMIN — PIOGLITAZONE HYDROCHLORIDE 30 MG: 30 TABLET ORAL at 08:26

## 2024-02-03 RX ADMIN — POLYETHYLENE GLYCOL 3350 17 G: 17 POWDER, FOR SOLUTION ORAL at 08:25

## 2024-02-03 RX ADMIN — ASPIRIN 81 MG: 81 TABLET, COATED ORAL at 08:29

## 2024-02-03 RX ADMIN — ATORVASTATIN CALCIUM 10 MG: 10 TABLET, FILM COATED ORAL at 08:26

## 2024-02-03 NOTE — PLAN OF CARE
Goal Outcome Evaluation:  Plan of Care Reviewed With: patient           Outcome Evaluation: No complaints of pain this shift. Slept the majority of the shift. Immobilizer in place at all times. CMS within normal  limits. To be transferred to [facility] today.

## 2024-02-03 NOTE — THERAPY TREATMENT NOTE
Acute Care - Physical Therapy Treatment Note   Zulma Fraire     Patient Name: Joey Schmitt  : 1935  MRN: [id number]  Today's Date: 2/3/2024      Visit Dx:     ICD-10-CM ICD-9-CM   1. Closed displaced longitudinal fracture of right patella, initial encounter  S82.021A 822.0     Patient Active Problem List   Diagnosis    Osteoarthritis of right glenohumeral joint    Gastroenteritis    Right patella fracture     Past Medical History:   Diagnosis Date    Cellulitis of foot     Chronic kidney disease     Dementia     Hyperlipidemia     Hypertension     Hypothyroidism     Macular degeneration     Prostate cancer     Renal disorder     Right shoulder pain     SCHEDULED FOR SX    Seasonal allergies     Type 2 diabetes mellitus     Vertigo      Past Surgical History:   Procedure Laterality Date    INCISION AND DRAINAGE LEG Right 2017    Procedure: INCISION AND DRAINAGE LOWER EXTREMITY;  Surgeon: Mikael Dwyer DPM;  Location: [facility];  Service:     PROSTATE SURGERY      SHOULDER SURGERY Left     TOTAL SHOULDER ARTHROPLASTY W/ DISTAL CLAVICLE EXCISION Right 2017    Procedure: TOTAL SHOULDER REVERSE ARTHROPLASTY;  Surgeon: Goyo Bhardwaj MD;  Location: [facility];  Service:      PT Assessment (last 12 hours)       PT Evaluation and Treatment       Row Name 24 0989          Physical Therapy Time and Intention    Subjective Information no complaints  -EA     Document Type therapy note (daily note)  -EA     Mode of Treatment physical therapy  -EA     Patient Effort good  -EA     Symptoms Noted During/After Treatment none  -EA     Comment family in room  -EA       Row Name 24 4953          General Information    Patient Profile Reviewed yes  -EA     Patient Observations alert;cooperative;agree to therapy  -EA     Existing Precautions/Restrictions fall  -EA     Limitations/Impairments safety/cognitive  -EA       Row Name 24 6179          Living Environment    Current Living Arrangements  home  -EA       Row Name 02/03/24 0937          Pain    Pre/Posttreatment Pain Comment pt requested to be gentle  -EA     Pain Intervention(s) Repositioned  -EA       Row Name 02/03/24 0937          Cognition    Personal Safety Interventions gait belt;nonskid shoes/slippers when out of bed  -EA       Row Name 02/03/24 0937          Bed Mobility    Bed Mobility supine-sit;sit-supine  -EA     Supine-Sit [place] (Bed Mobility) maximum assist (25% patient effort);2 person assist  -EA     Sit-Supine [place] (Bed Mobility) maximum assist (25% patient effort);2 person assist;verbal cues  -EA     Bed Mobility, Safety Issues cognitive deficits limit understanding;decreased use of legs for bridging/pushing  -EA     Assistive Device (Bed Mobility) head of bed elevated  -EA     Comment, (Bed Mobility) patient improved effort today and was able to use UE and LLE to initiate transfer  -EA       Row Name 02/03/24 0937          Transfers    Transfers sit-stand transfer;stand-sit transfer;stand pivot/stand step transfer  -EA     Comment, (Transfers) 3 sit to stand attempts from bed with Max A x2 , once standing pt required min A x2 people. cues for knee ext and proper UE placement on RW.  -EA       Row Name 02/03/24 0937          Sit-Stand Transfer    Sit-Stand [place] (Transfers) maximum assist (25% patient effort);verbal cues;2 person assist  -EA     Assistive Device (Sit-Stand Transfers) walker, front-wheeled  -EA     Comment, (Sit-Stand Transfer) elevated bed  -EA       Row Name 02/03/24 0937          Stand-Sit Transfer    Stand-Sit [place] (Transfers) maximum assist (25% patient effort)  -EA     Assistive Device (Stand-Sit Transfers) walker, front-wheeled  -EA       Row Name 02/03/24 0937          Gait/Stairs (Locomotion)    Comment, (Gait/Stairs) patient took lateral steps to the right with RW and min A x2. knee immobilizer donned entire session  -EA       Row Name 02/03/24 0937          Safety Issues,  Functional Mobility    Safety Issues Affecting Function (Mobility) ability to follow commands;awareness of need for assistance;impulsivity;insight into deficits/self-awareness;problem-solving;positioning of assistive device;safety precautions follow-through/compliance;safety precaution awareness;sequencing abilities  -       Row Name 02/03/24 0937          Balance    Balance Assessment sitting static balance  -EA     Static Sitting Balance contact guard  SBA to min A depending on patient fatigue and cognition. cues for hand placement and anterior trunk lean  -EA       Row Name 02/03/24 0937          Motor Skills    Motor Skills coordination;posture  -EA     Therapeutic Exercise hip  -EA       Row Name 02/03/24 0937          Posture    Posture postural control  sitting eob x6mins  -       Row Name 02/03/24 0937          Hip (Therapeutic Exercise)    Hip (Therapeutic Exercise) AROM (active range of motion);strengthening exercise  -EA     Hip AROM (Therapeutic Exercise) left  -EA     Hip Strengthening (Therapeutic Exercise) left;flexion;aBduction;supine  AP,  -       Row Name 02/03/24 0937          Plan of Care Review    Plan of Care Reviewed With patient  -EA     Progress improving  -EA     Outcome Evaluation Patient agreeable, family present. states he's going to rehab later today. Patient requires Max A x2 for supine<>sit transfers using bed rail and HOB elevated, knee immobilizer donned entire session. REadjusted as well, education on proper use. Patient performed sit<>stands from bed x 3 reps with Max x2, stood 10 seconds, able to take 4 steps laterally to the right with RW with Min A x2. Improved participation today .  -EA       Row Name 02/03/24 0937          Positioning and Restraints    Pre-Treatment Position in bed  -EA     In Bed supine;notified nsg;call light within reach;encouraged to call for assist;with family/caregiver  -       Row Name 02/03/24 0937          Progress Summary (PT)    Progress  Toward Functional Goals (PT) progress toward functional goals is fair  -EA     Daily Progress Summary (PT) improved effort  -EA     Barriers to Overall Progress (PT) weakness/cognition  -EA       Row Name 02/03/24 0937          Therapy Plan Review/Discharge Plan (PT)    Therapy Plan Review (PT) patient;spouse/significant other;participants included;participants voiced agreement with care plan;daughter  -EA               User Key  (r) = Recorded By, (t) = Taken By, (c) = Cosigned By      Initials Name Provider Type    Elyse Marie PTA Physical Therapist Assistant                    Physical Therapy Education       Title: PT OT SLP Therapies (Resolved)       Topic: Physical Therapy (Resolved)       Point: Mobility training (Resolved)       Learning Progress Summary             Patient Acceptance, E,TB, NR,NL by  at 2/2/2024 0947    Acceptance, E,TB, NR,NL by  at 2/1/2024 1304    Acceptance, E,TB, VU by  at 1/31/2024 1530    Nonacceptance, E, NR by  at 1/31/2024 1530                         Point: Home exercise program (Resolved)       Learning Progress Summary             Patient Nonacceptance, E, NR by  at 1/31/2024 1530                                         User Key       Initials Effective Dates Name Provider Type Discipline     06/16/21 -  Pat Domingo, PT Physical Therapist PT     06/16/21 -  Alexandra Hollingsworth, PT Physical Therapist PT                  PT Recommendation and Plan  Anticipated Discharge Disposition (PT): skilled nursing facility  Progress Summary (PT)  Progress Toward Functional Goals (PT): progress toward functional goals is fair  Daily Progress Summary (PT): improved effort  Barriers to Overall Progress (PT): weakness/cognition  Plan of Care Reviewed With: patient  Progress: improving  Outcome Evaluation: Patient agreeable, family present. states he's going to rehab later today. Patient requires Max A x2 for supine<>sit transfers using bed rail and HOB elevated, knee  immobilizer donned entire session. REadjusted as well, education on proper use. Patient performed sit<>stands from bed x 3 reps with Max x2, stood 10 seconds, able to take 4 steps laterally to the right with RW with Min A x2. Improved participation today .   Outcome Measures       Row Name 02/03/24 0937 02/02/24 0820 02/01/24 0835       How much help from another person do you currently need...    Turning from your back to your side while in flat bed without using bedrails? 1  -EA 1  -BP 1  -JW    Moving from lying on back to sitting on the side of a flat bed without bedrails? 1  -EA 1  -BP 1  -JW    Moving to and from a bed to a chair (including a wheelchair)? 2  -EA 1  -BP 1  -JW    Standing up from a chair using your arms (e.g., wheelchair, bedside chair)? 2  -EA 1  -BP 1  -JW    Climbing 3-5 steps with a railing? 1  -EA 1  -BP 1  -JW    To walk in hospital room? 1  -EA 1  -BP 1  -JW    AM-PAC 6 Clicks Score (PT) 8  -EA 6  -BP 6  -JW    Highest Level of Mobility Goal 3 --> Sit at edge of bed  -EA 2 --> Bed activities/dependent transfer  -BP 2 --> Bed activities/dependent transfer  -JW       Functional Assessment    Outcome Measure Options AM-PAC 6 Clicks Basic Mobility (PT)  -EA AM-PAC 6 Clicks Basic Mobility (PT)  -BP AM-PAC 6 Clicks Basic Mobility (PT)  -JW              User Key  (r) = Recorded By, (t) = Taken By, (c) = Cosigned By      Initials Name Provider Type    BP Pat Domingo, PT Physical Therapist    Alexandra Rodriguez, PT Physical Therapist    Elyse Marie PTA Physical Therapist Assistant                     Time Calculation:    PT Charges       Row Name 02/03/24 1048             Time Calculation    Start Time 0937  -EA      Stop Time 1000  -EA      Time Calculation (min) 23 min  -EA                User Key  (r) = Recorded By, (t) = Taken By, (c) = Cosigned By      Initials Name Provider Type    Elyse Marie PTA Physical Therapist Assistant                  Therapy Charges for Today        Code Description Service Date Service Provider Modifiers Qty    37634167303  PT THERAPEUTIC ACT EA 15 MIN 2/3/2024 Elyse Nelson, PTA GP 1    26212689355 HC PT THER SUPP EA 15 MIN 2/3/2024 Elyse Nelson, PTA GP 1            PT G-Codes  Outcome Measure Options: AM-PAC 6 Clicks Basic Mobility (PT)  AM-PAC 6 Clicks Score (PT): 8    Elyse Nelson PTA  2/3/2024

## 2024-02-03 NOTE — PROGRESS NOTES
"DAILY PROGRESS NOTE  [facility]  3  ORTHOPEDIC SURGERY DAILY PROGRESS NOTE  [facility]  LENGTH OF STAY 3 DAYS      Patient Identification:  Name: Joey Schmitt  Age: 88 y.o.  Sex: male  :  1935  MRN: [id number]         Primary Care Physician: Romeo Chamberlain MD      Subjective:  Interval History: No issues overnight, pain well-controlled    Objective:    Scheduled Meds:atorvastatin, 10 mg, Oral, Daily  donepezil, 10 mg, Oral, Nightly  famotidine, 40 mg, Oral, Daily  levothyroxine, 112 mcg, Oral, Daily  lisinopril, 5 mg, Oral, Daily  pioglitazone, 30 mg, Oral, Daily  polyethylene glycol, 17 g, Oral, Daily  sodium chloride, 10 mL, Intravenous, Q12H      Continuous Infusions:     Vital signs in last 24 hours:  Temp:  [97.6 °F (36.4 °C)-97.8 °F (36.6 °C)] 97.6 °F (36.4 °C)  Heart Rate:  [] 104  Resp:  [20] 20  BP: (131-150)/(69-76) 145/76    Intake/Output:    Intake/Output Summary (Last 24 hours) at 2/3/2024 0754  Last data filed at 2024 1344  Gross per 24 hour   Intake 480 ml   Output --   Net 480 ml       Exam:  /76 (BP Location: Right arm, Patient Position: Lying)   Pulse 104   Temp 97.6 °F (36.4 °C) (Oral)   Resp 20   Ht 188 cm (74\")   Wt 111 kg (245 lb)   SpO2 97%   BMI 31.46 kg/m²   General: No acute distress, Aox3  Pulmonary: Unlabored breathing  Cardiovascular: Regular rate and rhythm   RLE  Sensation normal  2+ dorsalis pedis and posterior tibial pulse  5 out of 5 ankle plantarflexion dorsiflexion inversion and eversion  KI in place  No signs of DVT or compartment syndrome                Data Review:  Lab Results   Component Value Date    CALCIUM 8.4 (L) 2023       Lab Results   Component Value Date    TROPONINT 75 (C) 2023     CrCl cannot be calculated (Patient's most recent lab result is older than the maximum 30 days allowed.).  WEIGHTS:     Wt Readings from Last 1 Encounters:   24 1513 111 kg (245 lb)         Assessment:    " Right patella fracture      Plan:  88-year-old male with a nondisplaced right patella fracture  Knee immobilizer to right lower extremity at all times  PT  Dispo planning  DC to [facility] today.  Dvt ppx aspirin 81 BID  DC med rec done today          Mitesh Andrade MD  2/3/2024  07:54 EST

## 2024-02-03 NOTE — NURSING NOTE
Patient more confused this a.m. Attempted to stick his hand in this nurse's shirt. Grabbing bed rails, shaking and yelling. Behavior redirected with good effect.

## 2024-02-03 NOTE — NURSING NOTE
Nursing IP/EMS Transfer  Joey Schmitt  88 y.o.  male    HPI :   No chief complaint on file.      Admitting doctor:   Maikel King MD    Admitting diagnosis:   The encounter diagnosis was Closed displaced longitudinal fracture of right patella, initial encounter.    Code status:   Current Code Status       Date Active Code Status Order ID Comments User Context       Prior            Allergies:   Patient has no known allergies.    Isolation:   No active isolations    Intake and Output    Intake/Output Summary (Last 24 hours) at 2/3/2024 1036  Last data filed at 2/2/2024 1344  Gross per 24 hour   Intake 240 ml   Output --   Net 240 ml       Weight:       01/31/24  1513   Weight: 111 kg (245 lb)       Most recent vitals:   Vitals:    02/02/24 1525 02/02/24 1956 02/03/24 0546 02/03/24 0824   BP: 150/69 131/70 145/76 148/85   BP Location: Right arm Right arm Right arm    Patient Position: Sitting Lying Lying    Pulse: 69 69 104 64   Resp: 20 20 20    Temp: 97.8 °F (36.6 °C) 97.6 °F (36.4 °C) 97.6 °F (36.4 °C)    TempSrc: Oral Oral Oral    SpO2: 93% 97% 97%    Weight:       Height:           Active LDAs/IV Access:   Lines, Drains & Airways       Active LDAs       None                    Labs (abnormal labs have a star):   Lab Results (last 24 hours)       Procedure Component Value Units Date/Time    POC Glucose Once [[id number]]  (Normal) Collected: 02/03/24 0855    Specimen: Blood Updated: 02/03/24 0904     Glucose 107 mg/dL     POC Glucose Once [[id number]]  (Abnormal) Collected: 02/02/24 2027    Specimen: Blood Updated: 02/02/24 2033     Glucose 169 mg/dL              EKG:   No orders to display       Current Medications:     Current Facility-Administered Medications:     acetaminophen (TYLENOL) tablet 650 mg, 650 mg, Oral, Q4H PRN, Maikel King MD, 650 mg at 02/02/24 1221    aspirin EC tablet 81 mg, 81 mg, Oral, BID, Mitesh Andrade MD, 81 mg at 02/03/24 0829    atorvastatin (LIPITOR) tablet 10 mg,  10 mg, Oral, Daily, Karen Rajan APRN, 10 mg at 24 08    donepezil (ARICEPT) tablet 10 mg, 10 mg, Oral, Nightly, Karen Rajan APRN, 10 mg at 24    famotidine (PEPCID) tablet 40 mg, 40 mg, Oral, Daily, Maikel King MD, 40 mg at 24    HYDROcodone-acetaminophen (NORCO) 5-325 MG per tablet 1 tablet, 1 tablet, Oral, Q4H PRN, Maikel King MD    levothyroxine (SYNTHROID, LEVOTHROID) tablet 112 mcg, 112 mcg, Oral, Daily, Karen Rajan APRN, 112 mcg at 24    lisinopril (PRINIVIL,ZESTRIL) tablet 5 mg, 5 mg, Oral, Daily, Karen Rajan APRN, 5 mg at 24 08    ondansetron ODT (ZOFRAN-ODT) disintegrating tablet 4 mg, 4 mg, Oral, Q6H PRN **OR** ondansetron (ZOFRAN) injection 4 mg, 4 mg, Intravenous, Q6H PRN, Maikel King MD    pioglitazone (ACTOS) tablet 30 mg, 30 mg, Oral, Daily, Pete Back MD, 30 mg at 24 08    polyethylene glycol (MIRALAX) packet 17 g, 17 g, Oral, Daily, Karen Rajan APRN, 17 g at 24 0825    sodium chloride 0.9 % flush 10 mL, 10 mL, Intravenous, Q12H, Maikel King MD    sodium chloride 0.9 % flush 10 mL, 10 mL, Intravenous, PRN, Maikel King MD    sodium chloride 0.9 % infusion 40 mL, 40 mL, Intravenous, PRN, Maikel King MD     Imaging results:  Imaging Results (Last 72 Hours)       ** No results found for the last 72 hours. **             Ambulatory status:   - non ambulatory- lift required at this time.     Social issues:   Social History     Socioeconomic History    Marital status:    Tobacco Use    Smoking status: Former     Packs/day: 1.00     Years: 15.00     Additional pack years: 0.00     Total pack years: 15.00     Types: Cigarettes     Quit date:      Years since quittin.1     Passive exposure: Past    Smokeless tobacco: Never    Tobacco comments:     caffeine use: one cup of coffee daily.    Vaping Use    Vaping Use: Never used   Substance and Sexual Activity     Alcohol use: No    Drug use: No    Sexual activity: Defer       NIH Stroke Scale:         Jaymie Morrison RN  02/03/24 10:36 EST

## 2024-02-03 NOTE — CASE MANAGEMENT/SOCIAL WORK
Case Management Discharge Note      Final Note: DC to SNF    Provided Post Acute Provider List?: Yes  Post Acute Provider List: Inpatient Rehab  Provided Post Acute Provider Quality & Resource List?: Yes  Post Acute Provider Quality and Resource List: Inpatient Rehab  Delivered To: Support Person  Support Person: CM called and spoke with Danika at [facility] and she states they can accept tomorrow. CM folowed up with patient and his daughter Xiomara in room today and updated them that [facility] can accept tomorrow and Xiomara verbalized understanding and is agreeable. She had no other questions or concerns regarding discharge plans. CM will follow.    Selected Continued Care - Admitted Since 1/31/2024       Destination Coordination complete.      Service Provider Selected Services Address Phone Fax Patient Preferred    [facility] [address] [place] [id number] [phone] --              Durable Medical Equipment    No services have been selected for the patient.                Dialysis/Infusion    No services have been selected for the patient.                Home Medical Care    No services have been selected for the patient.                Therapy    No services have been selected for the patient.                Community Resources    No services have been selected for the patient.                Community & DME    No services have been selected for the patient.                         Final Discharge Disposition Code: 03 - skilled nursing facility (SNF)

## 2024-02-03 NOTE — PLAN OF CARE
Goal Outcome Evaluation:  Plan of Care Reviewed With: patient        Progress: improving  Outcome Evaluation: Patient agreeable, family present. states he's going to rehab later today. Patient requires Max A x2 for supine<>sit transfers using bed rail and HOB elevated, knee immobilizer donned entire session. REadjusted as well, education on proper use. Patient performed sit<>stands from bed x 3 reps with Max x2, stood 10 seconds, able to take 4 steps laterally to the right with RW with Min A x2. Improved participation today .      Anticipated Discharge Disposition (PT): skilled nursing facility

## 2024-02-03 NOTE — CASE MANAGEMENT/SOCIAL WORK
Continued Stay Note  MERCEDEZ Reyes     Patient Name: Joey Schmitt  MRN: [id number]  Today's Date: 2/3/2024    Admit Date: 1/31/2024    Plan: DC to [facility]   Discharge Plan       Row Name 02/03/24 0902       Plan    Plan DC to [facility]    Plan Comments CCP noted dc, called Danika to obtain correct pharmacy, it is [facility] and to let her know pt will be dc'd today to [facility].  I gave pts nurse the phone number for report.  Pharmacy changed in Epic.  CCP will follow. Cesilia SHAVER    Final Discharge Disposition Code 03 - skilled nursing facility (SNF)    Final Note DC to SNF                   Discharge Codes    No documentation.                 Expected Discharge Date and Time       Expected Discharge Date Expected Discharge Time    Feb 3, 2024               Cesilia Seay

## 2024-02-03 NOTE — DISCHARGE SUMMARY
Orthopedic Discharge Summary      Patient: Joey Schmitt      YOB: 1935    Medical Record Number: [id number]    Attending Physician: Maikel King MD  Consulting Physician(s):   Date of Admission: 1/31/2024 11:35 AM  Date of Discharge:     Active Hospital Problems    Diagnosis     **Right patella fracture       Status Post: No admission procedures for hospital encounter.      No Known Allergies    Current Medications:     Discharge Medications        ASK your doctor about these medications        Instructions Start Date   atorvastatin 10 MG tablet  Commonly known as: LIPITOR  Ask about: Which instructions should I use?   10 mg, Oral, Daily      donepezil 5 MG tablet  Commonly known as: ARICEPT  Ask about: Which instructions should I use?   10 mg, Oral, Nightly      Lantus SoloStar 100 UNIT/ML injection pen  Generic drug: Insulin Glargine   Inject 15 Units under the skin into the appropriate area as directed Daily.      levothyroxine 112 MCG tablet  Commonly known as: SYNTHROID, LEVOTHROID  Ask about: Which instructions should I use?   112 mcg, Oral, Daily      lisinopril 5 MG tablet  Commonly known as: PRINIVIL,ZESTRIL  Ask about: Which instructions should I use?   5 mg, Oral, Daily      MiraLax 17 GM/SCOOP powder  Generic drug: polyethylene glycol   17 g, Oral, Daily      Ozempic (2 MG/DOSE) 8 MG/3ML solution pen-injector  Generic drug: Semaglutide (2 MG/DOSE)  Ask about: Which instructions should I use?   Inject 2 mg under the skin into the appropriate area as directed 1 (One) Time Per Week.      pioglitazone 30 MG tablet  Commonly known as: ACTOS  Ask about: Which instructions should I use?   30 mg, Oral, Daily                   Past Medical History:   Diagnosis Date    Cellulitis of foot     Chronic kidney disease     Dementia     Hyperlipidemia     Hypertension     Hypothyroidism     Macular degeneration     Prostate cancer     Renal disorder     Right shoulder pain     SCHEDULED FOR  SX    Seasonal allergies     Type 2 diabetes mellitus     Vertigo      Past Surgical History:   Procedure Laterality Date    INCISION AND DRAINAGE LEG Right 2017    Procedure: INCISION AND DRAINAGE LOWER EXTREMITY;  Surgeon: Mikael Dwyer DPM;  Location: [facility];  Service:     PROSTATE SURGERY      SHOULDER SURGERY Left     TOTAL SHOULDER ARTHROPLASTY W/ DISTAL CLAVICLE EXCISION Right 2017    Procedure: TOTAL SHOULDER REVERSE ARTHROPLASTY;  Surgeon: Goyo Bhardwaj MD;  Location: [facility];  Service:      Social History     Occupational History    Not on file   Tobacco Use    Smoking status: Former     Packs/day: 1.00     Years: 15.00     Additional pack years: 0.00     Total pack years: 15.00     Types: Cigarettes     Quit date:      Years since quittin.1     Passive exposure: Past    Smokeless tobacco: Never    Tobacco comments:     caffeine use: one cup of coffee daily.    Vaping Use    Vaping Use: Never used   Substance and Sexual Activity    Alcohol use: No    Drug use: No    Sexual activity: Defer      Social History     Social History Narrative    Not on file     Family History   Problem Relation Age of Onset    Heart disease Father          Physical Exam: 88 y.o. male  General Appearance:    Alert, cooperative, in no acute distress                      Vitals:    24 0640 24 1525 24 1956 24 0546   BP: 141/64 150/69 131/70 145/76   BP Location: Right arm Right arm Right arm Right arm   Patient Position: Lying Sitting Lying Lying   Pulse: 82 69 69 104   Resp: 22 20 20 20   Temp: 98 °F (36.7 °C) 97.8 °F (36.6 °C) 97.6 °F (36.4 °C) 97.6 °F (36.4 °C)   TempSrc: Oral Oral Oral Oral   SpO2: 96% 93% 97% 97%   Weight:       Height:                General: No acute distress  Pulmonary: Nonlabored breathing  Cardiovascular: Regular rate and rhythm  RLE  Sensation normal  2+ dorsalis pedis and posterior tibial pulse  5 out of 5 ankle plantarflexion dorsiflexion inversion and  eversion  Dressing clean dry and intact  No signs of DVT or compartment syndrome     Hospital Course:  88 y.o. male admitted to [facility] to services of Maikel King MD with Right patella fracture [S82.001A] on 1/31/2024 and underwent No admission procedures for hospital encounter.  Per Maikel King MD. Antibiotic and VTE prophylaxis were per SCIP protocols. Post-operatively the patient transferred to the post-operative floor where the patient underwent mobilization therapy that included active as well as passive ROM exercises. Opioids were titrated to achieve appropriate pain management to allow for participation in mobilization exercises. Vital signs are now stable. The incision is intact without signs or symptoms of infection. Operative extremity neurovascular status remains intact.   Appropriate education re: incision care, activity levels, medications, and follow up visits was completed and all questions were answered. The patient is now deemed stable for discharge to Home.      DIAGNOSTIC TESTS:     Admission on 01/31/2024   Component Date Value Ref Range Status    Hemoglobin A1C 01/31/2024 7.50 (H)  4.80 - 5.60 % Final    Glucose 02/01/2024 107  70 - 130 mg/dL Final    Glucose 02/01/2024 204 (H)  70 - 130 mg/dL Final    Glucose 02/01/2024 213 (H)  70 - 130 mg/dL Final    Glucose 02/02/2024 169 (H)  70 - 130 mg/dL Final       No results found.    Discharge and Follow up Instructions:   Follow-up with Karen Rajan in the office in 2 weeks          Date: 2/3/2024    Mitesh Andrade MD
